# Patient Record
Sex: MALE | Race: BLACK OR AFRICAN AMERICAN | Employment: FULL TIME | URBAN - METROPOLITAN AREA
[De-identification: names, ages, dates, MRNs, and addresses within clinical notes are randomized per-mention and may not be internally consistent; named-entity substitution may affect disease eponyms.]

---

## 2018-08-01 ENCOUNTER — HOSPITAL ENCOUNTER (EMERGENCY)
Age: 48
Discharge: HOME | End: 2018-08-01
Attending: EMERGENCY MEDICINE
Payer: COMMERCIAL

## 2018-08-01 ENCOUNTER — APPOINTMENT (OUTPATIENT)
Dept: ULTRASOUND IMAGING | Age: 48
End: 2018-08-01
Attending: EMERGENCY MEDICINE
Payer: COMMERCIAL

## 2018-08-01 VITALS
RESPIRATION RATE: 13 BRPM | HEIGHT: 67 IN | BODY MASS INDEX: 25.95 KG/M2 | SYSTOLIC BLOOD PRESSURE: 137 MMHG | TEMPERATURE: 99.1 F | OXYGEN SATURATION: 97 % | HEART RATE: 94 BPM | DIASTOLIC BLOOD PRESSURE: 98 MMHG | WEIGHT: 165.34 LBS

## 2018-08-01 DIAGNOSIS — K85.90 ACUTE PANCREATITIS WITHOUT INFECTION OR NECROSIS, UNSPECIFIED PANCREATITIS TYPE: Primary | ICD-10-CM

## 2018-08-01 DIAGNOSIS — N30.01 ACUTE CYSTITIS WITH HEMATURIA: ICD-10-CM

## 2018-08-01 DIAGNOSIS — K76.0 HEPATIC STEATOSIS: ICD-10-CM

## 2018-08-01 DIAGNOSIS — R11.2 NAUSEA AND VOMITING, INTRACTABILITY OF VOMITING NOT SPECIFIED, UNSPECIFIED VOMITING TYPE: ICD-10-CM

## 2018-08-01 LAB
ALBUMIN SERPL-MCNC: 3.7 G/DL (ref 3.5–5)
ALBUMIN/GLOB SERPL: 0.8 {RATIO} (ref 1.1–2.2)
ALP SERPL-CCNC: 98 U/L (ref 45–117)
ALT SERPL-CCNC: 84 U/L (ref 12–78)
ANION GAP SERPL CALC-SCNC: 6 MMOL/L (ref 5–15)
APPEARANCE UR: CLEAR
AST SERPL-CCNC: 48 U/L (ref 15–37)
BACTERIA URNS QL MICRO: ABNORMAL /HPF
BASOPHILS # BLD: 0 K/UL (ref 0–0.1)
BASOPHILS NFR BLD: 0 % (ref 0–1)
BILIRUB SERPL-MCNC: 1.8 MG/DL (ref 0.2–1)
BILIRUB UR QL CFM: NEGATIVE
BUN SERPL-MCNC: 13 MG/DL (ref 6–20)
BUN/CREAT SERPL: 13 (ref 12–20)
CALCIUM SERPL-MCNC: 9.5 MG/DL (ref 8.5–10.1)
CHLORIDE SERPL-SCNC: 92 MMOL/L (ref 97–108)
CO2 SERPL-SCNC: 30 MMOL/L (ref 21–32)
COLOR UR: ABNORMAL
CREAT SERPL-MCNC: 1.03 MG/DL (ref 0.7–1.3)
DIFFERENTIAL METHOD BLD: ABNORMAL
EOSINOPHIL # BLD: 0 K/UL (ref 0–0.4)
EOSINOPHIL NFR BLD: 0 % (ref 0–7)
EPITH CASTS URNS QL MICRO: ABNORMAL /LPF
ERYTHROCYTE [DISTWIDTH] IN BLOOD BY AUTOMATED COUNT: 11.3 % (ref 11.5–14.5)
GLOBULIN SER CALC-MCNC: 4.6 G/DL (ref 2–4)
GLUCOSE SERPL-MCNC: 108 MG/DL (ref 65–100)
GLUCOSE UR STRIP.AUTO-MCNC: NEGATIVE MG/DL
HCT VFR BLD AUTO: 46.9 % (ref 36.6–50.3)
HGB BLD-MCNC: 16.7 G/DL (ref 12.1–17)
HGB UR QL STRIP: ABNORMAL
HYALINE CASTS URNS QL MICRO: ABNORMAL /LPF (ref 0–5)
IMM GRANULOCYTES # BLD: 0 K/UL (ref 0–0.04)
IMM GRANULOCYTES NFR BLD AUTO: 0 % (ref 0–0.5)
KETONES UR QL STRIP.AUTO: 40 MG/DL
LEUKOCYTE ESTERASE UR QL STRIP.AUTO: ABNORMAL
LIPASE SERPL-CCNC: >3000 U/L (ref 73–393)
LYMPHOCYTES # BLD: 0.5 K/UL (ref 0.8–3.5)
LYMPHOCYTES NFR BLD: 8 % (ref 12–49)
MCH RBC QN AUTO: 34.5 PG (ref 26–34)
MCHC RBC AUTO-ENTMCNC: 35.6 G/DL (ref 30–36.5)
MCV RBC AUTO: 96.9 FL (ref 80–99)
MONOCYTES # BLD: 0.6 K/UL (ref 0–1)
MONOCYTES NFR BLD: 10 % (ref 5–13)
MUCOUS THREADS URNS QL MICRO: ABNORMAL /LPF
NEUTS SEG # BLD: 4.9 K/UL (ref 1.8–8)
NEUTS SEG NFR BLD: 82 % (ref 32–75)
NITRITE UR QL STRIP.AUTO: POSITIVE
NRBC # BLD: 0 K/UL (ref 0–0.01)
NRBC BLD-RTO: 0 PER 100 WBC
PH UR STRIP: 6 [PH] (ref 5–8)
PLATELET # BLD AUTO: 189 K/UL (ref 150–400)
PMV BLD AUTO: 9.7 FL (ref 8.9–12.9)
POTASSIUM SERPL-SCNC: 3.8 MMOL/L (ref 3.5–5.1)
PROT SERPL-MCNC: 8.3 G/DL (ref 6.4–8.2)
PROT UR STRIP-MCNC: 100 MG/DL
RBC # BLD AUTO: 4.84 M/UL (ref 4.1–5.7)
RBC #/AREA URNS HPF: ABNORMAL /HPF (ref 0–5)
SODIUM SERPL-SCNC: 128 MMOL/L (ref 136–145)
SP GR UR REFRACTOMETRY: 1.03 (ref 1–1.03)
UA: UC IF INDICATED,UAUC: ABNORMAL
UROBILINOGEN UR QL STRIP.AUTO: 1 EU/DL (ref 0.2–1)
WBC # BLD AUTO: 6 K/UL (ref 4.1–11.1)
WBC URNS QL MICRO: ABNORMAL /HPF (ref 0–4)

## 2018-08-01 PROCEDURE — 85025 COMPLETE CBC W/AUTO DIFF WBC: CPT | Performed by: EMERGENCY MEDICINE

## 2018-08-01 PROCEDURE — 74011250637 HC RX REV CODE- 250/637: Performed by: EMERGENCY MEDICINE

## 2018-08-01 PROCEDURE — 81001 URINALYSIS AUTO W/SCOPE: CPT | Performed by: EMERGENCY MEDICINE

## 2018-08-01 PROCEDURE — 87086 URINE CULTURE/COLONY COUNT: CPT | Performed by: EMERGENCY MEDICINE

## 2018-08-01 PROCEDURE — 96374 THER/PROPH/DIAG INJ IV PUSH: CPT

## 2018-08-01 PROCEDURE — 80053 COMPREHEN METABOLIC PANEL: CPT | Performed by: EMERGENCY MEDICINE

## 2018-08-01 PROCEDURE — 96375 TX/PRO/DX INJ NEW DRUG ADDON: CPT

## 2018-08-01 PROCEDURE — 74011250636 HC RX REV CODE- 250/636: Performed by: EMERGENCY MEDICINE

## 2018-08-01 PROCEDURE — 99284 EMERGENCY DEPT VISIT MOD MDM: CPT

## 2018-08-01 PROCEDURE — 96361 HYDRATE IV INFUSION ADD-ON: CPT

## 2018-08-01 PROCEDURE — 83690 ASSAY OF LIPASE: CPT | Performed by: EMERGENCY MEDICINE

## 2018-08-01 PROCEDURE — 76705 ECHO EXAM OF ABDOMEN: CPT

## 2018-08-01 PROCEDURE — 36415 COLL VENOUS BLD VENIPUNCTURE: CPT | Performed by: EMERGENCY MEDICINE

## 2018-08-01 RX ORDER — ONDANSETRON 2 MG/ML
4 INJECTION INTRAMUSCULAR; INTRAVENOUS
Status: COMPLETED | OUTPATIENT
Start: 2018-08-01 | End: 2018-08-01

## 2018-08-01 RX ORDER — ONDANSETRON 4 MG/1
4 TABLET, ORALLY DISINTEGRATING ORAL
Status: COMPLETED | OUTPATIENT
Start: 2018-08-01 | End: 2018-08-01

## 2018-08-01 RX ORDER — CEPHALEXIN 500 MG/1
500 CAPSULE ORAL 3 TIMES DAILY
Qty: 15 CAP | Refills: 0 | Status: SHIPPED | OUTPATIENT
Start: 2018-08-01 | End: 2019-03-02

## 2018-08-01 RX ORDER — ONDANSETRON 4 MG/1
4 TABLET, ORALLY DISINTEGRATING ORAL
Qty: 10 TAB | Refills: 0 | Status: SHIPPED | OUTPATIENT
Start: 2018-08-01 | End: 2019-02-28

## 2018-08-01 RX ORDER — OXYCODONE HYDROCHLORIDE 5 MG/1
5 TABLET ORAL
Qty: 10 TAB | Refills: 0 | Status: SHIPPED | OUTPATIENT
Start: 2018-08-01 | End: 2019-02-28

## 2018-08-01 RX ORDER — HYDROCODONE BITARTRATE AND ACETAMINOPHEN 5; 325 MG/1; MG/1
1 TABLET ORAL
Status: COMPLETED | OUTPATIENT
Start: 2018-08-01 | End: 2018-08-01

## 2018-08-01 RX ORDER — FAMOTIDINE 20 MG/1
20 TABLET, FILM COATED ORAL 2 TIMES DAILY
Qty: 20 TAB | Refills: 0 | Status: SHIPPED | OUTPATIENT
Start: 2018-08-01 | End: 2019-02-28

## 2018-08-01 RX ORDER — FENTANYL CITRATE 50 UG/ML
50 INJECTION, SOLUTION INTRAMUSCULAR; INTRAVENOUS
Status: COMPLETED | OUTPATIENT
Start: 2018-08-01 | End: 2018-08-01

## 2018-08-01 RX ADMIN — FENTANYL CITRATE 50 MCG: 50 INJECTION, SOLUTION INTRAMUSCULAR; INTRAVENOUS at 14:56

## 2018-08-01 RX ADMIN — HYDROCODONE BITARTRATE AND ACETAMINOPHEN 1 TABLET: 5; 325 TABLET ORAL at 12:47

## 2018-08-01 RX ADMIN — ONDANSETRON 4 MG: 2 INJECTION, SOLUTION INTRAMUSCULAR; INTRAVENOUS at 14:56

## 2018-08-01 RX ADMIN — SODIUM CHLORIDE 1000 ML: 900 INJECTION, SOLUTION INTRAVENOUS at 14:56

## 2018-08-01 RX ADMIN — ONDANSETRON 4 MG: 4 TABLET, ORALLY DISINTEGRATING ORAL at 12:48

## 2018-08-01 NOTE — ED PROVIDER NOTES
EMERGENCY DEPARTMENT HISTORY AND PHYSICAL EXAM      Date: 8/1/2018  Patient Name: David Devi    History of Presenting Illness     Chief Complaint   Patient presents with    Dizziness     per pt n/v past few days     History Provided By: Patient    HPI: David Devi, 50 y.o. male with no significant PMHx, presents ambulatory to the ED with cc of constant 8 out of 10 abdominal pain, ongoing for 2 days. Pt reports subjective fever, nausea, vomiting, decreased urination, and lightheadedness alongside LLQ, LUQ, and epigastric pain. He notes that he cannot keep down any fluids or food and has therefore unable to urinate or pass a stool. He endorses to typically drinking 4-5 beers daily. Pt denies taking any OTC medications for relief PTA. He denies any HA, chills, dysuria, back pain, CP, or diarrhea. Chief Complaint: abdominal pain  Duration: 2 Days  Timing:  Intermittent and Constant  Location: LUQ, LLQ  Quality: burning  Severity: 8 out of 10  Modifying Factors: denies any modifying factors  Associated Symptoms: subjective fever, nausea, vomiting, decreased urination, lightheadedness    There are no other complaints, changes, or physical findings at this time. PCP: Jessica Whaley MD  Past History     Past Medical History:  History reviewed. No pertinent past medical history. Past Surgical History:  History reviewed. No pertinent surgical history. Family History:  History reviewed. No pertinent family history. Social History:  Social History   Substance Use Topics    Smoking status: Never Smoker    Smokeless tobacco: Never Used    Alcohol use 3.6 oz/week     6 Cans of beer per week       Allergies:  No Known Allergies  Review of Systems   Review of Systems   Constitutional: Positive for fever (subjective). Negative for chills. HENT: Negative for congestion. Eyes: Negative for visual disturbance. Respiratory: Negative for chest tightness. Cardiovascular: Negative for chest pain. Gastrointestinal: Positive for abdominal pain (LUQ, LLQ), nausea and vomiting. Endocrine: Negative for heat intolerance. Genitourinary: Positive for difficulty urinating. Negative for dysuria. Musculoskeletal: Negative for back pain. Skin: Negative for rash. Allergic/Immunologic: Negative for immunocompromised state. Neurological: Positive for light-headedness. Negative for headaches. Hematological: Does not bruise/bleed easily. Psychiatric/Behavioral: Negative. All other systems reviewed and are negative. Physical Exam   Physical Exam   Constitutional: He is oriented to person, place, and time. He appears well-developed and well-nourished. He appears distressed (mild). HENT:   Head: Normocephalic and atraumatic. Eyes: EOM are normal. Pupils are equal, round, and reactive to light. Neck: Normal range of motion. Neck supple. Cardiovascular: Normal rate, regular rhythm and normal heart sounds. Pulmonary/Chest: Effort normal and breath sounds normal. He has no wheezes. Abdominal: Soft. Bowel sounds are normal.   Epigastric tenderness greater than LUQ greater than LLQ   Musculoskeletal: Normal range of motion. He exhibits no edema or tenderness. Neurological: He is alert and oriented to person, place, and time. No cranial nerve deficit. Skin: Skin is warm and dry. Psychiatric: He has a normal mood and affect. His behavior is normal.   Nursing note and vitals reviewed.     Diagnostic Study Results     Labs -     Recent Results (from the past 12 hour(s))   CBC WITH AUTOMATED DIFF    Collection Time: 08/01/18 11:35 AM   Result Value Ref Range    WBC 6.0 4.1 - 11.1 K/uL    RBC 4.84 4.10 - 5.70 M/uL    HGB 16.7 12.1 - 17.0 g/dL    HCT 46.9 36.6 - 50.3 %    MCV 96.9 80.0 - 99.0 FL    MCH 34.5 (H) 26.0 - 34.0 PG    MCHC 35.6 30.0 - 36.5 g/dL    RDW 11.3 (L) 11.5 - 14.5 %    PLATELET 937 662 - 510 K/uL    MPV 9.7 8.9 - 12.9 FL    NRBC 0.0 0  WBC    ABSOLUTE NRBC 0.00 0.00 - 0.01 K/uL    NEUTROPHILS 82 (H) 32 - 75 %    LYMPHOCYTES 8 (L) 12 - 49 %    MONOCYTES 10 5 - 13 %    EOSINOPHILS 0 0 - 7 %    BASOPHILS 0 0 - 1 %    IMMATURE GRANULOCYTES 0 0.0 - 0.5 %    ABS. NEUTROPHILS 4.9 1.8 - 8.0 K/UL    ABS. LYMPHOCYTES 0.5 (L) 0.8 - 3.5 K/UL    ABS. MONOCYTES 0.6 0.0 - 1.0 K/UL    ABS. EOSINOPHILS 0.0 0.0 - 0.4 K/UL    ABS. BASOPHILS 0.0 0.0 - 0.1 K/UL    ABS. IMM. GRANS. 0.0 0.00 - 0.04 K/UL    DF AUTOMATED     METABOLIC PANEL, COMPREHENSIVE    Collection Time: 08/01/18 11:35 AM   Result Value Ref Range    Sodium 128 (L) 136 - 145 mmol/L    Potassium 3.8 3.5 - 5.1 mmol/L    Chloride 92 (L) 97 - 108 mmol/L    CO2 30 21 - 32 mmol/L    Anion gap 6 5 - 15 mmol/L    Glucose 108 (H) 65 - 100 mg/dL    BUN 13 6 - 20 MG/DL    Creatinine 1.03 0.70 - 1.30 MG/DL    BUN/Creatinine ratio 13 12 - 20      GFR est AA >60 >60 ml/min/1.73m2    GFR est non-AA >60 >60 ml/min/1.73m2    Calcium 9.5 8.5 - 10.1 MG/DL    Bilirubin, total 1.8 (H) 0.2 - 1.0 MG/DL    ALT (SGPT) 84 (H) 12 - 78 U/L    AST (SGOT) 48 (H) 15 - 37 U/L    Alk.  phosphatase 98 45 - 117 U/L    Protein, total 8.3 (H) 6.4 - 8.2 g/dL    Albumin 3.7 3.5 - 5.0 g/dL    Globulin 4.6 (H) 2.0 - 4.0 g/dL    A-G Ratio 0.8 (L) 1.1 - 2.2     LIPASE    Collection Time: 08/01/18 11:35 AM   Result Value Ref Range    Lipase >3000 (H) 73 - 393 U/L   URINALYSIS W/ REFLEX CULTURE    Collection Time: 08/01/18 12:06 PM   Result Value Ref Range    Color ORANGE      Appearance CLEAR CLEAR      Specific gravity 1.028 1.003 - 1.030      pH (UA) 6.0 5.0 - 8.0      Protein 100 (A) NEG mg/dL    Glucose NEGATIVE  NEG mg/dL    Ketone 40 (A) NEG mg/dL    Blood MODERATE (A) NEG      Urobilinogen 1.0 0.2 - 1.0 EU/dL    Nitrites POSITIVE (A) NEG      Leukocyte Esterase SMALL (A) NEG      WBC 5-10 0 - 4 /hpf    RBC 5-10 0 - 5 /hpf    Epithelial cells FEW FEW /lpf    Bacteria 1+ (A) NEG /hpf    UA:UC IF INDICATED URINE CULTURE ORDERED (A) CNI      Mucus TRACE (A) NEG /lpf    Hyaline cast 0-2 0 - 5 /lpf   BILIRUBIN, CONFIRM    Collection Time: 08/01/18 12:06 PM   Result Value Ref Range    Bilirubin UA, confirm NEGATIVE  NEG         Radiologic Studies -   US ABD LTD   Final Result   Initial Result:     Impression:     IMPRESSION:   1. Trace ascites. 2. Moderate hepatic steatosis. No overt cirrhosis.     Narrative:     ULTRASOUND OF THE RIGHT UPPER QUADRANT    INDICATION: Abdominal pain; Pancreatitis, acute, first episode, etiology  unknown, 2-3 days out    COMPARISON: None. TECHNIQUE:  Sonography of the right upper quadrant was performed. FINDINGS:    LIVER: Hepatic echotexture is increased, consistent with moderate hepatic  steatosis. The deep portions of the liver are not well visualized as a result. No intrahepatic biliary ductal dilation or focal mass shown in the superficial  aspects of the liver. There is trace ascites. The liver is not overtly  cirrhotic. GALLBLADDER: No gallstones, wall thickening, or pericholecystic fluid. COMMON DUCT: 0. 3 cm in diameter. The duct is normal caliber. PANCREAS: Obscured by bowel gas. RIGHT KIDNEY: 10.4 cm in length. No hydronephrosis, shadowing calculus, or  contour-deforming renal mass. Medical Decision Making   I am the first provider for this patient. I reviewed the vital signs, available nursing notes, past medical history, past surgical history, family history and social history. Vital Signs-Reviewed the patient's vital signs.   Patient Vitals for the past 12 hrs:   Temp Pulse Resp BP SpO2   08/01/18 1525 - 94 13 - 97 %   08/01/18 1515 - 94 13 (!) 137/98 98 %   08/01/18 1416 - (!) 109 17 - 98 %   08/01/18 1233 99.1 °F (37.3 °C) (!) 116 20 (!) 124/101 98 %   08/01/18 1120 98.6 °F (37 °C) (!) 122 18 109/86 100 %     Pulse Oximetry Analysis - 98% on RA    Records Reviewed: Nursing Notes, Old Medical Records and Previous Laboratory Studies    Provider Notes (Medical Decision Making):   DDx: pancreatitis, reflux, gastritis, PUD, dehydration, electrolyte abnormality    ED Course:   Initial assessment performed. The patients presenting problems have been discussed, and they are in agreement with the care plan formulated and outlined with them. I have encouraged them to ask questions as they arise throughout their visit. 3:19 PM  Pt has been re-evaluated and reports to be feeling better. Critical Care Time: 0    Disposition:  3:25 PM  I informed the pt that he needed admission, further observation, further workup for adequate evaluation for his pancreatitis, chest pain, DKA, and that by refusing the above, he is at risk for worsening of symptoms and complications, myocardial infarction, arrhythmia, sudden death, paralysis, coma. He is awake, alert, and he understands his condition and the risks involved in leaving. The patient has received Fentanyl (list any potential altering drugs patient had receivedanalgesics, benzos, sedatives) and it has been 1-2 hours since last receiving this medication. He is clinically aware of his surroundings and able to ask appropriate questions, the patients family member and the nurse present confirms he is not clinically intoxicated and able to make medical decisions. He verbalized knowing the risks and understood it was recommended that he stay and could also return at any time. his family member was present for the discussion and also verbalized that they understood both diagnosis, risks and could return at any time. They were both provided with warnings regarding worsening of his condition and were provided instructions to follow up with Shirley Hawkins MD tomorrow or return to the Emergency Room as soon as possible. This discussion was witnessed by nurse Gibson Arthur. PLAN:  1.    Discharge Medication List as of 8/1/2018  3:35 PM      START taking these medications    Details   ondansetron (ZOFRAN ODT) 4 mg disintegrating tablet Take 1 Tab by mouth every eight (8) hours as needed for Nausea., Normal, Disp-10 Tab, R-0      oxyCODONE IR (ROXICODONE) 5 mg immediate release tablet Take 1 Tab by mouth every four (4) hours as needed for Pain. Max Daily Amount: 30 mg., Print, Disp-10 Tab, R-0      famotidine (PEPCID) 20 mg tablet Take 1 Tab by mouth two (2) times a day., Normal, Disp-20 Tab, R-0      cephALEXin (KEFLEX) 500 mg capsule Take 1 Cap by mouth three (3) times daily. , Normal, Disp-15 Cap, R-0           2. Follow-up Information     Follow up With Details Comments Contact Info    Faina Kothari MD In 2 days As needed [id number]      Jewell Juarez MD  [id number] [phone]      [facility] EMERGENCY DEPT  If symptoms worsen [address] [place] [address] Drive  [address] [facility]  [phone]        Return to ED if worse   Diagnosis     Clinical Impression:   1. Acute pancreatitis without infection or necrosis, unspecified pancreatitis type    2. Hepatic steatosis    3. Nausea and vomiting, intractability of vomiting not specified, unspecified vomiting type    4. Acute cystitis with hematuria        Attestations: This note is prepared by Lanie Mcdonough, acting as a Scribe for Love Malagon MD.    Love Malagon MD: The scribe's documentation has been prepared under my direction and personally reviewed by me in its entirety. I confirm that the notes above accurately reflects all work, treatment, procedures, and medical decision making performed by me.

## 2018-08-01 NOTE — DISCHARGE INSTRUCTIONS
Nonalcoholic Steatohepatitis (SUNG): Care Instructions  Your Care Instructions    Nonalcoholic steatohepatitis (SUNG) is liver inflammation. It is caused by a buildup of fat in the liver. The fat buildup is not caused by drinking alcohol. Because of the inflammation, the liver does not work as well as it should. SUNG is part of a group of liver diseases called nonalcoholic fatty liver disease. In these diseases, fat builds up in the liver and sometimes causes liver damage. This damage can get worse over time. Follow-up care is a key part of your treatment and safety. Be sure to make and go to all appointments, and call your doctor if you are having problems. It's also a good idea to know your test results and keep a list of the medicines you take. How can you care for yourself at home? · Stay at a healthy weight. Or if you need to, slowly get to a healthy weight. · Control your cholesterol. Talk to your doctor about ways to lower your cholesterol, if needed. You might try getting active, taking medicines, and making healthy changes to your diet. · Eat healthy foods. This includes fruits, vegetables, lean meats and dairy, and whole grains. · If you have diabetes, keep your blood sugar at your target level. · Get at least 30 minutes of exercise on most days of the week. Walking is a good choice. You also may want to do other activities, such as running, swimming, cycling, or playing tennis or team sports. · Limit alcohol, or do not drink. Alcohol can damage the liver and cause health problems. When should you call for help? Call 911 anytime you think you may need emergency care.  For example, call if:    · You have trouble breathing.     · You vomit blood or what looks like coffee grounds.    Call your doctor now or seek immediate medical care if:    · You feel very sleepy or confused.     · You have new or worse belly pain.     · You have a fever.     · There is a new or increasing yellow tint to your skin or the whites of your eyes.     · You have any abnormal bleeding, such as:  ¨ Nosebleeds. ¨ Vaginal bleeding that is different (heavier, more frequent, at a different time of the month) than what you are used to. ¨ Bloody or black stools, or rectal bleeding. ¨ Bloody or pink urine.    Watch closely for changes in your health, and be sure to contact your doctor if:    · Your belly is getting bigger.     · You are gaining weight.     · You have any problems. Where can you learn more? Go to http://viviane-wallace.info/. Enter Y087 in the search box to learn more about \"Nonalcoholic Steatohepatitis (SUNG): Care Instructions. \"  Current as of: May 12, 2017  Content Version: 11.7  © 7873-0854 [employer]. Care instructions adapted under license by [employer] (which disclaims liability or warranty for this information). If you have questions about a medical condition or this instruction, always ask your healthcare professional. [place] [address] any warranty or liability for your use of this information. Urinary Tract Infections in Men: Care Instructions  Your Care Instructions    A urinary tract infection, or UTI, is a general term for an infection anywhere between the kidneys and the tip of the penis. UTIs can also be a result of a prostate problem. Most cause pain or burning when you urinate. Most UTIs are caused by bacteria and can be cured with antibiotics. It is important to complete your treatment so that the infection does not get worse. Follow-up care is a key part of your treatment and safety. Be sure to make and go to all appointments, and call your doctor if you are having problems. It's also a good idea to know your test results and keep a list of the medicines you take. How can you care for yourself at home? · Take your antibiotics as prescribed. Do not stop taking them just because you feel better.  You need to take the full course of antibiotics. · Take your medicines exactly as prescribed. Your doctor may have prescribed a medicine, such as phenazopyridine (Pyridium), to help relieve pain when you urinate. This turns your urine orange. You may stop taking it when your symptoms get better. But be sure to take all of your antibiotics, which treat the infection. · Drink extra water for the next day or two. This will help make the urine less concentrated and help wash out the bacteria causing the infection. (If you have kidney, heart, or liver disease and have to limit your fluids, talk with your doctor before you increase your fluid intake.)  · Avoid drinks that are carbonated or have caffeine. They can irritate the bladder. · Urinate often. Try to empty your bladder each time. · To relieve pain, take a hot bath or lay a heating pad (set on low) over your lower belly or genital area. Never go to sleep with a heating pad in place. To help prevent UTIs  · Drink plenty of fluids, enough so that your urine is light yellow or clear like water. If you have kidney, heart, or liver disease and have to limit fluids, talk with your doctor before you increase the amount of fluids you drink. · Urinate when you have the urge. Do not hold your urine for a long time. Urinate before you go to sleep. · Keep your penis clean. Catheter care  If you have a drainage tube (catheter) in place, the following steps will help you care for it. · Always wash your hands before and after touching your catheter. · Check the area around the urethra for inflammation or signs of infection. Signs of infection include irritated, swollen, red, or tender skin, or pus around the catheter. · Clean the area around the catheter with soap and water two times a day. Dry with a clean towel afterward. · Do not apply powder or lotion to the skin around the catheter. To empty the urine collection bag  · Wash your hands with soap and water.   · Without touching the drain spout, remove the spout from its sleeve at the bottom of the collection bag. Open the valve on the spout. · Let the urine flow out of the bag and into the toilet or a container. Do not let the tubing or drain spout touch anything. · After you empty the bag, clean the end of the drain spout with tissue and water. Close the valve and put the drain spout back into its sleeve at the bottom of the collection bag. · Wash your hands with soap and water. When should you call for help? Call your doctor now or seek immediate medical care if:    · Symptoms such as a fever, chills, nausea, or vomiting get worse or happen for the first time.     · You have new pain in your back just below your rib cage. This is called flank pain.     · There is new blood or pus in your urine.     · You are not able to take or keep down your antibiotics.    Watch closely for changes in your health, and be sure to contact your doctor if:    · You are not getting better after taking an antibiotic for 2 days.     · Your symptoms go away but then come back. Where can you learn more? Go to http://viviane-wallace.info/. Enter E486 in the search box to learn more about \"Urinary Tract Infections in Men: Care Instructions. \"  Current as of: May 12, 2017  Content Version: 11.7  © 7169-5870 [employer]. Care instructions adapted under license by [employer] (which disclaims liability or warranty for this information). If you have questions about a medical condition or this instruction, always ask your healthcare professional. [place] [address] any warranty or liability for your use of this information. Nausea and Vomiting: Care Instructions  Your Care Instructions    When you are nauseated, you may feel weak and sweaty and notice a lot of saliva in your mouth. Nausea often leads to vomiting.  Most of the time you do not need to worry about nausea and vomiting, but they can be signs of other illnesses. Two common causes of nausea and vomiting are stomach flu and food poisoning. Nausea and vomiting from viral stomach flu will usually start to improve within 24 hours. Nausea and vomiting from food poisoning may last from 12 to 48 hours. The doctor has checked you carefully, but problems can develop later. If you notice any problems or new symptoms, get medical treatment right away. Follow-up care is a key part of your treatment and safety. Be sure to make and go to all appointments, and call your doctor if you are having problems. It's also a good idea to know your test results and keep a list of the medicines you take. How can you care for yourself at home? · To prevent dehydration, drink plenty of fluids, enough so that your urine is light yellow or clear like water. Choose water and other caffeine-free clear liquids until you feel better. If you have kidney, heart, or liver disease and have to limit fluids, talk with your doctor before you increase the amount of fluids you drink. · Rest in bed until you feel better. · When you are able to eat, try clear soups, mild foods, and liquids until all symptoms are gone for 12 to 48 hours. Other good choices include dry toast, crackers, cooked cereal, and gelatin dessert, such as Jell-O. When should you call for help? Call 911 anytime you think you may need emergency care. For example, call if:    · You passed out (lost consciousness).    Call your doctor now or seek immediate medical care if:    · You have symptoms of dehydration, such as:  ¨ Dry eyes and a dry mouth. ¨ Passing only a little dark urine.   ¨ Feeling thirstier than usual.     · You have new or worsening belly pain.     · You have a new or higher fever.     · You vomit blood or what looks like coffee grounds.    Watch closely for changes in your health, and be sure to contact your doctor if:    · You have ongoing nausea and vomiting.     · Your vomiting is getting worse.     · Your vomiting lasts longer than 2 days.     · You are not getting better as expected. Where can you learn more? Go to http://viviane-wallace.info/. Enter 25 [id number] in the search box to learn more about \"Nausea and Vomiting: Care Instructions. \"  Current as of: November 20, 2017  Content Version: 11.7  © 2787-4046 [employer]. Care instructions adapted under license by [employer] (which disclaims liability or warranty for this information). If you have questions about a medical condition or this instruction, always ask your healthcare professional. [address] any warranty or liability for your use of this information. Pancreatitis: Care Instructions  Your Care Instructions    The pancreas is an organ behind the stomach. It makes hormones and enzymes to help your body digest food. But if these enzymes attack the pancreas, it can get inflamed. This is called pancreatitis. Most cases are caused by gallstones or by heavy alcohol use. If you take care of yourself at home, it will help you get better. It will also help you avoid more problems with your pancreas. Follow-up care is a key part of your treatment and safety. Be sure to make and go to all appointments, and call your doctor if you are having problems. It's also a good idea to know your test results and keep a list of the medicines you take. How can you care for yourself at home? · Drink clear liquids and eat bland foods until you feel better. [place] foods include rice, dry toast, and crackers. They also include bananas and applesauce. · Eat a low-fat diet until your doctor says your pancreas is healed. · Do not drink alcohol. Tell your doctor if you need help to quit. Counseling, support groups, and sometimes medicines can help you stay sober. · Be safe with medicines. Read and follow all instructions on the label.   ¨ If the doctor gave you a prescription medicine for pain, take it as prescribed. ¨ If you are not taking a prescription pain medicine, ask your doctor if you can take an over-the-counter medicine. · If your doctor prescribed antibiotics, take them as directed. Do not stop taking them just because you feel better. You need to take the full course of antibiotics. · Get extra rest until you feel better. To prevent future problems with your pancreas  · Do not drink alcohol. · Tell your doctors and pharmacist that you've had pancreatitis. They can help you avoid medicines that may cause this problem again. When should you call for help? Call 911 anytime you think you may need emergency care. For example, call if:    · You vomit blood or what looks like coffee grounds.     · Your stools are maroon or very bloody.    Call your doctor now or seek immediate medical care if:    · You have new or worse belly pain.     · Your stools are black and look like tar, or they have streaks of blood.     · You are vomiting.    Watch closely for changes in your health, and be sure to contact your doctor if:    · You do not get better as expected. Where can you learn more? Go to http://viviane-wallace.info/. Enter F244 in the search box to learn more about \"Pancreatitis: Care Instructions. \"  Current as of: May 12, 2017  Content Version: 11.7  © 4792-5386 [employer]. Care instructions adapted under license by [employer] (which disclaims liability or warranty for this information). If you have questions about a medical condition or this instruction, always ask your healthcare professional. [place] [address] any warranty or liability for your use of this information. Thank you! Thank you for allowing us to provide you with excellent care today. We hope we addressed all of your concerns and needs. We strive to provide excellent quality care in the Emergency Department.  You may receive a survey after your visit to evaluate the care you were provided. Should you receive a survey from us, we invite you to share your experience and tell us what made it excellent. It was a pleasure serving you, we invite you to share your experience with us, in our pursuit for excellence, should you be selected to receive a survey. If you feel that you have not received excellent quality care or timely care, please ask to speak to the nurse manager. Please choose us in the future for your continued health care needs. ------------------------------------------------------------------------------------------------------------  The exam and treatment you received in the Emergency Department were for an urgent problem and are not intended as complete care. It is important that you follow up with a doctor, nurse practitioner, or physician assistant for ongoing care. If your symptoms become worse or you do not improve as expected and you are unable to reach your usual health care provider, you should return to the Emergency Department. We are available 24 hours a day. Please take your discharge instructions with you when you go to your follow-up appointment. If you have any problem arranging a follow-up appointment, contact the Emergency Department immediately. If a prescription has been provided, please have it filled as soon as possible to prevent a delay in treatment. Read the entire medication instruction sheet provided to you by the pharmacy. If you have any questions or reservations about taking the medication due to side effects or interactions with other medications, please call your primary care physician or contact the ER to speak with the charge nurse. Make an appointment with your family doctor or the physician you were referred to for follow-up of this visit as instructed on your discharge paperwork, as this is mandatory follow-up. Return to the ER if you are unable to be seen or if you are unable to be seen in a timely manner.     If you have any problem arranging the follow-up visit, contact the Emergency Department immediately.

## 2018-08-03 LAB
BACTERIA SPEC CULT: NORMAL
CC UR VC: NORMAL
SERVICE CMNT-IMP: NORMAL

## 2019-02-27 ENCOUNTER — HOSPITAL ENCOUNTER (INPATIENT)
Age: 49
LOS: 2 days | Discharge: HOME | DRG: 439 | End: 2019-03-02
Attending: EMERGENCY MEDICINE | Admitting: GENERAL ACUTE CARE HOSPITAL
Payer: COMMERCIAL

## 2019-02-27 DIAGNOSIS — K85.21 ALCOHOL-INDUCED ACUTE PANCREATITIS WITH UNINFECTED NECROSIS: Primary | ICD-10-CM

## 2019-02-27 PROCEDURE — 36415 COLL VENOUS BLD VENIPUNCTURE: CPT

## 2019-02-27 PROCEDURE — 83605 ASSAY OF LACTIC ACID: CPT

## 2019-02-27 PROCEDURE — 85025 COMPLETE CBC W/AUTO DIFF WBC: CPT

## 2019-02-27 PROCEDURE — 99284 EMERGENCY DEPT VISIT MOD MDM: CPT

## 2019-02-27 PROCEDURE — 80307 DRUG TEST PRSMV CHEM ANLYZR: CPT

## 2019-02-27 PROCEDURE — 83690 ASSAY OF LIPASE: CPT

## 2019-02-27 PROCEDURE — 94761 N-INVAS EAR/PLS OXIMETRY MLT: CPT

## 2019-02-27 RX ORDER — ONDANSETRON 4 MG/1
4 TABLET, ORALLY DISINTEGRATING ORAL
Status: COMPLETED | OUTPATIENT
Start: 2019-02-27 | End: 2019-02-28

## 2019-02-27 RX ORDER — FENTANYL CITRATE 50 UG/ML
75 INJECTION, SOLUTION INTRAMUSCULAR; INTRAVENOUS
Status: COMPLETED | OUTPATIENT
Start: 2019-02-27 | End: 2019-02-28

## 2019-02-27 NOTE — LETTER
NOTIFICATION RETURN TO WORK / SCHOOL 
 
3/2/2019 8:08 AM 
 
Mr. Tamara Andrews 
[address] [place] [phone] To Whom It May Concern: 
 
Tamara Andrews has recently been hospitalized at [facility]. He was hospitalized from the evening of February 27 until the morning of March 2, 2019. He is medically cleared to return to work. If there are questions or concerns please have the patient contact our office. Sincerely, Delaney Orozco MD 
Hospitalist 
[facility]

## 2019-02-28 ENCOUNTER — APPOINTMENT (OUTPATIENT)
Dept: CT IMAGING | Age: 49
DRG: 439 | End: 2019-02-28
Attending: EMERGENCY MEDICINE
Payer: COMMERCIAL

## 2019-02-28 ENCOUNTER — APPOINTMENT (OUTPATIENT)
Dept: ULTRASOUND IMAGING | Age: 49
DRG: 439 | End: 2019-02-28
Attending: GENERAL ACUTE CARE HOSPITAL
Payer: COMMERCIAL

## 2019-02-28 PROBLEM — K85.90 PANCREATITIS: Status: ACTIVE | Noted: 2019-02-28

## 2019-02-28 LAB
ALBUMIN SERPL-MCNC: 4.1 G/DL (ref 3.5–5)
ALBUMIN/GLOB SERPL: 0.9 {RATIO} (ref 1.1–2.2)
ALP SERPL-CCNC: 92 U/L (ref 45–117)
ALT SERPL-CCNC: 83 U/L (ref 12–78)
ANION GAP SERPL CALC-SCNC: 11 MMOL/L (ref 5–15)
ANION GAP SERPL CALC-SCNC: 9 MMOL/L (ref 5–15)
APPEARANCE UR: CLEAR
AST SERPL-CCNC: 84 U/L (ref 15–37)
BACTERIA URNS QL MICRO: NEGATIVE /HPF
BASOPHILS # BLD: 0 K/UL (ref 0–0.1)
BASOPHILS NFR BLD: 0 % (ref 0–1)
BILIRUB SERPL-MCNC: 1.9 MG/DL (ref 0.2–1)
BILIRUB UR QL CFM: POSITIVE
BUN SERPL-MCNC: 13 MG/DL (ref 6–20)
BUN SERPL-MCNC: 15 MG/DL (ref 6–20)
BUN/CREAT SERPL: 16 (ref 12–20)
BUN/CREAT SERPL: 16 (ref 12–20)
CALCIUM SERPL-MCNC: 9.1 MG/DL (ref 8.5–10.1)
CALCIUM SERPL-MCNC: 9.8 MG/DL (ref 8.5–10.1)
CHLORIDE SERPL-SCNC: 89 MMOL/L (ref 97–108)
CHLORIDE SERPL-SCNC: 92 MMOL/L (ref 97–108)
CO2 SERPL-SCNC: 27 MMOL/L (ref 21–32)
CO2 SERPL-SCNC: 28 MMOL/L (ref 21–32)
COLOR UR: ABNORMAL
COMMENT, HOLDF: NORMAL
CREAT SERPL-MCNC: 0.82 MG/DL (ref 0.7–1.3)
CREAT SERPL-MCNC: 0.92 MG/DL (ref 0.7–1.3)
DIFFERENTIAL METHOD BLD: ABNORMAL
EOSINOPHIL # BLD: 0 K/UL (ref 0–0.4)
EOSINOPHIL NFR BLD: 0 % (ref 0–7)
EPITH CASTS URNS QL MICRO: ABNORMAL /LPF
ERYTHROCYTE [DISTWIDTH] IN BLOOD BY AUTOMATED COUNT: 10.8 % (ref 11.5–14.5)
ETHANOL SERPL-MCNC: <10 MG/DL
GLOBULIN SER CALC-MCNC: 4.4 G/DL (ref 2–4)
GLUCOSE SERPL-MCNC: 104 MG/DL (ref 65–100)
GLUCOSE SERPL-MCNC: 106 MG/DL (ref 65–100)
GLUCOSE UR STRIP.AUTO-MCNC: NEGATIVE MG/DL
HCT VFR BLD AUTO: 42.6 % (ref 36.6–50.3)
HGB BLD-MCNC: 15.8 G/DL (ref 12.1–17)
HGB UR QL STRIP: ABNORMAL
HYALINE CASTS URNS QL MICRO: ABNORMAL /LPF (ref 0–5)
IMM GRANULOCYTES # BLD AUTO: 0 K/UL (ref 0–0.04)
IMM GRANULOCYTES NFR BLD AUTO: 0 % (ref 0–0.5)
KETONES UR QL STRIP.AUTO: 80 MG/DL
LACTATE SERPL-SCNC: 1.3 MMOL/L (ref 0.4–2)
LACTATE SERPL-SCNC: 2.2 MMOL/L (ref 0.4–2)
LEUKOCYTE ESTERASE UR QL STRIP.AUTO: ABNORMAL
LIPASE SERPL-CCNC: >3000 U/L (ref 73–393)
LIPASE SERPL-CCNC: >3000 U/L (ref 73–393)
LYMPHOCYTES # BLD: 0.3 K/UL (ref 0.8–3.5)
LYMPHOCYTES NFR BLD: 4 % (ref 12–49)
MCH RBC QN AUTO: 34.7 PG (ref 26–34)
MCHC RBC AUTO-ENTMCNC: 37.1 G/DL (ref 30–36.5)
MCV RBC AUTO: 93.6 FL (ref 80–99)
MONOCYTES # BLD: 0.6 K/UL (ref 0–1)
MONOCYTES NFR BLD: 9 % (ref 5–13)
NEUTS SEG # BLD: 5.7 K/UL (ref 1.8–8)
NEUTS SEG NFR BLD: 87 % (ref 32–75)
NITRITE UR QL STRIP.AUTO: POSITIVE
NRBC # BLD: 0 K/UL (ref 0–0.01)
NRBC BLD-RTO: 0 PER 100 WBC
PH UR STRIP: 6 [PH] (ref 5–8)
PLATELET # BLD AUTO: 177 K/UL (ref 150–400)
PMV BLD AUTO: 9.7 FL (ref 8.9–12.9)
POTASSIUM SERPL-SCNC: 3.9 MMOL/L (ref 3.5–5.1)
POTASSIUM SERPL-SCNC: 4.2 MMOL/L (ref 3.5–5.1)
PROT SERPL-MCNC: 8.5 G/DL (ref 6.4–8.2)
PROT UR STRIP-MCNC: 100 MG/DL
RBC # BLD AUTO: 4.55 M/UL (ref 4.1–5.7)
RBC #/AREA URNS HPF: ABNORMAL /HPF (ref 0–5)
RBC MORPH BLD: ABNORMAL
SAMPLES BEING HELD,HOLD: NORMAL
SODIUM SERPL-SCNC: 127 MMOL/L (ref 136–145)
SODIUM SERPL-SCNC: 129 MMOL/L (ref 136–145)
SP GR UR REFRACTOMETRY: ABNORMAL (ref 1–1.03)
UA: UC IF INDICATED,UAUC: ABNORMAL
UROBILINOGEN UR QL STRIP.AUTO: 1 EU/DL (ref 0.2–1)
WBC # BLD AUTO: 6.6 K/UL (ref 4.1–11.1)
WBC URNS QL MICRO: ABNORMAL /HPF (ref 0–4)

## 2019-02-28 PROCEDURE — 83605 ASSAY OF LACTIC ACID: CPT

## 2019-02-28 PROCEDURE — 74177 CT ABD & PELVIS W/CONTRAST: CPT

## 2019-02-28 PROCEDURE — 74011250637 HC RX REV CODE- 250/637: Performed by: GENERAL ACUTE CARE HOSPITAL

## 2019-02-28 PROCEDURE — 96361 HYDRATE IV INFUSION ADD-ON: CPT

## 2019-02-28 PROCEDURE — 76700 US EXAM ABDOM COMPLETE: CPT

## 2019-02-28 PROCEDURE — 81001 URINALYSIS AUTO W/SCOPE: CPT

## 2019-02-28 PROCEDURE — 83690 ASSAY OF LIPASE: CPT

## 2019-02-28 PROCEDURE — 74011250636 HC RX REV CODE- 250/636: Performed by: GENERAL ACUTE CARE HOSPITAL

## 2019-02-28 PROCEDURE — 74011250636 HC RX REV CODE- 250/636: Performed by: INTERNAL MEDICINE

## 2019-02-28 PROCEDURE — 94761 N-INVAS EAR/PLS OXIMETRY MLT: CPT

## 2019-02-28 PROCEDURE — 65270000015 HC RM PRIVATE ONCOLOGY

## 2019-02-28 PROCEDURE — 74011250636 HC RX REV CODE- 250/636: Performed by: EMERGENCY MEDICINE

## 2019-02-28 PROCEDURE — 80053 COMPREHEN METABOLIC PANEL: CPT

## 2019-02-28 PROCEDURE — 74011636320 HC RX REV CODE- 636/320: Performed by: EMERGENCY MEDICINE

## 2019-02-28 PROCEDURE — 96374 THER/PROPH/DIAG INJ IV PUSH: CPT

## 2019-02-28 PROCEDURE — 74011250637 HC RX REV CODE- 250/637: Performed by: PHYSICIAN ASSISTANT

## 2019-02-28 RX ORDER — LORAZEPAM 2 MG/ML
2 INJECTION INTRAMUSCULAR
Status: DISCONTINUED | OUTPATIENT
Start: 2019-02-28 | End: 2019-03-02 | Stop reason: HOSPADM

## 2019-02-28 RX ORDER — HYDROMORPHONE HYDROCHLORIDE 1 MG/ML
1 INJECTION, SOLUTION INTRAMUSCULAR; INTRAVENOUS; SUBCUTANEOUS ONCE
Status: COMPLETED | OUTPATIENT
Start: 2019-02-28 | End: 2019-02-28

## 2019-02-28 RX ORDER — SODIUM CHLORIDE 9 MG/ML
150 INJECTION, SOLUTION INTRAVENOUS CONTINUOUS
Status: DISCONTINUED | OUTPATIENT
Start: 2019-02-28 | End: 2019-03-02 | Stop reason: HOSPADM

## 2019-02-28 RX ORDER — ENOXAPARIN SODIUM 100 MG/ML
40 INJECTION SUBCUTANEOUS DAILY
Status: DISCONTINUED | OUTPATIENT
Start: 2019-02-28 | End: 2019-03-02 | Stop reason: HOSPADM

## 2019-02-28 RX ORDER — LANOLIN ALCOHOL/MO/W.PET/CERES
1000 CREAM (GRAM) TOPICAL DAILY
Status: DISCONTINUED | OUTPATIENT
Start: 2019-02-28 | End: 2019-03-02 | Stop reason: HOSPADM

## 2019-02-28 RX ORDER — LORAZEPAM 2 MG/ML
4 INJECTION INTRAMUSCULAR
Status: DISCONTINUED | OUTPATIENT
Start: 2019-02-28 | End: 2019-03-02 | Stop reason: HOSPADM

## 2019-02-28 RX ORDER — THERA TABS 400 MCG
1 TAB ORAL DAILY
Status: DISCONTINUED | OUTPATIENT
Start: 2019-02-28 | End: 2019-03-02 | Stop reason: HOSPADM

## 2019-02-28 RX ORDER — SODIUM CHLORIDE 0.9 % (FLUSH) 0.9 %
10 SYRINGE (ML) INJECTION
Status: COMPLETED | OUTPATIENT
Start: 2019-02-28 | End: 2019-02-28

## 2019-02-28 RX ORDER — MORPHINE SULFATE 2 MG/ML
1 INJECTION, SOLUTION INTRAMUSCULAR; INTRAVENOUS
Status: DISCONTINUED | OUTPATIENT
Start: 2019-02-28 | End: 2019-02-28

## 2019-02-28 RX ORDER — SODIUM CHLORIDE 0.9 % (FLUSH) 0.9 %
5-40 SYRINGE (ML) INJECTION AS NEEDED
Status: DISCONTINUED | OUTPATIENT
Start: 2019-02-28 | End: 2019-03-02 | Stop reason: HOSPADM

## 2019-02-28 RX ORDER — SODIUM CHLORIDE 0.9 % (FLUSH) 0.9 %
5-40 SYRINGE (ML) INJECTION EVERY 8 HOURS
Status: DISCONTINUED | OUTPATIENT
Start: 2019-02-28 | End: 2019-03-02 | Stop reason: HOSPADM

## 2019-02-28 RX ORDER — ONDANSETRON 2 MG/ML
4 INJECTION INTRAMUSCULAR; INTRAVENOUS
Status: DISCONTINUED | OUTPATIENT
Start: 2019-02-28 | End: 2019-03-02 | Stop reason: HOSPADM

## 2019-02-28 RX ORDER — ASPIRIN 325 MG/1
100 TABLET, FILM COATED ORAL DAILY
Status: DISCONTINUED | OUTPATIENT
Start: 2019-02-28 | End: 2019-03-02 | Stop reason: HOSPADM

## 2019-02-28 RX ORDER — FOLIC ACID 1 MG/1
1 TABLET ORAL DAILY
Status: DISCONTINUED | OUTPATIENT
Start: 2019-02-28 | End: 2019-03-02 | Stop reason: HOSPADM

## 2019-02-28 RX ORDER — HYDROMORPHONE HYDROCHLORIDE 1 MG/ML
0.5 INJECTION, SOLUTION INTRAMUSCULAR; INTRAVENOUS; SUBCUTANEOUS
Status: DISCONTINUED | OUTPATIENT
Start: 2019-02-28 | End: 2019-02-28

## 2019-02-28 RX ORDER — HYDROMORPHONE HYDROCHLORIDE 1 MG/ML
1 INJECTION, SOLUTION INTRAMUSCULAR; INTRAVENOUS; SUBCUTANEOUS
Status: DISCONTINUED | OUTPATIENT
Start: 2019-02-28 | End: 2019-03-01

## 2019-02-28 RX ORDER — LANOLIN ALCOHOL/MO/W.PET/CERES
100 CREAM (GRAM) TOPICAL DAILY
Status: DISCONTINUED | OUTPATIENT
Start: 2019-02-28 | End: 2019-02-28

## 2019-02-28 RX ADMIN — Medication 100 MG: at 09:37

## 2019-02-28 RX ADMIN — FENTANYL CITRATE 75 MCG: 50 INJECTION, SOLUTION INTRAMUSCULAR; INTRAVENOUS at 00:08

## 2019-02-28 RX ADMIN — HYDROMORPHONE HYDROCHLORIDE 0.5 MG: 1 INJECTION, SOLUTION INTRAMUSCULAR; INTRAVENOUS; SUBCUTANEOUS at 16:09

## 2019-02-28 RX ADMIN — CYANOCOBALAMIN TAB 500 MCG 1000 MCG: 500 TAB at 09:38

## 2019-02-28 RX ADMIN — Medication 10 ML: at 00:08

## 2019-02-28 RX ADMIN — MORPHINE SULFATE 1 MG: 2 INJECTION, SOLUTION INTRAMUSCULAR; INTRAVENOUS at 10:35

## 2019-02-28 RX ADMIN — ONDANSETRON 4 MG: 2 INJECTION INTRAMUSCULAR; INTRAVENOUS at 06:05

## 2019-02-28 RX ADMIN — SODIUM CHLORIDE 1000 ML: 900 INJECTION, SOLUTION INTRAVENOUS at 00:08

## 2019-02-28 RX ADMIN — IOPAMIDOL 100 ML: 755 INJECTION, SOLUTION INTRAVENOUS at 00:26

## 2019-02-28 RX ADMIN — ENOXAPARIN SODIUM 40 MG: 40 INJECTION SUBCUTANEOUS at 09:38

## 2019-02-28 RX ADMIN — HYDROMORPHONE HYDROCHLORIDE 0.5 MG: 1 INJECTION, SOLUTION INTRAMUSCULAR; INTRAVENOUS; SUBCUTANEOUS at 12:14

## 2019-02-28 RX ADMIN — HYDROMORPHONE HYDROCHLORIDE 1 MG: 1 INJECTION, SOLUTION INTRAMUSCULAR; INTRAVENOUS; SUBCUTANEOUS at 20:43

## 2019-02-28 RX ADMIN — SODIUM CHLORIDE 150 ML/HR: 900 INJECTION, SOLUTION INTRAVENOUS at 23:18

## 2019-02-28 RX ADMIN — Medication 10 ML: at 06:05

## 2019-02-28 RX ADMIN — ONDANSETRON 4 MG: 4 TABLET, ORALLY DISINTEGRATING ORAL at 00:08

## 2019-02-28 RX ADMIN — THERA TABS 1 TABLET: TAB at 09:38

## 2019-02-28 RX ADMIN — HYDROMORPHONE HYDROCHLORIDE 1 MG: 1 INJECTION, SOLUTION INTRAMUSCULAR; INTRAVENOUS; SUBCUTANEOUS at 02:00

## 2019-02-28 RX ADMIN — FOLIC ACID 1 MG: 1 TABLET ORAL at 09:37

## 2019-02-28 RX ADMIN — SODIUM CHLORIDE 100 ML/HR: 900 INJECTION, SOLUTION INTRAVENOUS at 04:15

## 2019-02-28 RX ADMIN — MORPHINE SULFATE 1 MG: 2 INJECTION, SOLUTION INTRAMUSCULAR; INTRAVENOUS at 06:05

## 2019-02-28 RX ADMIN — SODIUM CHLORIDE 100 ML/HR: 900 INJECTION, SOLUTION INTRAVENOUS at 15:14

## 2019-02-28 NOTE — CDMP QUERY
Dr. Tavo Calix, Patient admitted with Acute pancreatitis, noted to have Na: 127. If possible, please document in progress notes and d/c summary if you are evaluating and/or treating any of the following: 
 
=>Hyponatremia POA 
=>Other, please specify 
=>Unable to Determine The medical record reflects the following: 
 
  Risk Factors: 48 BM w/hx: ETOH abuse Clinical Indicators: Admit Na: 127, 2/28: 129 Treatment: 1000 ml NS bolus IV, NS @ 100 ml/hr Thank You, Arsh [email or web address] 
[phone]

## 2019-02-28 NOTE — ED NOTES
Report received from [place], [facility]. Devaughn GARCIA, ED Summary, MAR and Recent Results was discussed.     Ramonita Yeager RN

## 2019-02-28 NOTE — PROGRESS NOTES
Patient seen in follow up to Dr. Judy Schaeffer early AM admission. Complains of abdominal pain, states morphine was unhelpful. Exacerbated with drinking water. Adamant about wanting to go home this afternoon. Exam:  VS reviewed  Gen: appears in pain on bed  CV: normal S1 and S2, reg  Pulm: CTA  Abd: soft, ND, tender in epigastrium, no guarding  Ext: warm, no edema    Plan  Will adjust analgesics  Maintain NPO, IVF  Counseled extensively that from a medical standpoint, I cannot discharge him this afternoon. Discussed the risks of decline including death. Suspect strongly that patient will elect to leave AMA regardless.

## 2019-02-28 NOTE — ED NOTES
Pt. Requesting increase in pain medication dose at this time. Paged Dr. Chari Nelson. Awaiting call back.

## 2019-02-28 NOTE — ED NOTES
Bedside and Verbal shift change report given to Ayesha BRODERICK (oncoming nurse) by Cushing RN (offgoing nurse). Report included the following information SBAR, Ed summary, MAR, recent results.

## 2019-02-28 NOTE — ED NOTES
Bedside and Verbal shift change report given to DANGELO Molina (oncoming nurse) by Lis Uriarte (offgoing nurse). Report included the following information SBAR, ED Summary, Intake/Output, MAR and Recent Results.

## 2019-02-28 NOTE — H&P
Hospitalist Admission Note    NAME: Simonne Oppenheim   :  1970   MRN:  [id number]     Date/Time:  2019 1:45 AM    Patient PCP: Elisabet Walker MD  ______________________________________________________________________  Given the patient's current clinical presentation, I have a high level of concern for decompensation if discharged from the emergency department. Complex decision making was performed, which includes reviewing the patient's available past medical records, laboratory results, and x-ray films. My assessment of this patient's clinical condition and my plan of care is as follows. Assessment / Plan:  Pancreatitis, recurrent, likely due to EtOH  -Admit to Medical bed  -Keep NPO  -IVF, IV pain medication  -Anti-emetics PRN  -CT Abdo: IMPRESSION: Acute pancreatitis with an area of hypoenhancement of the tail  suspicious for pancreatic parenchymal necrosis. Ascites. No other complication identified.  -Lipase >3000 - trend  -LFTs slightly deranged as well, not typical AST>ALT seen with EtOH   -LA 2.2 - continue IVF  -EtOH level  <10 - reports daily alcohol abuse - [facility] protocol  -MVT/FA/Thiamine  -Will order Abdo US    Code Status: Full  Surrogate Decision Maker: Wife  DVT Prophylaxis: Lovenox  GI Prophylaxis: not indicated  Baseline: independent       Subjective:   CHIEF COMPLAINT: N/V, abdominal pain    HISTORY OF PRESENT ILLNESS:     Simonne Oppenheim is a 50 y.o.  male who presents with CC listed above. Pt still complaining of significant abdominal pain. He is nearly writhing on the bed. He states that he drinks EtOH on a daily basis but has cut back. Currently he only admits to drinking 2 beers a day. He denies any history of withdrawal symptoms. He states that this abdominal pain has been on going for the past few days but has been worsening. He states that he cannot tolerate any food right now. He denies any history of gallstones.      Of note, pt states that he has to go to work tomorrow at [address] [place] [address] him that he may not be ready for discharge by then and that we can provide him a letter for work stating that he is hospitalized. He is still forceful about potentially leaving tomorrow afternoon. We were asked to admit for work up and evaluation of the above problems. History reviewed. No pertinent past medical history. History reviewed. No pertinent surgical history. Social History     Tobacco Use    Smoking status: Never Smoker    Smokeless tobacco: Never Used   Substance Use Topics    Alcohol use: Yes     Alcohol/week: 3.6 oz     Types: 6 Cans of beer per week        History reviewed. No pertinent family history. Maternal history of HTN. No Known Allergies     Prior to Admission medications    Medication Sig Start Date End Date Taking? Authorizing Provider   cephALEXin (KEFLEX) 500 mg capsule Take 1 Cap by mouth three (3) times daily. 8/1/18  Yes Enriqueta Littlejohn MD       REVIEW OF SYSTEMS:     I am not able to complete the review of systems because:    The patient is intubated and sedated    The patient has altered mental status due to his acute medical problems    The patient has baseline aphasia from prior stroke(s)    The patient has baseline dementia and is not reliable historian    The patient is in acute medical distress and unable to provide information           Total of 12 systems reviewed as follows:       POSITIVE= underlined text  Negative = text not underlined  General:  fever, chills, sweats, generalized weakness, weight loss/gain,      loss of appetite   Eyes:    blurred vision, eye pain, loss of vision, double vision  ENT:    rhinorrhea, pharyngitis   Respiratory:   cough, sputum production, SOB, MEEKS, wheezing, pleuritic pain   Cardiology:   chest pain, palpitations, orthopnea, PND, edema, syncope   Gastrointestinal:  abdominal pain , N/V, diarrhea, dysphagia, constipation, bleeding   Genitourinary:  frequency, urgency, dysuria, hematuria, incontinence   Muskuloskeletal :  arthralgia, myalgia, back pain  Hematology:  easy bruising, nose or gum bleeding, lymphadenopathy   Dermatological: rash, ulceration, pruritis, color change / jaundice  Endocrine:   hot flashes or polydipsia   Neurological:  headache, dizziness, confusion, focal weakness, paresthesia,     Speech difficulties, memory loss, gait difficulty  Psychological: Feelings of anxiety, depression, agitation    Objective:   VITALS:    Visit Vitals  BP (!) 144/96   Pulse (!) 101   Temp 97.7 °F (36.5 °C)   Resp 15   Ht 5' 6\" (1.676 m)   Wt 169 lb 1.5 oz (76.7 kg)   SpO2 99%   BMI 27.29 kg/m²       PHYSICAL EXAM:    General:    Alert, cooperative, in distress  HEENT: Atraumatic, anicteric sclerae, pink conjunctivae     No oral ulcers, mucosa moist, throat clear, dentition fair  Neck:  Supple, symmetrical,  thyroid: non tender  Lungs:   Clear to auscultation bilaterally. No Wheezing or Rhonchi. No rales. Chest wall:  No tenderness  No Accessory muscle use. Heart:   Regular  rhythm,  No  murmur   No edema  Abdomen:   Soft, TTP throughout. Not distended. Bowel sounds normal  Extremities: No cyanosis. No clubbing,      Skin turgor normal, Capillary refill normal, Radial dial pulse 2+  Skin:     Not pale. Not Jaundiced  No rashes   Psych:  Good insight. Not depressed. Not anxious or agitated. Neurologic: EOMs intact. No facial asymmetry. No aphasia or slurred speech. Symmetrical strength, Sensation grossly intact.  Alert and oriented X 4.     _______________________________________________________________________  Care Plan discussed with:    Comments   Patient x    Family  x    RN x    Care Manager                    Consultant:      _______________________________________________________________________  Expected  Disposition:   Home with Family x   HH/PT/OT/RN    SNF/LTC    ADRIAN    ________________________________________________________________________  TOTAL TIME:  54 Minutes    Critical Care Provided     Minutes non procedure based      Comments     Reviewed previous records   >50% of visit spent in counseling and coordination of care  Discussion with patient and/or family and questions answered       ________________________________________________________________________  Signed: Angela Song MD    Procedures: see electronic medical records for all procedures/Xrays and details which were not copied into this note but were reviewed prior to creation of Plan. LAB DATA REVIEWED:    Recent Results (from the past 24 hour(s))   CBC WITH AUTOMATED DIFF    Collection Time: 02/27/19 11:54 PM   Result Value Ref Range    WBC 6.6 4.1 - 11.1 K/uL    RBC 4.55 4.10 - 5.70 M/uL    HGB 15.8 12.1 - 17.0 g/dL    HCT 42.6 36.6 - 50.3 %    MCV 93.6 80.0 - 99.0 FL    MCH 34.7 (H) 26.0 - 34.0 PG    MCHC 37.1 (H) 30.0 - 36.5 g/dL    RDW 10.8 (L) 11.5 - 14.5 %    PLATELET 722 080 - 059 K/uL    MPV 9.7 8.9 - 12.9 FL    NRBC 0.0 0  WBC    ABSOLUTE NRBC 0.00 0.00 - 0.01 K/uL    NEUTROPHILS 87 (H) 32 - 75 %    LYMPHOCYTES 4 (L) 12 - 49 %    MONOCYTES 9 5 - 13 %    EOSINOPHILS 0 0 - 7 %    BASOPHILS 0 0 - 1 %    IMMATURE GRANULOCYTES 0 0.0 - 0.5 %    ABS. NEUTROPHILS 5.7 1.8 - 8.0 K/UL    ABS. LYMPHOCYTES 0.3 (L) 0.8 - 3.5 K/UL    ABS. MONOCYTES 0.6 0.0 - 1.0 K/UL    ABS. EOSINOPHILS 0.0 0.0 - 0.4 K/UL    ABS. BASOPHILS 0.0 0.0 - 0.1 K/UL    ABS. IMM.  GRANS. 0.0 0.00 - 0.04 K/UL    DF AUTOMATED      RBC COMMENTS NORMOCYTIC, NORMOCHROMIC     METABOLIC PANEL, COMPREHENSIVE    Collection Time: 02/27/19 11:54 PM   Result Value Ref Range    Sodium 127 (L) 136 - 145 mmol/L    Potassium 4.2 3.5 - 5.1 mmol/L    Chloride 89 (L) 97 - 108 mmol/L    CO2 27 21 - 32 mmol/L    Anion gap 11 5 - 15 mmol/L    Glucose 106 (H) 65 - 100 mg/dL    BUN 15 6 - 20 MG/DL    Creatinine 0.92 0.70 - 1.30 MG/DL    BUN/Creatinine ratio 16 12 - 20      GFR est AA >60 >60 ml/min/1.73m2    GFR est non-AA >60 >60 ml/min/1.73m2 Calcium 9.8 8.5 - 10.1 MG/DL    Bilirubin, total 1.9 (H) 0.2 - 1.0 MG/DL    ALT (SGPT) 83 (H) 12 - 78 U/L    AST (SGOT) 84 (H) 15 - 37 U/L    Alk. phosphatase 92 45 - 117 U/L    Protein, total 8.5 (H) 6.4 - 8.2 g/dL    Albumin 4.1 3.5 - 5.0 g/dL    Globulin 4.4 (H) 2.0 - 4.0 g/dL    A-G Ratio 0.9 (L) 1.1 - 2.2     LACTIC ACID    Collection Time: 02/27/19 11:54 PM   Result Value Ref Range    Lactic acid 2.2 (HH) 0.4 - 2.0 MMOL/L   LIPASE    Collection Time: 02/27/19 11:54 PM   Result Value Ref Range    Lipase >3,000 (H) 73 - 393 U/L   SAMPLES BEING HELD    Collection Time: 02/27/19 11:54 PM   Result Value Ref Range    SAMPLES BEING HELD RD BL     COMMENT        Add-on orders for these samples will be processed based on acceptable specimen integrity and analyte stability, which may vary by analyte.    ETHYL ALCOHOL    Collection Time: 02/27/19 11:54 PM   Result Value Ref Range    ALCOHOL(ETHYL),SERUM <10 <10 MG/DL

## 2019-02-28 NOTE — ED NOTES
Medications explained to pt and answered all questions. Medications given as ordered. Discussed signs/symptoms/concerns to call for; call bell within reach.

## 2019-02-28 NOTE — ED NOTES
Report given to Ct Roberts RN. Updated regarding pt status, diagnosis, and plan. Answered all questions to best of knowledge.

## 2019-02-28 NOTE — ED NOTES
TRANSFER - OUT REPORT:    Verbal report given to Tameka Tan (name) on Amanda Rizvi  being transferred to Med Onc [phone] [id number] (unit) for routine progression of care       Report consisted of patients Situation, Background, Assessment and   Recommendations(SBAR). Information from the following report(s) SBAR, Kardex, ED Summary, Intake/Output, MAR, Recent Results and Cardiac Rhythm NSR to Sinus Tach was reviewed with the receiving nurse. Lines:   Peripheral IV 02/28/19 Right Antecubital (Active)   Site Assessment Clean, dry, & intact 2/28/2019 12:02 AM   Phlebitis Assessment 0 2/28/2019 12:02 AM   Infiltration Assessment 0 2/28/2019 12:02 AM   Dressing Status Clean, dry, & intact 2/28/2019 12:02 AM   Dressing Type Tape;Transparent 2/28/2019 12:02 AM   Hub Color/Line Status Pink;Flushed;Patent 2/28/2019 12:02 AM   Action Taken Blood drawn 2/28/2019 12:02 AM        Opportunity for questions and clarification was provided. Patient transported with:   [employer] [facility] w/o tele or nurse. Clothing bagged for transport. Transport requested.

## 2019-03-01 LAB
ANION GAP SERPL CALC-SCNC: 8 MMOL/L (ref 5–15)
BASOPHILS # BLD: 0 K/UL (ref 0–0.1)
BASOPHILS NFR BLD: 0 % (ref 0–1)
BUN SERPL-MCNC: 14 MG/DL (ref 6–20)
BUN/CREAT SERPL: 20 (ref 12–20)
CALCIUM SERPL-MCNC: 8 MG/DL (ref 8.5–10.1)
CHLORIDE SERPL-SCNC: 98 MMOL/L (ref 97–108)
CO2 SERPL-SCNC: 23 MMOL/L (ref 21–32)
CREAT SERPL-MCNC: 0.69 MG/DL (ref 0.7–1.3)
DIFFERENTIAL METHOD BLD: ABNORMAL
EOSINOPHIL # BLD: 0 K/UL (ref 0–0.4)
EOSINOPHIL NFR BLD: 0 % (ref 0–7)
ERYTHROCYTE [DISTWIDTH] IN BLOOD BY AUTOMATED COUNT: 11.4 % (ref 11.5–14.5)
GLUCOSE SERPL-MCNC: 87 MG/DL (ref 65–100)
HCT VFR BLD AUTO: 39.4 % (ref 36.6–50.3)
HGB BLD-MCNC: 13.9 G/DL (ref 12.1–17)
IMM GRANULOCYTES # BLD AUTO: 0.1 K/UL (ref 0–0.04)
IMM GRANULOCYTES NFR BLD AUTO: 1 % (ref 0–0.5)
LIPASE SERPL-CCNC: 1412 U/L (ref 73–393)
LYMPHOCYTES # BLD: 0.3 K/UL (ref 0.8–3.5)
LYMPHOCYTES NFR BLD: 3 % (ref 12–49)
MAGNESIUM SERPL-MCNC: 2.1 MG/DL (ref 1.6–2.4)
MCH RBC QN AUTO: 34.7 PG (ref 26–34)
MCHC RBC AUTO-ENTMCNC: 35.3 G/DL (ref 30–36.5)
MCV RBC AUTO: 98.3 FL (ref 80–99)
MONOCYTES # BLD: 0.9 K/UL (ref 0–1)
MONOCYTES NFR BLD: 9 % (ref 5–13)
NEUTS SEG # BLD: 8.2 K/UL (ref 1.8–8)
NEUTS SEG NFR BLD: 87 % (ref 32–75)
NRBC # BLD: 0 K/UL (ref 0–0.01)
NRBC BLD-RTO: 0 PER 100 WBC
PHOSPHATE SERPL-MCNC: 1.9 MG/DL (ref 2.6–4.7)
PLATELET # BLD AUTO: 151 K/UL (ref 150–400)
PMV BLD AUTO: 9.8 FL (ref 8.9–12.9)
POTASSIUM SERPL-SCNC: 3.7 MMOL/L (ref 3.5–5.1)
RBC # BLD AUTO: 4.01 M/UL (ref 4.1–5.7)
SODIUM SERPL-SCNC: 129 MMOL/L (ref 136–145)
WBC # BLD AUTO: 9.4 K/UL (ref 4.1–11.1)

## 2019-03-01 PROCEDURE — 85025 COMPLETE CBC W/AUTO DIFF WBC: CPT

## 2019-03-01 PROCEDURE — 74011250636 HC RX REV CODE- 250/636: Performed by: GENERAL ACUTE CARE HOSPITAL

## 2019-03-01 PROCEDURE — 74011250637 HC RX REV CODE- 250/637: Performed by: INTERNAL MEDICINE

## 2019-03-01 PROCEDURE — 83690 ASSAY OF LIPASE: CPT

## 2019-03-01 PROCEDURE — 65270000015 HC RM PRIVATE ONCOLOGY

## 2019-03-01 PROCEDURE — 84100 ASSAY OF PHOSPHORUS: CPT

## 2019-03-01 PROCEDURE — 36415 COLL VENOUS BLD VENIPUNCTURE: CPT

## 2019-03-01 PROCEDURE — 83735 ASSAY OF MAGNESIUM: CPT

## 2019-03-01 PROCEDURE — 74011250637 HC RX REV CODE- 250/637: Performed by: GENERAL ACUTE CARE HOSPITAL

## 2019-03-01 PROCEDURE — 80048 BASIC METABOLIC PNL TOTAL CA: CPT

## 2019-03-01 PROCEDURE — 74011250636 HC RX REV CODE- 250/636: Performed by: INTERNAL MEDICINE

## 2019-03-01 RX ORDER — METOPROLOL TARTRATE 25 MG/1
12.5 TABLET, FILM COATED ORAL EVERY 12 HOURS
Status: DISCONTINUED | OUTPATIENT
Start: 2019-03-01 | End: 2019-03-02 | Stop reason: HOSPADM

## 2019-03-01 RX ORDER — MAGNESIUM CITRATE
296 SOLUTION, ORAL ORAL
Status: COMPLETED | OUTPATIENT
Start: 2019-03-01 | End: 2019-03-01

## 2019-03-01 RX ORDER — OXYCODONE HYDROCHLORIDE 5 MG/1
5 TABLET ORAL
Status: DISCONTINUED | OUTPATIENT
Start: 2019-03-01 | End: 2019-03-02 | Stop reason: HOSPADM

## 2019-03-01 RX ADMIN — SODIUM CHLORIDE 150 ML/HR: 900 INJECTION, SOLUTION INTRAVENOUS at 14:31

## 2019-03-01 RX ADMIN — Medication 10 ML: at 14:00

## 2019-03-01 RX ADMIN — HYDROMORPHONE HYDROCHLORIDE 1 MG: 1 INJECTION, SOLUTION INTRAMUSCULAR; INTRAVENOUS; SUBCUTANEOUS at 08:06

## 2019-03-01 RX ADMIN — ENOXAPARIN SODIUM 40 MG: 40 INJECTION SUBCUTANEOUS at 08:07

## 2019-03-01 RX ADMIN — SODIUM CHLORIDE 150 ML/HR: 900 INJECTION, SOLUTION INTRAVENOUS at 22:10

## 2019-03-01 RX ADMIN — FOLIC ACID 1 MG: 1 TABLET ORAL at 08:07

## 2019-03-01 RX ADMIN — CYANOCOBALAMIN TAB 500 MCG 1000 MCG: 500 TAB at 08:07

## 2019-03-01 RX ADMIN — SODIUM CHLORIDE 150 ML/HR: 900 INJECTION, SOLUTION INTRAVENOUS at 06:03

## 2019-03-01 RX ADMIN — OXYCODONE HYDROCHLORIDE 5 MG: 5 TABLET ORAL at 22:07

## 2019-03-01 RX ADMIN — Medication 10 ML: at 22:09

## 2019-03-01 RX ADMIN — THERA TABS 1 TABLET: TAB at 08:06

## 2019-03-01 RX ADMIN — OXYCODONE HYDROCHLORIDE 5 MG: 5 TABLET ORAL at 13:06

## 2019-03-01 RX ADMIN — DIBASIC SODIUM PHOSPHATE, MONOBASIC POTASSIUM PHOSPHATE AND MONOBASIC SODIUM PHOSPHATE 1 TABLET: 852; 155; 130 TABLET ORAL at 17:53

## 2019-03-01 RX ADMIN — METOPROLOL TARTRATE 12.5 MG: 25 TABLET ORAL at 22:07

## 2019-03-01 RX ADMIN — OXYCODONE HYDROCHLORIDE 5 MG: 5 TABLET ORAL at 17:53

## 2019-03-01 RX ADMIN — HYDROMORPHONE HYDROCHLORIDE 1 MG: 1 INJECTION, SOLUTION INTRAMUSCULAR; INTRAVENOUS; SUBCUTANEOUS at 03:14

## 2019-03-01 RX ADMIN — MAGESIUM CITRATE 296 ML: 1.75 LIQUID ORAL at 14:31

## 2019-03-01 RX ADMIN — Medication 100 MG: at 08:07

## 2019-03-01 NOTE — PROGRESS NOTES
Hospitalist Progress Note    NAME: Tamara Andrews   :  1970   MRN:  [id number]       Assessment / Plan:  Acute pancreatitis:  --Clinically improving with conservative management; no evidence of complications  --RUQ US negative for stones  --Patient admits to ongoing alcohol use; counseled on quitting and he intends to do so  --Will check triglycerides in the morning to exclude this as an etiology of disease  --Advance diet tonight and transition to oral analgesics    Alcohol abuse:  --On CIWA protocol but not exhibiting signs of alcohol withdrawal  --Receiving thiamine, multivitamin  --Replete phosphorus today  --Counseled on cessation of alcohol use    Hyponatremia POA:  --Appears chronic, possibly due to chronic alcohol abuse    25.0 - 29.9 Overweight / Body mass index is 27.29 kg/m². Code status: Full  Prophylaxis: Lovenox  Recommended Disposition: Home w/Family -- likely tomorrow if tolerates diet tonight     Subjective:     Chief Complaint / Reason for Physician Visit: pancreatitis follow up  Less pain today, but still requiring analgesics for management. Diet advanced to clear liquids; wants to try solid food tonight. Discussed with RN events overnight. Review of Systems:  Symptom Y/N Comments  Symptom Y/N Comments   Fever/Chills n   Chest Pain     Poor Appetite n   Edema n    Cough    Abdominal Pain     Sputum    Joint Pain     SOB/MEEKS n   Pruritis/Rash     Nausea/vomit n   Tolerating PT/OT     Diarrhea    Tolerating Diet n    Constipation y   Other       Could NOT obtain due to:      Objective:     VITALS:   Last 24hrs VS reviewed since prior progress note.  Most recent are:  Patient Vitals for the past 24 hrs:   Temp Pulse Resp BP SpO2   19 0800 98.2 °F (36.8 °C) (!) 110 18 144/86 99 %   19 2246 99 °F (37.2 °C) 98 18 134/82 99 %   19 1932 99.8 °F (37.7 °C) (!) 103 20 124/80 98 %   19 1816 97.7 °F (36.5 °C) (!) 111 20 (!) 147/91 98 %   19 1715 -- 100 22 (!) 140/96 96 %   02/28/19 1700 98.4 °F (36.9 °C) (!) 111 16 132/89 99 %   02/28/19 1615 -- (!) 116 24 (!) 141/97 96 %   02/28/19 1500 98.2 °F (36.8 °C) 96 18 143/90 96 %       Intake/Output Summary (Last 24 hours) at 3/1/2019 1443  Last data filed at 2/28/2019 2246  Gross per 24 hour   Intake --   Output 850 ml   Net -850 ml        PHYSICAL EXAM:  General: WD, WN. Alert, cooperative, no acute distress, looking more comfortable compared to yesterday    EENT:  EOMI. Anicteric sclerae. MMM  Resp:  CTA bilaterally, no wheezing or rales. No accessory muscle use  CV:  Regular  rhythm,  No edema  GI:  Soft, Non distended, tender to deep palpation in epigastrium. +Bowel sounds  Neurologic:  Alert and oriented X 3, normal speech,   Psych:   Good insight. Not anxious nor agitated  Skin:  No rashes. No jaundice    Reviewed most current lab test results and cultures  YES  Reviewed most current radiology test results   YES  Review and summation of old records today    NO  Reviewed patient's current orders and MAR    YES  PMH/SH reviewed - no change compared to H&P  ________________________________________________________________________  Care Plan discussed with:    Comments   Patient x    Family  x Wife   RN     Care Manager     Consultant                        Multidiciplinary team rounds were held today with , nursing, pharmacist and clinical coordinator. Patient's plan of care was discussed; medications were reviewed and discharge planning was addressed.      ________________________________________________________________________  Total NON critical care TIME:  15   Minutes    Total CRITICAL CARE TIME Spent:   Minutes non procedure based      Comments   >50% of visit spent in counseling and coordination of care     ________________________________________________________________________  Gato Stafford MD     Procedures: see electronic medical records for all procedures/Xrays and details which were not copied into this note but were reviewed prior to creation of Plan. LABS:  I reviewed today's most current labs and imaging studies.   Pertinent labs include:  Recent Labs     03/01/19  0540 02/27/19  2354   WBC 9.4 6.6   HGB 13.9 15.8   HCT 39.4 42.6    177     Recent Labs     03/01/19  0540 02/28/19  0348 02/27/19  2354   * 129* 127*   K 3.7 3.9 4.2   CL 98 92* 89*   CO2 23 28 27   GLU 87 104* 106*   BUN 14 13 15   CREA 0.69* 0.82 0.92   CA 8.0* 9.1 9.8   MG 2.1  --   --    PHOS 1.9*  --   --    ALB  --   --  4.1   TBILI  --   --  1.9*   SGOT  --   --  84*   ALT  --   --  83*       Signed: Gato Stafford MD

## 2019-03-01 NOTE — PROGRESS NOTES
Spiritual Care Partner Volunteer visited patient in Oncology on March 1, 2019. Documented by:  RAQUEL Arce 61 Paging Service [phone])

## 2019-03-01 NOTE — PROGRESS NOTES
Oncology End of Shift Note      Bedside shift change report given to Dax Willard RN (incoming nurse) by Paulina Hickman (outgoing nurse) on [facility]. Report included the following information SBAR, Kardex and MAR.

## 2019-03-01 NOTE — PROGRESS NOTES
Oncology End of Shift Note      Bedside shift change report given to Ulysses Wells RN (incoming nurse) by Jose Juan Nwoakr (outgoing nurse) on [facility]. Report included the following information SBAR, Kardex, ED Summary, Intake/Output, MAR, Accordion and Recent Results. Shift Summary:       Issues for Physician to Address:       Patient on Cardiac Monitoring?     [] Yes  [x] No    Rhythm:          Shift Events        Jose Juan Officer

## 2019-03-02 VITALS
DIASTOLIC BLOOD PRESSURE: 101 MMHG | BODY MASS INDEX: 27.18 KG/M2 | SYSTOLIC BLOOD PRESSURE: 152 MMHG | HEART RATE: 97 BPM | OXYGEN SATURATION: 98 % | HEIGHT: 66 IN | WEIGHT: 169.09 LBS | RESPIRATION RATE: 18 BRPM | TEMPERATURE: 98.9 F

## 2019-03-02 LAB
ALBUMIN SERPL-MCNC: 2.6 G/DL (ref 3.5–5)
ALBUMIN/GLOB SERPL: 0.7 {RATIO} (ref 1.1–2.2)
ALP SERPL-CCNC: 63 U/L (ref 45–117)
ALT SERPL-CCNC: 39 U/L (ref 12–78)
ANION GAP SERPL CALC-SCNC: 7 MMOL/L (ref 5–15)
AST SERPL-CCNC: 38 U/L (ref 15–37)
BILIRUB DIRECT SERPL-MCNC: 0.8 MG/DL (ref 0–0.2)
BILIRUB SERPL-MCNC: 1.7 MG/DL (ref 0.2–1)
BUN SERPL-MCNC: 9 MG/DL (ref 6–20)
BUN/CREAT SERPL: 14 (ref 12–20)
CALCIUM SERPL-MCNC: 8 MG/DL (ref 8.5–10.1)
CHLORIDE SERPL-SCNC: 97 MMOL/L (ref 97–108)
CO2 SERPL-SCNC: 27 MMOL/L (ref 21–32)
CREAT SERPL-MCNC: 0.65 MG/DL (ref 0.7–1.3)
GLOBULIN SER CALC-MCNC: 3.6 G/DL (ref 2–4)
GLUCOSE SERPL-MCNC: 97 MG/DL (ref 65–100)
POTASSIUM SERPL-SCNC: 3.5 MMOL/L (ref 3.5–5.1)
PROT SERPL-MCNC: 6.2 G/DL (ref 6.4–8.2)
SODIUM SERPL-SCNC: 131 MMOL/L (ref 136–145)
TRIGL SERPL-MCNC: 85 MG/DL (ref ?–150)

## 2019-03-02 PROCEDURE — 80076 HEPATIC FUNCTION PANEL: CPT

## 2019-03-02 PROCEDURE — 84478 ASSAY OF TRIGLYCERIDES: CPT

## 2019-03-02 PROCEDURE — 74011250637 HC RX REV CODE- 250/637: Performed by: GENERAL ACUTE CARE HOSPITAL

## 2019-03-02 PROCEDURE — 74011250636 HC RX REV CODE- 250/636: Performed by: INTERNAL MEDICINE

## 2019-03-02 PROCEDURE — 36415 COLL VENOUS BLD VENIPUNCTURE: CPT

## 2019-03-02 PROCEDURE — 74011250637 HC RX REV CODE- 250/637: Performed by: INTERNAL MEDICINE

## 2019-03-02 PROCEDURE — 80048 BASIC METABOLIC PNL TOTAL CA: CPT

## 2019-03-02 RX ORDER — ONDANSETRON 4 MG/1
4 TABLET, FILM COATED ORAL
Qty: 30 TAB | Refills: 0 | Status: SHIPPED | OUTPATIENT
Start: 2019-03-02 | End: 2020-11-10

## 2019-03-02 RX ORDER — OXYCODONE HYDROCHLORIDE 5 MG/1
5 TABLET ORAL
Qty: 30 TAB | Refills: 0 | Status: SHIPPED | OUTPATIENT
Start: 2019-03-02 | End: 2019-03-09

## 2019-03-02 RX ADMIN — OXYCODONE HYDROCHLORIDE 5 MG: 5 TABLET ORAL at 03:43

## 2019-03-02 RX ADMIN — DIBASIC SODIUM PHOSPHATE, MONOBASIC POTASSIUM PHOSPHATE AND MONOBASIC SODIUM PHOSPHATE 1 TABLET: 852; 155; 130 TABLET ORAL at 08:54

## 2019-03-02 RX ADMIN — THERA TABS 1 TABLET: TAB at 08:55

## 2019-03-02 RX ADMIN — CYANOCOBALAMIN TAB 500 MCG 1000 MCG: 500 TAB at 08:54

## 2019-03-02 RX ADMIN — METOPROLOL TARTRATE 12.5 MG: 25 TABLET ORAL at 08:55

## 2019-03-02 RX ADMIN — Medication 10 ML: at 05:01

## 2019-03-02 RX ADMIN — SODIUM CHLORIDE 150 ML/HR: 900 INJECTION, SOLUTION INTRAVENOUS at 05:01

## 2019-03-02 RX ADMIN — Medication 100 MG: at 09:00

## 2019-03-02 RX ADMIN — OXYCODONE HYDROCHLORIDE 5 MG: 5 TABLET ORAL at 08:54

## 2019-03-02 RX ADMIN — FOLIC ACID 1 MG: 1 TABLET ORAL at 08:55

## 2019-03-02 NOTE — DISCHARGE SUMMARY
Hospitalist Discharge Summary     Patient ID:  Ki Valentin  [id number]  50 y.o.  1970    PCP on record: Yesenia Pate MD    Admit date: 2/27/2019  Discharge date and time: 3/2/2019      DISCHARGE DIAGNOSIS:  Acute alcoholic pancreatitis  Alcohol abuse  Hyponatremia  Hepatic steatosis  Elevated blood pressure without diagnosis of hypertension    CONSULTATIONS:  None    Excerpted HPI from H&P of Lico Chacon MD:  Ki Valentin is a 50 y.o.  male who presents with nausea and abdominal pain. Pt still complaining of significant abdominal pain. He is nearly writhing on the bed. He states that he drinks EtOH on a daily basis but has cut back. Currently he only admits to drinking 2 beers a day. He denies any history of withdrawal symptoms. He states that this abdominal pain has been on going for the past few days but has been worsening. He states that he cannot tolerate any food right now. He denies any history of gallstones. Of note, pt states that he has to go to work tomorrow at [address] [place] [address] him that he may not be ready for discharge by then and that we can provide him a letter for work stating that he is hospitalized. He is still forceful about potentially leaving tomorrow afternoon.     We were asked to admit for work up and evaluation of the above problems. ______________________________________________________________________  DISCHARGE SUMMARY/HOSPITAL COURSE:  for full details see H&P, daily progress notes, labs, consult notes. Acute alcoholic pancreatitis  Treated conservatively with initial bowel rest, aggressive IV fluid resuscitation, and analgesics. Diet was gradually reintroduced in stepwise fashion, and intravenous analgesia transitioned to oral analgesics, which he tolerated well. Patient did not demonstrate any significant complications secondary to pancreatitis and was discharged with oral analgesics and antiemetics.  He was advised to abstain from any further alcohol use and to drink plenty of fluids, as well as to eat bland foods only until he felt back to normal.    Alcohol abuse  Counseled on cessation, which he intends to pursue. Patient did not display signs of alcohol withdrawal during his hospitalization. Hyponatremia  Chronic and asymptomatic; can be investigated further on an outpatient basis. Hepatic steatosis  Counseled on avoiding excess glucose and stopping alcohol use    Elevated blood pressure without diagnosis of hypertension  Advised to check outpatient BP and follow up with his primary care physician for management of possible chronic hypertension. _______________________________________________________________________  Patient seen and examined by me on discharge day. Pertinent Findings:  Gen:    Not in distress, nontoxic  Chest: Clear lungs, normal effort  CVS:   Regular rhythm. No edema  Abd:  Soft, not distended, not tender  Neuro:  Alert, oriented x3, no tremors  _______________________________________________________________________  DISCHARGE MEDICATIONS:   Discharge Medication List as of 3/2/2019  8:13 AM      START taking these medications    Details   ondansetron hcl (ZOFRAN) 4 mg tablet Take 1 Tab by mouth every eight (8) hours as needed for Nausea. , Print, Disp-30 Tab, R-0      oxyCODONE IR (ROXICODONE) 5 mg immediate release tablet Take 1 Tab by mouth every four (4) hours as needed for Pain for up to 7 days. Max Daily Amount: 30 mg., Print, Disp-30 Tab, R-0         STOP taking these medications       cephALEXin (KEFLEX) 500 mg capsule Comments:   Reason for Stopping:               My Recommended Diet, Activity, Wound Care, and follow-up labs are listed in the patient's Discharge Insturctions which I have personally completed and reviewed.     ______________________________________________________________________    Risk of deterioration: Low    Condition at Discharge: Stable  ______________________________________________________________________    Disposition  Home with family, no needs  ______________________________________________________________________    Care Plan discussed with:   Patient, Family, RN    ______________________________________________________________________    Code Status: Full Code  ______________________________________________________________________      Follow up with:   PCP : Dre Weston MD  Follow-up Information     Follow up With Specialties Details Why Contact Info    Dre Weston MD Family Practice In 1 month Hospital follow up/blood pressure check [address] [address]  [phone]                Total time in minutes spent coordinating this discharge (includes going over instructions, follow-up, prescriptions, and preparing report for sign off to her PCP) : 45  minutes    Signed:  Keiry Saldaña MD

## 2019-03-02 NOTE — DISCHARGE INSTRUCTIONS
Patient Education        Pancreatitis: Care Instructions  Your Care Instructions    The pancreas is an organ behind the stomach. It makes hormones and enzymes to help your body digest food. But if these enzymes attack the pancreas, it can get inflamed. This is called pancreatitis. Most cases are caused by gallstones or by heavy alcohol use. If you take care of yourself at home, it will help you get better. It will also help you avoid more problems with your pancreas. Follow-up care is a key part of your treatment and safety. Be sure to make and go to all appointments, and call your doctor if you are having problems. It's also a good idea to know your test results and keep a list of the medicines you take. How can you care for yourself at home? · Drink clear liquids and eat bland foods until you feel better. [place] foods include rice, dry toast, and crackers. They also include bananas and applesauce. · Eat a low-fat diet until your doctor says your pancreas is healed. · Do not drink alcohol. Tell your doctor if you need help to quit. Counseling, support groups, and sometimes medicines can help you stay sober. · Be safe with medicines. Read and follow all instructions on the label. ? If the doctor gave you a prescription medicine for pain, take it as prescribed. ? If you are not taking a prescription pain medicine, ask your doctor if you can take an over-the-counter medicine. · If your doctor prescribed antibiotics, take them as directed. Do not stop taking them just because you feel better. You need to take the full course of antibiotics. · Get extra rest until you feel better. To prevent future problems with your pancreas  · Do not drink alcohol. · Tell your doctors and pharmacist that you've had pancreatitis. They can help you avoid medicines that may cause this problem again. When should you call for help? Call 911 anytime you think you may need emergency care.  For example, call if:    · You vomit blood or what looks like coffee grounds.     · Your stools are maroon or very bloody.    Call your doctor now or seek immediate medical care if:    · You have new or worse belly pain.     · Your stools are black and look like tar, or they have streaks of blood.     · You are vomiting.    Watch closely for changes in your health, and be sure to contact your doctor if:    · You do not get better as expected. Where can you learn more? Go to http://viviane-wallace.info/. Enter S901 in the search box to learn more about \"Pancreatitis: Care Instructions. \"  Current as of: 2018  Content Version: 11.9  © 2486-8132 [employer]. Care instructions adapted under license by [employer] (which disclaims liability or warranty for this information). If you have questions about a medical condition or this instruction, always ask your healthcare professional. [place] [address] any warranty or liability for your use of this information. HOSPITALIST DISCHARGE INSTRUCTIONS    NAME: Miller Anne   :  1970   MRN:  [id number]     Date/Time:  3/2/2019 8:07 AM    ADMIT DATE: 2019   DISCHARGE DATE: 3/2/2019         · It is important that you take the medication exactly as they are prescribed. · Keep your medication in the bottles provided by the pharmacist and keep a list of the medication names, dosages, and times to be taken in your wallet. · Do not take other medications without consulting your doctor. What to do at [address]:  Eat low fat, bland foods until your pain is totally resolved. Drink plenty of water. Do not use alcohol. Recommended activity: Activity as tolerated      If you have questions regarding the hospital related prescriptions or hospital related issues please call [facility] at [phone].  You can always direct your questions to your primary care doctor if you are unable to reach your hospital physician; your PCP works as an extension of your hospital doctor just like your hospital doctor is an extension of your PCP for your time at the hospital [facility])    If you experience any of the following symptoms then please call your primary care physician or return to the emergency room if you cannot get hold of your doctor:    Fever, chills, nausea, vomiting, or persistent diarrhea  Worsening weakness or new problems with your speech or balance  Dark stools or visible blood in your stools  New Leg swelling or shortness of breath as these could be signs of a clot    Additional Instructions:    I recommend checking your blood pressure when you are feeling better so we can determine whether you have chronic high blood pressure. Bring these papers with you to your follow up appointments. The papers will help your doctors be sure to continue the care plan from the hospital.              Information obtained by :  I understand that if any problems occur once I am at home I am to contact my physician. I understand and acknowledge receipt of the instructions indicated above.                                                                                                                                            Physician's or R.N.'s Signature                                                                  Date/Time                                                                                                                                              Patient or Representative Signature

## 2019-03-02 NOTE — PROGRESS NOTES
Oncology End of Shift Note      Bedside shift change report given to DANGELO Chaney (incoming nurse) by Sanna Caputo (outgoing nurse) on [facility]. Report included the following information SBAR, Kardex and MAR. Shift Summary: pt. Stable during shift. Received prn pain meds.  No BM

## 2019-03-02 NOTE — PROGRESS NOTES
Patient being discharged this morning. Prescriptions and follow up appointments reviewed at bedside with patient. Opportunity for questions and clarification provided.

## 2019-03-02 NOTE — PROGRESS NOTES
Oncology End of Shift Note      Bedside shift change report given to Elis Jhaveri RN (incoming nurse) by Wendy Valdes (outgoing nurse) on [facility]. Report included the following information SBAR, Kardex, Intake/Output, MAR and Recent Results.       Shift Summary:       Issues for Physician to Address:                Shift Events        Alexei Steven

## 2020-11-10 ENCOUNTER — APPOINTMENT (OUTPATIENT)
Dept: CT IMAGING | Age: 50
End: 2020-11-10
Attending: EMERGENCY MEDICINE
Payer: COMMERCIAL

## 2020-11-10 ENCOUNTER — HOSPITAL ENCOUNTER (EMERGENCY)
Age: 50
Discharge: HOME | End: 2020-11-11
Attending: EMERGENCY MEDICINE
Payer: COMMERCIAL

## 2020-11-10 DIAGNOSIS — K85.20 ALCOHOL-INDUCED ACUTE PANCREATITIS, UNSPECIFIED COMPLICATION STATUS: Primary | ICD-10-CM

## 2020-11-10 DIAGNOSIS — R10.13 ABDOMINAL PAIN, EPIGASTRIC: ICD-10-CM

## 2020-11-10 LAB
ALBUMIN SERPL-MCNC: 3.9 G/DL (ref 3.5–5)
ALBUMIN/GLOB SERPL: 0.9 {RATIO} (ref 1.1–2.2)
ALP SERPL-CCNC: 113 U/L (ref 45–117)
ALT SERPL-CCNC: 73 U/L (ref 12–78)
ANION GAP SERPL CALC-SCNC: 13 MMOL/L (ref 5–15)
AST SERPL-CCNC: 55 U/L (ref 15–37)
BASOPHILS # BLD: 0 K/UL (ref 0–0.1)
BASOPHILS NFR BLD: 0 % (ref 0–1)
BILIRUB SERPL-MCNC: 2.3 MG/DL (ref 0.2–1)
BUN SERPL-MCNC: 12 MG/DL (ref 6–20)
BUN/CREAT SERPL: 13 (ref 12–20)
CALCIUM SERPL-MCNC: 9.8 MG/DL (ref 8.5–10.1)
CHLORIDE SERPL-SCNC: 93 MMOL/L (ref 97–108)
CO2 SERPL-SCNC: 27 MMOL/L (ref 21–32)
CREAT SERPL-MCNC: 0.94 MG/DL (ref 0.7–1.3)
DIFFERENTIAL METHOD BLD: ABNORMAL
EOSINOPHIL # BLD: 0 K/UL (ref 0–0.4)
EOSINOPHIL NFR BLD: 0 % (ref 0–7)
ERYTHROCYTE [DISTWIDTH] IN BLOOD BY AUTOMATED COUNT: 11.1 % (ref 11.5–14.5)
ETHANOL SERPL-MCNC: <10 MG/DL
GLOBULIN SER CALC-MCNC: 4.3 G/DL (ref 2–4)
GLUCOSE SERPL-MCNC: 114 MG/DL (ref 65–100)
HCT VFR BLD AUTO: 42.3 % (ref 36.6–50.3)
HGB BLD-MCNC: 15.4 G/DL (ref 12.1–17)
IMM GRANULOCYTES # BLD AUTO: 0 K/UL (ref 0–0.04)
IMM GRANULOCYTES NFR BLD AUTO: 0 % (ref 0–0.5)
LIPASE SERPL-CCNC: >3000 U/L (ref 73–393)
LYMPHOCYTES # BLD: 0.3 K/UL (ref 0.8–3.5)
LYMPHOCYTES NFR BLD: 5 % (ref 12–49)
MCH RBC QN AUTO: 34.9 PG (ref 26–34)
MCHC RBC AUTO-ENTMCNC: 36.4 G/DL (ref 30–36.5)
MCV RBC AUTO: 95.9 FL (ref 80–99)
MONOCYTES # BLD: 0.7 K/UL (ref 0–1)
MONOCYTES NFR BLD: 11 % (ref 5–13)
NEUTS SEG # BLD: 5.2 K/UL (ref 1.8–8)
NEUTS SEG NFR BLD: 84 % (ref 32–75)
NRBC # BLD: 0 K/UL (ref 0–0.01)
NRBC BLD-RTO: 0 PER 100 WBC
PLATELET # BLD AUTO: 198 K/UL (ref 150–400)
PMV BLD AUTO: 9.1 FL (ref 8.9–12.9)
POTASSIUM SERPL-SCNC: 3.6 MMOL/L (ref 3.5–5.1)
PROT SERPL-MCNC: 8.2 G/DL (ref 6.4–8.2)
RBC # BLD AUTO: 4.41 M/UL (ref 4.1–5.7)
RBC MORPH BLD: ABNORMAL
SODIUM SERPL-SCNC: 133 MMOL/L (ref 136–145)
WBC # BLD AUTO: 6.2 K/UL (ref 4.1–11.1)

## 2020-11-10 PROCEDURE — 80307 DRUG TEST PRSMV CHEM ANLYZR: CPT

## 2020-11-10 PROCEDURE — 83690 ASSAY OF LIPASE: CPT

## 2020-11-10 PROCEDURE — 36415 COLL VENOUS BLD VENIPUNCTURE: CPT

## 2020-11-10 PROCEDURE — 74011000636 HC RX REV CODE- 636: Performed by: EMERGENCY MEDICINE

## 2020-11-10 PROCEDURE — 99283 EMERGENCY DEPT VISIT LOW MDM: CPT

## 2020-11-10 PROCEDURE — 74011250636 HC RX REV CODE- 250/636: Performed by: EMERGENCY MEDICINE

## 2020-11-10 PROCEDURE — 74177 CT ABD & PELVIS W/CONTRAST: CPT

## 2020-11-10 PROCEDURE — 80053 COMPREHEN METABOLIC PANEL: CPT

## 2020-11-10 PROCEDURE — 96375 TX/PRO/DX INJ NEW DRUG ADDON: CPT

## 2020-11-10 PROCEDURE — 85025 COMPLETE CBC W/AUTO DIFF WBC: CPT

## 2020-11-10 PROCEDURE — 96374 THER/PROPH/DIAG INJ IV PUSH: CPT

## 2020-11-10 RX ORDER — OXYCODONE AND ACETAMINOPHEN 5; 325 MG/1; MG/1
1 TABLET ORAL
Status: COMPLETED | OUTPATIENT
Start: 2020-11-11 | End: 2020-11-11

## 2020-11-10 RX ORDER — HYDROMORPHONE HYDROCHLORIDE 1 MG/ML
1 INJECTION, SOLUTION INTRAMUSCULAR; INTRAVENOUS; SUBCUTANEOUS ONCE
Status: COMPLETED | OUTPATIENT
Start: 2020-11-10 | End: 2020-11-10

## 2020-11-10 RX ORDER — OXYCODONE AND ACETAMINOPHEN 5; 325 MG/1; MG/1
1 TABLET ORAL
Qty: 12 TAB | Refills: 0 | Status: SHIPPED | OUTPATIENT
Start: 2020-11-10 | End: 2020-11-13

## 2020-11-10 RX ORDER — SODIUM CHLORIDE 0.9 % (FLUSH) 0.9 %
10 SYRINGE (ML) INJECTION ONCE
Status: COMPLETED | OUTPATIENT
Start: 2020-11-10 | End: 2020-11-10

## 2020-11-10 RX ORDER — ONDANSETRON 2 MG/ML
4 INJECTION INTRAMUSCULAR; INTRAVENOUS
Status: COMPLETED | OUTPATIENT
Start: 2020-11-10 | End: 2020-11-10

## 2020-11-10 RX ORDER — ONDANSETRON 4 MG/1
4 TABLET, ORALLY DISINTEGRATING ORAL
Qty: 20 TAB | Refills: 0 | Status: SHIPPED | OUTPATIENT
Start: 2020-11-10

## 2020-11-10 RX ORDER — ONDANSETRON 4 MG/1
4 TABLET, ORALLY DISINTEGRATING ORAL
Qty: 20 TAB | Refills: 0 | Status: SHIPPED | OUTPATIENT
Start: 2020-11-10 | End: 2020-11-10

## 2020-11-10 RX ORDER — SODIUM CHLORIDE 9 MG/ML
50 INJECTION, SOLUTION INTRAVENOUS ONCE
Status: COMPLETED | OUTPATIENT
Start: 2020-11-10 | End: 2020-11-11

## 2020-11-10 RX ORDER — MORPHINE SULFATE 2 MG/ML
4 INJECTION, SOLUTION INTRAMUSCULAR; INTRAVENOUS
Status: COMPLETED | OUTPATIENT
Start: 2020-11-10 | End: 2020-11-10

## 2020-11-10 RX ADMIN — SODIUM CHLORIDE 50 ML/HR: 900 INJECTION, SOLUTION INTRAVENOUS at 21:20

## 2020-11-10 RX ADMIN — IOPAMIDOL 100 ML: 755 INJECTION, SOLUTION INTRAVENOUS at 21:20

## 2020-11-10 RX ADMIN — MORPHINE SULFATE 4 MG: 2 INJECTION, SOLUTION INTRAMUSCULAR; INTRAVENOUS at 20:27

## 2020-11-10 RX ADMIN — HYDROMORPHONE HYDROCHLORIDE 1 MG: 1 INJECTION, SOLUTION INTRAMUSCULAR; INTRAVENOUS; SUBCUTANEOUS at 22:58

## 2020-11-10 RX ADMIN — Medication 10 ML: at 21:20

## 2020-11-10 RX ADMIN — SODIUM CHLORIDE 1000 ML: 900 INJECTION, SOLUTION INTRAVENOUS at 20:27

## 2020-11-10 RX ADMIN — ONDANSETRON 4 MG: 2 INJECTION INTRAMUSCULAR; INTRAVENOUS at 20:27

## 2020-11-10 NOTE — LETTER
[facility] EMERGENCY [address] [address] 
[phone] Work/School Note Date: 11/10/2020 To Whom It May concern: 
 
Sirisha Silva was seen and treated today in the emergency room by the following provider(s): 
Attending Provider: Victor M Hopper may return to work on 11/12/2020. Sincerely, Kaleb Castro, DO

## 2020-11-11 VITALS
BODY MASS INDEX: 27.29 KG/M2 | HEART RATE: 76 BPM | DIASTOLIC BLOOD PRESSURE: 86 MMHG | HEIGHT: 66 IN | TEMPERATURE: 98 F | SYSTOLIC BLOOD PRESSURE: 132 MMHG | RESPIRATION RATE: 16 BRPM | OXYGEN SATURATION: 97 %

## 2020-11-11 LAB
APPEARANCE UR: NORMAL
BACTERIA URNS QL MICRO: NORMAL /HPF
BILIRUB UR QL: NORMAL
COLOR UR: NORMAL
EPITH CASTS URNS QL MICRO: NORMAL /LPF (ref 0–5)
GLUCOSE UR STRIP.AUTO-MCNC: NORMAL MG/DL
HGB UR QL STRIP: NORMAL
KETONES UR QL STRIP.AUTO: NORMAL MG/DL
LEUKOCYTE ESTERASE UR QL STRIP.AUTO: NORMAL
NITRITE UR QL STRIP.AUTO: NORMAL
PH UR STRIP: NORMAL [PH] (ref 5–8)
PROT UR STRIP-MCNC: NORMAL MG/DL
RBC #/AREA URNS HPF: NORMAL /HPF (ref 0–5)
SP GR UR REFRACTOMETRY: NORMAL (ref 1–1.03)
SP GR UR REFRACTOMETRY: NORMAL (ref 1–1.03)
UR CULT HOLD, URHOLD: NORMAL
UROBILINOGEN UR QL STRIP.AUTO: NORMAL EU/DL (ref 0.2–1)
WBC URNS QL MICRO: NORMAL /HPF (ref 0–4)

## 2020-11-11 PROCEDURE — 74011250637 HC RX REV CODE- 250/637: Performed by: EMERGENCY MEDICINE

## 2020-11-11 RX ADMIN — OXYCODONE HYDROCHLORIDE AND ACETAMINOPHEN 1 TABLET: 5; 325 TABLET ORAL at 00:02

## 2020-11-11 NOTE — DISCHARGE INSTRUCTIONS
We hope that we have addressed all of your medical concerns. The examination and treatment you received in the Emergency Department were for an emergent problem and were not intended as complete care. It is important that you follow up with your healthcare provider(s) for ongoing care. If your symptoms worsen or do not improve as expected, and you are unable to reach your usual health care provider(s), you should return to the Emergency Department. Today's healthcare is undergoing tremendous change, and patient satisfaction surveys are one of the many tools to assess the quality of medical care. You may receive a survey from the "[employer] regarding your experience in the Emergency Department. I hope that your experience has been completely positive, particularly the medical care that I provided. As such, please participate in the survey; anything less than excellent does not meet my expectations or intentions. [facility] and 8 [facility] participate in nationally recognized quality of care measures. If your blood pressure is greater than 120/80, as reported below, we urge that you seek medical care to address the potential of high blood pressure, commonly known as hypertension. Hypertension can be hereditary or can be caused by certain medical conditions, pain, stress, or \"white coat syndrome. \"       Please make an appointment with your health care provider(s) for follow up of your Emergency Department visit. VITALS:   Patient Vitals for the past 8 hrs:   Temp Pulse Resp BP SpO2   11/10/20 1910 97.7 °F (36.5 °C) (!) 105 18 (!) 150/101 98 %          Thank you for allowing us to provide you with medical care today. We realize that you have many choices for your emergency care needs. Please choose us in the future for any continued health care needs. [address] [facility]   Office: [phone]            Recent Results (from the past 24 hour(s))   LIPASE    Collection Time: 11/10/20  7:15 PM   Result Value Ref Range    Lipase >3,000 (H) 73 - 393 U/L   CBC WITH AUTOMATED DIFF    Collection Time: 11/10/20  7:15 PM   Result Value Ref Range    WBC 6.2 4.1 - 11.1 K/uL    RBC 4.41 4.10 - 5.70 M/uL    HGB 15.4 12.1 - 17.0 g/dL    HCT 42.3 36.6 - 50.3 %    MCV 95.9 80.0 - 99.0 FL    MCH 34.9 (H) 26.0 - 34.0 PG    MCHC 36.4 30.0 - 36.5 g/dL    RDW 11.1 (L) 11.5 - 14.5 %    PLATELET 254 797 - 897 K/uL    MPV 9.1 8.9 - 12.9 FL    NRBC 0.0 0  WBC    ABSOLUTE NRBC 0.00 0.00 - 0.01 K/uL    NEUTROPHILS 84 (H) 32 - 75 %    LYMPHOCYTES 5 (L) 12 - 49 %    MONOCYTES 11 5 - 13 %    EOSINOPHILS 0 0 - 7 %    BASOPHILS 0 0 - 1 %    IMMATURE GRANULOCYTES 0 0.0 - 0.5 %    ABS. NEUTROPHILS 5.2 1.8 - 8.0 K/UL    ABS. LYMPHOCYTES 0.3 (L) 0.8 - 3.5 K/UL    ABS. MONOCYTES 0.7 0.0 - 1.0 K/UL    ABS. EOSINOPHILS 0.0 0.0 - 0.4 K/UL    ABS. BASOPHILS 0.0 0.0 - 0.1 K/UL    ABS. IMM. GRANS. 0.0 0.00 - 0.04 K/UL    DF SMEAR SCANNED      RBC COMMENTS NORMOCYTIC, NORMOCHROMIC     METABOLIC PANEL, COMPREHENSIVE    Collection Time: 11/10/20  7:15 PM   Result Value Ref Range    Sodium 133 (L) 136 - 145 mmol/L    Potassium 3.6 3.5 - 5.1 mmol/L    Chloride 93 (L) 97 - 108 mmol/L    CO2 27 21 - 32 mmol/L    Anion gap 13 5 - 15 mmol/L    Glucose 114 (H) 65 - 100 mg/dL    BUN 12 6 - 20 MG/DL    Creatinine 0.94 0.70 - 1.30 MG/DL    BUN/Creatinine ratio 13 12 - 20      GFR est AA >60 >60 ml/min/1.73m2    GFR est non-AA >60 >60 ml/min/1.73m2    Calcium 9.8 8.5 - 10.1 MG/DL    Bilirubin, total 2.3 (H) 0.2 - 1.0 MG/DL    ALT (SGPT) 73 12 - 78 U/L    AST (SGOT) 55 (H) 15 - 37 U/L    Alk.  phosphatase 113 45 - 117 U/L    Protein, total 8.2 6.4 - 8.2 g/dL    Albumin 3.9 3.5 - 5.0 g/dL    Globulin 4.3 (H) 2.0 - 4.0 g/dL    A-G Ratio 0.9 (L) 1.1 - 2.2     ETHYL ALCOHOL    Collection Time: 11/10/20  7:15 PM   Result Value Ref Range    ALCOHOL(ETHYL),SERUM <10 <10 MG/DL       Ct Abd Pelv W Cont    Result Date: 11/10/2020  EXAM: CT ABD PELV W CONT INDICATION: pancreatitis COMPARISON: CONTRAST: 100 mL of Isovue-370. TECHNIQUE: Following the uneventful intravenous administration of contrast, thin axial images were obtained through the abdomen and pelvis. Coronal and sagittal reconstructions were generated. Oral contrast was not administered. CT dose reduction was achieved through use of a standardized protocol tailored for this examination and automatic exposure control for dose modulation. FINDINGS: LOWER THORAX: No significant abnormality in the incidentally imaged lower chest. LIVER: No mass. There is hepatic steatosis BILIARY TREE: Gallbladder is within normal limits. CBD is not dilated. SPLEEN: within normal limits. PANCREAS: There is peripancreatic edema. There is slight prominence of the head were there are areas of low density. There is fluid extending into the lesser sac. There is small amount of ascites in the lower abdomen and pelvis. There is a small bowel loop slightly distended most likely localized ileus. ADRENALS: Unremarkable. KIDNEYS: No mass, calculus, or hydronephrosis. STOMACH: Unremarkable. SMALL BOWEL: No dilatation or wall thickening. COLON: No dilatation or wall thickening. APPENDIX: Not distended PERITONEUM: No ascites or pneumoperitoneum. RETROPERITONEUM: No lymphadenopathy or aortic aneurysm. REPRODUCTIVE ORGANS: Prostate is normal URINARY BLADDER: No mass or calculus. BONES: No destructive bone lesion. ABDOMINAL WALL: No mass or hernia. ADDITIONAL COMMENTS: N/A     IMPRESSION: 1. Findings are consistent with acute interstitial edematous pancreatitis. There is slight hypoenhancement of the head most likely more focal pancreatitis. There is mild peripancreatic edema. There is fluid extending into the lesser sac and right anterior pararenal space.  There is a mild amount of ascites in the lower abdomen and pelvis. .        Patient Education        Abdominal Pain: Care Instructions  Your Care Instructions     Abdominal pain has many possible causes. Some aren't serious and get better on their own in a few days. Others need more testing and treatment. If your pain continues or gets worse, you need to be rechecked and may need more tests to find out what is wrong. You may need surgery to correct the problem. Don't ignore new symptoms, such as fever, nausea and vomiting, urination problems, pain that gets worse, and dizziness. These may be signs of a more serious problem. Your doctor may have recommended a follow-up visit in the next 8 to 12 hours. If you are not getting better, you may need more tests or treatment. The doctor has checked you carefully, but problems can develop later. If you notice any problems or new symptoms, get medical treatment right away. Follow-up care is a key part of your treatment and safety. Be sure to make and go to all appointments, and call your doctor if you are having problems. It's also a good idea to know your test results and keep a list of the medicines you take. How can you care for yourself at home? · Rest until you feel better. · To prevent dehydration, drink plenty of fluids, enough so that your urine is light yellow or clear like water. Choose water and other caffeine-free clear liquids until you feel better. If you have kidney, heart, or liver disease and have to limit fluids, talk with your doctor before you increase the amount of fluids you drink. · If your stomach is upset, eat mild foods, such as rice, dry toast or crackers, bananas, and applesauce. Try eating several small meals instead of two or three large ones. · Wait until 48 hours after all symptoms have gone away before you have spicy foods, alcohol, and drinks that contain caffeine. · Do not eat foods that are high in fat.   · Avoid anti-inflammatory medicines such as aspirin, ibuprofen (Advil, Motrin), and naproxen (Aleve). These can cause stomach upset. Talk to your doctor if you take daily aspirin for another health problem. When should you call for help? Call 911 anytime you think you may need emergency care. For example, call if:    · You passed out (lost consciousness).     · You pass maroon or very bloody stools.     · You vomit blood or what looks like coffee grounds.     · You have new, severe belly pain. Call your doctor now or seek immediate medical care if:    · Your pain gets worse, especially if it becomes focused in one area of your belly.     · You have a new or higher fever.     · Your stools are black and look like tar, or they have streaks of blood.     · You have unexpected vaginal bleeding.     · You have symptoms of a urinary tract infection. These may include:  ? Pain when you urinate. ? Urinating more often than usual.  ? Blood in your urine.     · You are dizzy or lightheaded, or you feel like you may faint. Watch closely for changes in your health, and be sure to contact your doctor if:    · You are not getting better after 1 day (24 hours). Where can you learn more? Go to http://www.gray.com/  Enter I319 in the search box to learn more about \"Abdominal Pain: Care Instructions. \"  Current as of: June 26, 2019               Content Version: 12.6  © 6795-6554 [employer]. Care instructions adapted under license by [employer] (which disclaims liability or warranty for this information). If you have questions about a medical condition or this instruction, always ask your healthcare professional. [place] [address] any warranty or liability for your use of this information. Patient Education        Pancreatitis: Care Instructions  Your Care Instructions     The pancreas is an organ behind the stomach. It makes hormones and enzymes to help your body digest food.   But if these enzymes attack the pancreas, it can get inflamed. This is called pancreatitis. Most cases are caused by gallstones or by heavy alcohol use. If you take care of yourself at home, it will help you get better. It will also help you avoid more problems with your pancreas. Follow-up care is a key part of your treatment and safety. Be sure to make and go to all appointments, and call your doctor if you are having problems. It's also a good idea to know your test results and keep a list of the medicines you take. How can you care for yourself at home? · Drink clear liquids and eat bland foods until you feel better. [place] foods include rice, dry toast, and crackers. They also include bananas and applesauce. · Eat a low-fat diet until your doctor says your pancreas is healed. · Do not drink alcohol. Tell your doctor if you need help to quit. Counseling, support groups, and sometimes medicines can help you stay sober. · Be safe with medicines. Read and follow all instructions on the label. ? If the doctor gave you a prescription medicine for pain, take it as prescribed. ? If you are not taking a prescription pain medicine, ask your doctor if you can take an over-the-counter medicine. · If your doctor prescribed antibiotics, take them as directed. Do not stop taking them just because you feel better. You need to take the full course of antibiotics. · Get extra rest until you feel better. To prevent future problems with your pancreas  · Do not drink alcohol. · Tell your doctors and pharmacist that you've had pancreatitis. They can help you avoid medicines that may cause this problem again. When should you call for help? Call 911 anytime you think you may need emergency care. For example, call if:    · You vomit blood or what looks like coffee grounds.     · Your stools are maroon or very bloody.    Call your doctor now or seek immediate medical care if:    · You have new or worse belly pain.     · Your stools are black and look like tar, or they have streaks of blood.     · You are vomiting. Watch closely for changes in your health, and be sure to contact your doctor if:    · You do not get better as expected. Where can you learn more? Go to http://www.gray.com/  Enter J381 in the search box to learn more about \"Pancreatitis: Care Instructions. \"  Current as of: April 15, 2020               Content Version: 12.6  © 2006-2020 [employer]. Care instructions adapted under license by [employer] (which disclaims liability or warranty for this information). If you have questions about a medical condition or this instruction, always ask your healthcare professional. [address] any warranty or liability for your use of this information.

## 2020-11-11 NOTE — ED TRIAGE NOTES
Triage: pt c/o upper abd pain and N/V x3 days ago. Hx of pancreatitis; last episode 2018. Denies fever, rectal bleeding, dizziness, SOB, chest pain, urinary symptoms.

## 2020-11-11 NOTE — ED PROVIDER NOTES
This is a 59-year-old gentleman comes emergency room chief complaint of epigastric pain. Patient states that his abdominal pain started approximately 3 days ago. Patient has had nausea vomiting as well. Patient is also had a little bit of constipation. Patient denies any fever or chills. Patient denies any shortness of breath or chest pain. Patient denies any urinary symptoms. Patient states he does have a history of pancreatitis in the past.  Patient states that he does continue to drink alcohol on a frequent basis. Patient states his last drink of alcohol was on Saturday. The history is provided by the patient. No  was used. Abdominal Pain    This is a new problem. The current episode started more than 2 days ago. The problem occurs constantly. The problem has been gradually worsening. The pain is associated with vomiting. The pain is located in the epigastric region. The quality of the pain is sharp and aching. The pain is at a severity of 10/10. The pain is severe. Associated symptoms include nausea, vomiting and constipation. Pertinent negatives include no fever, no diarrhea, no dysuria, no hematuria, no myalgias, no chest pain and no back pain. The pain is worsened by palpation, drinking alcohol and vomiting. The pain is relieved by nothing. Past workup includes CT scan. Past workup includes no surgery. His past medical history is significant for pancreatitis. His past medical history does not include PUD, GERD, UTI or DM. The patient's surgical history non-contributory. Past Medical History:   Diagnosis Date    Pancreatitis        History reviewed. No pertinent surgical history. History reviewed. No pertinent family history.     Social History     Socioeconomic History    Marital status:      Spouse name: Not on file    Number of children: Not on file    Years of education: Not on file    Highest education level: Not on file   Occupational History    Not on file   Social Needs    Financial resource strain: Not on file    Food insecurity     Worry: Not on file     Inability: Not on file    Transportation needs     Medical: Not on file     Non-medical: Not on file   Tobacco Use    Smoking status: Never Smoker    Smokeless tobacco: Never Used   Substance and Sexual Activity    Alcohol use: Yes     Alcohol/week: 6.0 standard drinks     Types: 6 Cans of beer per week    Drug use: No    Sexual activity: Yes   Lifestyle    Physical activity     Days per week: Not on file     Minutes per session: Not on file    Stress: Not on file   Relationships    Social connections     Talks on phone: Not on file     Gets together: Not on file     Attends Judaism service: Not on file     Active member of club or organization: Not on file     Attends meetings of clubs or organizations: Not on file     Relationship status: Not on file    Intimate partner violence     Fear of current or ex partner: Not on file     Emotionally abused: Not on file     Physically abused: Not on file     Forced sexual activity: Not on file   Other Topics Concern    Not on file   Social History Narrative    Not on file     ALLERGIES: Patient has no known allergies. Review of Systems   Constitutional: Negative for appetite change, chills, fever and unexpected weight change. HENT: Negative for ear pain, hearing loss, rhinorrhea and trouble swallowing. Eyes: Negative for pain and visual disturbance. Respiratory: Negative for cough, chest tightness and shortness of breath. Cardiovascular: Negative for chest pain and palpitations. Gastrointestinal: Positive for abdominal pain, constipation, nausea and vomiting. Negative for abdominal distention, blood in stool and diarrhea. Genitourinary: Negative for dysuria, hematuria and urgency. Musculoskeletal: Negative for back pain and myalgias. Skin: Negative for rash.    Neurological: Negative for dizziness, syncope, weakness and numbness. Psychiatric/Behavioral: Negative for confusion and suicidal ideas. All other systems reviewed and are negative. Vitals:    11/10/20 1910 11/11/20 0000   BP: (!) 150/101 132/86   Pulse: (!) 105 76   Resp: 18 16   Temp: 97.7 °F (36.5 °C) 98 °F (36.7 °C)   SpO2: 98% 97%   Height: 5' 6\" (1.676 m)             Physical Exam  Vitals signs and nursing note reviewed. Constitutional:       General: He is not in acute distress. Appearance: Normal appearance. He is well-developed. He is not diaphoretic. Comments: Uncomfortable appearing   HENT:      Head: Normocephalic and atraumatic. Right Ear: External ear normal.      Left Ear: External ear normal.   Eyes:      General: No scleral icterus. Right eye: No discharge. Left eye: No discharge. Extraocular Movements: Extraocular movements intact. Conjunctiva/sclera: Conjunctivae normal.      Pupils: Pupils are equal, round, and reactive to light. Neck:      Musculoskeletal: Normal range of motion and neck supple. Vascular: No JVD. Trachea: No tracheal deviation. Cardiovascular:      Rate and Rhythm: Normal rate and regular rhythm. Heart sounds: Normal heart sounds. No murmur. No friction rub. No gallop. Pulmonary:      Effort: Pulmonary effort is normal. No respiratory distress. Breath sounds: Normal breath sounds. No stridor. No decreased breath sounds, wheezing, rhonchi or rales. Chest:      Chest wall: No tenderness. Abdominal:      General: Bowel sounds are normal. There is no distension. Palpations: Abdomen is soft. Tenderness: There is abdominal tenderness in the epigastric area. There is no guarding or rebound. Musculoskeletal: Normal range of motion. General: No tenderness. Skin:     General: Skin is warm and dry. Capillary Refill: Capillary refill takes less than 2 seconds. Coloration: Skin is not pale. Findings: No erythema or rash. Neurological:      General: No focal deficit present. Mental Status: He is alert and oriented to person, place, and time. GCS: GCS eye subscore is 4. GCS verbal subscore is 5. GCS motor subscore is 6. Cranial Nerves: No cranial nerve deficit. Sensory: No sensory deficit. Motor: No weakness or abnormal muscle tone. Coordination: Coordination normal.      Deep Tendon Reflexes: Reflexes are normal and symmetric. Reflexes normal.   Psychiatric:         Mood and Affect: Mood normal. Mood is not anxious or depressed. Behavior: Behavior normal.         Thought Content: Thought content normal.         Judgment: Judgment normal.          MDM  Number of Diagnoses or Management Options  Abdominal pain, epigastric:   Alcohol-induced acute pancreatitis, unspecified complication status:      Amount and/or Complexity of Data Reviewed  Clinical lab tests: ordered and reviewed  Tests in the radiology section of CPT®: ordered and reviewed    Risk of Complications, Morbidity, and/or Mortality  Presenting problems: moderate  Diagnostic procedures: moderate  Management options: moderate    Patient Progress  Patient progress: improved       Procedures    Chief Complaint   Patient presents with    Abdominal Pain       The patient's presenting problems have been discussed, and they are in agreement with the care plan formulated and outlined with them. I have encouraged them to ask questions as they arise throughout their visit.     MEDICATIONS GIVEN:  Medications   sodium chloride 0.9 % bolus infusion 1,000 mL (0 mL IntraVENous IV Completed 11/11/20 0000)   ondansetron (ZOFRAN) injection 4 mg (4 mg IntraVENous Given 11/10/20 2027)   morphine injection 4 mg (4 mg IntraVENous Given 11/10/20 2027)   0.9% sodium chloride infusion (0 mL/hr IntraVENous IV Completed 11/11/20 0000)   iopamidoL (ISOVUE-370) 76 % injection 100 mL (100 mL IntraVENous Given 11/10/20 2120)   sodium chloride (NS) flush 10 mL (10 mL IntraVENous Given 11/10/20 2120)   HYDROmorphone (PF) (DILAUDID) injection 1 mg (1 mg IntraVENous Given 11/10/20 2258)   oxyCODONE-acetaminophen (PERCOCET) 5-325 mg per tablet 1 Tab (1 Tab Oral Given 11/11/20 0002)       LABS REVIEWED:  Recent Results (from the past 24 hour(s))   LIPASE    Collection Time: 11/10/20  7:15 PM   Result Value Ref Range    Lipase >3,000 (H) 73 - 393 U/L   CBC WITH AUTOMATED DIFF    Collection Time: 11/10/20  7:15 PM   Result Value Ref Range    WBC 6.2 4.1 - 11.1 K/uL    RBC 4.41 4.10 - 5.70 M/uL    HGB 15.4 12.1 - 17.0 g/dL    HCT 42.3 36.6 - 50.3 %    MCV 95.9 80.0 - 99.0 FL    MCH 34.9 (H) 26.0 - 34.0 PG    MCHC 36.4 30.0 - 36.5 g/dL    RDW 11.1 (L) 11.5 - 14.5 %    PLATELET 939 749 - 157 K/uL    MPV 9.1 8.9 - 12.9 FL    NRBC 0.0 0  WBC    ABSOLUTE NRBC 0.00 0.00 - 0.01 K/uL    NEUTROPHILS 84 (H) 32 - 75 %    LYMPHOCYTES 5 (L) 12 - 49 %    MONOCYTES 11 5 - 13 %    EOSINOPHILS 0 0 - 7 %    BASOPHILS 0 0 - 1 %    IMMATURE GRANULOCYTES 0 0.0 - 0.5 %    ABS. NEUTROPHILS 5.2 1.8 - 8.0 K/UL    ABS. LYMPHOCYTES 0.3 (L) 0.8 - 3.5 K/UL    ABS. MONOCYTES 0.7 0.0 - 1.0 K/UL    ABS. EOSINOPHILS 0.0 0.0 - 0.4 K/UL    ABS. BASOPHILS 0.0 0.0 - 0.1 K/UL    ABS. IMM. GRANS. 0.0 0.00 - 0.04 K/UL    DF SMEAR SCANNED      RBC COMMENTS NORMOCYTIC, NORMOCHROMIC     METABOLIC PANEL, COMPREHENSIVE    Collection Time: 11/10/20  7:15 PM   Result Value Ref Range    Sodium 133 (L) 136 - 145 mmol/L    Potassium 3.6 3.5 - 5.1 mmol/L    Chloride 93 (L) 97 - 108 mmol/L    CO2 27 21 - 32 mmol/L    Anion gap 13 5 - 15 mmol/L    Glucose 114 (H) 65 - 100 mg/dL    BUN 12 6 - 20 MG/DL    Creatinine 0.94 0.70 - 1.30 MG/DL    BUN/Creatinine ratio 13 12 - 20      GFR est AA >60 >60 ml/min/1.73m2    GFR est non-AA >60 >60 ml/min/1.73m2    Calcium 9.8 8.5 - 10.1 MG/DL    Bilirubin, total 2.3 (H) 0.2 - 1.0 MG/DL    ALT (SGPT) 73 12 - 78 U/L    AST (SGOT) 55 (H) 15 - 37 U/L    Alk.  phosphatase 113 45 - 117 U/L Protein, total 8.2 6.4 - 8.2 g/dL    Albumin 3.9 3.5 - 5.0 g/dL    Globulin 4.3 (H) 2.0 - 4.0 g/dL    A-G Ratio 0.9 (L) 1.1 - 2.2     ETHYL ALCOHOL    Collection Time: 11/10/20  7:15 PM   Result Value Ref Range    ALCOHOL(ETHYL),SERUM <10 <10 MG/DL       VITAL SIGNS:  Patient Vitals for the past 24 hrs:   Temp Pulse Resp BP SpO2   11/11/20 0000 98 °F (36.7 °C) 76 16 132/86 97 %   11/10/20 1910 97.7 °F (36.5 °C) (!) 105 18 (!) 150/101 98 %       RADIOLOGY RESULTS:  The following have been ordered and reviewed:  Ct Abd Pelv W Cont    Result Date: 11/10/2020  EXAM: CT ABD PELV W CONT INDICATION: pancreatitis COMPARISON: CONTRAST: 100 mL of Isovue-370. TECHNIQUE: Following the uneventful intravenous administration of contrast, thin axial images were obtained through the abdomen and pelvis. Coronal and sagittal reconstructions were generated. Oral contrast was not administered. CT dose reduction was achieved through use of a standardized protocol tailored for this examination and automatic exposure control for dose modulation. FINDINGS: LOWER THORAX: No significant abnormality in the incidentally imaged lower chest. LIVER: No mass. There is hepatic steatosis BILIARY TREE: Gallbladder is within normal limits. CBD is not dilated. SPLEEN: within normal limits. PANCREAS: There is peripancreatic edema. There is slight prominence of the head were there are areas of low density. There is fluid extending into the lesser sac. There is small amount of ascites in the lower abdomen and pelvis. There is a small bowel loop slightly distended most likely localized ileus. ADRENALS: Unremarkable. KIDNEYS: No mass, calculus, or hydronephrosis. STOMACH: Unremarkable. SMALL BOWEL: No dilatation or wall thickening. COLON: No dilatation or wall thickening. APPENDIX: Not distended PERITONEUM: No ascites or pneumoperitoneum. RETROPERITONEUM: No lymphadenopathy or aortic aneurysm.  REPRODUCTIVE ORGANS: Prostate is normal URINARY BLADDER: No mass or calculus. BONES: No destructive bone lesion. ABDOMINAL WALL: No mass or hernia. ADDITIONAL COMMENTS: N/A     IMPRESSION: 1. Findings are consistent with acute interstitial edematous pancreatitis. There is slight hypoenhancement of the head most likely more focal pancreatitis. There is mild peripancreatic edema. There is fluid extending into the lesser sac and right anterior pararenal space. There is a mild amount of ascites in the lower abdomen and pelvis. Cristhian Walton PROGRESS NOTES:  Recommended that the patient be admitted for further evaluation and treatment and to see GI. Patient wishes to be discharged. Patient given instructions on return precautions. Discussed results and plan with patient. Patient will be discharged home with PCP and GI follow up. Patient instructed to return to the emergency room for any worsening symptoms or any other concerns. DIAGNOSIS:    1. Alcohol-induced acute pancreatitis, unspecified complication status    2. Abdominal pain, epigastric        PLAN:  Follow-up Information     Follow up With Specialties Details Why Contact Info    Destinee Hunter MD Family Medicine Schedule an appointment as soon as possible for a visit  [id number] [address]/ [place] [address] [facility]  Schedule an appointment as soon as possible for a visit  [address] [facility] [address] [facility] [id number]    SPT [facility] [facility]  If symptoms worsen [address] [facility] [phone]        Discharge Medication List as of 11/10/2020 11:34 PM      START taking these medications    Details   oxyCODONE-acetaminophen (Percocet) 5-325 mg per tablet Take 1 Tab by mouth every six (6) hours as needed for Pain for up to 3 days.  Max Daily Amount: 4 Tabs., Normal, Disp-12 Tab,R-0      ondansetron (ZOFRAN ODT) 4 mg disintegrating tablet Take 1 Tab by mouth every eight (8) hours as needed for Nausea., Normal, Disp-20 Tab,R-0             ED COURSE: The patient's hospital course has been uncomplicated. Please note that this dictation was completed with [employer], the computer voice recognition software. Quite often unanticipated grammatical, syntax, homophones, and other interpretive errors are inadvertently transcribed by the computer software. Please disregard these errors. Please excuse any errors that have escaped final proofreading.

## 2020-11-27 ENCOUNTER — TRANSCRIBE ORDER (OUTPATIENT)
Dept: SCHEDULING | Age: 50
End: 2020-11-27

## 2020-11-27 DIAGNOSIS — K21.9 ESOPHAGEAL REFLUX: ICD-10-CM

## 2020-11-27 DIAGNOSIS — F10.10 ALCOHOL ABUSE: ICD-10-CM

## 2020-11-27 DIAGNOSIS — K85.90 ACUTE PANCREATITIS: Primary | ICD-10-CM

## 2021-01-09 ENCOUNTER — HOSPITAL ENCOUNTER (OUTPATIENT)
Dept: MRI IMAGING | Age: 51
Discharge: HOME | End: 2021-01-09
Attending: NURSE PRACTITIONER
Payer: COMMERCIAL

## 2021-01-09 DIAGNOSIS — K85.90 ACUTE PANCREATITIS: ICD-10-CM

## 2021-01-09 DIAGNOSIS — K21.9 ESOPHAGEAL REFLUX: ICD-10-CM

## 2021-01-09 DIAGNOSIS — F10.10 ALCOHOL ABUSE: ICD-10-CM

## 2021-01-09 PROCEDURE — A9585 GADOBUTROL INJECTION: HCPCS | Performed by: NURSE PRACTITIONER

## 2021-01-09 PROCEDURE — 74183 MRI ABD W/O CNTR FLWD CNTR: CPT

## 2021-01-09 PROCEDURE — 74011250636 HC RX REV CODE- 250/636: Performed by: NURSE PRACTITIONER

## 2021-01-09 RX ADMIN — GADOBUTROL 7 ML: 604.72 INJECTION INTRAVENOUS at 09:13

## 2022-03-19 PROBLEM — K85.90 PANCREATITIS: Status: ACTIVE | Noted: 2019-02-28

## 2022-09-15 ENCOUNTER — TRANSCRIBE ORDER (OUTPATIENT)
Dept: SCHEDULING | Age: 52
End: 2022-09-15

## 2022-09-15 DIAGNOSIS — K85.90 ACUTE PANCREATITIS: ICD-10-CM

## 2022-09-15 DIAGNOSIS — K21.9 ACID REFLUX: Primary | ICD-10-CM

## 2022-09-15 DIAGNOSIS — R10.13 EPIGASTRIC PAIN: ICD-10-CM

## 2022-09-23 ENCOUNTER — HOSPITAL ENCOUNTER (OUTPATIENT)
Dept: CT IMAGING | Age: 52
Discharge: HOME | End: 2022-09-23
Attending: INTERNAL MEDICINE
Payer: COMMERCIAL

## 2022-09-23 DIAGNOSIS — K85.90 ACUTE PANCREATITIS: ICD-10-CM

## 2022-09-23 DIAGNOSIS — R10.13 EPIGASTRIC PAIN: ICD-10-CM

## 2022-09-23 DIAGNOSIS — K21.9 ACID REFLUX: ICD-10-CM

## 2022-09-23 PROCEDURE — 74160 CT ABDOMEN W/CONTRAST: CPT

## 2022-09-23 PROCEDURE — 74011000636 HC RX REV CODE- 636: Performed by: RADIOLOGY

## 2022-09-23 RX ADMIN — IOPAMIDOL 100 ML: 755 INJECTION, SOLUTION INTRAVENOUS at 17:59

## 2022-09-28 ENCOUNTER — APPOINTMENT (OUTPATIENT)
Dept: ULTRASOUND IMAGING | Age: 52
DRG: 433 | End: 2022-09-28
Attending: PHYSICIAN ASSISTANT
Payer: COMMERCIAL

## 2022-09-28 ENCOUNTER — HOSPITAL ENCOUNTER (INPATIENT)
Age: 52
LOS: 8 days | Discharge: HOME | DRG: 433 | End: 2022-10-07
Attending: EMERGENCY MEDICINE | Admitting: FAMILY MEDICINE
Payer: COMMERCIAL

## 2022-09-28 DIAGNOSIS — R16.0 HEPATOMEGALY: ICD-10-CM

## 2022-09-28 DIAGNOSIS — F10.10 ALCOHOL USE DISORDER, MILD, ABUSE: ICD-10-CM

## 2022-09-28 DIAGNOSIS — K70.9 ALCOHOLIC LIVER DISEASE (HCC): ICD-10-CM

## 2022-09-28 DIAGNOSIS — E87.1 HYPONATREMIA: ICD-10-CM

## 2022-09-28 DIAGNOSIS — K70.11 ALCOHOLIC HEPATITIS WITH ASCITES: ICD-10-CM

## 2022-09-28 DIAGNOSIS — K70.10 ALCOHOLIC HEPATITIS WITHOUT ASCITES: ICD-10-CM

## 2022-09-28 DIAGNOSIS — D68.9 COAGULOPATHY (HCC): ICD-10-CM

## 2022-09-28 DIAGNOSIS — R74.01 TRANSAMINITIS: Primary | ICD-10-CM

## 2022-09-28 LAB
ALBUMIN SERPL-MCNC: 2.4 G/DL (ref 3.5–5)
ALBUMIN/GLOB SERPL: 0.4 {RATIO} (ref 1.1–2.2)
ALP SERPL-CCNC: 240 U/L (ref 45–117)
ALT SERPL-CCNC: 82 U/L (ref 12–78)
ANION GAP SERPL CALC-SCNC: 5 MMOL/L (ref 5–15)
APPEARANCE UR: CLEAR
AST SERPL-CCNC: 305 U/L (ref 15–37)
BACTERIA URNS QL MICRO: NEGATIVE /HPF
BASOPHILS # BLD: 0.1 K/UL (ref 0–0.1)
BASOPHILS NFR BLD: 1 % (ref 0–1)
BILIRUB SERPL-MCNC: 11.1 MG/DL (ref 0.2–1)
BILIRUB UR QL CFM: POSITIVE
BUN SERPL-MCNC: 2 MG/DL (ref 6–20)
BUN/CREAT SERPL: 4 (ref 12–20)
CALCIUM SERPL-MCNC: 9.3 MG/DL (ref 8.5–10.1)
CHLORIDE SERPL-SCNC: 91 MMOL/L (ref 97–108)
CO2 SERPL-SCNC: 33 MMOL/L (ref 21–32)
COLOR UR: NORMAL
COMMENT, HOLDF: NORMAL
COMMENT, HOLDF: NORMAL
CREAT SERPL-MCNC: 0.47 MG/DL (ref 0.7–1.3)
DIFFERENTIAL METHOD BLD: ABNORMAL
EOSINOPHIL # BLD: 0.1 K/UL (ref 0–0.4)
EOSINOPHIL NFR BLD: 1 % (ref 0–7)
EPITH CASTS URNS QL MICRO: NORMAL /LPF
ERYTHROCYTE [DISTWIDTH] IN BLOOD BY AUTOMATED COUNT: 13.7 % (ref 11.5–14.5)
GLOBULIN SER CALC-MCNC: 5.4 G/DL (ref 2–4)
GLUCOSE SERPL-MCNC: 123 MG/DL (ref 65–100)
GLUCOSE UR STRIP.AUTO-MCNC: NEGATIVE MG/DL
HAV IGM SER QL: NONREACTIVE
HBV CORE IGM SER QL: NONREACTIVE
HBV SURFACE AG SER QL: <0.1 INDEX
HBV SURFACE AG SER QL: NEGATIVE
HCT VFR BLD AUTO: 26.6 % (ref 36.6–50.3)
HCV AB SERPL QL IA: NONREACTIVE
HGB BLD-MCNC: 9.7 G/DL (ref 12.1–17)
HGB UR QL STRIP: NEGATIVE
IMM GRANULOCYTES # BLD AUTO: 0 K/UL (ref 0–0.04)
IMM GRANULOCYTES NFR BLD AUTO: 0 % (ref 0–0.5)
KETONES UR QL STRIP.AUTO: NEGATIVE MG/DL
LEUKOCYTE ESTERASE UR QL STRIP.AUTO: NEGATIVE
LIPASE SERPL-CCNC: 50 U/L (ref 73–393)
LYMPHOCYTES # BLD: 0.6 K/UL (ref 0.8–3.5)
LYMPHOCYTES NFR BLD: 8 % (ref 12–49)
MCH RBC QN AUTO: ABNORMAL PG (ref 26–34)
MCHC RBC AUTO-ENTMCNC: ABNORMAL G/DL (ref 30–36.5)
MCV RBC AUTO: 101.5 FL (ref 80–99)
MONOCYTES # BLD: 0.5 K/UL (ref 0–1)
MONOCYTES NFR BLD: 7 % (ref 5–13)
NEUTS SEG # BLD: 6 K/UL (ref 1.8–8)
NEUTS SEG NFR BLD: 83 % (ref 32–75)
NITRITE UR QL STRIP.AUTO: NEGATIVE
NRBC # BLD: 0 K/UL (ref 0–0.01)
NRBC BLD-RTO: 0 PER 100 WBC
PH UR STRIP: 7.5 [PH] (ref 5–8)
PLATELET # BLD AUTO: 272 K/UL (ref 150–400)
PMV BLD AUTO: 10.5 FL (ref 8.9–12.9)
POTASSIUM SERPL-SCNC: 3.2 MMOL/L (ref 3.5–5.1)
PROT SERPL-MCNC: 7.8 G/DL (ref 6.4–8.2)
PROT UR STRIP-MCNC: NEGATIVE MG/DL
RBC # BLD AUTO: 2.62 M/UL (ref 4.1–5.7)
RBC #/AREA URNS HPF: NORMAL /HPF (ref 0–5)
RBC MORPH BLD: ABNORMAL
SAMPLES BEING HELD,HOLD: NORMAL
SAMPLES BEING HELD,HOLD: NORMAL
SODIUM SERPL-SCNC: 129 MMOL/L (ref 136–145)
SP GR UR REFRACTOMETRY: 1 (ref 1–1.03)
SP1: NORMAL
SP2: NORMAL
SP3: NORMAL
UROBILINOGEN UR QL STRIP.AUTO: 1 EU/DL (ref 0.2–1)
WBC # BLD AUTO: 7.3 K/UL (ref 4.1–11.1)
WBC URNS QL MICRO: NORMAL /HPF (ref 0–4)

## 2022-09-28 PROCEDURE — 36415 COLL VENOUS BLD VENIPUNCTURE: CPT

## 2022-09-28 PROCEDURE — 80053 COMPREHEN METABOLIC PANEL: CPT

## 2022-09-28 PROCEDURE — 76705 ECHO EXAM OF ABDOMEN: CPT

## 2022-09-28 PROCEDURE — 85025 COMPLETE CBC W/AUTO DIFF WBC: CPT

## 2022-09-28 PROCEDURE — 80074 ACUTE HEPATITIS PANEL: CPT

## 2022-09-28 PROCEDURE — 81001 URINALYSIS AUTO W/SCOPE: CPT

## 2022-09-28 PROCEDURE — 83690 ASSAY OF LIPASE: CPT

## 2022-09-28 PROCEDURE — 99285 EMERGENCY DEPT VISIT HI MDM: CPT

## 2022-09-28 RX ORDER — OXYCODONE HYDROCHLORIDE 5 MG/1
5 TABLET ORAL
Status: COMPLETED | OUTPATIENT
Start: 2022-09-28 | End: 2022-09-29

## 2022-09-28 RX ORDER — ONDANSETRON 2 MG/ML
4 INJECTION INTRAMUSCULAR; INTRAVENOUS
Status: COMPLETED | OUTPATIENT
Start: 2022-09-28 | End: 2022-09-29

## 2022-09-29 ENCOUNTER — APPOINTMENT (OUTPATIENT)
Dept: NUCLEAR MEDICINE | Age: 52
DRG: 433 | End: 2022-09-29
Attending: FAMILY MEDICINE
Payer: COMMERCIAL

## 2022-09-29 PROBLEM — R16.0 HEPATOMEGALY: Status: ACTIVE | Noted: 2022-09-29

## 2022-09-29 PROBLEM — R74.01 TRANSAMINITIS: Status: ACTIVE | Noted: 2022-09-29

## 2022-09-29 PROBLEM — E80.6 HYPERBILIRUBINEMIA: Status: ACTIVE | Noted: 2022-09-29

## 2022-09-29 PROBLEM — E44.0 MODERATE PROTEIN-CALORIE MALNUTRITION (HCC): Status: ACTIVE | Noted: 2022-09-29

## 2022-09-29 LAB
ALBUMIN SERPL-MCNC: 2.3 G/DL (ref 3.5–5)
ALBUMIN/GLOB SERPL: 0.5 {RATIO} (ref 1.1–2.2)
ALP SERPL-CCNC: 226 U/L (ref 45–117)
ALT SERPL-CCNC: 75 U/L (ref 12–78)
ANION GAP SERPL CALC-SCNC: 9 MMOL/L (ref 5–15)
APTT PPP: 38 SEC (ref 22.1–31)
AST SERPL-CCNC: 288 U/L (ref 15–37)
BASOPHILS # BLD: 0 K/UL (ref 0–0.1)
BASOPHILS NFR BLD: 0 % (ref 0–1)
BILIRUB DIRECT SERPL-MCNC: 9 MG/DL (ref 0–0.2)
BILIRUB SERPL-MCNC: 11.6 MG/DL (ref 0.2–1)
BUN SERPL-MCNC: 1 MG/DL (ref 6–20)
BUN/CREAT SERPL: 2 (ref 12–20)
CALCIUM SERPL-MCNC: 8.7 MG/DL (ref 8.5–10.1)
CHLORIDE SERPL-SCNC: 93 MMOL/L (ref 97–108)
CO2 SERPL-SCNC: 29 MMOL/L (ref 21–32)
CREAT SERPL-MCNC: 0.5 MG/DL (ref 0.7–1.3)
DIFFERENTIAL METHOD BLD: ABNORMAL
EOSINOPHIL # BLD: 0 K/UL (ref 0–0.4)
EOSINOPHIL NFR BLD: 0 % (ref 0–7)
ERYTHROCYTE [DISTWIDTH] IN BLOOD BY AUTOMATED COUNT: 14.4 % (ref 11.5–14.5)
GLOBULIN SER CALC-MCNC: 4.8 G/DL (ref 2–4)
GLUCOSE SERPL-MCNC: 124 MG/DL (ref 65–100)
HCT VFR BLD AUTO: 26.5 % (ref 36.6–50.3)
HGB BLD-MCNC: ABNORMAL G/DL (ref 12.1–17)
IMM GRANULOCYTES # BLD AUTO: 0.1 K/UL (ref 0–0.04)
IMM GRANULOCYTES NFR BLD AUTO: 1 % (ref 0–0.5)
INR PPP: 1.6 (ref 0.9–1.1)
LYMPHOCYTES # BLD: 0.3 K/UL (ref 0.8–3.5)
LYMPHOCYTES NFR BLD: 4 % (ref 12–49)
MAGNESIUM SERPL-MCNC: 1.5 MG/DL (ref 1.6–2.4)
MCH RBC QN AUTO: ABNORMAL PG (ref 26–34)
MCHC RBC AUTO-ENTMCNC: ABNORMAL G/DL (ref 30–36.5)
MCV RBC AUTO: 101.5 FL (ref 80–99)
MONOCYTES # BLD: 0.3 K/UL (ref 0–1)
MONOCYTES NFR BLD: 4 % (ref 5–13)
NEUTS SEG # BLD: 7 K/UL (ref 1.8–8)
NEUTS SEG NFR BLD: 91 % (ref 32–75)
NRBC # BLD: 0 K/UL (ref 0–0.01)
NRBC BLD-RTO: 0 PER 100 WBC
PLATELET # BLD AUTO: 234 K/UL (ref 150–400)
PLATELET COMMENTS,PCOM: ABNORMAL
PMV BLD AUTO: 12 FL (ref 8.9–12.9)
POTASSIUM SERPL-SCNC: 3 MMOL/L (ref 3.5–5.1)
PROT SERPL-MCNC: 7.1 G/DL (ref 6.4–8.2)
PROTHROMBIN TIME: 16.5 SEC (ref 9–11.1)
RBC # BLD AUTO: 2.61 M/UL (ref 4.1–5.7)
RBC MORPH BLD: ABNORMAL
SODIUM SERPL-SCNC: 131 MMOL/L (ref 136–145)
THERAPEUTIC RANGE,PTTT: ABNORMAL SECS (ref 58–77)
WBC # BLD AUTO: 7.7 K/UL (ref 4.1–11.1)

## 2022-09-29 PROCEDURE — A9537 TC99M MEBROFENIN: HCPCS

## 2022-09-29 PROCEDURE — 74011250636 HC RX REV CODE- 250/636: Performed by: INTERNAL MEDICINE

## 2022-09-29 PROCEDURE — 80053 COMPREHEN METABOLIC PANEL: CPT

## 2022-09-29 PROCEDURE — 36415 COLL VENOUS BLD VENIPUNCTURE: CPT

## 2022-09-29 PROCEDURE — 99221 1ST HOSP IP/OBS SF/LOW 40: CPT

## 2022-09-29 PROCEDURE — 82248 BILIRUBIN DIRECT: CPT

## 2022-09-29 PROCEDURE — 74011250636 HC RX REV CODE- 250/636: Performed by: FAMILY MEDICINE

## 2022-09-29 PROCEDURE — 85610 PROTHROMBIN TIME: CPT

## 2022-09-29 PROCEDURE — 85730 THROMBOPLASTIN TIME PARTIAL: CPT

## 2022-09-29 PROCEDURE — 74011000258 HC RX REV CODE- 258: Performed by: INTERNAL MEDICINE

## 2022-09-29 PROCEDURE — 74011250637 HC RX REV CODE- 250/637: Performed by: FAMILY MEDICINE

## 2022-09-29 PROCEDURE — 96374 THER/PROPH/DIAG INJ IV PUSH: CPT

## 2022-09-29 PROCEDURE — 83735 ASSAY OF MAGNESIUM: CPT

## 2022-09-29 PROCEDURE — 74011000250 HC RX REV CODE- 250: Performed by: FAMILY MEDICINE

## 2022-09-29 PROCEDURE — 74011250636 HC RX REV CODE- 250/636: Performed by: PHYSICIAN ASSISTANT

## 2022-09-29 PROCEDURE — 85025 COMPLETE CBC W/AUTO DIFF WBC: CPT

## 2022-09-29 PROCEDURE — 74011250637 HC RX REV CODE- 250/637: Performed by: PHYSICIAN ASSISTANT

## 2022-09-29 PROCEDURE — 65270000029 HC RM PRIVATE

## 2022-09-29 RX ORDER — ACETAMINOPHEN 650 MG/1
650 SUPPOSITORY RECTAL
Status: DISCONTINUED | OUTPATIENT
Start: 2022-09-29 | End: 2022-09-30

## 2022-09-29 RX ORDER — HYDROMORPHONE HYDROCHLORIDE 1 MG/ML
0.5 INJECTION, SOLUTION INTRAMUSCULAR; INTRAVENOUS; SUBCUTANEOUS
Status: DISCONTINUED | OUTPATIENT
Start: 2022-09-29 | End: 2022-10-07 | Stop reason: HOSPADM

## 2022-09-29 RX ORDER — ONDANSETRON 2 MG/ML
4 INJECTION INTRAMUSCULAR; INTRAVENOUS
Status: DISCONTINUED | OUTPATIENT
Start: 2022-09-29 | End: 2022-10-07 | Stop reason: HOSPADM

## 2022-09-29 RX ORDER — SODIUM CHLORIDE 9 MG/ML
25 INJECTION, SOLUTION INTRAVENOUS
Status: COMPLETED | OUTPATIENT
Start: 2022-09-29 | End: 2022-09-29

## 2022-09-29 RX ORDER — SODIUM CHLORIDE 9 MG/ML
75 INJECTION, SOLUTION INTRAVENOUS CONTINUOUS
Status: DISCONTINUED | OUTPATIENT
Start: 2022-09-29 | End: 2022-10-01

## 2022-09-29 RX ORDER — MAGNESIUM SULFATE 1 G/100ML
1 INJECTION INTRAVENOUS ONCE
Status: COMPLETED | OUTPATIENT
Start: 2022-09-29 | End: 2022-09-29

## 2022-09-29 RX ORDER — HYDROMORPHONE HYDROCHLORIDE 1 MG/ML
1 INJECTION, SOLUTION INTRAMUSCULAR; INTRAVENOUS; SUBCUTANEOUS
Status: COMPLETED | OUTPATIENT
Start: 2022-09-29 | End: 2022-09-29

## 2022-09-29 RX ORDER — POTASSIUM CHLORIDE 7.45 MG/ML
10 INJECTION INTRAVENOUS ONCE
Status: COMPLETED | OUTPATIENT
Start: 2022-09-29 | End: 2022-09-29

## 2022-09-29 RX ORDER — POLYETHYLENE GLYCOL 3350 17 G/17G
17 POWDER, FOR SOLUTION ORAL DAILY PRN
Status: DISCONTINUED | OUTPATIENT
Start: 2022-09-29 | End: 2022-09-30

## 2022-09-29 RX ORDER — ONDANSETRON 4 MG/1
4 TABLET, ORALLY DISINTEGRATING ORAL
Status: DISCONTINUED | OUTPATIENT
Start: 2022-09-29 | End: 2022-10-07 | Stop reason: HOSPADM

## 2022-09-29 RX ORDER — KIT FOR THE PREPARATION OF TECHNETIUM TC 99M MEBROFENIN 45 MG/10ML
5.4 INJECTION, POWDER, LYOPHILIZED, FOR SOLUTION INTRAVENOUS
Status: COMPLETED | OUTPATIENT
Start: 2022-09-29 | End: 2022-09-29

## 2022-09-29 RX ORDER — SODIUM CHLORIDE 0.9 % (FLUSH) 0.9 %
5-40 SYRINGE (ML) INJECTION AS NEEDED
Status: DISCONTINUED | OUTPATIENT
Start: 2022-09-29 | End: 2022-10-07 | Stop reason: HOSPADM

## 2022-09-29 RX ORDER — MORPHINE SULFATE 2 MG/ML
2 INJECTION, SOLUTION INTRAMUSCULAR; INTRAVENOUS
Status: DISCONTINUED | OUTPATIENT
Start: 2022-09-29 | End: 2022-09-29

## 2022-09-29 RX ORDER — HYDROMORPHONE HYDROCHLORIDE 2 MG/1
2 TABLET ORAL ONCE
Status: COMPLETED | OUTPATIENT
Start: 2022-09-29 | End: 2022-09-29

## 2022-09-29 RX ORDER — ACETAMINOPHEN 325 MG/1
650 TABLET ORAL
Status: DISCONTINUED | OUTPATIENT
Start: 2022-09-29 | End: 2022-09-30

## 2022-09-29 RX ORDER — SODIUM CHLORIDE 0.9 % (FLUSH) 0.9 %
5-40 SYRINGE (ML) INJECTION EVERY 8 HOURS
Status: DISCONTINUED | OUTPATIENT
Start: 2022-09-29 | End: 2022-10-07 | Stop reason: HOSPADM

## 2022-09-29 RX ADMIN — POTASSIUM CHLORIDE 10 MEQ: 7.46 INJECTION, SOLUTION INTRAVENOUS at 12:01

## 2022-09-29 RX ADMIN — SODIUM CHLORIDE, PRESERVATIVE FREE 10 ML: 5 INJECTION INTRAVENOUS at 18:32

## 2022-09-29 RX ADMIN — SODIUM CHLORIDE, PRESERVATIVE FREE 10 ML: 5 INJECTION INTRAVENOUS at 12:01

## 2022-09-29 RX ADMIN — KIT FOR THE PREPARATION OF TECHNETIUM TC 99M MEBROFENIN 5.4 MILLICURIE: 45 INJECTION, POWDER, LYOPHILIZED, FOR SOLUTION INTRAVENOUS at 09:30

## 2022-09-29 RX ADMIN — MORPHINE SULFATE 2 MG: 2 INJECTION, SOLUTION INTRAMUSCULAR; INTRAVENOUS at 12:00

## 2022-09-29 RX ADMIN — HYDROMORPHONE HYDROCHLORIDE 2 MG: 2 TABLET ORAL at 02:03

## 2022-09-29 RX ADMIN — OXYCODONE 5 MG: 5 TABLET ORAL at 00:03

## 2022-09-29 RX ADMIN — SODIUM CHLORIDE, PRESERVATIVE FREE 10 ML: 5 INJECTION INTRAVENOUS at 06:12

## 2022-09-29 RX ADMIN — HYDROMORPHONE HYDROCHLORIDE 0.5 MG: 1 INJECTION, SOLUTION INTRAMUSCULAR; INTRAVENOUS; SUBCUTANEOUS at 15:12

## 2022-09-29 RX ADMIN — MAGNESIUM SULFATE 1 G: 1 INJECTION INTRAVENOUS at 08:52

## 2022-09-29 RX ADMIN — HYDROMORPHONE HYDROCHLORIDE 0.5 MG: 1 INJECTION, SOLUTION INTRAMUSCULAR; INTRAVENOUS; SUBCUTANEOUS at 21:29

## 2022-09-29 RX ADMIN — ONDANSETRON 4 MG: 2 INJECTION INTRAMUSCULAR; INTRAVENOUS at 06:26

## 2022-09-29 RX ADMIN — SODIUM CHLORIDE 75 ML/HR: 9 INJECTION, SOLUTION INTRAVENOUS at 20:43

## 2022-09-29 RX ADMIN — SODIUM CHLORIDE, PRESERVATIVE FREE 10 ML: 5 INJECTION INTRAVENOUS at 15:12

## 2022-09-29 RX ADMIN — ACETAMINOPHEN 650 MG: 325 TABLET, FILM COATED ORAL at 06:36

## 2022-09-29 RX ADMIN — SINCALIDE 1.36 MCG: 5 INJECTION, POWDER, LYOPHILIZED, FOR SOLUTION INTRAVENOUS at 10:45

## 2022-09-29 RX ADMIN — ONDANSETRON 4 MG: 2 INJECTION INTRAMUSCULAR; INTRAVENOUS at 00:06

## 2022-09-29 RX ADMIN — SODIUM CHLORIDE 75 ML/HR: 9 INJECTION, SOLUTION INTRAVENOUS at 06:15

## 2022-09-29 RX ADMIN — HYDROMORPHONE HYDROCHLORIDE 1 MG: 1 INJECTION, SOLUTION INTRAMUSCULAR; INTRAVENOUS; SUBCUTANEOUS at 03:06

## 2022-09-29 RX ADMIN — HYDROMORPHONE HYDROCHLORIDE 0.5 MG: 1 INJECTION, SOLUTION INTRAMUSCULAR; INTRAVENOUS; SUBCUTANEOUS at 18:32

## 2022-09-29 RX ADMIN — SODIUM CHLORIDE 25 ML/HR: 900 INJECTION, SOLUTION INTRAVENOUS at 10:45

## 2022-09-29 RX ADMIN — SODIUM CHLORIDE 1000 ML: 9 INJECTION, SOLUTION INTRAVENOUS at 00:02

## 2022-09-29 NOTE — H&P
[address] [facility] Adult  Hospitalist Group  History and Physical    Date of Service:  9/29/2022  Primary Care Provider: Yahir Lawrence MD  Source of information: The patient, patient's wife (at the bedside), and chart review    Chief Complaint: Abdominal Pain and Vomiting      History of Presenting Illness:   Luis Zaidi is a 46 y.o. male with past medical history of alcohol abuse and pancreatitis presented to the emergency department with abnormal CT findings. Patient reportedly underwent CT abdomen pelvis with IV contrast on 9/23/2022; findings included hepatomegaly with parenchymal heterogeneity suspicious for acute pancreatitis, biliary sludge in the gallbladder with gallbladder wall thickening. Patient reportedly was called by the gastroenterologist office regarding the same and was referred to the emergency department for further evaluation. There is concern for hepatitis along with enlarged liver. Patient had significant history of alcohol abuse (formerly ~ 8 beers / day); reportedly quit drinking alcohol approximately a week ago. On arrival emergency department initial recorded vital signs temperature 97.8 °F, /92, heart rate 97, respiratory rate 18, O2 saturation 99% room air. Limited abdominal ultrasound showed evidence of hepatomegaly, diffuse heterogeneous liver, possible hepatic steatosis or nonspecific parenchymal liver disease, portal hypertension, perihepatic ascites, diminutive middle and left hepatic veins with discontinuous flow to IVC with findings suggesting possible acute liver inflammation and gallbladder sludge without shadowing gallstones; no biliary duct dilatation. ED ordered Dilaudid 2 mg IV, Zofran 4 mg IV, oxycodone 5 mg p.o., 0.9% normal saline 1000 mL IV fluid bolus x1 dose. Patient is now seen for admission to the hospital service for continued evaluation and treatment.  Patient complains of ongoing, recent nausea, vomiting, poor oral intake, and significant weight loss ~ 35 - 40 lbs x last 12 months. REVIEW OF SYSTEMS:  A comprehensive review of systems was negative except for that written in the History of Present Illness. Past Medical History:   Diagnosis Date    Pancreatitis           Medications:  Prior to Admission medications    Medication Sig Start Date End Date Taking? Authorizing Provider   ondansetron (ZOFRAN ODT) 4 mg disintegrating tablet Take 1 Tab by mouth every eight (8) hours as needed for Nausea. 11/10/20  Yes Clark[place] , DO     Allergies:  No Known Allergies     Social History:    reports that he has never smoked. He has never used smokeless tobacco. He reports current alcohol use of about 6.0 standard drinks per week. He reports that he does not use drugs. Family history:      Objective:   Visit Vitals  BP (!) 148/92 (BP 1 Location: Right upper arm, BP Patient Position: At rest;Sitting)   Pulse 97   Temp 97.8 °F (36.6 °C)   Resp 18   Ht 5' 7\" (1.702 m)   Wt 67.2 kg (148 lb 2.4 oz)   SpO2 99%   BMI 23.20 kg/m²      O2 Device: None (Room air)    PHYSICAL EXAM:   General:  Patient in no acute respiratory distress. Head:  Normocephalic, without obvious abnormality, atraumatic   Eyes:  Conjunctivae/corneas jaundiced; sclera icteric. Diffuse 2 mm reactive bilateral.   E/N/M/T: Nares normal. Septum midline.  No nasal drainage or sinus tenderness  Tongue midline/ non-edematous  Clear oropharynx   Neck: Normal appearance and movements, symmetrical, trachea midline  No palpable adenopathy  No thyroid enlargement, tenderness or nodules  No carotid bruit   No JVD  Trachea midline   Lungs:   Symmetrical chest expansion and respiratory effort  Clear to auscultation bilaterally   Chest wall:  No tenderness or deformity   Heart:  Regular rate and rhythm   Normal S1 and S2; no murmur, click, rub or gallop   Abdomen:   Soft, mild generalized tenderness  No rebound, guarding, or rigidity  Mildly distended   Bowel sounds normal  + hepatomegaly  No hernias present   Back: No costovertebral angle tenderness  No step-off deformity   Extremities: Extremities normal, atraumatic  No cyanosis or edema     Vascular/  Pulses: 2+ radial/ 1+ DP bilateral pulses   Integument/  Skin: No rashes or ulcers  Warm and dry   Musculo-      skeletal: Gait not tested  Normal symmetry, ROM, strength and tone  No calf tenderness   Neuro: GCS 15. Moves all extremities x 4. No slurred speech. No facial droop. Sensation grossly intact. Psych: Alert, oriented x 3             Data Review: All diagnostic labs and studies have been reviewed. Abnormal Labs Reviewed   METABOLIC PANEL, COMPREHENSIVE - Abnormal; Notable for the following components:       Result Value    Sodium 129 (*)     Potassium 3.2 (*)     Chloride 91 (*)     CO2 33 (*)     Glucose 123 (*)     BUN 2 (*)     Creatinine 0.47 (*)     BUN/Creatinine ratio 4 (*)     Bilirubin, total 11.1 (*)     ALT (SGPT) 82 (*)     AST (SGOT) 305 (*)     Alk. phosphatase 240 (*)     Albumin 2.4 (*)     Globulin 5.4 (*)     A-G Ratio 0.4 (*)     All other components within normal limits   CBC WITH AUTOMATED DIFF - Abnormal; Notable for the following components:    RBC 2.62 (*)     HGB 9.7 (*)     HCT 26.6 (*)     .5 (*)     NEUTROPHILS 83 (*)     LYMPHOCYTES 8 (*)     ABS. LYMPHOCYTES 0.6 (*)     All other components within normal limits   LIPASE - Abnormal; Notable for the following components:    Lipase 50 (*)     All other components within normal limits   BILIRUBIN, CONFIRM - Abnormal; Notable for the following components:    Bilirubin UA, confirm Positive (*)     All other components within normal limits       All Micro Results       None            IMAGING:   US ABD LTD   Final Result   1. Hepatomegaly. Diffusely echogenic and heterogeneous liver can be seen with   hepatic steatosis or nonspecific parenchymal liver disease.        2. Borderline Main portal vein velocity and diameter suggesting  portal   hypertension. Small amount of perihepatic ascites. 3. Diminutive middle and left hepatic veins with discontinuous flow to the IVC. Nonvisualized right hepatic vein. These findings may be due to acute liver   inflammation, but Budd-Chiari syndrome is a differential consideration. 4. Gallbladder sludge without shadowing gallstones. No biliary duct dilatation. ECG/ECHO:  No results found for this or any previous visit. Assessment / Plan:   Given the patient's current clinical presentation, there is a high level of concern for decompensation if discharged from the emergency department. Complex decision making was performed, which includes reviewing the patient's available past medical records, laboratory results, and imaging studies. Active Problems:    Transaminitis   -admit to med / surg floor  -repeat LFTs in a.m.  -order ammonia level  -consult GI and/or hepatologist     2. Hyperbilirubinemia  -Total bilirubin 11.1  -plan as above  -order total and direct bilirubin level    3. Hepatomegaly  -Hepatic steatosis or nonspecific parenchymal liver disease.  -Perihepatic ascites  -Findings suggesting portal hypertension  -Gallbladder sludge  -order HIDA scan    4. Nausea vomiting  -mild generalized abdominal pain/ distention (secondary to ascites)  -Order Zofran 4 mg IV every 6 hours as needed    5. Hyponatremia, acute  -Serum sodium 129. Repeat sodium levels. 6.  Acute hypokalemia  -K 3.2. Oral KCl 30 mEq p.o. x1 dose. Repeat K level post replacement    7. Anemia, macrocytic  -Hemoglobin 9.7, .5  -Check vitamin B12, folate levels. Check iron profile. Repeat H&H. Transfuse if hemoglobin less than 7.0  -Order stool occult blood test    8. History of alcohol abuse  -Reportedly quit alcohol  -Plan as above    9.  Jaundice  -plan as above                 DIET: N.p.o. for HIDA scan  ISOLATION PRECAUTIONS:  none  ADVANCED DIRECTIVE/ CODE STATUS:  FULL CODE DVT PROPHYLAXIS: SCDs  FUNCTIONAL STATUS PRIOR TO HOSPITALIZATION: Fully active and ambulatory; able to carry on all self-care without restriction. Ambulatory status/function: By self   EARLY MOBILITY ASSESSMENT: ambulates unassisted  ANTICIPATED DISCHARGE: Greater than 48 hours. ANTICIPATED DISPOSITION: Home  EMERGENCY CONTACT/SURROGATE DECISION MAKEREvely December [phone]    CRITICAL CARE WAS PERFORMED FOR THIS ENCOUNTER: NO.      Signed By: Kings Coleman MD     September 29, 2022         Please note that this dictation may have been completed with Dragon, the computer voice recognition software. Quite often unanticipated grammatical, syntax, homophones, and other interpretive errors are inadvertently transcribed by the computer software. Please disregard these errors. Please excuse any errors that have escaped final proofreading.

## 2022-09-29 NOTE — PROGRESS NOTES
[facility]  Hospitalist Group                                                                                          Hospitalist Progress Note  Asia Nation MD  Answering service: [phone] from in house phone        Date of Service:  2022  NAME:  Deepika Markham  :  1970  MRN:  [id number]      Admission Summary:   Deepika Markham is a 46 y.o. male with past medical history of alcohol abuse and pancreatitis presented to the emergency department with abnormal CT findings. Patient reportedly underwent CT abdomen pelvis with IV contrast on 2022; findings included hepatomegaly with parenchymal heterogeneity suspicious for acute pancreatitis, biliary sludge in the gallbladder with gallbladder wall thickening. Patient reportedly was called by the gastroenterologist office regarding the same and was referred to the emergency department for further evaluation. There is concern for hepatitis along with enlarged liver. Patient had significant history of alcohol abuse (formerly ~ 8 beers / day); reportedly quit drinking alcohol approximately a week ago. On arrival emergency department initial recorded vital signs temperature 97.8 °F, /92, heart rate 97, respiratory rate 18, O2 saturation 99% room air. Limited abdominal ultrasound showed evidence of hepatomegaly, diffuse heterogeneous liver, possible hepatic steatosis or nonspecific parenchymal liver disease, portal hypertension, perihepatic ascites, diminutive middle and left hepatic veins with discontinuous flow to IVC with findings suggesting possible acute liver inflammation and gallbladder sludge without shadowing gallstones; no biliary duct dilatation. ED ordered Dilaudid 2 mg IV, Zofran 4 mg IV, oxycodone 5 mg p.o., 0.9% normal saline 1000 mL IV fluid bolus x1 dose. Patient is now seen for admission to the hospital service for continued evaluation and treatment.  Patient complains of ongoing, recent nausea, vomiting, poor oral intake, and significant weight loss ~ 35 - 40 lbs x last 12 months       Interval history / Subjective:   Admitted over night for evaluation of abd pain, jaundice  HIDA noted  GI, hepatology and general surgeon consulted, evaluation is pendign  + abd pain RUQ  Wife at bed side  Advised alcohol cessation     Assessment & Plan:     Transaminitis   -f/u LFTs   -ammonia level  -GI and/or hepatologist consulted, evaluation is pending  HIDA reviewed  Gen Sx consulted     Hyperbilirubinemia  -Total bilirubin 11.1  Continue supportive care, fluids IV  Follow up GI and Hepatologist     Hepatomegaly  -Hepatic steatosis or nonspecific parenchymal liver disease.  -Perihepatic ascites  -Findings suggesting portal hypertension  -Gallbladder sludge  -HIDA scan reviewed, no acute cholecystitis, + cystic duct obstruction  General surgeon consulted     Nausea vomiting  -mild generalized abdominal pain/ distention (secondary to ascites)  -Order Zofran 4 mg IV every 6 hours as needed    Hyponatremia, acute  -Serum sodium 129. Repeat sodium levels 131     Acute hypokalemia  -K 3.2. Oral KCl 30 mEq p.o. x1 dose. Repeat K level post and will replace    Anemia, macrocytic  -Hemoglobin 9.7, .5  -Check vitamin B12, folate levels. Check iron profile. Repeat H&H.   Transfuse if hemoglobin less than 7.0  -Order stool occult blood test    History of alcohol abuse  -Reportedly quit alcohol  -Plan as above     Jaundice  -plan as above        Code status: Full code  Prophylaxis: SCD  Care Plan discussed with: patient and RN  Anticipated Disposition: TBD, pending clinical improvement       Hospital Problems  Never Reviewed            Codes Class Noted POA    Hepatomegaly ICD-10-CM: R16.0  ICD-9-CM: 789.1  9/29/2022 Unknown        Hyperbilirubinemia ICD-10-CM: E80.6  ICD-9-CM: 782.4  9/29/2022 Unknown        Transaminitis ICD-10-CM: R74.01  ICD-9-CM: 790.4  9/29/2022 Unknown        Moderate protein-calorie malnutrition ([facility] ICD-10-CM: E44.0  ICD-9-CM: 263.0  9/29/2022 Yes             Review of Systems:   A comprehensive review of systems was negative except for that written in the HPI. Vital Signs:    Last 24hrs VS reviewed since prior progress note. Most recent are:  Visit Vitals  /79 (BP 1 Location: Left upper arm, BP Patient Position: At rest)   Pulse 100   Temp 98.5 °F (36.9 °C)   Resp 16   Ht 5' 7\" (1.702 m)   Wt 67.2 kg (148 lb 2.4 oz)   SpO2 98%   BMI 23.20 kg/m²         Intake/Output Summary (Last 24 hours) at 9/29/2022 1614  Last data filed at 9/29/2022 0210  Gross per 24 hour   Intake 1000 ml   Output --   Net 1000 ml        Physical Examination:     I had a face to face encounter with this patient and independently examined them on 9/29/2022 as outlined below:          Constitutional:  + abdominal pain, cooperative, pleasant    ENT:  Oral mucosa moist, oropharynx benign. Icteric sclera   Resp:  CTA bilaterally. No wheezing/rhonchi/rales. No accessory muscle use. CV:  Regular rhythm, normal rate, no murmurs, gallops, rubs    GI:  Soft, + distended, + tender, normoactive bowel sounds, +hepatosplenomegaly     Musculoskeletal:  No edema, warm, 2+ pulses throughout    Neurologic:  Moves all extremities. AAOx3, CN II-XII reviewed            Data Review:    Review and/or order of clinical lab test  HIDA:  IMPRESSION  No evidence of acute cholecystitis or common bile duct obstruction. Absent gallbladder emptying following CCK administration suggests chronic  cholecystitis/chronic cystic duct dysfunction.     Labs:     Recent Labs     09/29/22  0619 09/28/22 2101   WBC 7.7 7.3   HGB UNABLE TO OBTAIN ACCURATE RESULTS DUE TO EXTREME ICTERIA 9.7*   HCT 26.5* 26.6*    272     Recent Labs     09/29/22  0619 09/28/22  2101   * 129*   K 3.0* 3.2*   CL 93* 91*   CO2 29 33*   BUN 1* 2*   CREA 0.50* 0.47*   * 123*   CA 8.7 9.3   MG 1.5*  --      Recent Labs 09/29/22 0619 09/28/22 2101   ALT 75 82*   * 240*   TBILI 11.6* 11.1*   TP 7.1 7.8   ALB 2.3* 2.4*   GLOB 4.8* 5.4*   LPSE  --  50*     Recent Labs     09/29/22 0619   INR 1.6*   PTP 16.5*   APTT 38.0*      No results for input(s): FE, TIBC, PSAT, FERR in the last 72 hours. No results found for: FOL, RBCF   No results for input(s): PH, PCO2, PO2 in the last 72 hours. No results for input(s): CPK, CKNDX, TROIQ in the last 72 hours.     No lab exists for component: CPKMB  Lab Results   Component Value Date/Time    Triglyceride 85 03/02/2019 03:46 AM     No results found for: [facility]  Lab Results   Component Value Date/Time    Color DARK YELLOW 09/28/2022 10:31 PM    Appearance CLEAR 09/28/2022 10:31 PM    Specific gravity 1.005 09/28/2022 10:31 PM    Specific gravity PATIENT DISCHARGED BEFORE SAMPLE COLLECTED 11/10/2020 08:00 PM    pH (UA) 7.5 09/28/2022 10:31 PM    Protein Negative 09/28/2022 10:31 PM    Glucose Negative 09/28/2022 10:31 PM    Ketone Negative 09/28/2022 10:31 PM    Bilirubin PATIENT DISCHARGED BEFORE SAMPLE COLLECTED 11/10/2020 08:00 PM    Urobilinogen 1.0 09/28/2022 10:31 PM    Nitrites Negative 09/28/2022 10:31 PM    Leukocyte Esterase Negative 09/28/2022 10:31 PM    Epithelial cells FEW 09/28/2022 10:31 PM    Bacteria Negative 09/28/2022 10:31 PM    WBC 0-4 09/28/2022 10:31 PM    RBC 0-5 09/28/2022 10:31 PM         Medications Reviewed:     Current Facility-Administered Medications   Medication Dose Route Frequency    sodium chloride (NS) flush 5-40 mL  5-40 mL IntraVENous Q8H    sodium chloride (NS) flush 5-40 mL  5-40 mL IntraVENous PRN    acetaminophen (TYLENOL) tablet 650 mg  650 mg Oral Q6H PRN    Or    acetaminophen (TYLENOL) suppository 650 mg  650 mg Rectal Q6H PRN    polyethylene glycol (MIRALAX) packet 17 g  17 g Oral DAILY PRN    ondansetron (ZOFRAN ODT) tablet 4 mg  4 mg Oral Q8H PRN    Or    ondansetron (ZOFRAN) injection 4 mg  4 mg IntraVENous Q6H PRN    0.9% sodium chloride infusion  75 mL/hr IntraVENous CONTINUOUS    sodium chloride 0.9 % bolus infusion 25 mL  25 mL IntraVENous RAD ONCE    HYDROmorphone (DILAUDID) injection 0.5 mg  0.5 mg IntraVENous Q3H PRN     ______________________________________________________________________  EXPECTED LENGTH OF STAY: - - -  ACTUAL LENGTH OF STAY:          0                 Zahcery Camara MD

## 2022-09-29 NOTE — ED TRIAGE NOTES
Pt states Dr Myers Darrick office called his home on Monday after he had a CT Friday- per pt- his ct showed \"hepatitis and an enlarged liver. \"  Pt says he is here for admission. Pt says he quit drinking about a week ago and has been drinking for 15 years. Pt associates n/v with last vomitus this AM..

## 2022-09-29 NOTE — PROGRESS NOTES
Comprehensive Nutrition Assessment    Type and Reason for Visit: Initial, Positive nutrition screen    Nutrition Recommendations/Plan:   Diet advancement per MD  Will add strawb ensure enlive TID, snacks upon advancement  Pt c/o constipation- consider addition of bowel regimen  Continue with IV Mg, IV K repletion       Malnutrition Assessment:  Malnutrition Status: Moderate malnutrition (09/29/22 1321)    Context:  Acute illness     Findings of the 6 clinical characteristics of malnutrition:   Energy Intake:  75% or less of est energy req for 7 or more days  Weight Loss:  Greater than 7.5% over 3 months     Body Fat Loss: Moderate body fat loss, Triceps, Orbital   Muscle Mass Loss:  Mild muscle mass loss, Hand (interosseous), Calf  Fluid Accumulation:  No significant fluid accumulation,     Strength:  Not performed        Nutrition Assessment:    Past Medical History:   Diagnosis Date    Pancreatitis      46 y.o. male admitted with jaundice, pancreatitis r/t h/o alcohol abuse. Per MD note abd ultrasound showed \"hepatomegaly, diffuse heterogeneous liver, possible hepatic steatosis or nonspecific parenchymal liver disease, portal hypertension, perihepatic ascites, diminutive middle and left hepatic veins with discontinuous flow to IVC with findings suggesting possible acute liver inflammation and gallbladder sludge without shadowing gallstones; no biliary duct dilatation. \" Pt NPO today, had HIDA scan this morning, awaiting surgery consult. Visited pt at bedside where he stated he's been eating poorly for 6 months d/t n/v/lack of appetite. Stated #, thinks he's lost 35-40# in last 3-6 months. NFPE performed revealed moderate fat and muscle wasting. Pt meets ASPEN criteria for moderate malnutrition. Pt stated his appetite is better today, likely d/t being NPO since admission. He'd like strawberry ensure TID, boiled eggs, yogurt, cheese as snacks.  We talked about use of easy to prepare, small frequent meals dense in kcals/pro for wt gain/maintenance in context of poor appetite and liver dysfunction. Na 131, K 3.0, Mg 1.5- pt receiving IV repletion of all. Recent Labs     09/29/22  0619 09/28/22  2101   * 123*   BUN 1* 2*   CREA 0.50* 0.47*   * 129*   K 3.0* 3.2*   CL 93* 91*   CO2 29 33*   CA 8.7 9.3   MG 1.5*  --          Nutritionally Significant Medications:  IV NaCl, IV Mg, IV K  Prn morphine, zofran      Estimated Daily Nutrient Needs:  Energy Requirements Based On: Kcal/kg     Energy (kcal/day): 2010  Weight Used for Protein Requirements: Current  Protein (g/day): 101  Method Used for Fluid Requirements: 1 ml/kcal  Fluid (ml/day): 2000    Nutrition Related Findings:   Edema: No data recorded    Last BM: 09/29/22,      Wounds: None      Current Nutrition Therapies:  Diet: NPO  Supplements: NPO  Meal intake: No data found. Supplement intake: No data found. Nutrition Support: none      Anthropometric Measures:  Height: 5' 7\" (170.2 cm)  Ideal Body Weight (IBW): 148 lbs (67 kg)     Current Body Wt:  67.2 kg (148 lb 2.4 oz), 100.1 % IBW. Standing scale  Current BMI (kg/m2): 23.2  Usual Body Weight: 77.1 kg (170 lb)  % Weight Change (Calculated): -12.9  Weight Adjustment: No adjustment     BMI Category: Normal weight (BMI 18.5-24. 9)    Wt Readings from Last 10 Encounters:   09/28/22 67.2 kg (148 lb 2.4 oz)   02/27/19 76.7 kg (169 lb 1.5 oz)   08/01/18 75 kg (165 lb 5.5 oz)           Nutrition Diagnosis:   Moderate malnutrition, In context of acute illness or injury related to inadequate protein-energy intake as evidenced by Criteria as identified in malnutrition assessment  Unintended weight loss related to other (specify) (hepatic dysfunction) as evidenced by poor intake prior to admission, nausea, vomiting, weight loss    Nutrition Interventions:   Food and/or Nutrient Delivery: Start oral diet, Start oral nutrition supplement, Snacks (specify) (upon appropriate advancement)  Nutrition Education/Counseling: Counseling completed  Coordination of Nutrition Care: Continue to monitor while inpatient       Goals:     Goals: PO intake 50% or greater, by next RD assessment       Nutrition Monitoring and Evaluation:   Behavioral-Environmental Outcomes: None identified  Food/Nutrient Intake Outcomes: Diet advancement/tolerance, Supplement intake, Food and nutrient intake  Physical Signs/Symptoms Outcomes: Biochemical data, GI status, Meal time behavior, Nutrition focused physical findings, Weight    Discharge Planning:    Continue oral nutrition supplement    Juan Petersen RD  Available via [facility]

## 2022-09-29 NOTE — ED NOTES
Took call from Keith/Laboratory, stating there will be no report on the mch and mchc from the CBC collected due to it being icteric. Penny Landaverde, [address] notified.

## 2022-09-29 NOTE — PROGRESS NOTES
ALBERT: anticipate d/c home with spouse; Follow up with PCP & Specialist    CM provided pt with IP & OP Etoh abuse written resources as per Attending's request    Spouse transport    RUR: 8%  -GI consulted  -Hepatology consulted  Reason for Admission:    Transaminitis, hepatomegaly, hyperbilirubinemia,          History of: Etoh abuse, pancreatitis          RUR Score:                   8%  Plan for utilizing home health:          PCP: First and Last name:  Kristen Graves MD     Name of Practice:    Are you a current patient: Yes/No: YES   Approximate date of last visit: within last year   Can you participate in a virtual visit with your PCP: YES                    Current Advanced Directive/Advance Care Plan: Full Code      Healthcare Decision Maker:   Click here to complete [address] including selection of the Healthcare Decision Maker Relationship (ie \"Primary\")         Spouse, Kingsley Herring, [phone]                    Transition of Care Plan:                    1100-CM reviewed pt chart & met with pt's spouse at bedside to discuss transitional planning. Pt resides with spouse in a two story home with four steps to entrance and 12 steps to entrance. Prior to admission, pt was independent with adls/iadls. Denies dme. Pt uses [facility] [address] for meds with no copay concerns. CM confirmed pcp & demographics. Spouse transport. CM to follow for transitional care needs. Care Management Interventions  PCP Verified by CM: Yes  Mode of Transport at Discharge:  Other (see comment) (spouse )  Transition of Care Consult (CM Consult): Discharge Planning  MyChart Signup: Yes  Discharge Durable Medical Equipment: No  Health Maintenance Reviewed: Yes  Support Systems: Spouse/Significant Other  Confirm Follow Up Transport: Family  Discharge Location  Patient Expects to be Discharged to[de-identified] Home

## 2022-09-29 NOTE — ED NOTES
Has a final transfer review been performed? Yes    Reason for Admission: Hepatomegaly  Patient comes from: Home  Mental Status: Alert and oriented  ADL:self care  Ambulation:no difficulty  Pertinent Info/Safety Concerns: none  COVID Status: Not Tested  MEWS Score: 01  Vitals:    09/28/22 2045 09/29/22 0239   BP: (!) 148/92 (!) 163/106   Pulse: 97 95   Resp: 18 17   Temp: 97.8 °F (36.6 °C) 98.2 °F (36.8 °C)   SpO2: 99%    Weight: 67.2 kg (148 lb 2.4 oz)    Height: 5' 7\" (1.702 m)      Lines:   Peripheral IV 09/28/22 Left; Anterior Antecubital (Active)   Site Assessment Clean, dry, & intact 09/28/22 2101   Phlebitis Assessment 0 09/28/22 2101   Infiltration Assessment 0 09/28/22 2101   Dressing Status Clean, dry, & intact 09/28/22 2101   Dressing Type Transparent 09/28/22 2101   Hub Color/Line Status Pink;Capped;Flushed;Patent 09/28/22 2101   Action Taken Blood drawn 09/28/22 2101      Transport mode:Wheelchair    Learta Guild  being transferred to [facility] [phone] (unit) for routine progression of care     \"Notification of etransfer note given to (Name) Brianna Cottle (Title) [address] staff

## 2022-09-29 NOTE — ED PROVIDER NOTES
46year old M presenting to the ED for abdominal pain. Pt points to the upper abdomen. + vomiting. Pain worsened with eating. Pain has been present for a while - maybe a year.  + vomiting is new, started 6 months ago.  + constipation, no diarrhea. Pt describes abdominal pain as sharp at times, achey, worse at night, better with activity, seems to notice it more with relaxation. Does seen to have some back pain when abdominal pain is present. No fever or chills. Reports chronic urinary frequency. Saw Dr. Milan Ribeiro last Thursday (9/22) for acute worsening of symptoms- had CT on the 23rd showing hepatomegaly and labs with transaminitis. Dr. Milan Ribeiro called on Tuesday and said that he wanted the patient to be admitted, may need HIDA scan, also to see hepatology. PMHx: pancreatitis  PSx: denies  Social: non-smoker. Recently quit drinking alcohol - about one week ago. Prior to that - was probably drinking about 6-8 beers a day for about 10 years. No drugs.   NKDA    The history is provided by the patient, the spouse and medical records. Abdominal Pain   Associated symptoms include nausea and vomiting. Pertinent negatives include no fever, no dysuria and no chest pain. Vomiting   Associated symptoms include abdominal pain. Pertinent negatives include no fever. Past Medical History:   Diagnosis Date    Pancreatitis        No past surgical history on file. History reviewed. No pertinent family history. Social History     Socioeconomic History    Marital status:      Spouse name: Not on file    Number of children: Not on file    Years of education: Not on file    Highest education level: Not on file   Occupational History    Not on file   Tobacco Use    Smoking status: Never    Smokeless tobacco: Never   Substance and Sexual Activity    Alcohol use:  Yes     Alcohol/week: 6.0 standard drinks     Types: 6 Cans of beer per week    Drug use: No    Sexual activity: Yes   Other Topics Concern    Not on file   Social History Narrative    Not on file     Social Determinants of Health     Financial Resource Strain: Not on file   Food Insecurity: Not on file   Transportation Needs: Not on file   Physical Activity: Not on file   Stress: Not on file   Social Connections: Not on file   Intimate Partner Violence: Not on file   Housing Stability: Not on file         ALLERGIES: Patient has no known allergies. Review of Systems   Constitutional:  Negative for fever. HENT:  Negative for facial swelling. Eyes:  Negative for visual disturbance. Respiratory:  Negative for shortness of breath. Cardiovascular:  Negative for chest pain. Gastrointestinal:  Positive for abdominal pain, nausea and vomiting. Genitourinary:  Negative for dysuria. Skin:  Negative for wound. Neurological:  Negative for syncope. All other systems reviewed and are negative. Vitals:    09/28/22 2045   BP: (!) 148/92   Pulse: 97   Resp: 18   Temp: 97.8 °F (36.6 °C)   SpO2: 99%   Weight: 67.2 kg (148 lb 2.4 oz)   Height: 5' 7\" (1.702 m)            Physical Exam  Vitals and nursing note reviewed. Constitutional:       General: He is not in acute distress. Appearance: He is well-developed. Comments: Pleasant, appears to not feel well, no distress   HENT:      Head: Normocephalic and atraumatic. Right Ear: External ear normal.      Left Ear: External ear normal.   Eyes:      General: Scleral icterus present. Conjunctiva/sclera: Conjunctivae normal.   Neck:      Trachea: No tracheal deviation. Cardiovascular:      Rate and Rhythm: Normal rate and regular rhythm. Heart sounds: Normal heart sounds. No murmur heard. No friction rub. No gallop. Pulmonary:      Effort: Pulmonary effort is normal. No respiratory distress. Breath sounds: Normal breath sounds. No stridor. No wheezing. Abdominal:      General: There is no distension. Palpations: Abdomen is soft.       Tenderness: There is no abdominal tenderness. Musculoskeletal:         General: Normal range of motion. Cervical back: Neck supple. Skin:     General: Skin is warm and dry. Neurological:      Mental Status: He is alert and oriented to person, place, and time. Psychiatric:         Behavior: Behavior normal.        MDM  Number of Diagnoses or Management Options  Hepatomegaly  Transaminitis  Diagnosis management comments: 80-year-old male, history of alcohol abuse, alcoholic pancreatitis presenting to the ED for abdominal pain and abnormal outpatient CT scan and labs, sent by his gastroenterologist for admission. Patient reports longstanding history of abdominal pain and nausea vomiting with recent worsening, CT scan showed hepatomegaly. Patient jaundiced on exam, abdomen is nontender. Differential diagnosis is broad, includes infectious hepatitis, alcoholic hepatitis, cirrhosis, etc.  Labs tonight remarkable for hyperbilirubinemia, transaminitis. Medicine consulted for admission. Amount and/or Complexity of Data Reviewed  Clinical lab tests: ordered and reviewed  Tests in the radiology section of CPT®: reviewed and ordered  Discuss the patient with other providers: yes (Dr. Tobias Black ED attending. Dr. Pat Gray GI)           Procedures                 Discussed with NEEMA Wu. Recommends US, would like patient admitted, will see in consult. JESÚS Torres  9:50 PM    Perfect Serve Consult for Admission  11:47 PM    ED Room Number: R31/R31  Patient Name and age:  Stefani Apodaca 46 y.o.  male  Working Diagnosis:   1. Transaminitis    2.  Hepatomegaly        COVID-19 Suspicion:  no  Sepsis present:  no  Reassessment needed: no  Code Status:  Full Code  Readmission: no  Isolation Requirements:  no  Recommended Level of Care:  med/surg  Department:[facility] Adult ED - 21   Other:  46year old - quit drinking one week ago - previous hx alcohol induced pancreatitis - Dr. Pat Gray sent him in for abnormal CT scan done as an outpatient, transaminitis, bili 11

## 2022-09-29 NOTE — CONSULTS
[facility]  Inpatient Consultation        Admit Date: 9/28/2022  Reason for Consultation: obstructed cystic duct     HPI:  Deion Toscano is a 46 y.o. male with a past medical history significant for alcohol abuse and pancreatitis whom we are asked to see in consultation by Dr. Tong Triplett for the above complaint. Patient states he has been having ongoing intermittent epigastric pain that radiates to LUQ for the past year associated with nausea and vomiting. He states pain happens mostly at night when he is laying still. When he is moving around pain seems to be manageable. He has been taking Tylenol with minimal relief of symptoms. Patient reports he drinks about 10 drinks a day for about ten years now, but has not had any alcoholic beverages for the last 5 days. Denies fever or chills. Denies any history of any abdominal surgery. Denies hematemesis or dark or bloody stool. Has been NPO for 2 days now. No leukocytosis. HIDA scan and ultrasound results noted below. HIDA 9/29/22  No evidence of acute cholecystitis or common bile duct obstruction. Absent gallbladder emptying following CCK administration suggests chronic  cholecystitis/chronic cystic duct dysfunction. Ultrasound abd 9/28/22  1. Hepatomegaly. Diffusely echogenic and heterogeneous liver can be seen with  hepatic steatosis or nonspecific parenchymal liver disease. 2. Borderline Main portal vein velocity and diameter suggesting  portal  hypertension. Small amount of perihepatic ascites. 3. Diminutive middle and left hepatic veins with discontinuous flow to the IVC. Nonvisualized right hepatic vein. These findings may be due to acute liver  inflammation, but Budd-Chiari syndrome is a differential consideration. 4. Gallbladder sludge without shadowing gallstones.  No biliary duct dilatation      Patient Active Problem List    Diagnosis Date Noted    Hepatomegaly 09/29/2022    Hyperbilirubinemia 09/29/2022 Transaminitis 2022    Moderate protein-calorie malnutrition ([facility] [address] 2022    Pancreatitis 2019     Past Medical History:   Diagnosis Date    Pancreatitis       No past surgical history on file. Social History     Tobacco Use    Smoking status: Never    Smokeless tobacco: Never   Substance Use Topics    Alcohol use: Yes     Alcohol/week: 6.0 standard drinks     Types: 6 Cans of beer per week      History reviewed. No pertinent family history. Prior to Admission medications    Medication Sig Start Date End Date Taking? Authorizing Provider   ondansetron (ZOFRAN ODT) 4 mg disintegrating tablet Take 1 Tab by mouth every eight (8) hours as needed for Nausea. 11/10/20  Yes Michelle Fairly, DO     Current Facility-Administered Medications   Medication Dose Route Frequency    sodium chloride (NS) flush 5-40 mL  5-40 mL IntraVENous Q8H    sodium chloride (NS) flush 5-40 mL  5-40 mL IntraVENous PRN    acetaminophen (TYLENOL) tablet 650 mg  650 mg Oral Q6H PRN    Or    acetaminophen (TYLENOL) suppository 650 mg  650 mg Rectal Q6H PRN    polyethylene glycol (MIRALAX) packet 17 g  17 g Oral DAILY PRN    ondansetron (ZOFRAN ODT) tablet 4 mg  4 mg Oral Q8H PRN    Or    ondansetron (ZOFRAN) injection 4 mg  4 mg IntraVENous Q6H PRN    0.9% sodium chloride infusion  75 mL/hr IntraVENous CONTINUOUS    sodium chloride 0.9 % bolus infusion 25 mL  25 mL IntraVENous RAD ONCE    HYDROmorphone (DILAUDID) injection 0.5 mg  0.5 mg IntraVENous Q3H PRN     No Known Allergies       Subjective:     Review of Systems:    A comprehensive review of systems was negative except for that written in the History of Present Illness. Objective:     Blood pressure 123/79, pulse 100, temperature 98.5 °F (36.9 °C), resp. rate 16, height 5' 7\" (1.702 m), weight 148 lb 2.4 oz (67.2 kg), SpO2 98 %.   Temp (24hrs), Av °F (36.7 °C), Min:97.7 °F (36.5 °C), Max:98.5 °F (36.9 °C)      Recent Labs     22  0619 22 WBC 7.7 7.3   HGB UNABLE TO OBTAIN ACCURATE RESULTS DUE TO EXTREME ICTERIA 9.7*   HCT 26.5* 26.6*    272     Recent Labs     09/29/22  0619 09/28/22 2101   * 129*   K 3.0* 3.2*   CL 93* 91*   CO2 29 33*   * 123*   BUN 1* 2*   CREA 0.50* 0.47*   CA 8.7 9.3   MG 1.5*  --    ALB 2.3* 2.4*   TBILI 11.6* 11.1*   ALT 75 82*   INR 1.6*  --      Recent Labs     09/28/22 2101   LPSE 50*         Intake/Output Summary (Last 24 hours) at 9/29/2022 1625  Last data filed at 9/29/2022 0210  Gross per 24 hour   Intake 1000 ml   Output --   Net 1000 ml       _____________________  Physical Exam:     General:  Alert, cooperative, no distress, appears stated age; poor dentition   Eyes:   Sclera clear. Throat: Lips, mucosa, and tongue normal.   Neck: Supple, symmetrical, trachea midline. Lungs:   Clear to auscultation bilaterally. Heart:  Regular rate and rhythm. Abdomen:   +BS, epigastric and LUQ TTP, no guarding or rebound    Extremities: Extremities normal, atraumatic, no cyanosis or edema. Skin: Skin color, texture, turgor normal. No rashes or lesions. Assessment:   Active Problems:    Hepatomegaly (9/29/2022)      Hyperbilirubinemia (9/29/2022)      Transaminitis (9/29/2022)      Moderate protein-calorie malnutrition ([address] (9/29/2022)            Plan:     No acute surgical indication for chronic cholecystitis and chronic cystic duct dysfunction; No obstruction noted. Patient high risk for surgery given the condition of his liver (hepatomegaly and portal hypertension). Hepatology consulted   Continue with supportive care  Replete electrolytes per medical team  D/W Dr. Anjali Friend          Thank you for allowing us to participate in the care of this patient. Total time spent with patient: 30  minutes. Signed By: Wilner Raymundo NP     September 29, 2022    Patient seen and examined  Agree with above  Has been having left and epigastric abdominal pain. Worse with eating.   Patient with heavy alcoholic history. Denies really any right upper quadrant pain. General alert in no acute distress  Eyes icteric  Abdomen soft no right upper quadrant tenderness mild left upper quadrant tenderness to deep palpation no rebound or guarding  Imaging as above  Patient does not have acute cholecystitis. It looks as though he may have chronic cholecystitis though it is unclear whether or not that is causing him abdominal pain. At this point given the condition of his liver no indication for operative intervention at this point. If he improves and develops right sided abdominal pain can consider lap howie but not at this point.   Call if needed

## 2022-09-29 NOTE — CONSULTS
[address] [facility].  [address] [facility] [address] Tony [place]ooten, [address]  [phone]                     GI CONSULTATION NOTE      NAME:  Mukesh Quiroga   :   1970   MRN:   [id number]     Consult Date: 2022     Chief Complaint: hepatomegaly, ascites, N/V, elevated T. bili    History of Present Illness:  Patient is a 46 y.o. who is seen in consultation at the request of Dr. Luis Hayes for the above mentioned problem. Mr. Mars Ríos was seen OP (9/15/22) by Dr. Real Eldridge for abdominal pain, weight loss 35 pounds over 6 months, and abdominal fullness. Dr. Real Eldridge ordered a CT AP- based on CT results he advised patient to report to ER for further assessment. Patient endorses ETOH- 8-10 beers daily for 10 years. He states he stopped drinking 5-6 days ago. He also states he was self medicating his abdominal pain with 3.5- 4.5 G of Tylenol daily. Endorses abdominal pain, bloating, difficulty with bowel movement for 2-3 months. PMH:  Past Medical History:   Diagnosis Date    Pancreatitis        PSH:  History reviewed. No pertinent surgical history. Allergies:  No Known Allergies    Home Medications:  Prior to Admission Medications   Prescriptions Last Dose Informant Patient Reported? Taking?   ondansetron (ZOFRAN ODT) 4 mg disintegrating tablet 2022  No Yes   Sig: Take 1 Tab by mouth every eight (8) hours as needed for Nausea.       Facility-Administered Medications: None       Hospital Medications:  Current Facility-Administered Medications   Medication Dose Route Frequency    sodium chloride (NS) flush 5-40 mL  5-40 mL IntraVENous Q8H    sodium chloride (NS) flush 5-40 mL  5-40 mL IntraVENous PRN    acetaminophen (TYLENOL) tablet 650 mg  650 mg Oral Q6H PRN    Or    acetaminophen (TYLENOL) suppository 650 mg  650 mg Rectal Q6H PRN    polyethylene glycol (MIRALAX) packet 17 g  17 g Oral DAILY PRN    ondansetron (ZOFRAN ODT) tablet 4 mg  4 mg Oral Q8H PRN    Or    ondansetron (ZOFRAN) injection 4 mg 4 mg IntraVENous Q6H PRN    0.9% sodium chloride infusion  75 mL/hr IntraVENous CONTINUOUS    sodium chloride 0.9 % bolus infusion 25 mL  25 mL IntraVENous RAD ONCE    HYDROmorphone (DILAUDID) injection 0.5 mg  0.5 mg IntraVENous Q3H PRN       Social History:  Social History     Tobacco Use    Smoking status: Never    Smokeless tobacco: Never   Substance Use Topics    Alcohol use: Yes     Alcohol/week: 6.0 standard drinks     Types: 6 Cans of beer per week       Family History:  History reviewed. No pertinent family history. Review of Systems:    Constitutional: negative fever, negative chills, negative weight loss  Eyes:   negative visual changes  ENT:   negative sore throat, tongue or lip swelling  Respiratory:  negative cough, negative dyspnea  Cards:  negative for chest pain, palpitations, lower extremity edema  GI:   See HPI  :  negative for frequency, dysuria  Integument:  negative for rash and pruritus  Heme:  negative for easy bruising and gum/nose bleeding  Musculoskel: negative for myalgias,  back pain and muscle weakness  Neuro:  negative for headaches, dizziness, vertigo  Psych:  negative for feelings of anxiety, depression      Objective:   Patient Vitals for the past 8 hrs:   BP Temp Pulse Resp SpO2 Height   09/29/22 1544 123/79 -- 100 16 98 % --   09/29/22 1441 (!) 163/101 98.5 °F (36.9 °C) 100 17 99 % --   09/29/22 1320 -- -- -- -- -- 5' 7\" (1.702 m)   09/29/22 0846 (!) 143/91 97.8 °F (36.6 °C) 84 18 98 % --     No intake/output data recorded. 09/27 1901 - 09/29 0700  In: 1000 [I.V.:1000]  Out: -       PHYSICAL EXAM:  General appearance: cooperative, no distress, appears stated age  Skin: Extremities and face reveal no rashes. Jaundice   HEENT: Sclerae icteric. Cardiovascular: Regular rate and rhythm. No murmurs, gallops, or rubs. Respiratory: . Clear breath sounds with no wheezes, rales, or rhonchi. GI: Abdomen distended, soft, and tender LLQ. Normal active bowel sounds.    Rectal: Deferred   Musculoskeletal: No pitting edema of the lower legs. Psychiatric:  No anxiety or agitation. Data Review     Recent Labs     09/29/22  0619 09/28/22  2101   WBC 7.7 7.3   HGB UNABLE TO OBTAIN ACCURATE RESULTS DUE TO EXTREME ICTERIA 9.7*   HCT 26.5* 26.6*    272     Recent Labs     09/29/22  0619 09/28/22  2101   * 129*   K 3.0* 3.2*   CL 93* 91*   CO2 29 33*   BUN 1* 2*   CREA 0.50* 0.47*   * 123*   CA 8.7 9.3     Recent Labs     09/29/22  0619 09/28/22  2101   * 240*   TP 7.1 7.8   ALB 2.3* 2.4*   GLOB 4.8* 5.4*   LPSE  --  50*     Recent Labs     09/29/22 0619   INR 1.6*   PTP 16.5*   APTT 38.0*        Imaging studies reviewed    Assessment / Plan :     Mr. Juan Ramon Fitzpatrick was seen OP (9/15/22) by Dr. Gail Garcia for abdominal pain, weight loss, and abdominal fullness. Dr. Gail Garcia ordered a CT AP- based on CT results he advised patient to report to ER for further assessment. Patient endorses ETOH- 8-10 beers daily for 10 years. He states he stopped drinking 5-6 days ago. He also states he was self medicating his abdominal pain with 3.5- 4.5 G of Tylenol daily. CT AP 9/23/22  IMPRESSION  1. Hepatomegaly with parenchymal heterogeneity suspicious for acute hepatitis,  recommend correlation with liver function tests. 2. Biliary sludge in the gallbladder, with mild gallbladder wall thickening. Bladder wall thickening is likely reactive. If there is strong clinical  suspicion for acute cholecystitis, recommend nuclear HIDA scan. ABD US  IMPRESSION  1. Hepatomegaly. Diffusely echogenic and heterogeneous liver can be seen with  hepatic steatosis or nonspecific parenchymal liver disease. 2. Borderline Main portal vein velocity and diameter suggesting  portal  hypertension. Small amount of perihepatic ascites. 3. Diminutive middle and left hepatic veins with discontinuous flow to the IVC. Nonvisualized right hepatic vein.  These findings may be due to acute liver  inflammation, but Budd-Chiari syndrome is a differential consideration. 4. Gallbladder sludge without shadowing gallstones. No biliary duct dilatation. HIDA scan 9/29/22  IMPRESSION  No evidence of acute cholecystitis or common bile duct obstruction. Absent gallbladder emptying following CCK administration suggests chronic  cholecystitis/chronic cystic duct dysfunction. LABS:   K+ 3.0, Magnesium 1.5, T bili 11.6, , Alk phos 226    Ddx: Drug/ETOH induced acute hepatitis, biliary dyskinesia, cirrhosis, fatty liver    - Hepatology consulted   - General surgery consulted   - Clear liquid diet   - Trend LFT's   - Prednisolone 40 mg daily   - Replace electrolytes           Patient Active Hospital Problem List:   Active Problems:    Hepatomegaly (9/29/2022)      Hyperbilirubinemia (9/29/2022)      Transaminitis (9/29/2022)      Moderate protein-calorie malnutrition ([address] (9/29/2022)     Annalise Brunner NP  I have personally reviewed the history and independently examined the patient. I have reviewed the chart and agree with the documentation recorded by the Mid Level Provider, including the assessment, treatment plan, and disposition. ASSESSMENT AND PLAN:  46 yr old male has presented with history of LUQ pain and 35 lb weight loss. Physical exam shows large, firm , hepatomegaly and labs , CT are consistent with alcoholic hepatitis, portal HTN and chronic cholecystitis. 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 Replete K+  Start Prednisolone  Serial LFTs  PT/INR level  Hepatology consult  Surgery consult. Thank you for consultation.     Mirza Mason MD

## 2022-09-29 NOTE — PROGRESS NOTES
Pt is laying in bed with even and unlabored respirations on room air. Pt was given pain medication prior to shift change. No distress noted. Continuing IV hydration and pain management. Pt was started on a clear liquid diet and has a poor appetite. Safety precautions are in place. Bed in low position and locked. Call bell within reach. Problem: Falls - Risk of  Goal: *Absence of Falls  Description: Document [place] Fall Risk and appropriate interventions in the flowsheet.   Outcome: Progressing Towards Goal  Note: Fall Risk Interventions:            Medication Interventions: Evaluate medications/consider consulting pharmacy                   Problem: Patient Education: Go to Patient Education Activity  Goal: Patient/Family Education  Outcome: Progressing Towards Goal

## 2022-09-30 ENCOUNTER — APPOINTMENT (OUTPATIENT)
Dept: GENERAL RADIOLOGY | Age: 52
DRG: 433 | End: 2022-09-30
Payer: COMMERCIAL

## 2022-09-30 ENCOUNTER — APPOINTMENT (OUTPATIENT)
Dept: CT IMAGING | Age: 52
DRG: 433 | End: 2022-09-30
Payer: COMMERCIAL

## 2022-09-30 LAB
ALBUMIN SERPL-MCNC: 2.2 G/DL (ref 3.5–5)
ALBUMIN/GLOB SERPL: 0.4 {RATIO} (ref 1.1–2.2)
ALP SERPL-CCNC: 224 U/L (ref 45–117)
ALT SERPL-CCNC: 73 U/L (ref 12–78)
AMMONIA PLAS-SCNC: 37 UMOL/L
ANION GAP SERPL CALC-SCNC: 8 MMOL/L (ref 5–15)
AST SERPL-CCNC: 322 U/L (ref 15–37)
ATRIAL RATE: 106 BPM
BASOPHILS # BLD: 0 K/UL (ref 0–0.1)
BASOPHILS NFR BLD: 0 % (ref 0–1)
BILIRUB DIRECT SERPL-MCNC: 11.3 MG/DL (ref 0–0.2)
BILIRUB SERPL-MCNC: 15 MG/DL (ref 0.2–1)
BUN SERPL-MCNC: 2 MG/DL (ref 6–20)
BUN/CREAT SERPL: 3 (ref 12–20)
CALCIUM SERPL-MCNC: 9 MG/DL (ref 8.5–10.1)
CALCULATED P AXIS, ECG09: 68 DEGREES
CALCULATED R AXIS, ECG10: -16 DEGREES
CALCULATED T AXIS, ECG11: -2 DEGREES
CHLORIDE SERPL-SCNC: 93 MMOL/L (ref 97–108)
CO2 SERPL-SCNC: 25 MMOL/L (ref 21–32)
CREAT SERPL-MCNC: 0.75 MG/DL (ref 0.7–1.3)
DIAGNOSIS, 93000: NORMAL
DIFFERENTIAL METHOD BLD: ABNORMAL
EOSINOPHIL # BLD: 0 K/UL (ref 0–0.4)
EOSINOPHIL NFR BLD: 0 % (ref 0–7)
ERYTHROCYTE [DISTWIDTH] IN BLOOD BY AUTOMATED COUNT: 14.6 % (ref 11.5–14.5)
GLOBULIN SER CALC-MCNC: 5.1 G/DL (ref 2–4)
GLUCOSE SERPL-MCNC: 89 MG/DL (ref 65–100)
HCT VFR BLD AUTO: 29.5 % (ref 36.6–50.3)
HGB BLD-MCNC: 10.9 G/DL (ref 12.1–17)
IMM GRANULOCYTES # BLD AUTO: 0.1 K/UL (ref 0–0.04)
IMM GRANULOCYTES NFR BLD AUTO: 1 % (ref 0–0.5)
LYMPHOCYTES # BLD: 0.6 K/UL (ref 0.8–3.5)
LYMPHOCYTES NFR BLD: 5 % (ref 12–49)
MAGNESIUM SERPL-MCNC: 1.6 MG/DL (ref 1.6–2.4)
MCH RBC QN AUTO: 38.9 PG (ref 26–34)
MCHC RBC AUTO-ENTMCNC: 36.9 G/DL (ref 30–36.5)
MCV RBC AUTO: 105.4 FL (ref 80–99)
MONOCYTES # BLD: 0.8 K/UL (ref 0–1)
MONOCYTES NFR BLD: 7 % (ref 5–13)
NEUTS SEG # BLD: 10 K/UL (ref 1.8–8)
NEUTS SEG NFR BLD: 87 % (ref 32–75)
NRBC # BLD: 0 K/UL (ref 0–0.01)
NRBC BLD-RTO: 0 PER 100 WBC
P-R INTERVAL, ECG05: 128 MS
PLATELET # BLD AUTO: 257 K/UL (ref 150–400)
PMV BLD AUTO: 10.8 FL (ref 8.9–12.9)
POTASSIUM SERPL-SCNC: 4 MMOL/L (ref 3.5–5.1)
PROT SERPL-MCNC: 7.3 G/DL (ref 6.4–8.2)
Q-T INTERVAL, ECG07: 352 MS
QRS DURATION, ECG06: 90 MS
QTC CALCULATION (BEZET), ECG08: 467 MS
RBC # BLD AUTO: 2.8 M/UL (ref 4.1–5.7)
RBC MORPH BLD: ABNORMAL
SODIUM SERPL-SCNC: 126 MMOL/L (ref 136–145)
VENTRICULAR RATE, ECG03: 106 BPM
WBC # BLD AUTO: 11.5 K/UL (ref 4.1–11.1)

## 2022-09-30 PROCEDURE — 65270000029 HC RM PRIVATE

## 2022-09-30 PROCEDURE — 93005 ELECTROCARDIOGRAM TRACING: CPT

## 2022-09-30 PROCEDURE — 74011636637 HC RX REV CODE- 636/637

## 2022-09-30 PROCEDURE — 80053 COMPREHEN METABOLIC PANEL: CPT

## 2022-09-30 PROCEDURE — 74177 CT ABD & PELVIS W/CONTRAST: CPT

## 2022-09-30 PROCEDURE — 82140 ASSAY OF AMMONIA: CPT

## 2022-09-30 PROCEDURE — 74011000250 HC RX REV CODE- 250: Performed by: FAMILY MEDICINE

## 2022-09-30 PROCEDURE — 74011250636 HC RX REV CODE- 250/636: Performed by: FAMILY MEDICINE

## 2022-09-30 PROCEDURE — 74011250636 HC RX REV CODE- 250/636: Performed by: INTERNAL MEDICINE

## 2022-09-30 PROCEDURE — 74018 RADEX ABDOMEN 1 VIEW: CPT

## 2022-09-30 PROCEDURE — 74011000636 HC RX REV CODE- 636: Performed by: RADIOLOGY

## 2022-09-30 PROCEDURE — 74011250637 HC RX REV CODE- 250/637

## 2022-09-30 PROCEDURE — 83735 ASSAY OF MAGNESIUM: CPT

## 2022-09-30 PROCEDURE — 74011250637 HC RX REV CODE- 250/637: Performed by: INTERNAL MEDICINE

## 2022-09-30 PROCEDURE — 36415 COLL VENOUS BLD VENIPUNCTURE: CPT

## 2022-09-30 PROCEDURE — 85025 COMPLETE CBC W/AUTO DIFF WBC: CPT

## 2022-09-30 PROCEDURE — 82248 BILIRUBIN DIRECT: CPT

## 2022-09-30 RX ORDER — TRAMADOL HYDROCHLORIDE 50 MG/1
50 TABLET ORAL
Status: DISCONTINUED | OUTPATIENT
Start: 2022-09-30 | End: 2022-10-07 | Stop reason: HOSPADM

## 2022-09-30 RX ORDER — POLYETHYLENE GLYCOL 3350 17 G/17G
17 POWDER, FOR SOLUTION ORAL 2 TIMES DAILY
Status: DISCONTINUED | OUTPATIENT
Start: 2022-09-30 | End: 2022-10-06

## 2022-09-30 RX ORDER — POLYETHYLENE GLYCOL 3350 17 G/17G
17 POWDER, FOR SOLUTION ORAL DAILY
Status: DISCONTINUED | OUTPATIENT
Start: 2022-09-30 | End: 2022-09-30

## 2022-09-30 RX ORDER — PREDNISOLONE SODIUM PHOSPHATE 15 MG/5ML
40 SOLUTION ORAL DAILY
Status: DISCONTINUED | OUTPATIENT
Start: 2022-09-30 | End: 2022-10-07 | Stop reason: HOSPADM

## 2022-09-30 RX ADMIN — TRAMADOL HYDROCHLORIDE 50 MG: 50 TABLET, COATED ORAL at 13:32

## 2022-09-30 RX ADMIN — HYDROMORPHONE HYDROCHLORIDE 0.5 MG: 1 INJECTION, SOLUTION INTRAMUSCULAR; INTRAVENOUS; SUBCUTANEOUS at 09:50

## 2022-09-30 RX ADMIN — IOPAMIDOL 100 ML: 755 INJECTION, SOLUTION INTRAVENOUS at 19:56

## 2022-09-30 RX ADMIN — HYDROMORPHONE HYDROCHLORIDE 0.5 MG: 1 INJECTION, SOLUTION INTRAMUSCULAR; INTRAVENOUS; SUBCUTANEOUS at 06:30

## 2022-09-30 RX ADMIN — ONDANSETRON 4 MG: 2 INJECTION INTRAMUSCULAR; INTRAVENOUS at 02:34

## 2022-09-30 RX ADMIN — TRAMADOL HYDROCHLORIDE 50 MG: 50 TABLET, COATED ORAL at 21:03

## 2022-09-30 RX ADMIN — POLYETHYLENE GLYCOL 3350 17 G: 17 POWDER, FOR SOLUTION ORAL at 10:49

## 2022-09-30 RX ADMIN — HYDROMORPHONE HYDROCHLORIDE 0.5 MG: 1 INJECTION, SOLUTION INTRAMUSCULAR; INTRAVENOUS; SUBCUTANEOUS at 02:34

## 2022-09-30 RX ADMIN — SODIUM CHLORIDE, PRESERVATIVE FREE 10 ML: 5 INJECTION INTRAVENOUS at 21:04

## 2022-09-30 RX ADMIN — LACTULOSE 30 ML: 20 SOLUTION ORAL at 09:45

## 2022-09-30 RX ADMIN — SODIUM CHLORIDE 75 ML/HR: 9 INJECTION, SOLUTION INTRAVENOUS at 10:49

## 2022-09-30 RX ADMIN — Medication 40 MG: at 13:32

## 2022-09-30 NOTE — PROGRESS NOTES
Comprehensive Nutrition Assessment    Type and Reason for Visit: Reassess    Nutrition Recommendations/Plan:   Diet advancement per MD  Once on a diet please order Strawberry ensure enlive TID  Pt c/o constipation- consider addition of bowel regimen  Continue with IV Mg, IV K repletion       Malnutrition Assessment:  Malnutrition Status: Moderate malnutrition (09/29/22 1321)    Context:  Acute illness     Findings of the 6 clinical characteristics of malnutrition:   Energy Intake:  75% or less of est energy req for 7 or more days  Weight Loss:  Greater than 7.5% over 3 months     Body Fat Loss: Moderate body fat loss, Triceps, Orbital   Muscle Mass Loss:  Mild muscle mass loss, Hand (interosseous), Calf  Fluid Accumulation:  No significant fluid accumulation,     Strength:  Not performed        Nutrition Assessment:    Past Medical History:   Diagnosis Date    Pancreatitis      46 y.o. male admitted with jaundice, pancreatitis r/t h/o alcohol abuse. Per MD note abd ultrasound showed \"hepatomegaly, diffuse heterogeneous liver, possible hepatic steatosis or nonspecific parenchymal liver disease, portal hypertension, perihepatic ascites, diminutive middle and left hepatic veins with discontinuous flow to IVC with findings suggesting possible acute liver inflammation and gallbladder sludge without shadowing gallstones; no biliary duct dilatation. \" Pt NPO today, had HIDA scan this morning, awaiting surgery consult. Visited pt at bedside where he stated he's been eating poorly for 6 months d/t n/v/lack of appetite. Stated #, thinks he's lost 35-40# in last 3-6 months. NFPE performed revealed moderate fat and muscle wasting. Pt meets ASPEN criteria for moderate malnutrition. Pt stated his appetite is better today, likely d/t being NPO since admission. He'd like strawberry ensure TID, boiled eggs, yogurt, cheese as snacks.  We talked about use of easy to prepare, small frequent meals dense in kcals/pro for wt gain/maintenance in context of poor appetite and liver dysfunction. Na 131, K 3.0, Mg 1.5- pt receiving IV repletion of all. Recent Labs     09/30/22  0248 09/29/22  0619 09/28/22  2101   GLU 89 124* 123*   BUN 2* 1* 2*   CREA 0.75 0.50* 0.47*   * 131* 129*   K 4.0 3.0* 3.2*   CL 93* 93* 91*   CO2 25 29 33*   CA 9.0 8.7 9.3   MG 1.6 1.5*  --          Nutritionally Significant Medications:  IV NaCl, IV Mg, IV K  Prn morphine, zofran      Estimated Daily Nutrient Needs:  Energy Requirements Based On: Kcal/kg     Energy (kcal/day): 2010  Weight Used for Protein Requirements: Current  Protein (g/day): 101  Method Used for Fluid Requirements: 1 ml/kcal  Fluid (ml/day): 2000    Nutrition Related Findings:   Edema: No data recorded    Last BM: 09/29/22,      Wounds: None      Current Nutrition Therapies:  Diet:  Clear Liquids  Supplements:  order once diet advances  Meal intake: No data found. Supplement intake: No data found. Nutrition Support: none      Anthropometric Measures:  Height: 5' 7\" (170.2 cm)  Ideal Body Weight (IBW): 148 lbs (67 kg)     Current Body Wt:  67.2 kg (148 lb 2.4 oz), 100.1 % IBW. Standing scale  Current BMI (kg/m2): 23.2  Usual Body Weight: 77.1 kg (170 lb)  % Weight Change (Calculated): -12.9  Weight Adjustment: No adjustment     BMI Category: Normal weight (BMI 18.5-24. 9)    Wt Readings from Last 10 Encounters:   09/28/22 67.2 kg (148 lb 2.4 oz)   02/27/19 76.7 kg (169 lb 1.5 oz)   08/01/18 75 kg (165 lb 5.5 oz)           Nutrition Diagnosis:   Moderate malnutrition, In context of acute illness or injury related to inadequate protein-energy intake as evidenced by Criteria as identified in malnutrition assessment  Unintended weight loss related to other (specify) (hepatic dysfunction) as evidenced by poor intake prior to admission, nausea, vomiting, weight loss    Nutrition Interventions:   Food and/or Nutrient Delivery: Start oral diet, Start oral nutrition supplement, Snacks (specify) (upon appropriate advancement)  Nutrition Education/Counseling: Counseling completed  Coordination of Nutrition Care: Continue to monitor while inpatient       Goals:     Goals: PO intake 50% or greater, by next RD assessment       Nutrition Monitoring and Evaluation:   Behavioral-Environmental Outcomes: None identified  Food/Nutrient Intake Outcomes: Diet advancement/tolerance, Supplement intake, Food and nutrient intake  Physical Signs/Symptoms Outcomes: Biochemical data, GI status, Meal time behavior, Nutrition focused physical findings, Weight    Discharge Planning:    Continue oral nutrition supplement    Margie Riley RD, CSP  Available via [employer]

## 2022-09-30 NOTE — PROGRESS NOTES
[facility]                                                                                          Hospitalist Progress Note  Joe Acosta MD  Answering service: [phone] from in house phone        Date of Service:  2022  NAME:  Mukesh Quiroga  :  1970  MRN:  [id number]      Admission Summary:   Mukesh Quiroga is a 46 y.o. male with past medical history of alcohol abuse and pancreatitis presented to the emergency department with abnormal CT findings. Patient reportedly underwent CT abdomen pelvis with IV contrast on 2022; findings included hepatomegaly with parenchymal heterogeneity suspicious for acute pancreatitis, biliary sludge in the gallbladder with gallbladder wall thickening. Patient reportedly was called by the gastroenterologist office regarding the same and was referred to the emergency department for further evaluation. There is concern for hepatitis along with enlarged liver. Patient had significant history of alcohol abuse (formerly ~ 8 beers / day); reportedly quit drinking alcohol approximately a week ago. On arrival emergency department initial recorded vital signs temperature 97.8 °F, /92, heart rate 97, respiratory rate 18, O2 saturation 99% room air. Limited abdominal ultrasound showed evidence of hepatomegaly, diffuse heterogeneous liver, possible hepatic steatosis or nonspecific parenchymal liver disease, portal hypertension, perihepatic ascites, diminutive middle and left hepatic veins with discontinuous flow to IVC with findings suggesting possible acute liver inflammation and gallbladder sludge without shadowing gallstones; no biliary duct dilatation. ED ordered Dilaudid 2 mg IV, Zofran 4 mg IV, oxycodone 5 mg p.o., 0.9% normal saline 1000 mL IV fluid bolus x1 dose. Patient is now seen for admission to the hospital service for continued evaluation and treatment.  Patient complains of ongoing, recent nausea, vomiting, poor oral intake, and significant weight loss ~ 35 - 40 lbs x last 12 months       Interval history / Subjective: Follow up pt with abd pain, jaundice, acute liver injury  HIDA noted, discussed with General surgeon, no intervention as recommended, no acute cholecystitis  GI,  and general surgeon consulted, input appreciated  Per GI steroids were initiated,   + abd pain RUQ  Wife at bed side  Advised alcohol cessation  Hepatology consulted, evaluation is pending     Assessment & Plan:     Transaminitis   -f/u LFTs   -ammonia level  -GI and/or hepatologist consulted, input appreciated, diet and steroids were initiated  HIDA reviewed  Gen Sx consulted, no procedure was recommended     Hyperbilirubinemia  -Total bilirubin 11.1  Continue supportive care, fluids IV  Follow up GI and Hepatologist  Diet initiated, advance if OK per hepatologist and GI     Hepatomegaly  -Hepatic steatosis or nonspecific parenchymal liver disease.  -Perihepatic ascites  -Findings suggesting portal hypertension  -Gallbladder sludge  -HIDA scan reviewed, no acute cholecystitis, ? cystic duct obstruction  General surgeon consulted, no procedure was recommended  Add Ultram to control abd pain     Nausea vomiting, improved  -mild generalized abdominal pain/ distention (secondary to ascites), acute liver injury  -Order Zofran 4 mg IV every 6 hours as needed    Hyponatremia, acute  -Serum sodium 129. Repeat sodium levels trending down to 126, possible related to acute liver Ds  Continue to monitor     Acute hypokalemia  -K 3.2. Oral KCl 30 mEq p.o. x1 dose. Repeat K level post and will replace    Anemia, macrocytic  -Hemoglobin 9.7, .5  -Check vitamin B12, folate levels. Check iron profile. Repeat H&H.   Transfuse if hemoglobin less than 7.0  -Order stool occult blood test    History of alcohol abuse  -Reportedly quit alcohol  -Plan as above     Jaundice  -plan as above    Leukocytosis   possible related to steroids        Code status: Full code  Prophylaxis: SCD  Care Plan discussed with: patient and RN  Anticipated Disposition: TBD, pending clinical improvement, 48-72 hrs       Hospital Problems  Never Reviewed            Codes Class Noted POA    Hepatomegaly ICD-10-CM: R16.0  ICD-9-CM: 789.1  9/29/2022 Unknown        Hyperbilirubinemia ICD-10-CM: E80.6  ICD-9-CM: 782.4  9/29/2022 Unknown        Transaminitis ICD-10-CM: R74.01  ICD-9-CM: 790.4  9/29/2022 Unknown        Moderate protein-calorie malnutrition ([facility] [address] ICD-10-CM: E44.0  ICD-9-CM: 263.0  9/29/2022 Yes           Review of Systems:   A comprehensive review of systems was negative except for that written in the HPI. Vital Signs:    Last 24hrs VS reviewed since prior progress note. Most recent are:  Visit Vitals  /80 (BP 1 Location: Right arm, BP Patient Position: At rest)   Pulse (!) 114   Temp 98.2 °F (36.8 °C)   Resp 18   Ht 5' 7\" (1.702 m)   Wt 67.2 kg (148 lb 2.4 oz)   SpO2 97%   BMI 23.20 kg/m²       No intake or output data in the 24 hours ending 09/30/22 1225       Physical Examination:     I had a face to face encounter with this patient and independently examined them on 9/30/2022 as outlined below:          Constitutional:  + abdominal pain, cooperative, pleasant    ENT:  Oral mucosa moist, oropharynx benign. Icteric sclera   Resp:  CTA bilaterally. No wheezing/rhonchi/rales. No accessory muscle use. CV:  Regular rhythm, normal rate, no murmurs, gallops, rubs    GI:  Soft, + distended, + tender, normoactive bowel sounds, +hepatosplenomegaly     Musculoskeletal:  No edema, warm, 2+ pulses throughout    Neurologic:  Moves all extremities. AAOx3, CN II-XII reviewed            Data Review:    Review and/or order of clinical lab test  HIDA:  IMPRESSION  No evidence of acute cholecystitis or common bile duct obstruction.   Absent gallbladder emptying following CCK administration suggests chronic  cholecystitis/chronic cystic duct dysfunction. Labs:     Recent Labs     09/30/22 0248 09/29/22 0619   WBC 11.5* 7.7   HGB 10.9* UNABLE TO OBTAIN ACCURATE RESULTS DUE TO EXTREME ICTERIA   HCT 29.5* 26.5*    234       Recent Labs     09/30/22 0248 09/29/22 0619 09/28/22  2101   * 131* 129*   K 4.0 3.0* 3.2*   CL 93* 93* 91*   CO2 25 29 33*   BUN 2* 1* 2*   CREA 0.75 0.50* 0.47*   GLU 89 124* 123*   CA 9.0 8.7 9.3   MG 1.6 1.5*  --        Recent Labs     09/30/22 0248 09/29/22 0619 09/28/22 2101   ALT 73 75 82*   * 226* 240*   TBILI 15.0* 11.6* 11.1*   TP 7.3 7.1 7.8   ALB 2.2* 2.3* 2.4*   GLOB 5.1* 4.8* 5.4*   LPSE  --   --  50*       Recent Labs     09/29/22 0619   INR 1.6*   PTP 16.5*   APTT 38.0*        No results for input(s): FE, TIBC, PSAT, FERR in the last 72 hours. No results found for: FOL, RBCF   No results for input(s): PH, PCO2, PO2 in the last 72 hours. No results for input(s): CPK, CKNDX, TROIQ in the last 72 hours.     No lab exists for component: CPKMB  Lab Results   Component Value Date/Time    Triglyceride 85 03/02/2019 03:46 AM     No results found for: [facility]  Lab Results   Component Value Date/Time    Color DARK YELLOW 09/28/2022 10:31 PM    Appearance CLEAR 09/28/2022 10:31 PM    Specific gravity 1.005 09/28/2022 10:31 PM    Specific gravity PATIENT DISCHARGED BEFORE SAMPLE COLLECTED 11/10/2020 08:00 PM    pH (UA) 7.5 09/28/2022 10:31 PM    Protein Negative 09/28/2022 10:31 PM    Glucose Negative 09/28/2022 10:31 PM    Ketone Negative 09/28/2022 10:31 PM    Bilirubin PATIENT DISCHARGED BEFORE SAMPLE COLLECTED 11/10/2020 08:00 PM    Urobilinogen 1.0 09/28/2022 10:31 PM    Nitrites Negative 09/28/2022 10:31 PM    Leukocyte Esterase Negative 09/28/2022 10:31 PM    Epithelial cells FEW 09/28/2022 10:31 PM    Bacteria Negative 09/28/2022 10:31 PM    WBC 0-4 09/28/2022 10:31 PM    RBC 0-5 09/28/2022 10:31 PM         Medications Reviewed:     Current Facility-Administered Medications   Medication Dose Route Frequency    lactulose (CHRONULAC) 10 gram/15 mL solution 30 mL  20 g Oral DAILY    prednisoLONE (ORAPRED) 15 mg/5 mL (3 mg/mL) solution 40 mg  40 mg Oral DAILY    polyethylene glycol (MIRALAX) packet 17 g  17 g Oral DAILY    traMADoL (ULTRAM) tablet 50 mg  50 mg Oral Q6H PRN    sodium chloride (NS) flush 5-40 mL  5-40 mL IntraVENous Q8H    sodium chloride (NS) flush 5-40 mL  5-40 mL IntraVENous PRN    ondansetron (ZOFRAN ODT) tablet 4 mg  4 mg Oral Q8H PRN    Or    ondansetron (ZOFRAN) injection 4 mg  4 mg IntraVENous Q6H PRN    0.9% sodium chloride infusion  75 mL/hr IntraVENous CONTINUOUS    HYDROmorphone (DILAUDID) injection 0.5 mg  0.5 mg IntraVENous Q3H PRN     ______________________________________________________________________  EXPECTED LENGTH OF STAY: 3d 7h  ACTUAL LENGTH OF STAY:          1                 Danielle Sherman MD

## 2022-10-01 ENCOUNTER — APPOINTMENT (OUTPATIENT)
Dept: CT IMAGING | Age: 52
DRG: 433 | End: 2022-10-01
Attending: INTERNAL MEDICINE
Payer: COMMERCIAL

## 2022-10-01 LAB
ALBUMIN SERPL-MCNC: 2 G/DL (ref 3.5–5)
ALBUMIN/GLOB SERPL: 0.5 {RATIO} (ref 1.1–2.2)
ALP SERPL-CCNC: 175 U/L (ref 45–117)
ALT SERPL-CCNC: 56 U/L (ref 12–78)
ANION GAP SERPL CALC-SCNC: 9 MMOL/L (ref 5–15)
AST SERPL-CCNC: 204 U/L (ref 15–37)
BASOPHILS # BLD: 0 K/UL (ref 0–0.1)
BASOPHILS NFR BLD: 0 % (ref 0–1)
BILIRUB SERPL-MCNC: 14 MG/DL (ref 0.2–1)
BUN SERPL-MCNC: 4 MG/DL (ref 6–20)
BUN/CREAT SERPL: 8 (ref 12–20)
CALCIUM SERPL-MCNC: 8.7 MG/DL (ref 8.5–10.1)
CHLORIDE SERPL-SCNC: 93 MMOL/L (ref 97–108)
CO2 SERPL-SCNC: 25 MMOL/L (ref 21–32)
CREAT SERPL-MCNC: 0.52 MG/DL (ref 0.7–1.3)
DIFFERENTIAL METHOD BLD: ABNORMAL
EOSINOPHIL # BLD: 0 K/UL (ref 0–0.4)
EOSINOPHIL NFR BLD: 0 % (ref 0–7)
ERYTHROCYTE [DISTWIDTH] IN BLOOD BY AUTOMATED COUNT: 14.1 % (ref 11.5–14.5)
GLOBULIN SER CALC-MCNC: 4.1 G/DL (ref 2–4)
GLUCOSE SERPL-MCNC: 105 MG/DL (ref 65–100)
HCT VFR BLD AUTO: 25.9 % (ref 36.6–50.3)
HGB BLD-MCNC: 9.5 G/DL (ref 12.1–17)
IMM GRANULOCYTES # BLD AUTO: 0.1 K/UL (ref 0–0.04)
IMM GRANULOCYTES NFR BLD AUTO: 1 % (ref 0–0.5)
INR PPP: 2.2 (ref 0.9–1.1)
LYMPHOCYTES # BLD: 0.5 K/UL (ref 0.8–3.5)
LYMPHOCYTES NFR BLD: 5 % (ref 12–49)
MCH RBC QN AUTO: 38.2 PG (ref 26–34)
MCHC RBC AUTO-ENTMCNC: 36.7 G/DL (ref 30–36.5)
MCV RBC AUTO: 104 FL (ref 80–99)
MONOCYTES # BLD: 0.4 K/UL (ref 0–1)
MONOCYTES NFR BLD: 4 % (ref 5–13)
NEUTS SEG # BLD: 8.2 K/UL (ref 1.8–8)
NEUTS SEG NFR BLD: 90 % (ref 32–75)
NRBC # BLD: 0 K/UL (ref 0–0.01)
NRBC BLD-RTO: 0 PER 100 WBC
PLATELET # BLD AUTO: 215 K/UL (ref 150–400)
PMV BLD AUTO: 10.9 FL (ref 8.9–12.9)
POTASSIUM SERPL-SCNC: 4.1 MMOL/L (ref 3.5–5.1)
PROCALCITONIN SERPL-MCNC: 0.45 NG/ML
PROT SERPL-MCNC: 6.1 G/DL (ref 6.4–8.2)
PROTHROMBIN TIME: 22 SEC (ref 9–11.1)
RBC # BLD AUTO: 2.49 M/UL (ref 4.1–5.7)
RBC MORPH BLD: ABNORMAL
RBC MORPH BLD: ABNORMAL
SODIUM SERPL-SCNC: 127 MMOL/L (ref 136–145)
WBC # BLD AUTO: 9.2 K/UL (ref 4.1–11.1)

## 2022-10-01 PROCEDURE — 84145 PROCALCITONIN (PCT): CPT

## 2022-10-01 PROCEDURE — 74011636637 HC RX REV CODE- 636/637

## 2022-10-01 PROCEDURE — 65270000029 HC RM PRIVATE

## 2022-10-01 PROCEDURE — 85025 COMPLETE CBC W/AUTO DIFF WBC: CPT

## 2022-10-01 PROCEDURE — 85610 PROTHROMBIN TIME: CPT

## 2022-10-01 PROCEDURE — 94760 N-INVAS EAR/PLS OXIMETRY 1: CPT

## 2022-10-01 PROCEDURE — 99223 1ST HOSP IP/OBS HIGH 75: CPT | Performed by: INTERNAL MEDICINE

## 2022-10-01 PROCEDURE — 36415 COLL VENOUS BLD VENIPUNCTURE: CPT

## 2022-10-01 PROCEDURE — 74011250636 HC RX REV CODE- 250/636: Performed by: INTERNAL MEDICINE

## 2022-10-01 PROCEDURE — 74011250637 HC RX REV CODE- 250/637: Performed by: INTERNAL MEDICINE

## 2022-10-01 PROCEDURE — 80053 COMPREHEN METABOLIC PANEL: CPT

## 2022-10-01 PROCEDURE — 74011000258 HC RX REV CODE- 258: Performed by: INTERNAL MEDICINE

## 2022-10-01 RX ADMIN — TRAMADOL HYDROCHLORIDE 50 MG: 50 TABLET, COATED ORAL at 21:01

## 2022-10-01 RX ADMIN — TRAMADOL HYDROCHLORIDE 50 MG: 50 TABLET, COATED ORAL at 14:43

## 2022-10-01 RX ADMIN — TRAMADOL HYDROCHLORIDE 50 MG: 50 TABLET, COATED ORAL at 03:31

## 2022-10-01 RX ADMIN — Medication 40 MG: at 08:53

## 2022-10-01 RX ADMIN — AMPICILLIN SODIUM AND SULBACTAM SODIUM 1.5 G: 1; .5 INJECTION, POWDER, FOR SOLUTION INTRAMUSCULAR; INTRAVENOUS at 17:57

## 2022-10-01 RX ADMIN — AMPICILLIN SODIUM AND SULBACTAM SODIUM 1.5 G: 1; .5 INJECTION, POWDER, FOR SOLUTION INTRAMUSCULAR; INTRAVENOUS at 12:06

## 2022-10-01 RX ADMIN — TRAMADOL HYDROCHLORIDE 50 MG: 50 TABLET, COATED ORAL at 09:49

## 2022-10-01 NOTE — CONSULTS
[facility], MD, FACP, Kj Danni Chapman, [place]      Henry Mendoza, MIKAL Rajput, [facility]   Lin Smith, [facility]-   Chapis Duff, [facility]   RIVER Carter, [facility]       Butch Corrales [facility] [address]    at [address], [facility] Annabelle Schneider  22.    [phone]    FAX: [address] [place] [address]    at [address], [address]    [phone]    FAX: [phone]         HEPATOLOGY CONSULT NOTE  I was asked to see this patient in consultation for management of alcohol induced hepatitis. I have reviewed the Emergency room note, Hospital admission note, Notes by all other physicians who have seen the patient during this hospitalization to date. I have reviewed the problem list, all past medical history and the reason for this hospitalization. I have reviewed the allergies and the medications the patient was taking at home prior to this hospitalization. HISTORY:  The patient is a 46 y.o. Black male who regularly consumes 10 alcoholic drinks, usually beer sometimes mixed drinks, per day. He has done this for many years. He had an episode of acute pancreatitis in 2019. He was abstinent for a few months after that but then slowly started drinking alcohol again in increasing amounts. A few weeks ago he developed abdominal pain/fullness that was different than his previous pancreatitis pain. He then became jaundice. He stopped consuming alcohol and after 2 days when the jaundice did not improve he came to the ED. There was no nausea, vomiting, swelling of the abdomen, swelling of the lower extremity, confusion.   .      In the ED Laboratory studies were significant for:    WBC 7.3, HB 9.7 gms, , Sna 129, Scr 0.47 mg, AST 305, ALT 82, , TBILI 11.1 mg, INR 1.6, Lipase 50    Imaging of the liver with CT scan demonstrated hepatomegaly, fatty liver, no liver mass, mild ascites. Hospital Course to date: The TBILI and INR have increased to 15.0 and 2.2  He was started on prednisolone oral solution 40 mg every day. Hepatology Plan:  I have discussed with he and wife that he has severe alcoholic hepatitis and this is associated with a 30% risk of liver failure within 90 days. I have offered enrollment into the [id number] clinical trial.  Georgette Man stimulates the liver to produce repair proteins. He would like to enrol into this study. He will get dosed with study drug or placebo on Monday evening and can go home Tuesday AM if otherwise clinically stable. He can remain on prednisolone at the current dose. Monitor hepatic panel and INR every day. ASSESSMENT AND PLAN:  Alcohol hepatitis  There is elevation in liver transaminases in a pattern consistent with alcohol. There is an elevation in TBILI to 15. The INR is prolonged to 2.2. The DF is 59. The alcoholic hepatitis is severe and associated with a 30% mortality in the next 3 months. He was started on oral prednisolone yesterday. He will have received 4 days of prednisolone before study drug which is fine. We will keep him on prednisolone for 28 days and supply that as a study medication starting Tuesday AM.    It is not yet clear if he has cirrhosis of the liver. Alcoholic hepatitis can make the liver numbers and look like cirrhosis and can cause ascites in the absence of cirrhosis. There is no evidence of alcohol withdrawal    Alcohol liver disease  The diagnosis is based upon a history of consuming alcohol in excess, imaging, pattern of AST>ALT,   Will check serology for other causes of chronic liver disease. Will check for HIV. A liver biopsy has not been performed. A Fibroscan has not been perfomred.       The patient was consuming 10 alcoholic beverages daily. The patient has been abstinent from alcohol since 2022. Discussed the need to remain abstinent from alcohol. Hyponatremia  This is secondary alcohol. Will hold on giving IV albumin since he has no significant ascites and no edema. IF Sna does not start coming up by tomorrow ay need some IV albumin. Anemia   This is due to multifactorial causes including bone marrow suppression secondary to alcohol. There is no evidence for acute blood loss. Will obtain FE panel to assess for iron stores. Hepatic encephalopathy   Overt HE has not developed to date. There is no reason for treatment with lactulose of xifaxan  He was given lactulose once every day for constipation. Grabiel Rasmussen was only 34  Will stop the lactulose. SYSTEM REVIEW:  Constitution systems: Negative for fever, chills, weight gain, weight loss. Eyes: Negative for visual changes. ENT: Negative for sore throat, painful swallowing. Respiratory: Negative for cough, hemoptysis, SOB. Cardiology: Negative for chest pain, palpitations. GI:  Negative for constipation or diarrhea. : Negative for urinary frequency, dysuria, hematuria, nocturia. Skin: Negative for rash. Hematology: Negative for easy bruising, blood clots. Musculo-skelatal: Negative for back pain, muscle pain, weakness. Neurologic: Negative for headaches, dizziness, vertigo, memory problems not related to HE. Psychology: Negative for anxiety, depression. FAMILY HISTORY:  The father  of assault when he was a child. The mother  of cancer. There is no family history of liver disease. SOCIAL HISTORY:  The patient is . The patient has 3 children,   The patient has never used tobacco products. The patient consumes 10 alcoholic beverages per day   The patient currently works full time as fork . PHYSICAL EXAMINATION:  VS: per nursing note  General:  No acute distress.    Eyes: Sclera icteric  ENT:  No oral lesions. Thyroid normal.  Nodes:  No adenopathy. Skin:  No spider angiomata. Jaundice. Respiratory:  Lungs clear to auscultation. Cardiovascular:  Regular heart rate. Abdomen:  Soft non-tender, on/palpation. Hepatomegally with liver 4 fingers below the right costal margin. Spleen not palpable. No obvious ascites. Extremities:  No lower extremity edema. NO muscle wasting  Neurologic:  Alert and oriented. Cranial nerves grossly intact. No asterixis. LABORATORY:   Latest Reference Range & Units 9/28/22 21:01 9/29/22 06:19 9/30/22 02:48 10/1/22 02:29   WBC 4.1 - 11.1 K/uL 7.3 7.7 11.5 (H) 9.2   HGB 12.1 - 17.0 g/dL 9.7 (L) UNABLE TO OBTAIN ACCURATE RESULTS DUE TO EXTREME ICTERIA 10.9 (L) 9.5 (L)   PLATELET 532 - 700 K/uL 272 234 257 215   INR 0.9 - 1.1    1.6 (H)  2.2 (H)   Sodium 136 - 145 mmol/L 129 (L) 131 (L) 126 (L) 127 (L)   Potassium 3.5 - 5.1 mmol/L 3.2 (L) 3.0 (L) 4.0 4.1   Chloride 97 - 108 mmol/L 91 (L) 93 (L) 93 (L) 93 (L)   CO2 21 - 32 mmol/L 33 (H) 29 25 25   Glucose 65 - 100 mg/dL 123 (H) 124 (H) 89 105 (H)   BUN 6 - 20 MG/DL 2 (L) 1 (L) 2 (L) 4 (L)   Creatinine 0.70 - 1.30 MG/DL 0.47 (L) 0.50 (L) 0.75 0.52 (L)   Bilirubin, total 0.2 - 1.0 MG/DL 11.1 (H) 11.6 (H) 15.0 (H) 14.0 (H)   Albumin 3.5 - 5.0 g/dL 2.4 (L) 2.3 (L) 2.2 (L) 2.0 (L)   ALT 12 - 78 U/L 82 (H) 75 73 56   AST 15 - 37 U/L 305 (H) 288 (H) 322 (H) 204 (H)   Alk.  phosphatase 45 - 117 U/L 240 (H) 226 (H) 224 (H) 175 (H)   Ammonia, plasma <32 UMOL/L   37 (H)    (H): Data is abnormally high  (L): Data is abnormally low      MD Mitzi [address]  [address]. [phone]  FAX:  [address]

## 2022-10-01 NOTE — PROGRESS NOTES
[facility] Adult  Hospitalist Group                                                                                          Hospitalist Progress Note  Ayala Crawford MD  Answering service: [phone] from in house phone        Date of Service:  10/1/2022  NAME:  Laura Zamarripa  :  1970  MRN:  [id number]      Admission Summary:   Laura Zamarripa is a 46 y.o. male with past medical history of alcohol abuse and pancreatitis presented to the emergency department with abnormal CT findings. Patient reportedly underwent CT abdomen pelvis with IV contrast on 2022; findings included hepatomegaly with parenchymal heterogeneity suspicious for acute pancreatitis, biliary sludge in the gallbladder with gallbladder wall thickening. Patient reportedly was called by the gastroenterologist office regarding the same and was referred to the emergency department for further evaluation. There is concern for hepatitis along with enlarged liver. Patient had significant history of alcohol abuse (formerly ~ 8 beers / day); reportedly quit drinking alcohol approximately a week ago. On arrival emergency department initial recorded vital signs temperature 97.8 °F, /92, heart rate 97, respiratory rate 18, O2 saturation 99% room air. Limited abdominal ultrasound showed evidence of hepatomegaly, diffuse heterogeneous liver, possible hepatic steatosis or nonspecific parenchymal liver disease, portal hypertension, perihepatic ascites, diminutive middle and left hepatic veins with discontinuous flow to IVC with findings suggesting possible acute liver inflammation and gallbladder sludge without shadowing gallstones; no biliary duct dilatation. ED ordered Dilaudid 2 mg IV, Zofran 4 mg IV, oxycodone 5 mg p.o., 0.9% normal saline 1000 mL IV fluid bolus x1 dose. Patient is now seen for admission to the hospital service for continued evaluation and treatment.  Patient complains of ongoing, recent nausea, vomiting, poor oral intake, and significant weight loss ~ 35 - 40 lbs x last 12 months       Interval history / Subjective: Follow up pt with abd pain, jaundice, acute liver injury  Right low jaw pain, pt think he might have a dental abscess  Abx Unasyn IV  CT facial was requested for evaluation of possible abscess  CT abd reviewed    GI,  and general surgeon consulted, input appreciated   HIDA noted, discussed with General surgeon, no intervention as recommended, no acute cholecystitis  Per GI steroids were initiated,   + abd pain RUQ improved 2/10  Wife at bed side  alcohol cessation was recommended  Hepatology consulted, evaluation is pending     Assessment & Plan:     Transaminitis   -f/u LFTs   -ammonia level 37  -GI and/or hepatologist consulted, input appreciated, diet and steroids were initiated  HIDA reviewed, dilated gallbladder  Gen Sx consulted, no procedure was recommended  Request acute hepatitis panel     Hyperbilirubinemia  -Total bilirubin 11.1  Continue supportive care, fluids IV  Follow up GI and Hepatologist  Diet initiated, advance if OK per hepatologist and GI     Hepatomegaly  -Hepatic steatosis or nonspecific parenchymal liver disease.  -Perihepatic ascites  -Findings suggesting portal hypertension  -Gallbladder sludge  -HIDA scan reviewed, no acute cholecystitis, no cystic duct obstruction per  General surgeon, no procedure was recommended  Ultram to control abd pain PRN     Nausea vomiting, resolved  -mild generalized abdominal pain/ distention (secondary to ascites), acute liver injury  -Order Zofran 4 mg IV every 6 hours as needed    Hyponatremia, acute  -Serum sodium 129. Repeat sodium levels trending down to 127, possible related to acute liver Ds  Continue to monitor  D/c fluids IV     Acute hypokalemia sever  replaced    Anemia, macrocytic  -Hemoglobin 9.7, .5  -Check vitamin B12, folate levels. Check iron profile. Repeat H&H.   Transfuse if hemoglobin less than 7.0  -Order stool occult blood test    History of alcohol abuse  -Reportedly quit alcohol  -Plan as above     Jaundice  -plan as above    Leukocytosis   possible related to steroids    Right mandibular pain, possible dental abscess  Unasyn IV  CT facial bones was requested  PCT 0.45        Code status: Full code  Prophylaxis: SCD  Care Plan discussed with: patient and RN  Anticipated Disposition: TBD, pending clinical improvement, 48-72 hrs, follow up CT facial bones, hepatology evaluation is pending       Hospital Problems  Never Reviewed            Codes Class Noted POA    Hepatomegaly ICD-10-CM: R16.0  ICD-9-CM: 789.1  9/29/2022 Unknown        Hyperbilirubinemia ICD-10-CM: E80.6  ICD-9-CM: 782.4  9/29/2022 Unknown        Transaminitis ICD-10-CM: R74.01  ICD-9-CM: 790.4  9/29/2022 Unknown        Moderate protein-calorie malnutrition ([facility] [address] ICD-10-CM: E44.0  ICD-9-CM: 263.0  9/29/2022 Yes         Review of Systems:   A comprehensive review of systems was negative except for that written in the HPI. Vital Signs:    Last 24hrs VS reviewed since prior progress note. Most recent are:  Visit Vitals  /86 (BP 1 Location: Right arm, BP Patient Position: At rest)   Pulse 83   Temp 98 °F (36.7 °C)   Resp 17   Ht 5' 7\" (1.702 m)   Wt 67.2 kg (148 lb 2.4 oz)   SpO2 96%   BMI 23.20 kg/m²       No intake or output data in the 24 hours ending 10/01/22 1057       Physical Examination:     I had a face to face encounter with this patient and independently examined them on 10/1/2022 as outlined below:          Constitutional:  + abdominal pain, cooperative, pleasant    ENT:  Oral mucosa moist, oropharynx benign. Icteric sclera, tender edema right low jaw   Resp:  CTA bilaterally. No wheezing/rhonchi/rales. No accessory muscle use.     CV:  Regular rhythm, normal rate, no murmurs, gallops, rubs    GI:  Soft, distended, + tender RUQ, normoactive bowel sounds, +hepatosplenomegaly     Musculoskeletal:  No edema, warm, 2+ pulses throughout    Neurologic:  Moves all extremities. AAOx3, CN II-XII reviewed            Data Review:    Review and/or order of clinical lab test  HIDA:  IMPRESSION  No evidence of acute cholecystitis or common bile duct obstruction. Absent gallbladder emptying following CCK administration suggests chronic  cholecystitis/chronic cystic duct dysfunction. CT abd:  IMPRESSION  Hepatomegaly with early cirrhotic change and ascites. Gallbladder  distention is redemonstrated without other CT evidence of cholecystitis. Otherwise no acute findings or findings correlate with left lower quadrant  abdominal pain. Labs:     Recent Labs     10/01/22  0229 09/30/22  0248   WBC 9.2 11.5*   HGB 9.5* 10.9*   HCT 25.9* 29.5*    257       Recent Labs     10/01/22  0229 09/30/22  0248 09/29/22  0619   * 126* 131*   K 4.1 4.0 3.0*   CL 93* 93* 93*   CO2 25 25 29   BUN 4* 2* 1*   CREA 0.52* 0.75 0.50*   * 89 124*   CA 8.7 9.0 8.7   MG  --  1.6 1.5*       Recent Labs     10/01/22  0229 09/30/22  0248 09/29/22  0619 09/28/22  2101   ALT 56 73 75 82*   * 224* 226* 240*   TBILI 14.0* 15.0* 11.6* 11.1*   TP 6.1* 7.3 7.1 7.8   ALB 2.0* 2.2* 2.3* 2.4*   GLOB 4.1* 5.1* 4.8* 5.4*   LPSE  --   --   --  50*       Recent Labs     10/01/22  0229 09/29/22  0619   INR 2.2* 1.6*   PTP 22.0* 16.5*   APTT  --  38.0*        No results for input(s): FE, TIBC, PSAT, FERR in the last 72 hours. No results found for: FOL, RBCF   No results for input(s): PH, PCO2, PO2 in the last 72 hours. No results for input(s): CPK, CKNDX, TROIQ in the last 72 hours.     No lab exists for component: CPKMB  Lab Results   Component Value Date/Time    Triglyceride 85 03/02/2019 03:46 AM     No results found for: [facility]  Lab Results   Component Value Date/Time    Color DARK YELLOW 09/28/2022 10:31 PM    Appearance CLEAR 09/28/2022 10:31 PM    Specific gravity 1.005 09/28/2022 10:31 PM    Specific gravity PATIENT DISCHARGED BEFORE SAMPLE COLLECTED 11/10/2020 08:00 PM    pH (UA) 7.5 09/28/2022 10:31 PM    Protein Negative 09/28/2022 10:31 PM    Glucose Negative 09/28/2022 10:31 PM    Ketone Negative 09/28/2022 10:31 PM    Bilirubin PATIENT DISCHARGED BEFORE SAMPLE COLLECTED 11/10/2020 08:00 PM    Urobilinogen 1.0 09/28/2022 10:31 PM    Nitrites Negative 09/28/2022 10:31 PM    Leukocyte Esterase Negative 09/28/2022 10:31 PM    Epithelial cells FEW 09/28/2022 10:31 PM    Bacteria Negative 09/28/2022 10:31 PM    WBC 0-4 09/28/2022 10:31 PM    RBC 0-5 09/28/2022 10:31 PM         Medications Reviewed:     Current Facility-Administered Medications   Medication Dose Route Frequency    ampicillin-sulbactam (UNASYN) 1.5 g in 0.9% sodium chloride (MBP/ADV) 50 mL MBP  1.5 g IntraVENous Q6H    lactulose (CHRONULAC) 10 gram/15 mL solution 30 mL  20 g Oral DAILY    prednisoLONE (ORAPRED) 15 mg/5 mL (3 mg/mL) solution 40 mg  40 mg Oral DAILY    traMADoL (ULTRAM) tablet 50 mg  50 mg Oral Q6H PRN    polyethylene glycol (MIRALAX) packet 17 g  17 g Oral BID    sodium chloride (NS) flush 5-40 mL  5-40 mL IntraVENous Q8H    sodium chloride (NS) flush 5-40 mL  5-40 mL IntraVENous PRN    ondansetron (ZOFRAN ODT) tablet 4 mg  4 mg Oral Q8H PRN    Or    ondansetron (ZOFRAN) injection 4 mg  4 mg IntraVENous Q6H PRN    HYDROmorphone (DILAUDID) injection 0.5 mg  0.5 mg IntraVENous Q3H PRN     ______________________________________________________________________  EXPECTED LENGTH OF STAY: 3d 7h  ACTUAL LENGTH OF STAY:          2                 Nuzhat Cornelius MD

## 2022-10-02 ENCOUNTER — APPOINTMENT (OUTPATIENT)
Dept: CT IMAGING | Age: 52
DRG: 433 | End: 2022-10-02
Attending: INTERNAL MEDICINE
Payer: COMMERCIAL

## 2022-10-02 LAB
ALBUMIN SERPL-MCNC: 2.1 G/DL (ref 3.5–5)
ALBUMIN SERPL-MCNC: 2.1 G/DL (ref 3.5–5)
ALBUMIN/GLOB SERPL: 0.5 {RATIO} (ref 1.1–2.2)
ALBUMIN/GLOB SERPL: 0.5 {RATIO} (ref 1.1–2.2)
ALP SERPL-CCNC: 182 U/L (ref 45–117)
ALP SERPL-CCNC: 186 U/L (ref 45–117)
ALT SERPL-CCNC: 62 U/L (ref 12–78)
ALT SERPL-CCNC: 92 U/L (ref 12–78)
ANION GAP SERPL CALC-SCNC: 5 MMOL/L (ref 5–15)
ANION GAP SERPL CALC-SCNC: 5 MMOL/L (ref 5–15)
AST SERPL-CCNC: 188 U/L (ref 15–37)
AST SERPL-CCNC: 369 U/L (ref 15–37)
BASOPHILS # BLD: 0 K/UL (ref 0–0.1)
BASOPHILS NFR BLD: 0 % (ref 0–1)
BILIRUB SERPL-MCNC: 12.3 MG/DL (ref 0.2–1)
BILIRUB SERPL-MCNC: 13 MG/DL (ref 0.2–1)
BUN SERPL-MCNC: 6 MG/DL (ref 6–20)
BUN SERPL-MCNC: 7 MG/DL (ref 6–20)
BUN/CREAT SERPL: 12 (ref 12–20)
BUN/CREAT SERPL: 12 (ref 12–20)
CALCIUM SERPL-MCNC: 9.1 MG/DL (ref 8.5–10.1)
CALCIUM SERPL-MCNC: 9.1 MG/DL (ref 8.5–10.1)
CHLORIDE SERPL-SCNC: 91 MMOL/L (ref 97–108)
CHLORIDE SERPL-SCNC: 93 MMOL/L (ref 97–108)
CO2 SERPL-SCNC: 28 MMOL/L (ref 21–32)
CO2 SERPL-SCNC: 29 MMOL/L (ref 21–32)
CREAT SERPL-MCNC: 0.51 MG/DL (ref 0.7–1.3)
CREAT SERPL-MCNC: 0.57 MG/DL (ref 0.7–1.3)
DIFFERENTIAL METHOD BLD: ABNORMAL
EOSINOPHIL # BLD: 0 K/UL (ref 0–0.4)
EOSINOPHIL NFR BLD: 0 % (ref 0–7)
ERYTHROCYTE [DISTWIDTH] IN BLOOD BY AUTOMATED COUNT: 13.9 % (ref 11.5–14.5)
FERRITIN SERPL-MCNC: 1222 NG/ML (ref 26–388)
GLOBULIN SER CALC-MCNC: 4.4 G/DL (ref 2–4)
GLOBULIN SER CALC-MCNC: 4.4 G/DL (ref 2–4)
GLUCOSE SERPL-MCNC: 123 MG/DL (ref 65–100)
GLUCOSE SERPL-MCNC: 129 MG/DL (ref 65–100)
HCT VFR BLD AUTO: 28.3 % (ref 36.6–50.3)
HGB BLD-MCNC: 10.5 G/DL (ref 12.1–17)
HIV 1+2 AB+HIV1 P24 AG SERPL QL IA: NONREACTIVE
HIV12 RESULT COMMENT, HHIVC: NORMAL
IMM GRANULOCYTES # BLD AUTO: 0.1 K/UL (ref 0–0.04)
IMM GRANULOCYTES NFR BLD AUTO: 1 % (ref 0–0.5)
INR PPP: 2.2 (ref 0.9–1.1)
IRON SATN MFR SERPL: 45 % (ref 20–50)
IRON SERPL-MCNC: 64 UG/DL (ref 35–150)
LYMPHOCYTES # BLD: 0.2 K/UL (ref 0.8–3.5)
LYMPHOCYTES NFR BLD: 3 % (ref 12–49)
MCH RBC QN AUTO: 38.5 PG (ref 26–34)
MCHC RBC AUTO-ENTMCNC: 37.1 G/DL (ref 30–36.5)
MCV RBC AUTO: 103.7 FL (ref 80–99)
MONOCYTES # BLD: 0.1 K/UL (ref 0–1)
MONOCYTES NFR BLD: 2 % (ref 5–13)
NEUTS SEG # BLD: 6.6 K/UL (ref 1.8–8)
NEUTS SEG NFR BLD: 94 % (ref 32–75)
NRBC # BLD: 0 K/UL (ref 0–0.01)
NRBC BLD-RTO: 0 PER 100 WBC
PLATELET # BLD AUTO: 228 K/UL (ref 150–400)
PMV BLD AUTO: 10.6 FL (ref 8.9–12.9)
POTASSIUM SERPL-SCNC: 4.3 MMOL/L (ref 3.5–5.1)
POTASSIUM SERPL-SCNC: 4.3 MMOL/L (ref 3.5–5.1)
PROT SERPL-MCNC: 6.5 G/DL (ref 6.4–8.2)
PROT SERPL-MCNC: 6.5 G/DL (ref 6.4–8.2)
PROTHROMBIN TIME: 21.9 SEC (ref 9–11.1)
RBC # BLD AUTO: 2.73 M/UL (ref 4.1–5.7)
RBC MORPH BLD: ABNORMAL
RBC MORPH BLD: ABNORMAL
SODIUM SERPL-SCNC: 125 MMOL/L (ref 136–145)
SODIUM SERPL-SCNC: 126 MMOL/L (ref 136–145)
TIBC SERPL-MCNC: 143 UG/DL (ref 250–450)
WBC # BLD AUTO: 7 K/UL (ref 4.1–11.1)

## 2022-10-02 PROCEDURE — 65270000029 HC RM PRIVATE

## 2022-10-02 PROCEDURE — 87389 HIV-1 AG W/HIV-1&-2 AB AG IA: CPT

## 2022-10-02 PROCEDURE — 85610 PROTHROMBIN TIME: CPT

## 2022-10-02 PROCEDURE — 74011636637 HC RX REV CODE- 636/637

## 2022-10-02 PROCEDURE — 86015 ACTIN ANTIBODY EACH: CPT

## 2022-10-02 PROCEDURE — 74011000250 HC RX REV CODE- 250: Performed by: INTERNAL MEDICINE

## 2022-10-02 PROCEDURE — 82728 ASSAY OF FERRITIN: CPT

## 2022-10-02 PROCEDURE — 80053 COMPREHEN METABOLIC PANEL: CPT

## 2022-10-02 PROCEDURE — 70487 CT MAXILLOFACIAL W/DYE: CPT

## 2022-10-02 PROCEDURE — 85025 COMPLETE CBC W/AUTO DIFF WBC: CPT

## 2022-10-02 PROCEDURE — 74011250637 HC RX REV CODE- 250/637: Performed by: INTERNAL MEDICINE

## 2022-10-02 PROCEDURE — 74011000258 HC RX REV CODE- 258: Performed by: INTERNAL MEDICINE

## 2022-10-02 PROCEDURE — 83540 ASSAY OF IRON: CPT

## 2022-10-02 PROCEDURE — 74011000250 HC RX REV CODE- 250: Performed by: FAMILY MEDICINE

## 2022-10-02 PROCEDURE — 74011250636 HC RX REV CODE- 250/636: Performed by: INTERNAL MEDICINE

## 2022-10-02 PROCEDURE — 36415 COLL VENOUS BLD VENIPUNCTURE: CPT

## 2022-10-02 PROCEDURE — 86038 ANTINUCLEAR ANTIBODIES: CPT

## 2022-10-02 PROCEDURE — 99233 SBSQ HOSP IP/OBS HIGH 50: CPT | Performed by: INTERNAL MEDICINE

## 2022-10-02 PROCEDURE — 74011000636 HC RX REV CODE- 636: Performed by: RADIOLOGY

## 2022-10-02 PROCEDURE — 74011250637 HC RX REV CODE- 250/637: Performed by: NURSE PRACTITIONER

## 2022-10-02 RX ORDER — CALCIUM CARBONATE 200(500)MG
200 TABLET,CHEWABLE ORAL
Status: DISCONTINUED | OUTPATIENT
Start: 2022-10-02 | End: 2022-10-07 | Stop reason: HOSPADM

## 2022-10-02 RX ORDER — CHLORHEXIDINE GLUCONATE 1.2 MG/ML
15 RINSE ORAL EVERY 12 HOURS
Status: DISCONTINUED | OUTPATIENT
Start: 2022-10-02 | End: 2022-10-07 | Stop reason: HOSPADM

## 2022-10-02 RX ADMIN — SODIUM CHLORIDE, PRESERVATIVE FREE 10 ML: 5 INJECTION INTRAVENOUS at 23:25

## 2022-10-02 RX ADMIN — TRAMADOL HYDROCHLORIDE 50 MG: 50 TABLET, COATED ORAL at 18:01

## 2022-10-02 RX ADMIN — AMPICILLIN SODIUM AND SULBACTAM SODIUM 1.5 G: 1; .5 INJECTION, POWDER, FOR SOLUTION INTRAMUSCULAR; INTRAVENOUS at 23:25

## 2022-10-02 RX ADMIN — CHLORHEXIDINE GLUCONATE 15 ML: 1.2 RINSE ORAL at 20:29

## 2022-10-02 RX ADMIN — IOPAMIDOL 100 ML: 612 INJECTION, SOLUTION INTRAVENOUS at 08:45

## 2022-10-02 RX ADMIN — CALCIUM CARBONATE (ANTACID) CHEW TAB 500 MG 200 MG: 500 CHEW TAB at 06:52

## 2022-10-02 RX ADMIN — TRAMADOL HYDROCHLORIDE 50 MG: 50 TABLET, COATED ORAL at 03:22

## 2022-10-02 RX ADMIN — CHLORHEXIDINE GLUCONATE 15 ML: 1.2 RINSE ORAL at 14:55

## 2022-10-02 RX ADMIN — AMPICILLIN SODIUM AND SULBACTAM SODIUM 1.5 G: 1; .5 INJECTION, POWDER, FOR SOLUTION INTRAMUSCULAR; INTRAVENOUS at 11:25

## 2022-10-02 RX ADMIN — SODIUM CHLORIDE, PRESERVATIVE FREE 10 ML: 5 INJECTION INTRAVENOUS at 06:54

## 2022-10-02 RX ADMIN — AMPICILLIN SODIUM AND SULBACTAM SODIUM 1.5 G: 1; .5 INJECTION, POWDER, FOR SOLUTION INTRAMUSCULAR; INTRAVENOUS at 06:52

## 2022-10-02 RX ADMIN — AMPICILLIN SODIUM AND SULBACTAM SODIUM 1.5 G: 1; .5 INJECTION, POWDER, FOR SOLUTION INTRAMUSCULAR; INTRAVENOUS at 17:59

## 2022-10-02 RX ADMIN — CALCIUM CARBONATE (ANTACID) CHEW TAB 500 MG 200 MG: 500 CHEW TAB at 21:24

## 2022-10-02 RX ADMIN — AMPICILLIN SODIUM AND SULBACTAM SODIUM 1.5 G: 1; .5 INJECTION, POWDER, FOR SOLUTION INTRAMUSCULAR; INTRAVENOUS at 01:13

## 2022-10-02 RX ADMIN — TRAMADOL HYDROCHLORIDE 50 MG: 50 TABLET, COATED ORAL at 09:10

## 2022-10-02 RX ADMIN — Medication 40 MG: at 09:06

## 2022-10-02 NOTE — PROGRESS NOTES
Problem: Falls - Risk of  Goal: *Absence of Falls  Description: Document [place] Fall Risk and appropriate interventions in the flowsheet.   Outcome: Progressing Towards Goal  Note: Fall Risk Interventions:            Medication Interventions: Evaluate medications/consider consulting pharmacy, Patient to call before getting OOB                   Problem: Patient Education: Go to Patient Education Activity  Goal: Patient/Family Education  Outcome: Progressing Towards Goal

## 2022-10-02 NOTE — DISCHARGE INSTRUCTIONS
Discharge Instructions       PATIENT ID: Ludmila Clifton  MRN: [id number]   YOB: 1970    DATE OF ADMISSION: 9/27/2022  DATE OF DISCHARGE: 10/7/2022    ATTENDING PHYSICIAN: Little Ramos DO   DISCHARGING PROVIDER: Little Ramos DO    To contact this individual call [phone] and ask the  to page. If unavailable ask to be transferred the Adult Hospitalist Department. DISCHARGE DIAGNOSES     Alcoholic hepatitis     CONSULTATIONS: hepatology, GI    PROCEDURES/SURGERIES: * No surgery found *    PENDING TEST RESULTS:   At the time of discharge the following test results are still pending: none    FOLLOW UP APPOINTMENTS:   Please follow up with hepatology and oral surgery     ADDITIONAL CARE RECOMMENDATIONS:    Please follow new medication list       DISCHARGE MEDICATIONS:   See Medication Reconciliation Form    It is important that you take the medication exactly as they are prescribed. Keep your medication in the bottles provided by the pharmacist and keep a list of the medication names, dosages, and times to be taken in your wallet. Do not take other medications without consulting your doctor. NOTIFY YOUR PHYSICIAN FOR ANY OF THE FOLLOWING:   Fever over 101 degrees for 24 hours. Chest pain, shortness of breath, fever, chills, nausea, vomiting, diarrhea, change in mentation, falling, weakness, bleeding. Severe pain or pain not relieved by medications. Or, any other signs or symptoms that you may have questions about.         Signed:   Little Ramos DO  10/7/2022  1:07 PM

## 2022-10-02 NOTE — PROGRESS NOTES
[facility] Adult  Hospitalist Group                                                                                          Hospitalist Progress Note  Magen Lucio MD  Answering service: [phone] from in house phone        Date of Service:  10/2/2022  NAME:  Kati Nava  :  1970  MRN:  [id number]      Admission Summary:   Kati Nava is a 46 y.o. male with past medical history of alcohol abuse and pancreatitis presented to the emergency department with abnormal CT findings. Patient reportedly underwent CT abdomen pelvis with IV contrast on 2022; findings included hepatomegaly with parenchymal heterogeneity suspicious for acute pancreatitis, biliary sludge in the gallbladder with gallbladder wall thickening. Patient reportedly was called by the gastroenterologist office regarding the same and was referred to the emergency department for further evaluation. There is concern for hepatitis along with enlarged liver. Patient had significant history of alcohol abuse (formerly ~ 8 beers / day); reportedly quit drinking alcohol approximately a week ago. On arrival emergency department initial recorded vital signs temperature 97.8 °F, /92, heart rate 97, respiratory rate 18, O2 saturation 99% room air. Limited abdominal ultrasound showed evidence of hepatomegaly, diffuse heterogeneous liver, possible hepatic steatosis or nonspecific parenchymal liver disease, portal hypertension, perihepatic ascites, diminutive middle and left hepatic veins with discontinuous flow to IVC with findings suggesting possible acute liver inflammation and gallbladder sludge without shadowing gallstones; no biliary duct dilatation. ED ordered Dilaudid 2 mg IV, Zofran 4 mg IV, oxycodone 5 mg p.o., 0.9% normal saline 1000 mL IV fluid bolus x1 dose. Patient is now seen for admission to the hospital service for continued evaluation and treatment.  Patient complains of ongoing, recent nausea, vomiting, poor oral intake, and significant weight loss ~ 35 - 40 lbs x last 12 months       Interval history / Subjective: Follow up pt with abd pain, jaundice, acute liver injury  Right low jaw pain, pt think he might have a dental abscess  Abx Unasyn IV  CT facial was requested for evaluation of possible abscess  CT abd reviewed    GI,  and general surgeon consulted, input appreciated   HIDA noted, discussed with General surgeon, no intervention as recommended, no acute cholecystitis  Per GI steroids were initiated,   + abd pain RUQ improved 2/10  Wife at bed side  alcohol cessation was recommended  Hepatology consulted, evaluation is pending     Assessment & Plan:     Transaminitis   -f/u LFTs   -ammonia level 37  -GI and/or hepatologist consulted, input appreciated, diet and steroids were initiated  HIDA reviewed, dilated gallbladder  Gen Sx consulted, no procedure was recommended  Request acute hepatitis panel     Hyperbilirubinemia  -Total bilirubin 11.1  Continue supportive care, fluids IV  Follow up GI and Hepatologist  Diet initiated, advance if OK per hepatologist and GI     Hepatomegaly  -Hepatic steatosis or nonspecific parenchymal liver disease.  -Perihepatic ascites  -Findings suggesting portal hypertension  -Gallbladder sludge  -HIDA scan reviewed, no acute cholecystitis, no cystic duct obstruction per  General surgeon, no procedure was recommended  Ultram to control abd pain PRN     Nausea vomiting, resolved  -mild generalized abdominal pain/ distention (secondary to ascites), acute liver injury  -Order Zofran 4 mg IV every 6 hours as needed    Hyponatremia, acute  -Serum sodium 129. Repeat sodium levels trending down to 127, possible related to acute liver Ds  Continue to monitor  D/c fluids IV     Acute hypokalemia sever  replaced    Anemia, macrocytic  -Hemoglobin 9.7, .5  -Check vitamin B12, folate levels. Check iron profile. Repeat H&H.   Transfuse if hemoglobin less than 7.0  -Order stool occult blood test    History of alcohol abuse  -Reportedly quit alcohol  -Plan as above     Jaundice  -plan as above    Leukocytosis   possible related to steroids    Right mandibular pain,   dental abscess right mandibular  cellulitis  Continue Unasyn IV  Pt is on steroids  CT facial bones was reviewed  PCT 0.45  Will consult oral surgeon or ENT, might need I&D abscess,   Discussed with oral surgeon on call Dr. Radha Monae, reccommended to follow up as soon as discharged for possible tooth extraction tel [phone] and continue Abx Augmentin as outpt        Code status: Full code  Prophylaxis: SCD  Care Plan discussed with: patient and RN  Anticipated Disposition: TBD, pending clinical improvement, 48-72 hrs, oral surgeon consulted,  hepatology follow up       Hospital Problems  Never Reviewed            Codes Class Noted POA    Hepatomegaly ICD-10-CM: R16.0  ICD-9-CM: 789.1  9/29/2022 Unknown        Hyperbilirubinemia ICD-10-CM: E80.6  ICD-9-CM: 782.4  9/29/2022 Unknown        Transaminitis ICD-10-CM: R74.01  ICD-9-CM: 790.4  9/29/2022 Unknown        Moderate protein-calorie malnutrition ([facility] [address] ICD-10-CM: E44.0  ICD-9-CM: 263.0  9/29/2022 Yes         Review of Systems:   A comprehensive review of systems was negative except for that written in the HPI. Vital Signs:    Last 24hrs VS reviewed since prior progress note. Most recent are:  Visit Vitals  /81 (BP 1 Location: Right arm, BP Patient Position: At rest)   Pulse 79   Temp 98.2 °F (36.8 °C)   Resp 18   Ht 5' 7\" (1.702 m)   Wt 67.2 kg (148 lb 2.4 oz)   SpO2 98%   BMI 23.20 kg/m²       No intake or output data in the 24 hours ending 10/02/22 1235       Physical Examination:     I had a face to face encounter with this patient and independently examined them on 10/2/2022 as outlined below:          Constitutional:  + abdominal pain, cooperative, pleasant    ENT:  Oral mucosa moist, oropharynx benign.  Icteric sclera, tender edema right low jaw   Resp:  CTA bilaterally. No wheezing/rhonchi/rales. No accessory muscle use. CV:  Regular rhythm, normal rate, no murmurs, gallops, rubs    GI:  Soft, distended, + tender RUQ, normoactive bowel sounds, +hepatosplenomegaly     Musculoskeletal:  No edema, warm, 2+ pulses throughout    Neurologic:  Moves all extremities. AAOx3, CN II-XII reviewed            Data Review:    Review and/or order of clinical lab test  HIDA:  IMPRESSION  No evidence of acute cholecystitis or common bile duct obstruction. Absent gallbladder emptying following CCK administration suggests chronic  cholecystitis/chronic cystic duct dysfunction. CT abd:  IMPRESSION  Hepatomegaly with early cirrhotic change and ascites. Gallbladder  distention is redemonstrated without other CT evidence of cholecystitis. Otherwise no acute findings or findings correlate with left lower quadrant  abdominal pain. CT facial:  IMPRESSION  1. A 1.9 x 0.9 x 1.6 cm odontogenic abscess in the right mandibular buccal  gingiva with surrounding cellulitis. Periapical lucency/radicular cyst at the  root of tooth number 29. Labs:     Recent Labs     10/01/22  0229 09/30/22  0248   WBC 9.2 11.5*   HGB 9.5* 10.9*   HCT 25.9* 29.5*    257       Recent Labs     10/01/22  0229 09/30/22  0248   * 126*   K 4.1 4.0   CL 93* 93*   CO2 25 25   BUN 4* 2*   CREA 0.52* 0.75   * 89   CA 8.7 9.0   MG  --  1.6       Recent Labs     10/01/22  0229 09/30/22  0248   ALT 56 73   * 224*   TBILI 14.0* 15.0*   TP 6.1* 7.3   ALB 2.0* 2.2*   GLOB 4.1* 5.1*       Recent Labs     10/01/22  0229   INR 2.2*   PTP 22.0*        Recent Labs     10/02/22  0128   TIBC 143*   PSAT 45   FERR 1,222*        No results found for: FOL, RBCF   No results for input(s): PH, PCO2, PO2 in the last 72 hours. No results for input(s): CPK, CKNDX, TROIQ in the last 72 hours.     No lab exists for component: CPKMB  Lab Results   Component Value Date/Time    Triglyceride 85 03/02/2019 03:46 AM     No results found for: [facility]  Lab Results   Component Value Date/Time    Color DARK YELLOW 09/28/2022 10:31 PM    Appearance CLEAR 09/28/2022 10:31 PM    Specific gravity 1.005 09/28/2022 10:31 PM    Specific gravity PATIENT DISCHARGED BEFORE SAMPLE COLLECTED 11/10/2020 08:00 PM    pH (UA) 7.5 09/28/2022 10:31 PM    Protein Negative 09/28/2022 10:31 PM    Glucose Negative 09/28/2022 10:31 PM    Ketone Negative 09/28/2022 10:31 PM    Bilirubin PATIENT DISCHARGED BEFORE SAMPLE COLLECTED 11/10/2020 08:00 PM    Urobilinogen 1.0 09/28/2022 10:31 PM    Nitrites Negative 09/28/2022 10:31 PM    Leukocyte Esterase Negative 09/28/2022 10:31 PM    Epithelial cells FEW 09/28/2022 10:31 PM    Bacteria Negative 09/28/2022 10:31 PM    WBC 0-4 09/28/2022 10:31 PM    RBC 0-5 09/28/2022 10:31 PM         Medications Reviewed:     Current Facility-Administered Medications   Medication Dose Route Frequency    calcium carbonate (TUMS) chewable tablet 200 mg [elemental]  200 mg Oral TID PRN    ampicillin-sulbactam (UNASYN) 1.5 g in 0.9% sodium chloride (MBP/ADV) 50 mL MBP  1.5 g IntraVENous Q6H    lactulose (CHRONULAC) 10 gram/15 mL solution 30 mL  20 g Oral DAILY    prednisoLONE (ORAPRED) 15 mg/5 mL (3 mg/mL) solution 40 mg  40 mg Oral DAILY    traMADoL (ULTRAM) tablet 50 mg  50 mg Oral Q6H PRN    polyethylene glycol (MIRALAX) packet 17 g  17 g Oral BID    sodium chloride (NS) flush 5-40 mL  5-40 mL IntraVENous Q8H    sodium chloride (NS) flush 5-40 mL  5-40 mL IntraVENous PRN    ondansetron (ZOFRAN ODT) tablet 4 mg  4 mg Oral Q8H PRN    Or    ondansetron (ZOFRAN) injection 4 mg  4 mg IntraVENous Q6H PRN    HYDROmorphone (DILAUDID) injection 0.5 mg  0.5 mg IntraVENous Q3H PRN     ______________________________________________________________________  EXPECTED LENGTH OF STAY: 3d 7h  ACTUAL LENGTH OF STAY:          3                 Valentino Oiler, MD

## 2022-10-02 NOTE — CONSULTS
formerly [facility], MD, FACP, Cite Danni Chapman, [place]      MIKAL Valle, [facility]   Taylor Martinez, [facility]-   Ellaree Bloch, CARMELO Laird FNJUDE-FRANKLIN Young, [facility]       Butch Corrales Kumar [address]    at [address], [facility] Annabelle Schneider  22.    [phone]    FAX: [address] [place] [address] Avenue    [place] [address], [address] [place] [address], [facility] [address]    [phone]    FAX: [phone]         HEPATOLOGY CONSULT NOTE  I was asked to see this patient in consultation for management of alcohol induced hepatitis. I have reviewed the Emergency room note, Hospital admission note, Notes by all other physicians who have seen the patient during this hospitalization to date. I have reviewed the problem list, all past medical history and the reason for this hospitalization. I have reviewed the allergies and the medications the patient was taking at home prior to this hospitalization. HISTORY:  The patient is a 46 y.o. Black male who regularly consumes 10 alcoholic drinks, usually beer sometimes mixed drinks, per day. He has done this for many years. He had an episode of acute pancreatitis in 2019. He was abstinent for a few months after that but then slowly started drinking alcohol again in increasing amounts. A few weeks ago he developed abdominal pain/fullness that was different than his previous pancreatitis pain. He then became jaundice. He stopped consuming alcohol and after 2 days when the jaundice did not improve he came to the ED. There was no nausea, vomiting, swelling of the abdomen, swelling of the lower extremity, confusion.   .      In the ED Laboratory studies were significant for:    WBC 7.3, HB 9.7 gms, , Sna 129, Scr 0.47 mg, AST 305, ALT 82, , TBILI 11.1 mg, INR 1.6, Lipase 50    Imaging of the liver with CT scan demonstrated hepatomegaly, fatty liver, no liver mass, mild ascites. Hospital Course to date: The TBILI and INR have increased to 15.0 and 2.2  He was started on prednisolone oral solution 40 mg every day. Hepatology Plan:  I have discussed with he and wife that he has severe alcoholic hepatitis and this is associated with a 30% risk of liver failure within 90 days. I have offered enrollment into the DURECT-928 clinical trial.  Beni Coy stimulates the liver to produce repair proteins. He would like to enrol into this study. He will get dosed with study drug or placebo on Monday evening and can go home Tuesday AM if otherwise clinically stable. He can remain on prednisolone at the current dose. Monitor hepatic panel and INR every day. ASSESSMENT AND PLAN:  Alcohol hepatitis  There is elevation in liver transaminases in a pattern consistent with alcohol. There is an elevation in TBILI to 15. The INR is prolonged to 2.2. The DF is 59. The alcoholic hepatitis is severe and associated with a 30% mortality in the next 3 months. He was started on oral prednisolone yesterday. He will have received 4 days of prednisolone before study drug which is fine. We will keep him on prednisolone for 28 days and supply that as a study medication starting Tuesday AM.    It is not yet clear if he has cirrhosis of the liver. Alcoholic hepatitis can make the liver numbers and look like cirrhosis and can cause ascites in the absence of cirrhosis. There is no evidence of alcohol withdrawal    Alcohol liver disease  The diagnosis is based upon a history of consuming alcohol in excess, imaging, pattern of AST>ALT, an elevation in ferritin  Will check serology for other causes of chronic liver disease. Will check for HIV. A liver biopsy has not been performed. A Fibroscan has not been perfomred.       The patient was consuming 10 alcoholic beverages daily. The patient has been abstinent from alcohol since 9/26/2022. Discussed the need to remain abstinent from alcohol. Elevation in Ferritin  There is an elevation in ferritin with Normal iron saturation. It is unlikely that the patient has hemochromatosis or is a carrier for an HFE gene. Suspect the elevation in ferritin is secondary to alcohol use and will come down to normal with abstinence. Hyponatremia  This is secondary alcohol. Will hold on giving IV albumin since he has no significant ascites and no edema. The Sna has increased to to 126-127. Anemia   This is due to multifactorial causes including bone marrow suppression secondary to alcohol. There is no evidence for acute blood loss. The most recent FE studies were from 9/2022. The serum ferritin is 1222  The FE saturation is 45%    Hepatic encephalopathy   Overt HE has not developed to date. There is no reason for treatment with lactulose of xifaxan  He was given lactulose once every day for constipation. Matty Lowry was only 34  Will stop the lactulose. PHYSICAL EXAMINATION:  VS: per nursing note  General:  No acute distress. Eyes:  Sclera icteric  ENT:  No oral lesions. Thyroid normal.  Nodes:  No adenopathy. Skin:  No spider angiomata. Jaundice. Respiratory:  Lungs clear to auscultation. Cardiovascular:  Regular heart rate. Abdomen:  Soft non-tender, on/palpation. Hepatomegally with liver 4 fingers below the right costal margin. Spleen not palpable. No obvious ascites. Extremities:  No lower extremity edema. NO muscle wasting  Neurologic:  Alert and oriented. Cranial nerves grossly intact. No asterixis.       LABORATORY:   Latest Reference Range & Units 10/1/22 02:29 10/2/22 01:28   WBC 4.1 - 11.1 K/uL 9.2    HGB 12.1 - 17.0 g/dL 9.5 (L)    PLATELET 000 - 920 K/uL 215    INR 0.9 - 1.1   2.2 (H)    Sodium 136 - 145 mmol/L 127 (L) 126 (L)   Potassium 3.5 - 5.1 mmol/L 4.1 4.3   Chloride 97 - 108 mmol/L 93 (L) 93 (L)   CO2 21 - 32 mmol/L 25 28   Glucose 65 - 100 mg/dL 105 (H) 123 (H)   BUN 6 - 20 MG/DL 4 (L) 7   Creatinine 0.70 - 1.30 MG/DL 0.52 (L) 0.57 (L)   Bilirubin, total 0.2 - 1.0 MG/DL 14.0 (H) 12.3 (H)   Albumin 3.5 - 5.0 g/dL 2.0 (L) 2.1 (L)   ALT 12 - 78 U/L 56 62   AST 15 - 37 U/L 204 (H) 188 (H)   Alk.  phosphatase 45 - 117 U/L 175 (H) 186 (H)   (L): Data is abnormally low  (H): Data is abnormally high      Lazaro Libman, MD   [address] [address] Helen Lehman [address] [place] [address], [facility] 22. [phone]  FAX:  [address]

## 2022-10-03 LAB
ALBUMIN SERPL-MCNC: 2 G/DL (ref 3.5–5)
ALBUMIN/GLOB SERPL: 0.5 {RATIO} (ref 1.1–2.2)
ALP SERPL-CCNC: 187 U/L (ref 45–117)
ALT SERPL-CCNC: 97 U/L (ref 12–78)
ANION GAP SERPL CALC-SCNC: 6 MMOL/L (ref 5–15)
AST SERPL-CCNC: 325 U/L (ref 15–37)
BILIRUB SERPL-MCNC: 12.7 MG/DL (ref 0.2–1)
BUN SERPL-MCNC: 6 MG/DL (ref 6–20)
BUN/CREAT SERPL: 11 (ref 12–20)
CALCIUM SERPL-MCNC: 8.9 MG/DL (ref 8.5–10.1)
CHLORIDE SERPL-SCNC: 90 MMOL/L (ref 97–108)
CO2 SERPL-SCNC: 29 MMOL/L (ref 21–32)
CREAT SERPL-MCNC: 0.53 MG/DL (ref 0.7–1.3)
ERYTHROCYTE [DISTWIDTH] IN BLOOD BY AUTOMATED COUNT: 13.8 % (ref 11.5–14.5)
GLOBULIN SER CALC-MCNC: 4.4 G/DL (ref 2–4)
GLUCOSE SERPL-MCNC: 83 MG/DL (ref 65–100)
HCT VFR BLD AUTO: 28.2 % (ref 36.6–50.3)
HGB BLD-MCNC: 10.5 G/DL (ref 12.1–17)
INR PPP: 2.3 (ref 0.9–1.1)
MCH RBC QN AUTO: 38.7 PG (ref 26–34)
MCHC RBC AUTO-ENTMCNC: 37.2 G/DL (ref 30–36.5)
MCV RBC AUTO: 104.1 FL (ref 80–99)
NRBC # BLD: 0 K/UL (ref 0–0.01)
NRBC BLD-RTO: 0 PER 100 WBC
PLATELET # BLD AUTO: 235 K/UL (ref 150–400)
PMV BLD AUTO: 11 FL (ref 8.9–12.9)
POTASSIUM SERPL-SCNC: 4.5 MMOL/L (ref 3.5–5.1)
PROT SERPL-MCNC: 6.4 G/DL (ref 6.4–8.2)
PROTHROMBIN TIME: 22.4 SEC (ref 9–11.1)
RBC # BLD AUTO: 2.71 M/UL (ref 4.1–5.7)
SMA IGG SER-ACNC: 8 UNITS (ref 0–19)
SODIUM SERPL-SCNC: 125 MMOL/L (ref 136–145)
WBC # BLD AUTO: 6.8 K/UL (ref 4.1–11.1)

## 2022-10-03 PROCEDURE — 80053 COMPREHEN METABOLIC PANEL: CPT

## 2022-10-03 PROCEDURE — 85027 COMPLETE CBC AUTOMATED: CPT

## 2022-10-03 PROCEDURE — 74011250637 HC RX REV CODE- 250/637: Performed by: NURSE PRACTITIONER

## 2022-10-03 PROCEDURE — 74011250636 HC RX REV CODE- 250/636: Performed by: INTERNAL MEDICINE

## 2022-10-03 PROCEDURE — 36415 COLL VENOUS BLD VENIPUNCTURE: CPT

## 2022-10-03 PROCEDURE — 74011000258 HC RX REV CODE- 258: Performed by: INTERNAL MEDICINE

## 2022-10-03 PROCEDURE — 94760 N-INVAS EAR/PLS OXIMETRY 1: CPT

## 2022-10-03 PROCEDURE — 74011250637 HC RX REV CODE- 250/637: Performed by: INTERNAL MEDICINE

## 2022-10-03 PROCEDURE — 85610 PROTHROMBIN TIME: CPT

## 2022-10-03 PROCEDURE — 99233 SBSQ HOSP IP/OBS HIGH 50: CPT | Performed by: INTERNAL MEDICINE

## 2022-10-03 PROCEDURE — P9047 ALBUMIN (HUMAN), 25%, 50ML: HCPCS | Performed by: INTERNAL MEDICINE

## 2022-10-03 PROCEDURE — 65270000029 HC RM PRIVATE

## 2022-10-03 PROCEDURE — 74011250637 HC RX REV CODE- 250/637

## 2022-10-03 PROCEDURE — 74011636637 HC RX REV CODE- 636/637

## 2022-10-03 PROCEDURE — 74011000250 HC RX REV CODE- 250: Performed by: FAMILY MEDICINE

## 2022-10-03 RX ORDER — FAMOTIDINE 20 MG/1
20 TABLET, FILM COATED ORAL DAILY
Status: DISCONTINUED | OUTPATIENT
Start: 2022-10-03 | End: 2022-10-06

## 2022-10-03 RX ORDER — ALBUMIN HUMAN 250 G/1000ML
25 SOLUTION INTRAVENOUS EVERY 6 HOURS
Status: DISCONTINUED | OUTPATIENT
Start: 2022-10-03 | End: 2022-10-06

## 2022-10-03 RX ORDER — METOCLOPRAMIDE HYDROCHLORIDE 5 MG/ML
5 INJECTION INTRAMUSCULAR; INTRAVENOUS EVERY 6 HOURS
Status: DISCONTINUED | OUTPATIENT
Start: 2022-10-03 | End: 2022-10-05

## 2022-10-03 RX ADMIN — AMPICILLIN SODIUM AND SULBACTAM SODIUM 1.5 G: 1; .5 INJECTION, POWDER, FOR SOLUTION INTRAMUSCULAR; INTRAVENOUS at 12:18

## 2022-10-03 RX ADMIN — SODIUM CHLORIDE, PRESERVATIVE FREE 10 ML: 5 INJECTION INTRAVENOUS at 18:24

## 2022-10-03 RX ADMIN — SODIUM CHLORIDE, PRESERVATIVE FREE 10 ML: 5 INJECTION INTRAVENOUS at 05:43

## 2022-10-03 RX ADMIN — ALBUMIN (HUMAN) 25 G: 0.25 INJECTION, SOLUTION INTRAVENOUS at 14:15

## 2022-10-03 RX ADMIN — ALBUMIN (HUMAN) 25 G: 0.25 INJECTION, SOLUTION INTRAVENOUS at 19:19

## 2022-10-03 RX ADMIN — CHLORHEXIDINE GLUCONATE 15 ML: 1.2 RINSE ORAL at 08:58

## 2022-10-03 RX ADMIN — CHLORHEXIDINE GLUCONATE 15 ML: 1.2 RINSE ORAL at 20:58

## 2022-10-03 RX ADMIN — AMPICILLIN SODIUM AND SULBACTAM SODIUM 1.5 G: 1; .5 INJECTION, POWDER, FOR SOLUTION INTRAMUSCULAR; INTRAVENOUS at 05:43

## 2022-10-03 RX ADMIN — POLYETHYLENE GLYCOL 3350 17 G: 17 POWDER, FOR SOLUTION ORAL at 18:24

## 2022-10-03 RX ADMIN — FAMOTIDINE 20 MG: 20 TABLET, FILM COATED ORAL at 08:58

## 2022-10-03 RX ADMIN — TRAMADOL HYDROCHLORIDE 50 MG: 50 TABLET, COATED ORAL at 18:24

## 2022-10-03 RX ADMIN — TRAMADOL HYDROCHLORIDE 50 MG: 50 TABLET, COATED ORAL at 12:15

## 2022-10-03 RX ADMIN — TRAMADOL HYDROCHLORIDE 50 MG: 50 TABLET, COATED ORAL at 05:47

## 2022-10-03 RX ADMIN — AMPICILLIN SODIUM AND SULBACTAM SODIUM 1.5 G: 1; .5 INJECTION, POWDER, FOR SOLUTION INTRAMUSCULAR; INTRAVENOUS at 18:24

## 2022-10-03 RX ADMIN — Medication 40 MG: at 08:52

## 2022-10-03 RX ADMIN — METOCLOPRAMIDE HYDROCHLORIDE 5 MG: 5 INJECTION INTRAMUSCULAR; INTRAVENOUS at 18:24

## 2022-10-03 RX ADMIN — METOCLOPRAMIDE HYDROCHLORIDE 5 MG: 5 INJECTION INTRAMUSCULAR; INTRAVENOUS at 12:19

## 2022-10-03 RX ADMIN — SODIUM CHLORIDE, PRESERVATIVE FREE 10 ML: 5 INJECTION INTRAVENOUS at 14:35

## 2022-10-03 RX ADMIN — ALBUMIN (HUMAN) 25 G: 0.25 INJECTION, SOLUTION INTRAVENOUS at 07:18

## 2022-10-03 NOTE — PROGRESS NOTES
[facility], MD, FACP, Kj Danni Ari, [place]      MIKAL Junior, [facility]   Ben Huddleston, [facility]   CARMLEO Ledesma Se FNASAD Gutierrez, [facility]       Butch Corrales [facility] [address]    at [address], [address] [place], [place] [address]Annabelle Ashton  22. [phone]    FAX: [address] [place] [address], [address] [phone]    FAX: [phone]       HEPATOLOGY NOTE  I have reviewed the consent form for the DUR-928 study protocol with the patient and his wife in detail. I have answered all of their questions. The patient and his wife as a witness, signed the ICF. INR was not obtained this AM. I ordered this earlier today when I saw him and this has now returned. The INR is still 2.3  TBILI is 13  DF is 61. MELD score 31. Serology for HIV, HBV, HCV are all negative. Serology for JAMES, ASMA are pending. He stopped using alcohol 2 days before entering the hospital which is less than 1 week ago. He consumed 10 alcohol drinks per day. He was started on prednisolone 40 mg every day on Friday 9/30. He has a tooth abscess/infection but this is minor localized and does not meet protocol definition of active systemic infection. He meets all criteria to enter the DUR-928 clinical trial.  He will be randomized Monday, receive study medication Monday evening and could go home Tuesday afternoon after study labs drawn if clinically stable.       Guerita Justin MD  [facility]  [address] [facility]  Helen [address] 7  Annabelle Wooten  22. [phone]  FAX:  [address] [place] [address]

## 2022-10-03 NOTE — PROGRESS NOTES
[facility], MD, Darilyn Ormond, Kj Danni Brennanbigg, [place]      MIKAL Vizcarra, [facility]   Sunny Carver, [facility]   Karla Stein, JUDE Parker, ASAD Fordegustavo Vinay, [facility]       Butch Robbiabram Kumar [address]    at [address], [facility] Annabelle Schneider  22. [phone]    FAX: [address] [place] [address], [address]    [phone]    FAX: [phone]       HEPATOLOGY PROGRESS NOTE  The patient is a 46 y.o. Black male who regularly consumes 10 alcoholic drinks, usually beer sometimes mixed drinks, per day. He has done this for many years. He had an episode of acute pancreatitis in 2019. He was abstinent for a few months after that but then slowly started drinking alcohol again in increasing amounts. A few weeks ago he developed abdominal pain/fullness that was different than his previous pancreatitis pain. He then became jaundice. He stopped consuming alcohol and after 2 days when the jaundice did not improve he came to the ED. There was no nausea, vomiting, swelling of the abdomen, swelling of the lower extremity, confusion. .      In the ED Laboratory studies were significant for:    WBC 7.3, HB 9.7 gms, , Sna 129, Scr 0.47 mg, , ALT 82, , TBILI 11.1 mg, INR 1.6, Lipase 50    Imaging of the liver with CT scan demonstrated hepatomegaly, fatty liver, no liver mass, mild ascites. Hospital Course to date: The TBILI increased to 15.0 and and has since drifted down to 12.7 mg today  The INR has slowly drifted up to 2.3  Sna has dropped to 125  DF is now 61  MELD score is 31. There is no obvious HE or aterixis.   He was started on prednisolone oral solution 40 mg every day on Friday 9/20.. Hepatology Plan: We signed consent form to enrol into the DUR-928 clinical trial to treat severe alcoholic hepatitis last evening and I was hoping to dose him with study medication tonight. Last night I found out my  is out until Thursday AM.      Will start IV albumin to help Sna. Continue PO solumedrol  He needs to remain in hospital until we see some improvement     Alcohol hepatitis  There is elevation in liver transaminases in a pattern consistent with alcohol. There is an elevation in TBILI to 15. The INR is prolonged to 2.2. The DF is 59. The alcoholic hepatitis is severe and associated with a 30% mortality in the next 3 months. He was started on oral prednisolone Friday 9/30 and should remain on this. We will keep him on prednisolone for 28 days and supply that as a study medication at [facility]. It is not yet clear if he has cirrhosis of the liver. Alcoholic hepatitis can make the liver numbers and look like cirrhosis and can cause ascites in the absence of cirrhosis. There is no evidence of alcohol withdrawal    Alcohol liver disease  The diagnosis is based upon a history of consuming alcohol in excess, imaging, pattern of AST>ALT, an elevation in ferritin  Will check serology for other causes of chronic liver disease. Will check for HIV. A liver biopsy has not been performed. A Fibroscan has not been perfomred. The patient was consuming 10 alcoholic beverages daily. The patient has been abstinent from alcohol since 9/26/2022. Discussed the need to remain abstinent from alcohol. Elevation in Ferritin  There is an elevation in ferritin with Normal iron saturation. It is unlikely that the patient has hemochromatosis or is a carrier for an HFE gene. Suspect the elevation in ferritin is secondary to alcohol use and will come down to normal with abstinence. Hyponatremia  This is secondary alcohol.     Will hold on giving IV albumin since he has no significant ascites and no edema. The Sna has dropped to 125. Will start IV albumin. Anemia   This is due to multifactorial causes including bone marrow suppression secondary to alcohol. There is no evidence for acute blood loss. The most recent FE studies were from 9/2022. The serum ferritin is 1222  The FE saturation is 45%    Hepatic encephalopathy   Overt HE has not developed to date. There is no reason for treatment with lactulose of xifaxan  He was given lactulose once every day for constipation. Ritchie Connolly was only 34  Will stop the lactulose. PHYSICAL EXAMINATION:  VS: per nursing note  General:  No acute distress. Eyes:  Sclera icteric  ENT:  No oral lesions. Thyroid normal.  Nodes:  No adenopathy. Skin:  No spider angiomata. Jaundice. Respiratory:  Lungs clear to auscultation. Cardiovascular:  Regular heart rate. Abdomen:  Soft non-tender, on/palpation. Hepatomegally with liver 4 fingers below the right costal margin. Spleen not palpable. No obvious ascites. Extremities:  No lower extremity edema. NO muscle wasting  Neurologic:  Alert and oriented. Cranial nerves grossly intact. No asterixis. LABORATORY:   Latest Reference Range & Units 10/1/22 02:29 10/2/22 16:32 10/3/22 02:26   WBC 4.1 - 11.1 K/uL 9.2 7.0 6.8   HGB 12.1 - 17.0 g/dL 9.5 (L) 10.5 (L) 10.5 (L)   PLATELET 134 - 592 K/uL 215 228 235   INR 0.9 - 1.1   2.2 (H) 2.2 (H) 2.3 (H)   Sodium 136 - 145 mmol/L 127 (L) 125 (L) 125 (L)   Potassium 3.5 - 5.1 mmol/L 4.1 4.3 4.5   Chloride 97 - 108 mmol/L 93 (L) 91 (L) 90 (L)   CO2 21 - 32 mmol/L 25 29 29   Glucose 65 - 100 mg/dL 105 (H) 129 (H) 83   BUN 6 - 20 MG/DL 4 (L) 6 6   Creatinine 0.70 - 1.30 MG/DL 0.52 (L) 0.51 (L) 0.53 (L)   Bilirubin, total 0.2 - 1.0 MG/DL 14.0 (H) 13.0 (H) 12.7 (H)   Albumin 3.5 - 5.0 g/dL 2.0 (L) 2.1 (L) 2.0 (L)   ALT 12 - 78 U/L 56 92 (H) 97 (H)   AST 15 - 37 U/L 204 (H) 369 (H) 325 (H)   Alk.  phosphatase 45 - 117 U/L 175 (H) 182 (H) 187 (H)   (L): Data is abnormally low  (H): Data is abnormally high      Pooja Webster MD  [place] [address] of [address] [address] Helen Lehman [facility] CenterAuburnAnnabelle ryan  22. [phone]  FAX:  [address]

## 2022-10-03 NOTE — PROGRESS NOTES
[address] [facility] Adult  Hospitalist Group                                                                                          Hospitalist Progress Note  Shane Toscano MD  Answering service: [phone] from in house phone        Date of Service:  10/3/2022  NAME:  Bruno De La Paz  :  1970  MRN:  [id number]      Admission Summary:   Bruno De La Paz is a 46 y.o. male with past medical history of alcohol abuse and pancreatitis presented to the emergency department with abnormal CT findings. Patient reportedly underwent CT abdomen pelvis with IV contrast on 2022; findings included hepatomegaly with parenchymal heterogeneity suspicious for acute pancreatitis, biliary sludge in the gallbladder with gallbladder wall thickening. Patient reportedly was called by the gastroenterologist office regarding the same and was referred to the emergency department for further evaluation. There is concern for hepatitis along with enlarged liver. Patient had significant history of alcohol abuse (formerly ~ 8 beers / day); reportedly quit drinking alcohol approximately a week ago. On arrival emergency department initial recorded vital signs temperature 97.8 °F, /92, heart rate 97, respiratory rate 18, O2 saturation 99% room air. Limited abdominal ultrasound showed evidence of hepatomegaly, diffuse heterogeneous liver, possible hepatic steatosis or nonspecific parenchymal liver disease, portal hypertension, perihepatic ascites, diminutive middle and left hepatic veins with discontinuous flow to IVC with findings suggesting possible acute liver inflammation and gallbladder sludge without shadowing gallstones; no biliary duct dilatation. ED ordered Dilaudid 2 mg IV, Zofran 4 mg IV, oxycodone 5 mg p.o., 0.9% normal saline 1000 mL IV fluid bolus x1 dose. Patient is now seen for admission to the hospital service for continued evaluation and treatment.  Patient complains of ongoing, recent nausea, vomiting, poor oral intake, and significant weight loss ~ 35 - 40 lbs x last 12 months       Interval history / Subjective: Follow up pt with abd pain, jaundice, acute liver injury, alcoholic hepatitis  Right low jaw pain, pt think he might have a dental abscess  Abx Unasyn IV  CT facial was requested for evaluation of possible abscess  CT abd reviewed    GI,  and general surgeon consulted, input appreciated   HIDA noted, discussed with General surgeon, no intervention as recommended, no acute cholecystitis  Per GI steroids were initiated,   + abd pain RUQ improved 2/10  Wife at bed side  alcohol cessation was recommended  Hepatology consulted, evaluation is pending     Assessment & Plan:     Transaminitis   Hyperbilirubinemia  Hepatomegaly  Coagulopathy  Acute Alcohol induced Hepatitis  -f/u LFTs   -ammonia level 37  -GI and/or hepatologist consulted, input appreciated, diet and steroids were initiated  HIDA reviewed, dilated gallbladder  Gen Sx consulted, no procedure was recommended  Request acute hepatitis panel  Per GI steroids were initiated, LFT's still elevated without trend  INR 2.3  MELD and DF are high, with high mortality per hepatologist 50%  -Hepatic steatosis or nonspecific parenchymal liver disease.  -Perihepatic ascites  -Findings suggesting portal hypertension  -Gallbladder sludge  -HIDA scan reviewed, no acute cholecystitis, no cystic duct obstruction per  General surgeon, no procedure was recommended  Ultram to control abd pain PRN  Continue steroids, monitor LFT     Nausea vomiting, improved  Hiccups  -mild generalized abdominal pain/ distention (secondary to ascites), acute liver injury  -Order Zofran 4 mg IV every 6 hours as needed  Add Reglan QID    Hyponatremia, acute, worsening  -Serum sodium 129 on admission.   Repeat sodium levels trending down to 125,  related to acute liver Ds  Continue to monitor  Off fluids IV     Acute hypokalemia sever  replaced    Anemia, macrocytic  -stable H&H  -Check vitamin B12, folate levels. Check iron profile. Repeat H&H. Transfuse if hemoglobin less than 7.0  No GI bleeding over night    History of alcohol abuse  -Reportedly quit alcohol  -Plan as above     Jaundice  -plan as above    Leukocytosis   possible related to dental abscess  resolved    Right mandibular pain,   dental abscess right mandibular  cellulitis  Continue Unasyn IV  Pt is on steroids  CT facial bones was reviewed, dicussed with oral surgeon Dr. Zain Briseno  PCT 0.45  Will consult oral surgeon or ENT, might need I&D abscess,   Discussed with oral surgeon on call Dr. Zain Briseno, reccommended to follow up as soon as discharged for possible tooth extraction tel [phone] and continue Abx Augmentin as outpt        Code status: Full code  Prophylaxis: SCD  Care Plan discussed with: patient and RN  Anticipated Disposition: TBD, pending clinical improvement, 48-72 hrs, oral surgeon consulted, pt will follow up as outpt,  hepatology following       Hospital Problems  Never Reviewed            Codes Class Noted POA    Hepatomegaly ICD-10-CM: R16.0  ICD-9-CM: 789.1  9/29/2022 Unknown        Hyperbilirubinemia ICD-10-CM: E80.6  ICD-9-CM: 782.4  9/29/2022 Unknown        Transaminitis ICD-10-CM: R74.01  ICD-9-CM: 790.4  9/29/2022 Unknown        Moderate protein-calorie malnutrition ([facility] [address] ICD-10-CM: E44.0  ICD-9-CM: 263.0  9/29/2022 Yes       Review of Systems:   A comprehensive review of systems was negative except for that written in the HPI. Vital Signs:    Last 24hrs VS reviewed since prior progress note.  Most recent are:  Visit Vitals  /85 (BP 1 Location: Right upper arm, BP Patient Position: At rest;Sitting)   Pulse 86   Temp 98.7 °F (37.1 °C)   Resp 18   Ht 5' 7\" (1.702 m)   Wt 67.2 kg (148 lb 2.4 oz)   SpO2 98%   BMI 23.20 kg/m²       No intake or output data in the 24 hours ending 10/03/22 1211       Physical Examination:     I had a face to face encounter with this patient and independently examined them on 10/3/2022 as outlined below:          Constitutional:  NAD, + hiccups, cooperative, pleasant    ENT:  Oral mucosa moist, oropharynx benign. Icteric sclera, tender edema right low jaw   Resp:  CTA bilaterally. No wheezing/rhonchi/rales. No accessory muscle use. CV:  Regular rhythm, normal rate, no murmurs, gallops, rubs    GI:  Soft, distended, + tender RUQ, normoactive bowel sounds, +hepatosplenomegaly     Musculoskeletal:  No edema, warm, 2+ pulses throughout    Neurologic:  Moves all extremities. AAOx3, CN II-XII reviewed            Data Review:    Review and/or order of clinical lab test  HIDA:  IMPRESSION  No evidence of acute cholecystitis or common bile duct obstruction. Absent gallbladder emptying following CCK administration suggests chronic  cholecystitis/chronic cystic duct dysfunction. CT abd:  IMPRESSION  Hepatomegaly with early cirrhotic change and ascites. Gallbladder  distention is redemonstrated without other CT evidence of cholecystitis. Otherwise no acute findings or findings correlate with left lower quadrant  abdominal pain. CT facial:  IMPRESSION  1. A 1.9 x 0.9 x 1.6 cm odontogenic abscess in the right mandibular buccal  gingiva with surrounding cellulitis. Periapical lucency/radicular cyst at the  root of tooth number 29.      Labs:     Recent Labs     10/03/22  0255 10/02/22  1632   WBC 6.8 7.0   HGB 10.5* 10.5*   HCT 28.2* 28.3*    228       Recent Labs     10/03/22  0226 10/02/22  1632 10/02/22  0128   * 125* 126*   K 4.5 4.3 4.3   CL 90* 91* 93*   CO2 29 29 28   BUN 6 6 7   CREA 0.53* 0.51* 0.57*   GLU 83 129* 123*   CA 8.9 9.1 9.1       Recent Labs     10/03/22  0226 10/02/22  1632 10/02/22  0128   ALT 97* 92* 62   * 182* 186*   TBILI 12.7* 13.0* 12.3*   TP 6.4 6.5 6.5   ALB 2.0* 2.1* 2.1*   GLOB 4.4* 4.4* 4.4*       Recent Labs     10/03/22  0226 10/02/22  1632 10/01/22  0229   INR 2.3* 2.2* 2.2*   PTP 22.4* 21.9* 22.0*        Recent Labs     10/02/22  0128   TIBC 143*   PSAT 45   FERR 1,222*        No results found for: FOL, RBCF   No results for input(s): PH, PCO2, PO2 in the last 72 hours. No results for input(s): CPK, CKNDX, TROIQ in the last 72 hours.     No lab exists for component: CPKMB  Lab Results   Component Value Date/Time    Triglyceride 85 03/02/2019 03:46 AM     No results found for: [facility]  Lab Results   Component Value Date/Time    Color DARK YELLOW 09/28/2022 10:31 PM    Appearance CLEAR 09/28/2022 10:31 PM    Specific gravity 1.005 09/28/2022 10:31 PM    Specific gravity PATIENT DISCHARGED BEFORE SAMPLE COLLECTED 11/10/2020 08:00 PM    pH (UA) 7.5 09/28/2022 10:31 PM    Protein Negative 09/28/2022 10:31 PM    Glucose Negative 09/28/2022 10:31 PM    Ketone Negative 09/28/2022 10:31 PM    Bilirubin PATIENT DISCHARGED BEFORE SAMPLE COLLECTED 11/10/2020 08:00 PM    Urobilinogen 1.0 09/28/2022 10:31 PM    Nitrites Negative 09/28/2022 10:31 PM    Leukocyte Esterase Negative 09/28/2022 10:31 PM    Epithelial cells FEW 09/28/2022 10:31 PM    Bacteria Negative 09/28/2022 10:31 PM    WBC 0-4 09/28/2022 10:31 PM    RBC 0-5 09/28/2022 10:31 PM         Medications Reviewed:     Current Facility-Administered Medications   Medication Dose Route Frequency    famotidine (PEPCID) tablet 20 mg  20 mg Oral DAILY    albumin human 25% (BUMINATE) solution 25 g  25 g IntraVENous Q6H    metoclopramide HCl (REGLAN) injection 5 mg  5 mg IntraVENous Q6H    calcium carbonate (TUMS) chewable tablet 200 mg [elemental]  200 mg Oral TID PRN    chlorhexidine (PERIDEX) 0.12 % mouthwash 15 mL  15 mL Oral Q12H    ampicillin-sulbactam (UNASYN) 1.5 g in 0.9% sodium chloride (MBP/ADV) 50 mL MBP  1.5 g IntraVENous Q6H    lactulose (CHRONULAC) 10 gram/15 mL solution 30 mL  20 g Oral DAILY    prednisoLONE (ORAPRED) 15 mg/5 mL (3 mg/mL) solution 40 mg  40 mg Oral DAILY    traMADoL (ULTRAM) tablet 50 mg  50 mg Oral Q6H PRN    polyethylene glycol (MIRALAX) packet 17 g  17 g Oral BID    sodium chloride (NS) flush 5-40 mL  5-40 mL IntraVENous Q8H    sodium chloride (NS) flush 5-40 mL  5-40 mL IntraVENous PRN    ondansetron (ZOFRAN ODT) tablet 4 mg  4 mg Oral Q8H PRN    Or    ondansetron (ZOFRAN) injection 4 mg  4 mg IntraVENous Q6H PRN    HYDROmorphone (DILAUDID) injection 0.5 mg  0.5 mg IntraVENous Q3H PRN     ______________________________________________________________________  EXPECTED LENGTH OF STAY: 3d 7h  ACTUAL LENGTH OF STAY:          4                 Massimo Evangelista MD

## 2022-10-03 NOTE — PROGRESS NOTES
Problem: Falls - Risk of  Goal: *Absence of Falls  Description: Document [place] Fall Risk and appropriate interventions in the flowsheet.   Outcome: Progressing Towards Goal  Note: Fall Risk Interventions:            Medication Interventions: Evaluate medications/consider consulting pharmacy                   Problem: Patient Education: Go to Patient Education Activity  Goal: Patient/Family Education  Outcome: Progressing Towards Goal

## 2022-10-04 ENCOUNTER — APPOINTMENT (OUTPATIENT)
Dept: NON INVASIVE DIAGNOSTICS | Age: 52
DRG: 433 | End: 2022-10-04
Attending: INTERNAL MEDICINE
Payer: COMMERCIAL

## 2022-10-04 LAB
ALBUMIN SERPL-MCNC: 2.7 G/DL (ref 3.5–5)
ANA SER QL: NEGATIVE
ANION GAP SERPL CALC-SCNC: 9 MMOL/L (ref 5–15)
BUN SERPL-MCNC: 4 MG/DL (ref 6–20)
BUN/CREAT SERPL: 9 (ref 12–20)
CALCIUM SERPL-MCNC: 9 MG/DL (ref 8.5–10.1)
CHLORIDE SERPL-SCNC: 91 MMOL/L (ref 97–108)
CO2 SERPL-SCNC: 29 MMOL/L (ref 21–32)
COMMENT, HOLDF: NORMAL
CREAT SERPL-MCNC: 0.46 MG/DL (ref 0.7–1.3)
GLUCOSE SERPL-MCNC: 75 MG/DL (ref 65–100)
INR PPP: 2.4 (ref 0.9–1.1)
POTASSIUM SERPL-SCNC: 3.3 MMOL/L (ref 3.5–5.1)
PROTHROMBIN TIME: 23.6 SEC (ref 9–11.1)
SAMPLES BEING HELD,HOLD: NORMAL
SODIUM SERPL-SCNC: 129 MMOL/L (ref 136–145)

## 2022-10-04 PROCEDURE — P9047 ALBUMIN (HUMAN), 25%, 50ML: HCPCS | Performed by: INTERNAL MEDICINE

## 2022-10-04 PROCEDURE — 82040 ASSAY OF SERUM ALBUMIN: CPT

## 2022-10-04 PROCEDURE — 74011000250 HC RX REV CODE- 250: Performed by: FAMILY MEDICINE

## 2022-10-04 PROCEDURE — 74011636637 HC RX REV CODE- 636/637

## 2022-10-04 PROCEDURE — 74011250636 HC RX REV CODE- 250/636: Performed by: INTERNAL MEDICINE

## 2022-10-04 PROCEDURE — 80048 BASIC METABOLIC PNL TOTAL CA: CPT

## 2022-10-04 PROCEDURE — 74011000258 HC RX REV CODE- 258: Performed by: INTERNAL MEDICINE

## 2022-10-04 PROCEDURE — 65270000029 HC RM PRIVATE

## 2022-10-04 PROCEDURE — 99233 SBSQ HOSP IP/OBS HIGH 50: CPT | Performed by: INTERNAL MEDICINE

## 2022-10-04 PROCEDURE — 93306 TTE W/DOPPLER COMPLETE: CPT

## 2022-10-04 PROCEDURE — 85610 PROTHROMBIN TIME: CPT

## 2022-10-04 PROCEDURE — 74011250637 HC RX REV CODE- 250/637

## 2022-10-04 PROCEDURE — 74011250637 HC RX REV CODE- 250/637: Performed by: NURSE PRACTITIONER

## 2022-10-04 PROCEDURE — 74011250637 HC RX REV CODE- 250/637: Performed by: INTERNAL MEDICINE

## 2022-10-04 PROCEDURE — 36415 COLL VENOUS BLD VENIPUNCTURE: CPT

## 2022-10-04 RX ADMIN — CHLORHEXIDINE GLUCONATE 15 ML: 1.2 RINSE ORAL at 09:55

## 2022-10-04 RX ADMIN — Medication 40 MG: at 09:59

## 2022-10-04 RX ADMIN — METOCLOPRAMIDE HYDROCHLORIDE 5 MG: 5 INJECTION INTRAMUSCULAR; INTRAVENOUS at 00:13

## 2022-10-04 RX ADMIN — ALBUMIN (HUMAN) 25 G: 0.25 INJECTION, SOLUTION INTRAVENOUS at 11:56

## 2022-10-04 RX ADMIN — SODIUM CHLORIDE, PRESERVATIVE FREE 10 ML: 5 INJECTION INTRAVENOUS at 06:48

## 2022-10-04 RX ADMIN — ALBUMIN (HUMAN) 25 G: 0.25 INJECTION, SOLUTION INTRAVENOUS at 02:29

## 2022-10-04 RX ADMIN — AMPICILLIN SODIUM AND SULBACTAM SODIUM 1.5 G: 1; .5 INJECTION, POWDER, FOR SOLUTION INTRAMUSCULAR; INTRAVENOUS at 19:01

## 2022-10-04 RX ADMIN — AMPICILLIN SODIUM AND SULBACTAM SODIUM 1.5 G: 1; .5 INJECTION, POWDER, FOR SOLUTION INTRAMUSCULAR; INTRAVENOUS at 14:40

## 2022-10-04 RX ADMIN — POLYETHYLENE GLYCOL 3350 17 G: 17 POWDER, FOR SOLUTION ORAL at 09:56

## 2022-10-04 RX ADMIN — TRAMADOL HYDROCHLORIDE 50 MG: 50 TABLET, COATED ORAL at 12:33

## 2022-10-04 RX ADMIN — CHLORHEXIDINE GLUCONATE 15 ML: 1.2 RINSE ORAL at 20:56

## 2022-10-04 RX ADMIN — ALBUMIN (HUMAN) 25 G: 0.25 INJECTION, SOLUTION INTRAVENOUS at 20:50

## 2022-10-04 RX ADMIN — AMPICILLIN SODIUM AND SULBACTAM SODIUM 1.5 G: 1; .5 INJECTION, POWDER, FOR SOLUTION INTRAMUSCULAR; INTRAVENOUS at 06:48

## 2022-10-04 RX ADMIN — METOCLOPRAMIDE HYDROCHLORIDE 5 MG: 5 INJECTION INTRAMUSCULAR; INTRAVENOUS at 19:02

## 2022-10-04 RX ADMIN — TRAMADOL HYDROCHLORIDE 50 MG: 50 TABLET, COATED ORAL at 06:55

## 2022-10-04 RX ADMIN — AMPICILLIN SODIUM AND SULBACTAM SODIUM 1.5 G: 1; .5 INJECTION, POWDER, FOR SOLUTION INTRAMUSCULAR; INTRAVENOUS at 00:12

## 2022-10-04 RX ADMIN — PHYTONADIONE 5 MG: 10 INJECTION, EMULSION INTRAMUSCULAR; INTRAVENOUS; SUBCUTANEOUS at 09:46

## 2022-10-04 RX ADMIN — METOCLOPRAMIDE HYDROCHLORIDE 5 MG: 5 INJECTION INTRAMUSCULAR; INTRAVENOUS at 12:29

## 2022-10-04 RX ADMIN — SODIUM CHLORIDE, PRESERVATIVE FREE 10 ML: 5 INJECTION INTRAVENOUS at 14:38

## 2022-10-04 RX ADMIN — FAMOTIDINE 20 MG: 20 TABLET, FILM COATED ORAL at 09:58

## 2022-10-04 RX ADMIN — TRAMADOL HYDROCHLORIDE 50 MG: 50 TABLET, COATED ORAL at 19:00

## 2022-10-04 RX ADMIN — METOCLOPRAMIDE HYDROCHLORIDE 5 MG: 5 INJECTION INTRAMUSCULAR; INTRAVENOUS at 06:48

## 2022-10-04 NOTE — PROGRESS NOTES
[facility]                                                                                          Hospitalist Progress Note  Beatrice White MD  Answering service: [phone] or [phone] from in house phone        Date of Service:  10/4/2022  NAME:  Frank Vargas  :  1970  MRN:  [id number]      Admission Summary:   Frank Vargas is a 46 y.o. male with past medical history of alcohol abuse and pancreatitis presented to the emergency department with abnormal CT findings. Patient reportedly underwent CT abdomen pelvis with IV contrast on 2022; findings included hepatomegaly with parenchymal heterogeneity suspicious for acute pancreatitis, biliary sludge in the gallbladder with gallbladder wall thickening. Patient reportedly was called by the gastroenterologist office regarding the same and was referred to the emergency department for further evaluation. There is concern for hepatitis along with enlarged liver. Patient had significant history of alcohol abuse (formerly ~ 8 beers / day); reportedly quit drinking alcohol approximately a week ago. On arrival emergency department initial recorded vital signs temperature 97.8 °F, /92, heart rate 97, respiratory rate 18, O2 saturation 99% room air. Limited abdominal ultrasound showed evidence of hepatomegaly, diffuse heterogeneous liver, possible hepatic steatosis or nonspecific parenchymal liver disease, portal hypertension, perihepatic ascites, diminutive middle and left hepatic veins with discontinuous flow to IVC with findings suggesting possible acute liver inflammation and gallbladder sludge without shadowing gallstones; no biliary duct dilatation. ED ordered Dilaudid 2 mg IV, Zofran 4 mg IV, oxycodone 5 mg p.o., 0.9% normal saline 1000 mL IV fluid bolus x1 dose. Patient is now seen for admission to the hospital service for continued evaluation and treatment.  Patient complains of ongoing, recent nausea, vomiting, poor oral intake, and significant weight loss ~ 35 - 40 lbs x last 12 months       Interval history / Subjective: Follow up pt with abd pain, jaundice, acute liver injury, alcoholic hepatitis  Right low jaw pain and edema improved, able to tolerate PO  Abx Unasyn IV  Hepatologist consulted, discussed, input appreciated, LFT still up  Bill 12  Na 125, albumin per Hepatologist  INR 2.4  + hiccups, Reglan was added  Abd pain controlled  Wife at bed side    GI,  and general surgeon consulted, input appreciated   HIDA noted, discussed with General surgeon, no intervention as recommended, no acute cholecystitis  Per GI steroids were initiated,   + abd pain RUQ improved 2/10     Assessment & Plan:     Transaminitis   Hyperbilirubinemia  Hepatomegaly  Coagulopathy, slight worsen  Acute Alcohol induced Hepatitis  -LFTs remained elevated  -ammonia level 37  -GI and hepatologist consulted, input appreciated, diet and steroids were initiated  HIDA reviewed, dilated gallbladder  Gen Sx consulted, no procedure was recommended  Request acute hepatitis panel  Per GI steroids were initiated, LFT's still elevated without trend  INR 2.3  MELD and DF are high, with high mortality per hepatologist 50%  -Hepatic steatosis or nonspecific parenchymal liver disease.  -Perihepatic ascites  -Findings suggesting portal hypertension  -Gallbladder sludge  -HIDA scan reviewed, no acute cholecystitis, no cystic duct obstruction per  Per General surgeon no procedure was recommended  Ultram to control abd pain PRN  Continue steroids per hepatologist for 28 days, monitor LFT's     Nausea improved   Vomiting resolved,   Hiccups  Reglan QID, migth need to increase to 10 mg QID  -Zofran 4 mg IV every 6 hours as needed      Hyponatremia, acute, worsening  -Serum sodium 129 on admission.   Repeat sodium levels trending down to 125,  related to acute liver Ds, alcohol  Continue to monitor  Off fluids IV  Albumin per hepatologist Acute hypokalemia sever  replaced    Anemia, macrocytic  -stable H&H  -Check vitamin B12, folate levels. Check iron profile. Repeat H&H. Transfuse if hemoglobin less than 7.0  No GI bleeding over night    History of alcohol abuse  -discussed , advised to quit alcohol       Jaundice  -plan as above    Leukocytosis   possible related to dental abscess  resolved    Right mandibular pain,   Dental abscess right mandibular  cellulitis  Continue Unasyn IV  Pt is on steroids  CT facial bones was reviewed, dicussed with oral surgeon Dr. Michaela Hwang  PCT 0.45  Discussed with oral surgeon on call Dr. Michaela Hwang, reccommended to follow up as soon as discharged for possible tooth extraction tel [phone] and continue Abx Augmentin as outpt        Code status: Full code  Prophylaxis: SCD  Care Plan discussed with: patient and RN  Anticipated Disposition: TBD, pending clinical improvement, 48-72 hrs, follow up hepatologist, monitor CMP  oral surgeon consulted, pt will follow up as outpt,  hepatology following       Hospital Problems  Never Reviewed            Codes Class Noted POA    Hepatomegaly ICD-10-CM: R16.0  ICD-9-CM: 789.1  9/29/2022 Unknown        Hyperbilirubinemia ICD-10-CM: E80.6  ICD-9-CM: 782.4  9/29/2022 Unknown        Transaminitis ICD-10-CM: R74.01  ICD-9-CM: 790.4  9/29/2022 Unknown        Moderate protein-calorie malnutrition ([facility] [address] ICD-10-CM: E44.0  ICD-9-CM: 263.0  9/29/2022 Yes       Review of Systems:   A comprehensive review of systems was negative except for that written in the HPI. Vital Signs:    Last 24hrs VS reviewed since prior progress note.  Most recent are:  Visit Vitals  /81 (BP 1 Location: Right arm, BP Patient Position: At rest)   Pulse 83   Temp 98 °F (36.7 °C)   Resp 18   Ht 5' 7\" (1.702 m)   Wt 67.2 kg (148 lb 2.4 oz)   SpO2 98%   BMI 23.20 kg/m²       No intake or output data in the 24 hours ending 10/04/22 8105       Physical Examination:     I had a face to face encounter with this patient and independently examined them on 10/4/2022 as outlined below:          Constitutional:  NAD, + hiccups, cooperative, pleasant    ENT:  Oral mucosa moist, oropharynx benign. Icteric sclera, tender edema right low jaw with some palpable abscess   Resp:  CTA bilaterally. No wheezing/rhonchi/rales. No accessory muscle use. CV:  Regular rhythm, normal rate, no murmurs, gallops, rubs    GI:  Soft, distended, + tender RUQ, normoactive bowel sounds, +hepatosplenomegaly     Musculoskeletal:  No edema, warm, 2+ pulses throughout    Neurologic:  Moves all extremities. AAOx3, CN II-XII reviewed            Data Review:    Review and/or order of clinical lab test  HIDA:  IMPRESSION  No evidence of acute cholecystitis or common bile duct obstruction. Absent gallbladder emptying following CCK administration suggests chronic  cholecystitis/chronic cystic duct dysfunction. CT abd:  IMPRESSION  Hepatomegaly with early cirrhotic change and ascites. Gallbladder  distention is redemonstrated without other CT evidence of cholecystitis. Otherwise no acute findings or findings correlate with left lower quadrant  abdominal pain. CT facial:  IMPRESSION  1. A 1.9 x 0.9 x 1.6 cm odontogenic abscess in the right mandibular buccal  gingiva with surrounding cellulitis. Periapical lucency/radicular cyst at the  root of tooth number 29.      Labs:     Recent Labs     10/03/22  0255 10/02/22  1632   WBC 6.8 7.0   HGB 10.5* 10.5*   HCT 28.2* 28.3*    228       Recent Labs     10/03/22  0226 10/02/22  1632 10/02/22  0128   * 125* 126*   K 4.5 4.3 4.3   CL 90* 91* 93*   CO2 29 29 28   BUN 6 6 7   CREA 0.53* 0.51* 0.57*   GLU 83 129* 123*   CA 8.9 9.1 9.1       Recent Labs     10/03/22  0226 10/02/22  1632 10/02/22  0128   ALT 97* 92* 62   * 182* 186*   TBILI 12.7* 13.0* 12.3*   TP 6.4 6.5 6.5   ALB 2.0* 2.1* 2.1*   GLOB 4.4* 4.4* 4.4*       Recent Labs     10/04/22  0417 10/03/22  0226 10/02/22  1632   INR 2.4* 2. 3* 2.2*   PTP 23.6* 22.4* 21.9*        Recent Labs     10/02/22  0128   TIBC 143*   PSAT 45   FERR 1,222*        No results found for: FOL, RBCF   No results for input(s): PH, PCO2, PO2 in the last 72 hours. No results for input(s): CPK, CKNDX, TROIQ in the last 72 hours.     No lab exists for component: CPKMB  Lab Results   Component Value Date/Time    Triglyceride 85 03/02/2019 03:46 AM     No results found for: [facility]  Lab Results   Component Value Date/Time    Color DARK YELLOW 09/28/2022 10:31 PM    Appearance CLEAR 09/28/2022 10:31 PM    Specific gravity 1.005 09/28/2022 10:31 PM    Specific gravity PATIENT DISCHARGED BEFORE SAMPLE COLLECTED 11/10/2020 08:00 PM    pH (UA) 7.5 09/28/2022 10:31 PM    Protein Negative 09/28/2022 10:31 PM    Glucose Negative 09/28/2022 10:31 PM    Ketone Negative 09/28/2022 10:31 PM    Bilirubin PATIENT DISCHARGED BEFORE SAMPLE COLLECTED 11/10/2020 08:00 PM    Urobilinogen 1.0 09/28/2022 10:31 PM    Nitrites Negative 09/28/2022 10:31 PM    Leukocyte Esterase Negative 09/28/2022 10:31 PM    Epithelial cells FEW 09/28/2022 10:31 PM    Bacteria Negative 09/28/2022 10:31 PM    WBC 0-4 09/28/2022 10:31 PM    RBC 0-5 09/28/2022 10:31 PM         Medications Reviewed:     Current Facility-Administered Medications   Medication Dose Route Frequency    phytonadione (vitamin K1) (AQUA-MEPHYTON) 5 mg in 0.9% sodium chloride 50 mL IVPB  5 mg IntraVENous ONCE    famotidine (PEPCID) tablet 20 mg  20 mg Oral DAILY    albumin human 25% (BUMINATE) solution 25 g  25 g IntraVENous Q6H    metoclopramide HCl (REGLAN) injection 5 mg  5 mg IntraVENous Q6H    calcium carbonate (TUMS) chewable tablet 200 mg [elemental]  200 mg Oral TID PRN    chlorhexidine (PERIDEX) 0.12 % mouthwash 15 mL  15 mL Oral Q12H    ampicillin-sulbactam (UNASYN) 1.5 g in 0.9% sodium chloride (MBP/ADV) 50 mL MBP  1.5 g IntraVENous Q6H    lactulose (CHRONULAC) 10 gram/15 mL solution 30 mL  20 g Oral DAILY    prednisoLONE (ORAPRED) 15 mg/5 mL (3 mg/mL) solution 40 mg  40 mg Oral DAILY    traMADoL (ULTRAM) tablet 50 mg  50 mg Oral Q6H PRN    polyethylene glycol (MIRALAX) packet 17 g  17 g Oral BID    sodium chloride (NS) flush 5-40 mL  5-40 mL IntraVENous Q8H    sodium chloride (NS) flush 5-40 mL  5-40 mL IntraVENous PRN    ondansetron (ZOFRAN ODT) tablet 4 mg  4 mg Oral Q8H PRN    Or    ondansetron (ZOFRAN) injection 4 mg  4 mg IntraVENous Q6H PRN    HYDROmorphone (DILAUDID) injection 0.5 mg  0.5 mg IntraVENous Q3H PRN     ______________________________________________________________________  EXPECTED LENGTH OF STAY: 3d 7h  ACTUAL LENGTH OF STAY:          5                 Chico Sandra MD

## 2022-10-04 NOTE — PROGRESS NOTES
Pt is laying in bed with even and unlabored respirations on room air. Pt was given pain medication prior to shift change. No distress noted. Continuing IV antibiotics at this time. Safety precautions are in place. Bed in low position and locked. Call bell within reach. Documentation done by Maty Joyce LPN was reviewed by Lorenzo Carvajal RN. Problem: Falls - Risk of  Goal: *Absence of Falls  Description: Document Rere Ross Fall Risk and appropriate interventions in the flowsheet.   Outcome: Progressing Towards Goal  Note: Fall Risk Interventions:            Medication Interventions: Evaluate medications/consider consulting pharmacy                   Problem: Patient Education: Go to Patient Education Activity  Goal: Patient/Family Education  Outcome: Progressing Towards Goal

## 2022-10-04 NOTE — PROGRESS NOTES
ALBERT:  1. RUR-11%  2. Return home when stable with spouse. 3. Dr. Awa Carpio following. CM following for any further discharge needs. Previous cm note indicates alcohol program information given to spouse.        Lorenzo Amezcua [facility]

## 2022-10-04 NOTE — PROGRESS NOTES
[address] [facility], MD, Debra, Kj Abigail Ari, [place]      Enrrique Cortés, MIKAL    April S Atiya, AGPCNP-BC   Martha Corbett, ACN-AG   Trupti Copeland, CARMELO Sandoval FNPNETTA Juarezguido Susanna, PCNP-BC       Butchrobert Corrales Kumar [address]    at [facility]    [address] [facility], [address] [facility], [place]  [address].    [phone]    FAX: [address]    [address], [address], [address]    [phone]    FAX: [phone]       HEPATOLOGY PROGRESS NOTE  The patient is a 46 y.o. Black male who regularly consumes 10 alcoholic drinks, usually beer sometimes mixed drinks, per day. He has done this for many years. He had an episode of acute pancreatitis in 2019. He was abstinent for a few months after that but then slowly started drinking alcohol again in increasing amounts. A few weeks ago he developed abdominal pain/fullness that was different than his previous pancreatitis pain. He then became jaundice. He stopped consuming alcohol and after 2 days when the jaundice did not improve he came to the ED. There was no nausea, vomiting, swelling of the abdomen, swelling of the lower extremity, confusion. .      In the ED Laboratory studies were significant for:    WBC 7.3, HB 9.7 gms, , Sna 129, Scr 0.47 mg, , ALT 82, , TBILI 11.1 mg, INR 1.6, Lipase 50    Imaging of the liver with CT scan demonstrated hepatomegaly, fatty liver, no liver mass, mild ascites. Hospital Course to date: The TBILI increased to 15.0 and and has since drifted down to 12.7 mg  The INR has slowly drifted up to 2.4  Sna has dropped to 125  DF is now 61  MELD score is 31. There is no obvious HE or aterixis.   He was started on prednisolone oral solution 40 mg every day on Friday 9/20.. Hepatology Plan: We signed consent form to enrol into the [id number] clinical trial to treat severe alcoholic hepatitis last evening and I was hoping to dose him with study medication Monday. Unfortunately, my  is out of town and we cannot do this until Thursday AM.      Continue IV albumin to help Sna. Continue PO solumedrol  Start IV Vitamin K.    ASSESSMENT:  Alcohol hepatitis  There is elevation in liver transaminases in a pattern consistent with alcohol. There is an elevation in TBILI to 15. The INR is prolonged to 2.2. The DF is 59. The alcoholic hepatitis is severe and associated with a 30% mortality in the next 3 months. He was started on oral prednisolone Friday 9/30 and should remain on this. We will keep him on prednisolone for 28 days and supply that as a study medication at CT. It is not yet clear if he has cirrhosis of the liver. Alcoholic hepatitis can make the liver numbers and look like cirrhosis and can cause ascites in the absence of cirrhosis. There is no evidence of alcohol withdrawal    Alcohol liver disease  The diagnosis is based upon a history of consuming alcohol in excess, imaging, pattern of AST>ALT, an elevation in ferritin  Will check serology for other causes of chronic liver disease. Will check for HIV. A liver biopsy has not been performed. A Fibroscan has not been perfomred. The patient was consuming 10 alcoholic beverages daily. The patient has been abstinent from alcohol since 9/26/2022. Discussed the need to remain abstinent from alcohol. Elevation in Ferritin  There is an elevation in ferritin with Normal iron saturation. It is unlikely that the patient has hemochromatosis or is a carrier for an HFE gene. Suspect the elevation in ferritin is secondary to alcohol use and will come down to normal with abstinence. Hyponatremia  This is secondary alcohol.     Will hold on giving IV albumin since he has no significant ascites and no edema. The Sna has dropped to 125. Will start IV albumin. Anemia   This is due to multifactorial causes including bone marrow suppression secondary to alcohol. There is no evidence for acute blood loss. The most recent FE studies were from 9/2022. The serum ferritin is 1222  The FE saturation is 45%    Hepatic encephalopathy   Overt HE has not developed to date. There is no reason for treatment with lactulose of xifaxan  He was given lactulose once every day for constipation. Melstone Cables was only 34  Will stop the lactulose. PHYSICAL EXAMINATION:  VS: per nursing note  General:  No acute distress. Eyes:  Sclera icteric  ENT:  No oral lesions. Thyroid normal.  Nodes:  No adenopathy. Skin:  No spider angiomata. Jaundice. Respiratory:  Lungs clear to auscultation. Cardiovascular:  Regular heart rate. Abdomen:  Soft non-tender, on/palpation. Hepatomegally with liver 4 fingers below the right costal margin. Spleen not palpable. No obvious ascites. Extremities:  No lower extremity edema. NO muscle wasting  Neurologic:  Alert and oriented. Cranial nerves grossly intact. No asterixis. LABORATORY:   Latest Reference Range & Units 10/2/22 16:32 10/3/22 02:26 10/4/22 04:17   WBC 4.1 - 11.1 K/uL 7.0 6.8    HGB 12.1 - 17.0 g/dL 10.5 (L) 10.5 (L)    PLATELET 630 - 602 K/uL 228 235    INR 0.9 - 1.1   2.2 (H) 2.3 (H) 2.4 (H)   Sodium 136 - 145 mmol/L 125 (L) 125 (L)    Potassium 3.5 - 5.1 mmol/L 4.3 4.5    Chloride 97 - 108 mmol/L 91 (L) 90 (L)    CO2 21 - 32 mmol/L 29 29    Glucose 65 - 100 mg/dL 129 (H) 83    BUN 6 - 20 MG/DL 6 6    Creatinine 0.70 - 1.30 MG/DL 0.51 (L) 0.53 (L)    Bilirubin, total 0.2 - 1.0 MG/DL 13.0 (H) 12.7 (H)    Albumin 3.5 - 5.0 g/dL 2.1 (L) 2.0 (L)    ALT 12 - 78 U/L 92 (H) 97 (H)    AST 15 - 37 U/L 369 (H) 325 (H)    Alk.  phosphatase 45 - 117 U/L 182 (H) 187 (H)    (L): Data is abnormally low  (H): Data is abnormally high      Amanda Strinegr MD  [facility]  [address] [facility] [address]  [place]  [address]. [phone]  FAX:  [address]

## 2022-10-05 ENCOUNTER — APPOINTMENT (OUTPATIENT)
Dept: ULTRASOUND IMAGING | Age: 52
DRG: 433 | End: 2022-10-05
Attending: INTERNAL MEDICINE
Payer: COMMERCIAL

## 2022-10-05 ENCOUNTER — APPOINTMENT (OUTPATIENT)
Dept: GENERAL RADIOLOGY | Age: 52
DRG: 433 | End: 2022-10-05
Attending: INTERNAL MEDICINE
Payer: COMMERCIAL

## 2022-10-05 LAB
ALBUMIN SERPL-MCNC: 3.5 G/DL (ref 3.5–5)
ALBUMIN/GLOB SERPL: 0.9 {RATIO} (ref 1.1–2.2)
ALP SERPL-CCNC: 157 U/L (ref 45–117)
ALT SERPL-CCNC: 97 U/L (ref 12–78)
ANION GAP SERPL CALC-SCNC: 6 MMOL/L (ref 5–15)
AST SERPL-CCNC: 236 U/L (ref 15–37)
BILIRUB SERPL-MCNC: 11.7 MG/DL (ref 0.2–1)
BUN SERPL-MCNC: 5 MG/DL (ref 6–20)
BUN/CREAT SERPL: 9 (ref 12–20)
CALCIUM SERPL-MCNC: 9.8 MG/DL (ref 8.5–10.1)
CHLORIDE SERPL-SCNC: 89 MMOL/L (ref 97–108)
CO2 SERPL-SCNC: 29 MMOL/L (ref 21–32)
CREAT SERPL-MCNC: 0.53 MG/DL (ref 0.7–1.3)
ECHO AO ROOT DIAM: 3.9 CM
ECHO AO ROOT INDEX: 2.19 CM/M2
ECHO AV PEAK GRADIENT: 6 MMHG
ECHO AV PEAK VELOCITY: 1.2 M/S
ECHO AV VELOCITY RATIO: 1
ECHO LA DIAMETER INDEX: 2.02 CM/M2
ECHO LA DIAMETER: 3.6 CM
ECHO LA TO AORTIC ROOT RATIO: 0.92
ECHO LV E' LATERAL VELOCITY: 13 CM/S
ECHO LV E' SEPTAL VELOCITY: 9 CM/S
ECHO LV FRACTIONAL SHORTENING: 30 % (ref 28–44)
ECHO LV INTERNAL DIMENSION DIASTOLE INDEX: 2.25 CM/M2
ECHO LV INTERNAL DIMENSION DIASTOLIC: 4 CM (ref 4.2–5.9)
ECHO LV INTERNAL DIMENSION SYSTOLIC INDEX: 1.57 CM/M2
ECHO LV INTERNAL DIMENSION SYSTOLIC: 2.8 CM
ECHO LV IVSD: 0.8 CM (ref 0.6–1)
ECHO LV MASS 2D: 109.7 G (ref 88–224)
ECHO LV MASS INDEX 2D: 61.6 G/M2 (ref 49–115)
ECHO LV POSTERIOR WALL DIASTOLIC: 1 CM (ref 0.6–1)
ECHO LV RELATIVE WALL THICKNESS RATIO: 0.5
ECHO LVOT PEAK GRADIENT: 5 MMHG
ECHO LVOT PEAK VELOCITY: 1.2 M/S
ECHO MV A VELOCITY: 0.75 M/S
ECHO MV AREA PHT: 3.6 CM2
ECHO MV E DECELERATION TIME (DT): 209.5 MS
ECHO MV E VELOCITY: 0.91 M/S
ECHO MV E/A RATIO: 1.21
ECHO MV E/E' LATERAL: 7
ECHO MV E/E' RATIO (AVERAGED): 8.56
ECHO MV E/E' SEPTAL: 10.11
ECHO MV PRESSURE HALF TIME (PHT): 60.8 MS
ECHO PULMONARY ARTERY SYSTOLIC PRESSURE (PASP): 20 MMHG
ECHO PV MAX VELOCITY: 0.6 M/S
ECHO PV PEAK GRADIENT: 2 MMHG
ECHO RV TAPSE: 3.2 CM (ref 1.7–?)
ECHO TV REGURGITANT MAX VELOCITY: 1.98 M/S
ECHO TV REGURGITANT PEAK GRADIENT: 16 MMHG
GLOBULIN SER CALC-MCNC: 3.7 G/DL (ref 2–4)
GLUCOSE SERPL-MCNC: 93 MG/DL (ref 65–100)
INR PPP: 1.8 (ref 0.9–1.1)
POTASSIUM SERPL-SCNC: 3.9 MMOL/L (ref 3.5–5.1)
PROT SERPL-MCNC: 7.2 G/DL (ref 6.4–8.2)
PROTHROMBIN TIME: 18.4 SEC (ref 9–11.1)
SODIUM SERPL-SCNC: 124 MMOL/L (ref 136–145)

## 2022-10-05 PROCEDURE — 76705 ECHO EXAM OF ABDOMEN: CPT

## 2022-10-05 PROCEDURE — 74011250636 HC RX REV CODE- 250/636: Performed by: INTERNAL MEDICINE

## 2022-10-05 PROCEDURE — 77030027138 HC INCENT SPIROMETER -A

## 2022-10-05 PROCEDURE — 74011250637 HC RX REV CODE- 250/637: Performed by: NURSE PRACTITIONER

## 2022-10-05 PROCEDURE — 71045 X-RAY EXAM CHEST 1 VIEW: CPT

## 2022-10-05 PROCEDURE — 80053 COMPREHEN METABOLIC PANEL: CPT

## 2022-10-05 PROCEDURE — 74011636637 HC RX REV CODE- 636/637

## 2022-10-05 PROCEDURE — 85610 PROTHROMBIN TIME: CPT

## 2022-10-05 PROCEDURE — 93306 TTE W/DOPPLER COMPLETE: CPT | Performed by: INTERNAL MEDICINE

## 2022-10-05 PROCEDURE — 65270000029 HC RM PRIVATE

## 2022-10-05 PROCEDURE — P9047 ALBUMIN (HUMAN), 25%, 50ML: HCPCS | Performed by: INTERNAL MEDICINE

## 2022-10-05 PROCEDURE — 94760 N-INVAS EAR/PLS OXIMETRY 1: CPT

## 2022-10-05 PROCEDURE — 74011000250 HC RX REV CODE- 250: Performed by: FAMILY MEDICINE

## 2022-10-05 PROCEDURE — 74011000258 HC RX REV CODE- 258: Performed by: INTERNAL MEDICINE

## 2022-10-05 PROCEDURE — 36415 COLL VENOUS BLD VENIPUNCTURE: CPT

## 2022-10-05 PROCEDURE — 74011250637 HC RX REV CODE- 250/637: Performed by: INTERNAL MEDICINE

## 2022-10-05 RX ORDER — METOCLOPRAMIDE HYDROCHLORIDE 5 MG/ML
10 INJECTION INTRAMUSCULAR; INTRAVENOUS EVERY 6 HOURS
Status: DISCONTINUED | OUTPATIENT
Start: 2022-10-05 | End: 2022-10-07

## 2022-10-05 RX ADMIN — CHLORHEXIDINE GLUCONATE 15 ML: 1.2 RINSE ORAL at 09:44

## 2022-10-05 RX ADMIN — ALBUMIN (HUMAN) 25 G: 0.25 INJECTION, SOLUTION INTRAVENOUS at 23:43

## 2022-10-05 RX ADMIN — Medication 40 MG: at 09:43

## 2022-10-05 RX ADMIN — ALBUMIN (HUMAN) 25 G: 0.25 INJECTION, SOLUTION INTRAVENOUS at 06:20

## 2022-10-05 RX ADMIN — ALBUMIN (HUMAN) 25 G: 0.25 INJECTION, SOLUTION INTRAVENOUS at 14:29

## 2022-10-05 RX ADMIN — AMPICILLIN SODIUM AND SULBACTAM SODIUM 1.5 G: 1; .5 INJECTION, POWDER, FOR SOLUTION INTRAMUSCULAR; INTRAVENOUS at 04:01

## 2022-10-05 RX ADMIN — AMPICILLIN SODIUM AND SULBACTAM SODIUM 1.5 G: 1; .5 INJECTION, POWDER, FOR SOLUTION INTRAMUSCULAR; INTRAVENOUS at 18:49

## 2022-10-05 RX ADMIN — CALCIUM CARBONATE (ANTACID) CHEW TAB 500 MG 200 MG: 500 CHEW TAB at 20:52

## 2022-10-05 RX ADMIN — METOCLOPRAMIDE 10 MG: 5 INJECTION, SOLUTION INTRAMUSCULAR; INTRAVENOUS at 23:44

## 2022-10-05 RX ADMIN — TRAMADOL HYDROCHLORIDE 50 MG: 50 TABLET, COATED ORAL at 14:35

## 2022-10-05 RX ADMIN — FAMOTIDINE 20 MG: 20 TABLET, FILM COATED ORAL at 09:43

## 2022-10-05 RX ADMIN — TRAMADOL HYDROCHLORIDE 50 MG: 50 TABLET, COATED ORAL at 01:02

## 2022-10-05 RX ADMIN — SODIUM CHLORIDE, PRESERVATIVE FREE 10 ML: 5 INJECTION INTRAVENOUS at 13:30

## 2022-10-05 RX ADMIN — CALCIUM CARBONATE (ANTACID) CHEW TAB 500 MG 200 MG: 500 CHEW TAB at 09:42

## 2022-10-05 RX ADMIN — METOCLOPRAMIDE HYDROCHLORIDE 5 MG: 5 INJECTION INTRAMUSCULAR; INTRAVENOUS at 06:22

## 2022-10-05 RX ADMIN — TRAMADOL HYDROCHLORIDE 50 MG: 50 TABLET, COATED ORAL at 20:49

## 2022-10-05 RX ADMIN — ALBUMIN (HUMAN) 25 G: 0.25 INJECTION, SOLUTION INTRAVENOUS at 01:02

## 2022-10-05 RX ADMIN — POTASSIUM BICARBONATE 20 MEQ: 782 TABLET, EFFERVESCENT ORAL at 09:39

## 2022-10-05 RX ADMIN — AMPICILLIN SODIUM AND SULBACTAM SODIUM 1.5 G: 1; .5 INJECTION, POWDER, FOR SOLUTION INTRAMUSCULAR; INTRAVENOUS at 23:44

## 2022-10-05 RX ADMIN — CALCIUM CARBONATE (ANTACID) CHEW TAB 500 MG 200 MG: 500 CHEW TAB at 01:02

## 2022-10-05 RX ADMIN — AMPICILLIN SODIUM AND SULBACTAM SODIUM 1.5 G: 1; .5 INJECTION, POWDER, FOR SOLUTION INTRAMUSCULAR; INTRAVENOUS at 13:26

## 2022-10-05 RX ADMIN — LACTULOSE 30 ML: 20 SOLUTION ORAL at 09:43

## 2022-10-05 RX ADMIN — METOCLOPRAMIDE 10 MG: 5 INJECTION, SOLUTION INTRAMUSCULAR; INTRAVENOUS at 18:41

## 2022-10-05 RX ADMIN — AMPICILLIN SODIUM AND SULBACTAM SODIUM 1.5 G: 1; .5 INJECTION, POWDER, FOR SOLUTION INTRAMUSCULAR; INTRAVENOUS at 05:44

## 2022-10-05 RX ADMIN — ALBUMIN (HUMAN) 25 G: 0.25 INJECTION, SOLUTION INTRAVENOUS at 19:26

## 2022-10-05 RX ADMIN — METOCLOPRAMIDE 10 MG: 5 INJECTION, SOLUTION INTRAMUSCULAR; INTRAVENOUS at 13:26

## 2022-10-05 RX ADMIN — TRAMADOL HYDROCHLORIDE 50 MG: 50 TABLET, COATED ORAL at 07:41

## 2022-10-05 RX ADMIN — METOCLOPRAMIDE HYDROCHLORIDE 5 MG: 5 INJECTION INTRAMUSCULAR; INTRAVENOUS at 01:02

## 2022-10-05 RX ADMIN — CHLORHEXIDINE GLUCONATE 15 ML: 1.2 RINSE ORAL at 20:53

## 2022-10-05 NOTE — PROGRESS NOTES
[address] [facility]  Hospitalist Group                                                                                          Hospitalist Progress Note  Danielle Sherman MD  Answering service: [phone] from in house phone        Date of Service:  10/5/2022  NAME:  Justice Helms  :  1970  MRN:  [id number]      Admission Summary:   Justice Helms is a 46 y.o. male with past medical history of alcohol abuse and pancreatitis presented to the emergency department with abnormal CT findings. Patient reportedly underwent CT abdomen pelvis with IV contrast on 2022; findings included hepatomegaly with parenchymal heterogeneity suspicious for acute pancreatitis, biliary sludge in the gallbladder with gallbladder wall thickening. Patient reportedly was called by the gastroenterologist office regarding the same and was referred to the emergency department for further evaluation. There is concern for hepatitis along with enlarged liver. Patient had significant history of alcohol abuse (formerly ~ 8 beers / day); reportedly quit drinking alcohol approximately a week ago. On arrival emergency department initial recorded vital signs temperature 97.8 °F, /92, heart rate 97, respiratory rate 18, O2 saturation 99% room air. Limited abdominal ultrasound showed evidence of hepatomegaly, diffuse heterogeneous liver, possible hepatic steatosis or nonspecific parenchymal liver disease, portal hypertension, perihepatic ascites, diminutive middle and left hepatic veins with discontinuous flow to IVC with findings suggesting possible acute liver inflammation and gallbladder sludge without shadowing gallstones; no biliary duct dilatation. ED ordered Dilaudid 2 mg IV, Zofran 4 mg IV, oxycodone 5 mg p.o., 0.9% normal saline 1000 mL IV fluid bolus x1 dose. Patient is now seen for admission to the hospital service for continued evaluation and treatment.  Patient complains of ongoing, recent nausea, vomiting, poor oral intake, and significant weight loss ~ 35 - 40 lbs x last 12 months       Interval history / Subjective:    Follow up pt with abd pain, jaundice, acute liver injury, alcoholic hepatitis   + weak, + SOB, abdominal distention  Sodium 124 from 129, albumin per Hepatologist  Hepatologist consulted, discussed, input appreciated, LFT still up  Bill 11  INR 1.8  + hiccups intractable, Reglan was added and increased to 10 mg QID,   + Abd pain distention increased, uncomfortable  Right low jaw pain and edema resolved, able to tolerate PO  Abx Unasyn IV  GI,  and general surgeon consulted, input appreciated   HIDA noted, discussed with General surgeon, no intervention was recommended, no acute cholecystitis  Per GI steroids were initiated,   Wife at bed side  Pt is feeling dizzy     Assessment & Plan:     Transaminitis   Hyperbilirubinemia  Jaundice  Hepatomegaly  Coagulopathy, slight worsen  Acute Alcohol induced Hepatitis  -LFTs remained elevated  -ammonia level 37  -GI and hepatologist consulted, input appreciated, diet and steroids were initiated  HIDA reviewed, dilated gallbladder  Gen Sx consulted, no procedure was recommended  Request acute hepatitis panel: negative  Per GI steroids were initiated, LFT's still elevated without trend  INR 2.3  MELD and DF are high, with high mortality per hepatologist 50%  -Hepatic steatosis or nonspecific parenchymal liver disease.  -Perihepatic ascites  -Findings suggesting portal hypertension  -Gallbladder sludge  -HIDA scan reviewed, no acute cholecystitis, no cystic duct obstruction per  Per General surgeon no procedure was recommended  Ultram to control abd pain PRN  Continue steroids per hepatologist for 28 days, monitor LFT's   JAMES, Actin pending  Albumin Iv per hepatologist     Nausea improved   Vomiting resolved,   Hiccups intractable  Reglan QID, will increase to 10 mg QID  -Zofran 4 mg IV every 6 hours as needed  Will consult GI for evaluation of intractable hiccups  Might need paracentesis    Hyponatremia, acute, worsening  -Serum sodium 129 on admission. Repeat sodium levels trending down to 124,  related to acute liver Ds, alcohol  Continue to monitor  Off fluids IV  Albumin per hepatologist  Will restrict fluids     Acute hypokalemia sever  replaced    Anemia, macrocytic  -stable H&H  -  iron profile reviewed  Ferritin 1200  Repeat H&H. Transfuse if hemoglobin less than 7.0  No GI bleeding over night    History of alcohol abuse  -discussed , advised to quit alcohol       Leukocytosis   possible related to dental abscess  resolved    Right mandibular pain, resolved  Dental abscess right mandibular, slow improving,   resolving  Continue Unasyn IV  Pt is on steroids  CT facial bones was reviewed, dicussed with oral surgeon Dr. Isabella Ace  PCT 0.45  Discussed with oral surgeon on call Dr. Isabella Ace, reccommended to follow up as soon as discharged for possible tooth extraction tel [phone] and continue Abx Augmentin as outpt until abscess drained         Code status: Full code  Prophylaxis: SCD  Care Plan discussed with: patient and RN  Anticipated Disposition: TBD, pending clinical improvement, 48-72 hrs, follow up hepatologist, monitor CMP, CBC, follow up GI recommendations, CXR  oral surgeon consulted, pt will follow up as outpt,  hepatology following       Hospital Problems  Never Reviewed            Codes Class Noted POA    Hepatomegaly ICD-10-CM: R16.0  ICD-9-CM: 789.1  9/29/2022 Unknown        Hyperbilirubinemia ICD-10-CM: E80.6  ICD-9-CM: 782.4  9/29/2022 Unknown        Transaminitis ICD-10-CM: R74.01  ICD-9-CM: 790.4  9/29/2022 Unknown        Moderate protein-calorie malnutrition ([facility] [address] ICD-10-CM: E44.0  ICD-9-CM: 263.0  9/29/2022 Yes       Review of Systems:   A comprehensive review of systems was negative except for that written in the HPI. Vital Signs:    Last 24hrs VS reviewed since prior progress note.  Most recent are:  Visit Vitals  /69 (BP 1 Location: Right upper arm, BP Patient Position: At rest)   Pulse 80   Temp 97.6 °F (36.4 °C)   Resp 16   Ht 5' 7\" (1.702 m)   Wt 67 kg (147 lb 11.3 oz)   SpO2 95%   BMI 23.13 kg/m²         Intake/Output Summary (Last 24 hours) at 10/5/2022 1349  Last data filed at 10/5/2022 0504  Gross per 24 hour   Intake 210 ml   Output --   Net 210 ml          Physical Examination:     I had a face to face encounter with this patient and independently examined them on 10/5/2022 as outlined below:          Constitutional:  + weak, uncomfortable,  + hiccups, cooperative, pleasant    ENT:  Oral mucosa moist, oropharynx benign. Icteric sclera, tender edema right low jaw with some palpable abscess   Resp:  CTA bilaterally. No wheezing/rhonchi/rales. No accessory muscle use. CV:  Regular rhythm, normal rate, no murmurs, gallops, rubs    GI:  Soft, distended, + tender RUQ, normoactive bowel sounds, +hepatosplenomegaly     Musculoskeletal:  No edema, warm, 2+ pulses throughout    Neurologic:  Moves all extremities. AAOx3, CN II-XII reviewed            Data Review:    Review and/or order of clinical lab test  HIDA:  IMPRESSION  No evidence of acute cholecystitis or common bile duct obstruction. Absent gallbladder emptying following CCK administration suggests chronic  cholecystitis/chronic cystic duct dysfunction. CT abd:  IMPRESSION  Hepatomegaly with early cirrhotic change and ascites. Gallbladder  distention is redemonstrated without other CT evidence of cholecystitis. Otherwise no acute findings or findings correlate with left lower quadrant  abdominal pain. CT facial:  IMPRESSION  1. A 1.9 x 0.9 x 1.6 cm odontogenic abscess in the right mandibular buccal  gingiva with surrounding cellulitis. Periapical lucency/radicular cyst at the  root of tooth number 29.      Labs:     Recent Labs     10/03/22  0255 10/02/22  1632   WBC 6.8 7.0   HGB 10.5* 10.5*   HCT 28.2* 28.3*    228       Recent Labs 10/05/22  0251 10/04/22  0826 10/03/22  0226   * 129* 125*   K 3.9 3.3* 4.5   CL 89* 91* 90*   CO2 29 29 29   BUN 5* 4* 6   CREA 0.53* 0.46* 0.53*   GLU 93 75 83   CA 9.8 9.0 8.9       Recent Labs     10/05/22  0251 10/04/22  0826 10/03/22  0226 10/02/22  1632   ALT 97*  --  97* 92*   *  --  187* 182*   TBILI 11.7*  --  12.7* 13.0*   TP 7.2  --  6.4 6.5   ALB 3.5 2.7* 2.0* 2.1*   GLOB 3.7  --  4.4* 4.4*       Recent Labs     10/05/22  0251 10/04/22  0417 10/03/22  0226   INR 1.8* 2.4* 2.3*   PTP 18.4* 23.6* 22.4*        No results for input(s): FE, TIBC, PSAT, FERR in the last 72 hours. No results found for: FOL, RBCF   No results for input(s): PH, PCO2, PO2 in the last 72 hours. No results for input(s): CPK, CKNDX, TROIQ in the last 72 hours.     No lab exists for component: CPKMB  Lab Results   Component Value Date/Time    Triglyceride 85 03/02/2019 03:46 AM     No results found for: [facility]  Lab Results   Component Value Date/Time    Color DARK YELLOW 09/28/2022 10:31 PM    Appearance CLEAR 09/28/2022 10:31 PM    Specific gravity 1.005 09/28/2022 10:31 PM    Specific gravity PATIENT DISCHARGED BEFORE SAMPLE COLLECTED 11/10/2020 08:00 PM    pH (UA) 7.5 09/28/2022 10:31 PM    Protein Negative 09/28/2022 10:31 PM    Glucose Negative 09/28/2022 10:31 PM    Ketone Negative 09/28/2022 10:31 PM    Bilirubin PATIENT DISCHARGED BEFORE SAMPLE COLLECTED 11/10/2020 08:00 PM    Urobilinogen 1.0 09/28/2022 10:31 PM    Nitrites Negative 09/28/2022 10:31 PM    Leukocyte Esterase Negative 09/28/2022 10:31 PM    Epithelial cells FEW 09/28/2022 10:31 PM    Bacteria Negative 09/28/2022 10:31 PM    WBC 0-4 09/28/2022 10:31 PM    RBC 0-5 09/28/2022 10:31 PM         Medications Reviewed:     Current Facility-Administered Medications   Medication Dose Route Frequency    metoclopramide HCl (REGLAN) injection 10 mg  10 mg IntraVENous Q6H    famotidine (PEPCID) tablet 20 mg  20 mg Oral DAILY    albumin human 25% (BUMINATE) solution 25 g  25 g IntraVENous Q6H    calcium carbonate (TUMS) chewable tablet 200 mg [elemental]  200 mg Oral TID PRN    chlorhexidine (PERIDEX) 0.12 % mouthwash 15 mL  15 mL Oral Q12H    ampicillin-sulbactam (UNASYN) 1.5 g in 0.9% sodium chloride (MBP/ADV) 50 mL MBP  1.5 g IntraVENous Q6H    lactulose (CHRONULAC) 10 gram/15 mL solution 30 mL  20 g Oral DAILY    prednisoLONE (ORAPRED) 15 mg/5 mL (3 mg/mL) solution 40 mg  40 mg Oral DAILY    traMADoL (ULTRAM) tablet 50 mg  50 mg Oral Q6H PRN    polyethylene glycol (MIRALAX) packet 17 g  17 g Oral BID    sodium chloride (NS) flush 5-40 mL  5-40 mL IntraVENous Q8H    sodium chloride (NS) flush 5-40 mL  5-40 mL IntraVENous PRN    ondansetron (ZOFRAN ODT) tablet 4 mg  4 mg Oral Q8H PRN    Or    ondansetron (ZOFRAN) injection 4 mg  4 mg IntraVENous Q6H PRN    HYDROmorphone (DILAUDID) injection 0.5 mg  0.5 mg IntraVENous Q3H PRN     ______________________________________________________________________  EXPECTED LENGTH OF STAY: 3d 7h  ACTUAL LENGTH OF STAY:          6                 Halima Roberson MD

## 2022-10-05 NOTE — PROGRESS NOTES
Problem: Falls - Risk of  Goal: *Absence of Falls  Description: Document Elisabet Dye Fall Risk and appropriate interventions in the flowsheet.   Outcome: Progressing Towards Goal  Note: Fall Risk Interventions:       Mentation Interventions: Adequate sleep, hydration, pain control    Medication Interventions: Evaluate medications/consider consulting pharmacy, Patient to call before getting OOB     Problem: Patient Education: Go to Patient Education Activity  Goal: Patient/Family Education  Outcome: Progressing Towards Goal

## 2022-10-05 NOTE — PROGRESS NOTES
Problem: Falls - Risk of  Goal: *Absence of Falls  Description: Document Aruna Lowry Fall Risk and appropriate interventions in the flowsheet.   Outcome: Progressing Towards Goal  Note: Fall Risk Interventions:       Mentation Interventions: Adequate sleep, hydration, pain control    Medication Interventions: Evaluate medications/consider consulting pharmacy, Teach patient to arise slowly                   Problem: Patient Education: Go to Patient Education Activity  Goal: Patient/Family Education  Outcome: Progressing Towards Goal

## 2022-10-05 NOTE — ADT AUTH CERT NOTES
Gastroenterology [facility] Day 6 (10/4/2022) by Taryn Dugan       Review Status Review Entered   Completed 10/5/2022 1026       Created By   Taryn Dugan      Criteria Review      Care Day: 6 Care Date: 10/4/2022 Level of Care: Inpatient Floor    Guideline Day 1    Level Of Care    ( ) ICU or intermediate care    10/5/2022 10:26:37 EDT by Tavo Sequin      Medical bed    Clinical Status    ( ) * Clinical Indications met    Interventions    (X) Inpatient interventions as needed    10/5/2022 10:26:37 EDT by Tavo Sanchez      Continuous scd's Monitor I and O Neuro/vascular checks per unit routine Monitor vital signs Weigh patient daily    ( ) NPO if enteral studies planned or bowel rest needed    10/5/2022 10:26:37 EDT by Ayo Souza [address] [place] [address] Breakfast, Lunch, Dinner; Standard High Calorie/High Protein    * Milestone   Additional Notes    10/04      Pertinent Updates:   -still have c/o abdominal pain 4/10.   -+ hiccups, Reglan was added      Vitals: 98.4 °F (36.9 °C) 76 148/80 15 98% RA      Physical Exam:   Constitutional: NAD, + hiccups, cooperative, pleasant    ENT: Oral mucosa moist, oropharynx benign. Icteric sclera, tender edema right low jaw with some palpable abscess   Resp: CTA bilaterally. No wheezing/rhonchi/rales. No accessory muscle use. CV: Regular rhythm, normal rate, no murmurs, gallops, rubs   GI: Soft, distended, + tender RUQ, normoactive bowel sounds, +hepatosplenomegaly    Musculoskeletal: No edema, warm, 2+ pulses throughout   Neurologic: Moves all extremities. AAOx3, CN II-XII reviewed.       MD Consults/Assessments & Plans:   Transaminitis    Hyperbilirubinemia   Hepatomegaly   Coagulopathy, slight worsen   Acute Alcohol induced Hepatitis   -LFTs remained elevated   -ammonia level 37   -GI and hepatologist consulted, input appreciated, diet and steroids were initiated   HIDA reviewed, dilated gallbladder   Gen Sx consulted, no procedure was recommended   Request acute hepatitis panel   Per GI steroids were initiated, LFT's still elevated without trend   INR 2.3   MELD and DF are high, with high mortality per hepatologist 50%   -Hepatic steatosis or nonspecific parenchymal liver disease.   -Perihepatic ascites   -Findings suggesting portal hypertension   -Gallbladder sludge   -HIDA scan reviewed, no acute cholecystitis, no cystic duct obstruction per   Per General surgeon no procedure was recommended   Ultram to control abd pain PRN   Continue steroids per hepatologist for 28 days, monitor LFT's      Hyponatremia, acute, worsening   -Serum sodium 129 on admission.   Repeat sodium levels trending down to 125,  related to acute liver Ds, alcohol   Continue to monitor   Off fluids IV   Albumin per hepatologist       Hepatology notes:   *No changes noted       Medications:   albumin human 25% (BUMINATE) solution 25 g iv q6   ampicillin-sulbactam (UNASYN) 1.5 g in 0.9% sodium chloride 50 mL iv q6   famotidine (PEPCID) tablet 20 mg po daily   prednisoLONE (ORAPRED) 15 mg/5 mL (3 mg/mL) solution 40 mg po daily   polyethylene glycol (MIRALAX) packet 17 g po bid   traMADoL (ULTRAM) tablet 50 mg po q6 prn x3   metoclopramide HCl (REGLAN) injection 5 mg iv q6           Gastroenterology GRG - Care Day 5 (10/3/2022) by Tanya Mark       Review Status Review Entered   Completed 10/5/2022 1013       Created By   Tanya Mark      Criteria Review      Care Day: 5 Care Date: 10/3/2022 Level of Care: Inpatient Floor    Guideline Day 1    Level Of Care    ( ) ICU or intermediate care    10/5/2022 10:13:04 EDT by Arnaud Monae      Medical bed    Clinical Status    ( ) * Clinical Indications met    Interventions    (X) Inpatient interventions as needed    10/5/2022 10:13:04 EDT by Arnaud Monae      Continuous scd's Monitor I and O Neuro/vascular checks per unit routine Monitor vital signs Weigh patient daily    ( ) NPO if enteral studies planned or bowel rest needed    10/5/2022 10:13:04 EDT by Vincenzo Curtis      ADULT ORAL NUTRITION SUPPLEMENT Breakfast, Lunch, Dinner; Standard High Calorie/High Protein    * Milestone   Additional Notes    10/03      Pertinent Updates:   -still have c/o abdominal pain 4/10. Vitals: 97.8 °F (36.6 °C) 73 134/74 16 98% RA      Abnl/Pertinent Labs/Radiology/Diagnostic Studies:   INR: 2.3 (H)   Prothrombin time: 22.4 (H)   Sodium: 125 (L)   Chloride: 90 (L)   Creatinine: 0.53 (L)   BUN/Creatinine ratio: 11 (L)   Bilirubin, total: 12.7 (H)   Albumin: 2.0 (L)   Globulin: 4.4 (H)   A-G Ratio: 0.5 (L)   ALT: 97 (H)   AST: 325 (H)   Alk. phosphatase: 187 (H)      Physical Exam:   Constitutional: NAD, + hiccups, cooperative, pleasant    ENT: Oral mucosa moist, oropharynx benign. Icteric sclera, tender edema right low jaw   Resp: CTA bilaterally. No wheezing/rhonchi/rales. No accessory muscle use. CV: Regular rhythm, normal rate, no murmurs, gallops, rubs   GI: Soft, distended, + tender RUQ, normoactive bowel sounds, +hepatosplenomegaly    Musculoskeletal: No edema, warm, 2+ pulses throughout    Neurologic: Moves all extremities. AAOx3, CN II-XII reviewed. MD Consults/Assessments & Plans:   *No changes noted        Hepatology notes:   -Will start IV albumin to help Sna.   -Continue PO solumedrol   -He needs to remain in hospital until we see some improvement.        Medications:   albumin human 25% (BUMINATE) solution 25 g iv q6   ampicillin-sulbactam (UNASYN) 1.5 g in 0.9% sodium chloride 50 mL iv q6   famotidine (PEPCID) tablet 20 mg po daily   prednisoLONE (ORAPRED) 15 mg/5 mL (3 mg/mL) solution 40 mg po daily   polyethylene glycol (MIRALAX) packet 17 g po bid   traMADoL (ULTRAM) tablet 50 mg po q6 prn x3   metoclopramide HCl (REGLAN) injection 5 mg iv q6           Gastroenterology GRG - Care Day 4 (10/2/2022) by Abdi Nelson       Review Status Review Entered   Completed 10/5/2022 0958       Created By   Abdi Nelson Criteria Review      Care Day: 4 Care Date: 10/2/2022 Level of Care: Inpatient Floor    Guideline Day 1    Level Of Care    ( ) ICU or intermediate care    10/5/2022 09:58:26 EDT by [facility]guido      Medical [facility]    Clinical Status    ( ) * Clinical Indications met    Interventions    (X) Inpatient interventions as needed    10/5/2022 09:58:26 EDT by [facility]      Continuous scd's Monitor I and O Neuro/vascular checks per unit routine Monitor vital signs Weigh patient daily    ( ) NPO if enteral studies planned or bowel rest needed    10/5/2022 09:58:26 EDT by [facility]      ADULT ORAL NUTRITION SUPPLEMENT Breakfast, Lunch, Dinner; Standard High Calorie/High Protein    * Milestone   Additional Notes    10/02      Pertinent Updates:   -still have c/o LLQ abdominal pain    -alcohol cessation was recommended      Vitals: 98 (36.7) 87 146/86 16 96% RA      Abnl/Pertinent Labs/Radiology/Diagnostic Studies:   RBC: 2.73 (L)   HGB: 10.5 (L)   HCT: 28.3 (L)   MCV: 103.7 (H)   MCH: 38.5 (H)   MCHC: 37.1 (H)   NEUTROPHILS: 94 (H)   LYMPHOCYTES: 3 (L)   MONOCYTES: 2 (L)   IMMATURE GRANULOCYTES: 1 (H)   ABS. IMM. GRANS.: 0.1 (H)   ABS. LYMPHOCYTES: 0.2 (L)   INR: 2.2 (H)   Prothrombin time: 21.9 (H)   Sodium: 125 (L)   Chloride: 91 (L)   Glucose: 129 (H)   Creatinine: 0.51 (L)   Bilirubin, total: 13.0 (H)   Albumin: 2.1 (L)   Globulin: 4.4 (H)   A-G Ratio: 0.5 (L)   ALT: 92 (H)   AST: 369 (H)   Alk. phosphatase: 182 (H)   TIBC: 143 (L)   Ferritin: 1,222 (H)      CT MAXILLOFACIAL W CONT    Impression:     1. A 1.9 x 0.9 x 1.6 cm odontogenic abscess in the right mandibular buccal gingiva with surrounding cellulitis. Periapical lucency/radicular cyst at the    root of tooth number 29. Physical Exam:   Constitutional: + abdominal pain, cooperative, pleasant    ENT: Oral mucosa moist, oropharynx benign. Icteric sclera, tender edema right low jaw   Resp: CTA bilaterally. No wheezing/rhonchi/rales.  No accessory muscle use. CV: Regular rhythm, normal rate, no murmurs, gallops, rubs   GI: Soft, distended, + tender RUQ, normoactive bowel sounds, +hepatosplenomegaly    Musculoskeletal: No edema, warm, 2+ pulses throughout   Neurologic: Moves all extremities.   AAOx3, CN II-XII reviewed      MD Consults/Assessments & Plans:   Right mandibular pain,    dental abscess right mandibular   cellulitis   Continue Unasyn IV   Pt is on steroids   CT facial bones was reviewed   PCT 0.45   Will consult oral surgeon or ENT, might need I&D abscess,    Discussed with oral surgeon on call Dr. Aminta Darling, reccommended to follow up as soon as discharged for possible tooth extraction tel [phone] and continue Abx Augmentin as outpt       Hepatology notes:   *No changes noted       Medications:   ampicillin-sulbactam (UNASYN) 1.5 g in 0.9% sodium chloride 50 mL iv q6   calcium carbonate (TUMS) chewable tablet 200 mg [elemental] po tid prn x2   prednisoLONE (ORAPRED) 15 mg/5 mL (3 mg/mL) solution 40 mg po daily              Gastroenterology GRG - Care Day 3 (10/1/2022) by Luis Enrique Quan       Review Status Review Entered   Completed 10/5/2022 0935       Created By   Luis Enrique Quan      Criteria Review      Care Day: 3 Care Date: 10/1/2022 Level of Care: Inpatient Floor    Guideline Day 1    Level Of Care    ( ) ICU or intermediate care    10/5/2022 09:35:30 EDT by Leo means    Clinical Status    ( ) * Clinical Indications met    Interventions    (X) Inpatient interventions as needed    10/5/2022 09:35:30 EDT by Cristobal Thorpe      Continuous scd's  Monitor I and O  Neuro/vascular checks per unit routine  Monitor vital signs  Weigh patient daily    ( ) NPO if enteral studies planned or bowel rest needed    10/5/2022 09:35:30 EDT by Cristobal Thorpe      ADULT ORAL NUTRITION SUPPLEMENT Breakfast, Lunch, Dinner; Standard High Calorie/High Protein    * Milestone   Additional Notes    10/01      Pertinent Updates:   -Follow up pt with abd pain, jaundice, acute liver injury. + abd pain RUQ improved 2/10   -Right low jaw pain, pt think he might have a dental abscess   -c/o LLQ abdominal pain and abdominal fullness. Vitals: 97.6 (36.4) 88 145/94 16 97% RA      Abnl/Pertinent Labs/Radiology/Diagnostic Studies:   RBC: 2.49 (L)   HGB: 9.5 (L)   HCT: 25.9 (L)   MCV: 104.0 (H)   MCH: 38.2 (H)   MCHC: 36.7 (H)   NEUTROPHILS: 90 (H)   LYMPHOCYTES: 5 (L)   MONOCYTES: 4 (L)   IMMATURE GRANULOCYTES: 1 (H)   ABS. NEUTROPHILS: 8.2 (H)   ABS. IMM. GRANS.: 0.1 (H)   ABS. LYMPHOCYTES: 0.5 (L)   INR: 2.2 (H)   Prothrombin time: 22.0 (H)   Sodium: 127 (L)   Chloride: 93 (L)   Glucose: 105 (H)   BUN: 4 (L)   Creatinine: 0.52 (L)   BUN/Creatinine ratio: 8 (L)   Bilirubin, total: 14.0 (H)   Protein, total: 6.1 (L)   Albumin: 2.0 (L)   Globulin: 4.1 (H)   A-G Ratio: 0.5 (L)   AST: 204 (H)   Alk. phosphatase: 175 (H)      Physical Exam:   Constitutional: + abdominal pain, cooperative, pleasant    ENT: Oral mucosa moist, oropharynx benign. Icteric sclera, tender edema right low jaw   Resp: CTA bilaterally. No wheezing/rhonchi/rales. No accessory muscle use. CV: Regular rhythm, normal rate, no murmurs, gallops, rubs   GI: Soft, distended, + tender RUQ, normoactive bowel sounds, +hepatosplenomegaly    Musculoskeletal: No edema, warm, 2+ pulses throughout   Neurologic: Moves all extremities. AAOx3, CN II-XII reviewed.       MD Consults/Assessments & Plans:   *Transaminitis    -f/u LFTs    -ammonia level 37   -GI and/or hepatologist consulted, input appreciated, diet and steroids were initiated   HIDA reviewed, dilated gallbladder   Gen Sx consulted, no procedure was recommended   Request acute hepatitis panel       *Hyperbilirubinemia   -Total bilirubin 11.1   Continue supportive care, fluids IV   Follow up GI and Hepatologist   Diet initiated, advance if OK per hepatologist and GI       *Hepatomegaly   -Hepatic steatosis or nonspecific parenchymal liver disease.   -Perihepatic ascites   -Findings suggesting portal hypertension   -Gallbladder sludge   -HIDA scan reviewed, no acute cholecystitis, no cystic duct obstruction per   General surgeon, no procedure was recommended   Ultram to control abd pain PRN      Hepatology notes:   Alcohol hepatitis   There is elevation in liver transaminases in a pattern consistent with alcohol. There is an elevation in TBILI to 15. The INR is prolonged to 2.2. The DF is 59. The alcoholic hepatitis is severe and associated with a 30% mortality in the next 3 months. He was started on oral prednisolone yesterday. He will have received 4 days of prednisolone before study drug which is fine. We will keep him on prednisolone for 28 days and supply that as a study medication starting Tuesday AM.       It is not yet clear if he has cirrhosis of the liver. Alcoholic hepatitis can make the liver numbers and look like cirrhosis and can cause ascites in the absence of cirrhosis.        There is no evidence of alcohol withdrawal      Medications:   ampicillin-sulbactam (UNASYN) 1.5 g in 0.9% sodium chloride 50 mL iv q6   prednisoLONE (ORAPRED) 15 mg/5 mL (3 mg/mL) solution 40 mg po daily   traMADoL (ULTRAM) tablet 50 mg po q6 prn x4

## 2022-10-05 NOTE — PROGRESS NOTES
[address] [facility], MD, FACP, Kj Abigailearline Chapman, [place]      Joaquin Botello, MIKAL Rajput, [facility] Hospital[facility]BC   Oj Edwards [facility]   Lorelei Pham, FNP-FRANKLIN Mariee FNP-FRANKLIN Pablo, PCNP-BC       Butch Corrales [address]    at [address], [address] [place], [place] [address]guido    Annabelle W[address] [phone]    FAX: [address]    [address], [address]    [phone]    FAX: [phone]     HEPATOLOGY NOTE    I am not at [facility] today. INR has responded nicely to 1 dose of Vitamin K and is down from 2.4 to 1.8  T BILI is stable at 11.7. DF is 41  MELD score 22    Will enroll into DUR-928 clinical trial in AM/Thursday.   Will dose with study med Thursday at 9 PM.    Can go home Friday after study labs drawn about [facility] [address] [facility]  Annabelle Wooten  [address]. [phone]  FAX:  Mary Velez

## 2022-10-06 LAB
ALBUMIN SERPL-MCNC: 4 G/DL (ref 3.5–5)
ALBUMIN/GLOB SERPL: 1.1 {RATIO} (ref 1.1–2.2)
ALP SERPL-CCNC: 150 U/L (ref 45–117)
ALT SERPL-CCNC: 94 U/L (ref 12–78)
AMPHET UR QL SCN: NEGATIVE
ANION GAP SERPL CALC-SCNC: 4 MMOL/L (ref 5–15)
APPEARANCE UR: CLEAR
AST SERPL-CCNC: 193 U/L (ref 15–37)
ATRIAL RATE: 92 BPM
BACTERIA URNS QL MICRO: NEGATIVE /HPF
BARBITURATES UR QL SCN: NEGATIVE
BASOPHILS # BLD: 0 K/UL (ref 0–0.1)
BASOPHILS NFR BLD: 0 % (ref 0–1)
BENZODIAZ UR QL: NEGATIVE
BILIRUB SERPL-MCNC: 11 MG/DL (ref 0.2–1)
BILIRUB UR QL CFM: POSITIVE
BUN SERPL-MCNC: 6 MG/DL (ref 6–20)
BUN/CREAT SERPL: 11 (ref 12–20)
CALCIUM SERPL-MCNC: 9.9 MG/DL (ref 8.5–10.1)
CALCULATED P AXIS, ECG09: 32 DEGREES
CALCULATED R AXIS, ECG10: -21 DEGREES
CALCULATED T AXIS, ECG11: 10 DEGREES
CANNABINOIDS UR QL SCN: NEGATIVE
CHLORIDE SERPL-SCNC: 89 MMOL/L (ref 97–108)
CO2 SERPL-SCNC: 32 MMOL/L (ref 21–32)
COCAINE UR QL SCN: NEGATIVE
COLOR UR: ABNORMAL
CREAT SERPL-MCNC: 0.53 MG/DL (ref 0.7–1.3)
DIAGNOSIS, 93000: NORMAL
DIFFERENTIAL METHOD BLD: ABNORMAL
DRUG SCRN COMMENT,DRGCM: NORMAL
EOSINOPHIL # BLD: 0 K/UL (ref 0–0.4)
EOSINOPHIL NFR BLD: 0 % (ref 0–7)
EPITH CASTS URNS QL MICRO: ABNORMAL /LPF
ERYTHROCYTE [DISTWIDTH] IN BLOOD BY AUTOMATED COUNT: 13.7 % (ref 11.5–14.5)
GLOBULIN SER CALC-MCNC: 3.5 G/DL (ref 2–4)
GLUCOSE SERPL-MCNC: 89 MG/DL (ref 65–100)
GLUCOSE UR STRIP.AUTO-MCNC: NEGATIVE MG/DL
HCT VFR BLD AUTO: 27.2 % (ref 36.6–50.3)
HGB BLD-MCNC: 10.1 G/DL (ref 12.1–17)
HGB UR QL STRIP: NEGATIVE
HYALINE CASTS URNS QL MICRO: ABNORMAL /LPF (ref 0–5)
IMM GRANULOCYTES # BLD AUTO: 0 K/UL
IMM GRANULOCYTES NFR BLD AUTO: 0 %
INR PPP: 1.9 (ref 0.9–1.1)
KETONES UR QL STRIP.AUTO: ABNORMAL MG/DL
LEUKOCYTE ESTERASE UR QL STRIP.AUTO: NEGATIVE
LYMPHOCYTES # BLD: 1.1 K/UL (ref 0.8–3.5)
LYMPHOCYTES NFR BLD: 14 % (ref 12–49)
MCH RBC QN AUTO: 38.8 PG (ref 26–34)
MCHC RBC AUTO-ENTMCNC: 37.1 G/DL (ref 30–36.5)
MCV RBC AUTO: 104.6 FL (ref 80–99)
METHADONE UR QL: NEGATIVE
MONOCYTES # BLD: 0.8 K/UL (ref 0–1)
MONOCYTES NFR BLD: 10 % (ref 5–13)
NEUTS SEG # BLD: 5.6 K/UL (ref 1.8–8)
NEUTS SEG NFR BLD: 76 % (ref 32–75)
NITRITE UR QL STRIP.AUTO: NEGATIVE
NRBC # BLD: 0.02 K/UL (ref 0–0.01)
NRBC BLD-RTO: 0.3 PER 100 WBC
OPIATES UR QL: NEGATIVE
P-R INTERVAL, ECG05: 152 MS
PCP UR QL: NEGATIVE
PH UR STRIP: 7 [PH] (ref 5–8)
PLATELET # BLD AUTO: 210 K/UL (ref 150–400)
PMV BLD AUTO: 11 FL (ref 8.9–12.9)
POTASSIUM SERPL-SCNC: 4.4 MMOL/L (ref 3.5–5.1)
PROT SERPL-MCNC: 7.5 G/DL (ref 6.4–8.2)
PROT UR STRIP-MCNC: ABNORMAL MG/DL
PROTHROMBIN TIME: 18.9 SEC (ref 9–11.1)
Q-T INTERVAL, ECG07: 384 MS
QRS DURATION, ECG06: 102 MS
QTC CALCULATION (BEZET), ECG08: 474 MS
RBC # BLD AUTO: 2.6 M/UL (ref 4.1–5.7)
RBC #/AREA URNS HPF: ABNORMAL /HPF (ref 0–5)
RBC MORPH BLD: ABNORMAL
SODIUM SERPL-SCNC: 125 MMOL/L (ref 136–145)
SP GR UR REFRACTOMETRY: 1.01 (ref 1–1.03)
UROBILINOGEN UR QL STRIP.AUTO: 2 EU/DL (ref 0.2–1)
VENTRICULAR RATE, ECG03: 92 BPM
WBC # BLD AUTO: 7.5 K/UL (ref 4.1–11.1)
WBC URNS QL MICRO: ABNORMAL /HPF (ref 0–4)

## 2022-10-06 PROCEDURE — 93005 ELECTROCARDIOGRAM TRACING: CPT

## 2022-10-06 PROCEDURE — 74011250636 HC RX REV CODE- 250/636: Performed by: INTERNAL MEDICINE

## 2022-10-06 PROCEDURE — 36415 COLL VENOUS BLD VENIPUNCTURE: CPT

## 2022-10-06 PROCEDURE — 80307 DRUG TEST PRSMV CHEM ANLYZR: CPT

## 2022-10-06 PROCEDURE — 74011250637 HC RX REV CODE- 250/637: Performed by: INTERNAL MEDICINE

## 2022-10-06 PROCEDURE — 74011250637 HC RX REV CODE- 250/637

## 2022-10-06 PROCEDURE — P9047 ALBUMIN (HUMAN), 25%, 50ML: HCPCS | Performed by: INTERNAL MEDICINE

## 2022-10-06 PROCEDURE — 74011000258 HC RX REV CODE- 258: Performed by: INTERNAL MEDICINE

## 2022-10-06 PROCEDURE — 81001 URINALYSIS AUTO W/SCOPE: CPT

## 2022-10-06 PROCEDURE — 74011000250 HC RX REV CODE- 250: Performed by: FAMILY MEDICINE

## 2022-10-06 PROCEDURE — 80053 COMPREHEN METABOLIC PANEL: CPT

## 2022-10-06 PROCEDURE — 74011000250 HC RX REV CODE- 250: Performed by: INTERNAL MEDICINE

## 2022-10-06 PROCEDURE — 65270000029 HC RM PRIVATE

## 2022-10-06 PROCEDURE — 74011636637 HC RX REV CODE- 636/637

## 2022-10-06 PROCEDURE — 99233 SBSQ HOSP IP/OBS HIGH 50: CPT | Performed by: INTERNAL MEDICINE

## 2022-10-06 PROCEDURE — 74011250636 HC RX REV CODE- 250/636: Performed by: FAMILY MEDICINE

## 2022-10-06 PROCEDURE — 85025 COMPLETE CBC W/AUTO DIFF WBC: CPT

## 2022-10-06 PROCEDURE — 74011250637 HC RX REV CODE- 250/637: Performed by: NURSE PRACTITIONER

## 2022-10-06 PROCEDURE — 85610 PROTHROMBIN TIME: CPT

## 2022-10-06 RX ORDER — PANTOPRAZOLE SODIUM 40 MG/1
40 TABLET, DELAYED RELEASE ORAL DAILY
Status: DISCONTINUED | OUTPATIENT
Start: 2022-10-06 | End: 2022-10-07 | Stop reason: HOSPADM

## 2022-10-06 RX ORDER — GABAPENTIN 100 MG/1
200 CAPSULE ORAL 3 TIMES DAILY
Status: DISCONTINUED | OUTPATIENT
Start: 2022-10-06 | End: 2022-10-07 | Stop reason: HOSPADM

## 2022-10-06 RX ADMIN — CHLORHEXIDINE GLUCONATE 15 ML: 1.2 RINSE ORAL at 20:44

## 2022-10-06 RX ADMIN — GABAPENTIN 200 MG: 100 CAPSULE ORAL at 22:00

## 2022-10-06 RX ADMIN — GABAPENTIN 200 MG: 100 CAPSULE ORAL at 17:52

## 2022-10-06 RX ADMIN — PANTOPRAZOLE SODIUM 40 MG: 40 TABLET, DELAYED RELEASE ORAL at 15:11

## 2022-10-06 RX ADMIN — LACTULOSE 30 ML: 20 SOLUTION ORAL at 10:12

## 2022-10-06 RX ADMIN — METOCLOPRAMIDE 10 MG: 5 INJECTION, SOLUTION INTRAMUSCULAR; INTRAVENOUS at 17:54

## 2022-10-06 RX ADMIN — METOCLOPRAMIDE 10 MG: 5 INJECTION, SOLUTION INTRAMUSCULAR; INTRAVENOUS at 05:58

## 2022-10-06 RX ADMIN — POLYETHYLENE GLYCOL 3350 17 G: 17 POWDER, FOR SOLUTION ORAL at 10:15

## 2022-10-06 RX ADMIN — SODIUM CHLORIDE, PRESERVATIVE FREE 10 ML: 5 INJECTION INTRAVENOUS at 05:58

## 2022-10-06 RX ADMIN — AMPICILLIN SODIUM AND SULBACTAM SODIUM 1.5 G: 1; .5 INJECTION, POWDER, FOR SOLUTION INTRAMUSCULAR; INTRAVENOUS at 08:49

## 2022-10-06 RX ADMIN — TRAMADOL HYDROCHLORIDE 50 MG: 50 TABLET, COATED ORAL at 17:50

## 2022-10-06 RX ADMIN — TRAMADOL HYDROCHLORIDE 50 MG: 50 TABLET, COATED ORAL at 10:11

## 2022-10-06 RX ADMIN — ALBUMIN (HUMAN) 25 G: 0.25 INJECTION, SOLUTION INTRAVENOUS at 05:58

## 2022-10-06 RX ADMIN — ONDANSETRON 4 MG: 2 INJECTION INTRAMUSCULAR; INTRAVENOUS at 03:20

## 2022-10-06 RX ADMIN — METOCLOPRAMIDE 10 MG: 5 INJECTION, SOLUTION INTRAMUSCULAR; INTRAVENOUS at 23:35

## 2022-10-06 RX ADMIN — FAMOTIDINE 20 MG: 20 TABLET, FILM COATED ORAL at 10:11

## 2022-10-06 RX ADMIN — SODIUM CHLORIDE, PRESERVATIVE FREE 10 ML: 5 INJECTION INTRAVENOUS at 15:12

## 2022-10-06 RX ADMIN — Medication: at 21:02

## 2022-10-06 RX ADMIN — TRAMADOL HYDROCHLORIDE 50 MG: 50 TABLET, COATED ORAL at 03:21

## 2022-10-06 RX ADMIN — AMPICILLIN SODIUM AND SULBACTAM SODIUM 1.5 G: 1; .5 INJECTION, POWDER, FOR SOLUTION INTRAMUSCULAR; INTRAVENOUS at 17:54

## 2022-10-06 RX ADMIN — CALCIUM CARBONATE (ANTACID) CHEW TAB 500 MG 200 MG: 500 CHEW TAB at 03:22

## 2022-10-06 RX ADMIN — CHLORHEXIDINE GLUCONATE 15 ML: 1.2 RINSE ORAL at 10:15

## 2022-10-06 RX ADMIN — Medication 40 MG: at 10:12

## 2022-10-06 RX ADMIN — AMPICILLIN SODIUM AND SULBACTAM SODIUM 1.5 G: 1; .5 INJECTION, POWDER, FOR SOLUTION INTRAMUSCULAR; INTRAVENOUS at 15:01

## 2022-10-06 RX ADMIN — AMPICILLIN SODIUM AND SULBACTAM SODIUM 1.5 G: 1; .5 INJECTION, POWDER, FOR SOLUTION INTRAMUSCULAR; INTRAVENOUS at 23:24

## 2022-10-06 RX ADMIN — METOCLOPRAMIDE 10 MG: 5 INJECTION, SOLUTION INTRAMUSCULAR; INTRAVENOUS at 15:06

## 2022-10-06 NOTE — PROGRESS NOTES
[address] [facility] Adult  Hospitalist Group                                                                                          Hospitalist Progress Note  [address] [facility],   Answering service: [phone] OR [phone] from in house phone        Date of Service:  10/6/2022  NAME:  Yvonne Tate  :  1970  MRN:  [id number]      Admission Summary:   Yvonne Tate is a 46 y.o. male with past medical history of alcohol abuse and pancreatitis presented to the emergency department with abnormal CT findings. Patient reportedly underwent CT abdomen pelvis with IV contrast on 2022; findings included hepatomegaly with parenchymal heterogeneity suspicious for acute pancreatitis, biliary sludge in the gallbladder with gallbladder wall thickening. Patient reportedly was called by the gastroenterologist office regarding the same and was referred to the emergency department for further evaluation. There is concern for hepatitis along with enlarged liver. Patient had significant history of alcohol abuse (formerly ~ 8 beers / day); reportedly quit drinking alcohol approximately a week ago. On arrival emergency department initial recorded vital signs temperature 97.8 °F, /92, heart rate 97, respiratory rate 18, O2 saturation 99% room air. Limited abdominal ultrasound showed evidence of hepatomegaly, diffuse heterogeneous liver, possible hepatic steatosis or nonspecific parenchymal liver disease, portal hypertension, perihepatic ascites, diminutive middle and left hepatic veins with discontinuous flow to IVC with findings suggesting possible acute liver inflammation and gallbladder sludge without shadowing gallstones; no biliary duct dilatation. ED ordered Dilaudid 2 mg IV, Zofran 4 mg IV, oxycodone 5 mg p.o., 0.9% normal saline 1000 mL IV fluid bolus x1 dose. Patient is now seen for admission to the hospital service for continued evaluation and treatment.  Patient complains of ongoing, recent nausea, vomiting, poor oral intake, and significant weight loss ~ 35 - 40 lbs x last 12 months       Interval history / Subjective: Follow up alcohol hepatitis. Patient seen and examined. Wife at bedside. Reports acid reflux, minimal appetite, ongoing hiccups, shortness of breath. During exam, patient attempted to speak but had recurrent hiccups      Assessment & Plan:     Acute Alcohol induced Hepatitis  Transaminitis   Hyperbilirubinemia  Jaundice  Hepatomegaly  Coagulopathy, slight worsen  -LFTs remained elevated  -ammonia level 37  -MELD and DF are high, with high mortality per hepatologist 50%  -HIDA scan reviewed, no acute cholecystitis, no cystic duct obstruction per  Per General surgeon no procedure was recommended  -continue steroids  -hepatology following. Enrolled in DUR-928 clinical trial   -stop albumin  -Potential plans for discharge 10/7     Nausea improved   Vomiting resolved  Hiccups intractable  Reglan QID 10 mg   -Zofran 4 mg IV every 6 hours as needed  -gabapentin TID  -encourage Ensure    Hyponatremia, acute- stable   -Serum sodium 129 on admission.   Repeat sodium levels trending down to 124,  related to acute liver Ds, alcohol  -off ivfs, albumin      Acute hypokalemia severe  replaced    Anemia, macrocytic  -stable H&H  - iron profile reviewed    History of alcohol abuse  -discussed, advised to quit alcohol    Right mandibular pain, resolved  Dental abscess right mandibular, slow improving,   resolving  Continue Unasyn IV- transition to Augmentin on discharge   Pt is on steroids  CT facial bones was reviewed, dicussed with oral surgeon Dr. George Ann  PCT 0.45  Discussed with oral surgeon on call Dr. George Ann, reccommended to follow up as soon as discharged for possible tooth extraction tel [phone] and continue Abx Augmentin as outpt until abscess drained         Code status: Full code  Prophylaxis: SCD  Care Plan discussed with: patient and RN  Anticipated Disposition: possible discharge 10/7 pending clinical improvement and clearance by GI       Hospital Problems  Never Reviewed            Codes Class Noted POA    Hepatomegaly ICD-10-CM: R16.0  ICD-9-CM: 789.1  9/29/2022 Unknown        Hyperbilirubinemia ICD-10-CM: E80.6  ICD-9-CM: 782.4  9/29/2022 Unknown        Transaminitis ICD-10-CM: R74.01  ICD-9-CM: 790.4  9/29/2022 Unknown        Moderate protein-calorie malnutrition ([facility] [address] ICD-10-CM: E44.0  ICD-9-CM: 263.0  9/29/2022 Yes       Review of Systems:   A comprehensive review of systems was negative except for that written in the HPI. Vital Signs:    Last 24hrs VS reviewed since prior progress note. Most recent are:  Visit Vitals  /84 (BP 1 Location: Left arm, BP Patient Position: At rest)   Pulse 95   Temp 97.7 °F (36.5 °C)   Resp 17   Ht 5' 7\" (1.702 m)   Wt 67 kg (147 lb 11.3 oz)   SpO2 96%   BMI 23.13 kg/m²         Intake/Output Summary (Last 24 hours) at 10/6/2022 1634  Last data filed at 10/6/2022 5346  Gross per 24 hour   Intake 300 ml   Output --   Net 300 ml          Physical Examination:     I had a face to face encounter with this patient and independently examined them on 10/6/2022 as outlined below:          Constitutional:  + weak, uncomfortable,  + hiccups, cooperative, pleasant    ENT:  Oral mucosa moist, oropharynx benign. =   Resp:  CTA bilaterally. No wheezing/rhonchi/rales. No accessory muscle use. CV:  Regular rhythm, normal rate, no murmurs, gallops, rubs    GI:  Soft, mildly distended, + tender RUQ, normoactive bowel sounds, +hepatosplenomegaly     Musculoskeletal:  No edema, warm, 2+ pulses throughout    Neurologic:  Moves all extremities            Data Review:    Review and/or order of clinical lab test  HIDA:  IMPRESSION  No evidence of acute cholecystitis or common bile duct obstruction. Absent gallbladder emptying following CCK administration suggests chronic  cholecystitis/chronic cystic duct dysfunction.     CT abd:  IMPRESSION  Hepatomegaly with early cirrhotic change and ascites. Gallbladder  distention is redemonstrated without other CT evidence of cholecystitis. Otherwise no acute findings or findings correlate with left lower quadrant  abdominal pain. CT facial:  IMPRESSION  1. A 1.9 x 0.9 x 1.6 cm odontogenic abscess in the right mandibular buccal  gingiva with surrounding cellulitis. Periapical lucency/radicular cyst at the  root of tooth number 29. Labs:     Recent Labs     10/06/22  0323   WBC 7.5   HGB 10.1*   HCT 27.2*          Recent Labs     10/06/22  0323 10/05/22  0251 10/04/22  0826   * 124* 129*   K 4.4 3.9 3.3*   CL 89* 89* 91*   CO2 32 29 29   BUN 6 5* 4*   CREA 0.53* 0.53* 0.46*   GLU 89 93 75   CA 9.9 9.8 9.0       Recent Labs     10/06/22  0323 10/05/22  0251 10/04/22  0826   ALT 94* 97*  --    * 157*  --    TBILI 11.0* 11.7*  --    TP 7.5 7.2  --    ALB 4.0 3.5 2.7*   GLOB 3.5 3.7  --        Recent Labs     10/06/22  0323 10/05/22  0251 10/04/22  0417   INR 1.9* 1.8* 2.4*   PTP 18.9* 18.4* 23.6*        No results for input(s): FE, TIBC, PSAT, FERR in the last 72 hours. No results found for: FOL, RBCF   No results for input(s): PH, PCO2, PO2 in the last 72 hours. No results for input(s): CPK, CKNDX, TROIQ in the last 72 hours.     No lab exists for component: CPKMB  Lab Results   Component Value Date/Time    Triglyceride 85 03/02/2019 03:46 AM     No results found for: GLUCPOC  Lab Results   Component Value Date/Time    Color DARK YELLOW 09/28/2022 10:31 PM    Appearance CLEAR 09/28/2022 10:31 PM    Specific gravity 1.005 09/28/2022 10:31 PM    Specific gravity PATIENT DISCHARGED BEFORE SAMPLE COLLECTED 11/10/2020 08:00 PM    pH (UA) 7.5 09/28/2022 10:31 PM    Protein Negative 09/28/2022 10:31 PM    Glucose Negative 09/28/2022 10:31 PM    Ketone Negative 09/28/2022 10:31 PM    Bilirubin PATIENT DISCHARGED BEFORE SAMPLE COLLECTED 11/10/2020 08:00 PM    Urobilinogen 1.0 09/28/2022 10:31 PM    Nitrites Negative 09/28/2022 10:31 PM    Leukocyte Esterase Negative 09/28/2022 10:31 PM    Epithelial cells FEW 09/28/2022 10:31 PM    Bacteria Negative 09/28/2022 10:31 PM    WBC 0-4 09/28/2022 10:31 PM    RBC 0-5 09/28/2022 10:31 PM         Medications Reviewed:     Current Facility-Administered Medications   Medication Dose Route Frequency    pantoprazole (PROTONIX) tablet 40 mg  40 mg Oral DAILY    INV DUR-928/placebo in 0.9% sodium chloride 100 mL INVESTIGATIONAL infusion   IntraVENous ONCE    [START ON 10/7/2022] INV methylPREDNISolone/placebo capsule 32 mg (Patient Supplied)  32 mg Oral DAILY    [START ON 10/7/2022] lactulose (CHRONULAC) 10 gram/15 mL solution 15 mL  10 g Oral DAILY    gabapentin (NEURONTIN) capsule 200 mg  200 mg Oral TID    metoclopramide HCl (REGLAN) injection 10 mg  10 mg IntraVENous Q6H    calcium carbonate (TUMS) chewable tablet 200 mg [elemental]  200 mg Oral TID PRN    chlorhexidine (PERIDEX) 0.12 % mouthwash 15 mL  15 mL Oral Q12H    ampicillin-sulbactam (UNASYN) 1.5 g in 0.9% sodium chloride (MBP/ADV) 50 mL MBP  1.5 g IntraVENous Q6H    prednisoLONE (ORAPRED) 15 mg/5 mL (3 mg/mL) solution 40 mg  40 mg Oral DAILY    traMADoL (ULTRAM) tablet 50 mg  50 mg Oral Q6H PRN    sodium chloride (NS) flush 5-40 mL  5-40 mL IntraVENous Q8H    sodium chloride (NS) flush 5-40 mL  5-40 mL IntraVENous PRN    ondansetron (ZOFRAN ODT) tablet 4 mg  4 mg Oral Q8H PRN    Or    ondansetron (ZOFRAN) injection 4 mg  4 mg IntraVENous Q6H PRN    HYDROmorphone (DILAUDID) injection 0.5 mg  0.5 mg IntraVENous Q3H PRN     ______________________________________________________________________  EXPECTED LENGTH OF STAY: 3d 7h  ACTUAL LENGTH OF STAY:          [facility],

## 2022-10-06 NOTE — PROGRESS NOTES
[address] [facility], [place], [address], [place], [place]      Bogdan Brock PA-C    April S Atiya, [facility]   Dino Gambino, [facility]   Holger Gray JUDE Weaver ASAD    Yingkaci Rodriguez, [facility]       Butch Deputado Kumar [address]    at [address], [address] Annabelle Schneider  22.    [phone]    FAX: [address]    [address], [address]    [phone]    FAX: [phone]       HEPATOLOGY PROGRESS NOTE  The patient is a 46 y.o. Black male who regularly consumes 10 alcoholic drinks, usually beer sometimes mixed drinks, per day. He has done this for many years. He had an episode of acute pancreatitis in 2019. He was abstinent for a few months after that but then slowly started drinking alcohol again in increasing amounts. A few weeks ago he developed abdominal pain/fullness that was different than his previous pancreatitis pain. He then became jaundice. He stopped consuming alcohol and after 2 days when the jaundice did not improve he came to the ED. There was no nausea, vomiting, swelling of the abdomen, swelling of the lower extremity, confusion. .      In the ED Laboratory studies were significant for:    WBC 7.3, HB 9.7 gms, , Sna 129, Scr 0.47 mg, , ALT 82, , TBILI 11.1 mg, INR 1.6, Lipase 50    Imaging of the liver with CT scan demonstrated hepatomegaly, fatty liver, no liver mass, mild ascites. Hospital Course to date: The TBILI increased to 15.0 and and has since drifted down to 11.0 mg  The INR has slowly drifted up to 2.4. I gave him a dose of vitamin K and INR dropped to 1.8. Is 1.9 today  Sna remains stable at 125 despite IV albumin  DF is now 43  MELD score is 23.   MELD-NA is 30  There is no obvious HE or aterixis. He was started on prednisolone oral solution 40 mg every day on Friday 9/30. He has tooth abscess but this is mild infection, controlled with ABX and pain meds, there is no evidence of active systemic infection. This is not contraindication to enter study. Hepatology Plan: We signed consent form to enrol into the [id number] clinical trial to treat severe alcoholic hepatitis Sunday. Unfortunately, my senior  was out of town. He still meets all criteria to enter the study and will formally enrol today and receive study medication this evening at 9PM.  He can DC in AM after study labs are obtained. Will stop IV albumin. Do not give him prescription for prednisolone at [facility]. We will supply this as study medication. ASSESSMENT:  Alcohol hepatitis  There is elevation in liver transaminases in a pattern consistent with alcohol. There is an elevation in TBILI to 11. The INR is prolonged to 1,9. The DF is 43. The alcoholic hepatitis is severe and associated with a 30% mortality in the next 3 months. He was started on oral prednisolone Friday 9/30 and should remain on this until DC. We will keep him on prednisolone for 28 days and supply that as a study medication at [facility]. It is not yet clear if he has cirrhosis of the liver. Alcoholic hepatitis can make the liver numbers and look like cirrhosis and can cause ascites in the absence of cirrhosis. There is no evidence of alcohol withdrawal    Alcohol liver disease  The diagnosis is based upon a history of consuming alcohol in excess, imaging, pattern of AST>ALT, an elevation in ferritin  Will check serology for other causes of chronic liver disease. Will check for HIV. A liver biopsy has not been performed. A Fibroscan has not been perfomred. The patient was consuming 10 alcoholic beverages daily. The patient has been abstinent from alcohol since 9/26/2022.   Discussed the need to remain abstinent from alcohol. Elevation in Ferritin  There is an elevation in ferritin with Normal iron saturation. It is unlikely that the patient has hemochromatosis or is a carrier for an HFE gene. Suspect the elevation in ferritin is secondary to alcohol use and will come down to normal with abstinence. Hyponatremia  This is secondary alcohol. Will hold on giving IV albumin since he has no significant ascites and no edema. The Sna has remained stable at 125 despite IV albumin. Will DC IV albumin. Anemia   This is due to multifactorial causes including bone marrow suppression secondary to alcohol. There is no evidence for acute blood loss. The most recent FE studies were from 9/2022. The serum ferritin is 1222  The FE saturation is 45%    Hepatic encephalopathy   Overt HE has not developed to date. There is no reason for treatment with lactulose of xifaxan  He was given lactulose once every day for constipation. Yanni Batter was only 34  Will stop the lactulose. Tooth Abscess  This is well controlled on ABX and not active systemic infection. PHYSICAL EXAMINATION:  VS: per nursing note  General:  No acute distress. Eyes:  Sclera icteric  ENT:  No oral lesions. Thyroid normal.  Nodes:  No adenopathy. Skin:  No spider angiomata. Jaundice. Respiratory:  Lungs clear to auscultation. Cardiovascular:  Regular heart rate. Abdomen:  Soft non-tender, on/palpation. Hepatomegally with liver 4 fingers below the right costal margin. Spleen not palpable. No obvious ascites. Extremities:  No lower extremity edema. NO muscle wasting  Neurologic:  Alert and oriented. Cranial nerves grossly intact. No asterixis.       LABORATORY:   Latest Reference Range & Units 10/3/22 02:26 10/4/22 08:26 10/5/22 02:51 10/6/22 03:23   WBC 4.1 - 11.1 K/uL 6.8      HGB 12.1 - 17.0 g/dL 10.5 (L)      PLATELET 878 - 394 K/uL 235      INR 0.9 - 1.1   2.3 (H) 2.4 (H) 1.8 (H) 1.9 (H)   Sodium 136 - 145 mmol/L 125 (L) 129 (L) 124 (L) 125 (L)   Potassium 3.5 - 5.1 mmol/L 4.5 3.3 (L) 3.9 4.4   Chloride 97 - 108 mmol/L 90 (L) 91 (L) 89 (L) 89 (L)   CO2 21 - 32 mmol/L 29 29 29 32   Glucose 65 - 100 mg/dL 83 75 93 89   BUN 6 - 20 MG/DL 6 4 (L) 5 (L) 6   Creatinine 0.70 - 1.30 MG/DL 0.53 (L) 0.46 (L) 0.53 (L) 0.53 (L)   Bilirubin, total 0.2 - 1.0 MG/DL 12.7 (H)  11.7 (H) 11.0 (H)   Albumin 3.5 - 5.0 g/dL 2.0 (L) 2.7 (L) 3.5 4.0   ALT 12 - 78 U/L 97 (H)  97 (H) 94 (H)   AST 15 - 37 U/L 325 (H)  236 (H) 193 (H)   Alk.  phosphatase 45 - 117 U/L 187 (H)  157 (H) 150 (H)   (L): Data is abnormally low  (H): Data is abnormally high      Shea Kurtz MD   [address]  [address] [place] [address] Helen [address]  Annabelle Wooten  22.  [phone]  FAX:  [address]

## 2022-10-06 NOTE — CONSULTS
[address] [facility].  [address] [address] [address], [address]  [phone]                     GI CONSULTATION NOTE      NAME:  Argentina Perez   :   1970   MRN:   [id number]     Consult Date: 10/6/2022     Chief Complaint: intractable hiccups    History of Present Illness:  Patient is a 46 y.o. BM who is seen in consultation at the request of Dr. Miguel Ángel Alcaraz for the above mentioned problem. He follows with Dr. Nikita Sotelo of [facility]. He has a pmh of ETOH, pancreatitis in 2019, and acid reflux for which he takes famotidine daily. He was admitted via the ER on  after a CTAP for abdominal pain, abdominal fullness, and weight loss showed hepatomegaly with heterogeneity suspicious for acute hepatitis, biliary sludge and GB wall thickening. He had elevated TB and LFTs on admission with anemia, hyponatremia, and elevated INR. HIDA showed EF 0% suggestive of chronic cholecystitis or cystic duct dysfunction. No evidence of acute cholecystitis or CBD obstruction. He has been assessed by hepatology and this is presumed alcohol hepatitis without confirmation of cirrhosis. He is enrolled in a clinical trial.     Four days ago he developed hiccups. They are persistent, worsening and prevent him from sleeping and eating. He has not vomited but has had some sour brash. Hiccups make it difficult for him to speak and breath. He had an abdominal ultrasound to assess for ascites but minimal fluid was seen. He has been having regular bowel movements with miralax and lactulose in hospital.     Prior EGD/Colonoscopy in 2022. EGD: salmon colored patch in esophagus, diffuse congestion and mosaic pattern in stomach compatible with portal gastropathy, normal duodenum > path: mild reflux changes, negative for barretts  Cscope:poor prep, single sessile 6 mm non bleeding polyp in sigmoid colon > path: focal glandular hyperplasia, negative for dysplasia or malignancy    He is a former smoker.  4 year pack history. PMH:  Past Medical History:   Diagnosis Date    Pancreatitis        PSH:  History reviewed. No pertinent surgical history. Allergies:  No Known Allergies    Home Medications:  Prior to Admission Medications   Prescriptions Last Dose Informant Patient Reported? Taking?   ondansetron (ZOFRAN ODT) 4 mg disintegrating tablet 9/28/2022  No Yes   Sig: Take 1 Tab by mouth every eight (8) hours as needed for Nausea. Facility-Administered Medications: None       Hospital Medications:  Current Facility-Administered Medications   Medication Dose Route Frequency    metoclopramide HCl (REGLAN) injection 10 mg  10 mg IntraVENous Q6H    famotidine (PEPCID) tablet 20 mg  20 mg Oral DAILY    albumin human 25% (BUMINATE) solution 25 g  25 g IntraVENous Q6H    calcium carbonate (TUMS) chewable tablet 200 mg [elemental]  200 mg Oral TID PRN    chlorhexidine (PERIDEX) 0.12 % mouthwash 15 mL  15 mL Oral Q12H    ampicillin-sulbactam (UNASYN) 1.5 g in 0.9% sodium chloride (MBP/ADV) 50 mL MBP  1.5 g IntraVENous Q6H    lactulose (CHRONULAC) 10 gram/15 mL solution 30 mL  20 g Oral DAILY    prednisoLONE (ORAPRED) 15 mg/5 mL (3 mg/mL) solution 40 mg  40 mg Oral DAILY    traMADoL (ULTRAM) tablet 50 mg  50 mg Oral Q6H PRN    polyethylene glycol (MIRALAX) packet 17 g  17 g Oral BID    sodium chloride (NS) flush 5-40 mL  5-40 mL IntraVENous Q8H    sodium chloride (NS) flush 5-40 mL  5-40 mL IntraVENous PRN    ondansetron (ZOFRAN ODT) tablet 4 mg  4 mg Oral Q8H PRN    Or    ondansetron (ZOFRAN) injection 4 mg  4 mg IntraVENous Q6H PRN    HYDROmorphone (DILAUDID) injection 0.5 mg  0.5 mg IntraVENous Q3H PRN       Social History:  Social History     Tobacco Use    Smoking status: Never    Smokeless tobacco: Never   Substance Use Topics    Alcohol use: Yes     Alcohol/week: 6.0 standard drinks     Types: 6 Cans of beer per week       Family History:  History reviewed. No pertinent family history.     Review of Systems:    Constitutional: negative fever, negative chills, + weight loss  Eyes:   negative visual changes  ENT:   +sore throat, neg tongue or lip swelling  Respiratory:  negative cough, negative dyspnea  Cards:  negative for chest pain, palpitations, lower extremity edema  GI:   See HPI  :  negative for frequency, dysuria  Integument:  negative for rash and pruritus  Heme:  negative for easy bruising and gum/nose bleeding  Musculoskel: negative for myalgias,  back pain, muscle weakness  Neuro: negative for headaches, dizziness, vertigo  Psych:  negative for feelings of anxiety, depression      Objective:   Patient Vitals for the past 8 hrs:   BP Temp Pulse Resp SpO2   10/06/22 0826 (!) 149/88 97.9 °F (36.6 °C) 85 17 98 %     No intake/output data recorded. 10/04 1901 - 10/06 0700  In: 56 [P.O.:610]  Out: -       PHYSICAL EXAM:  General appearance: cooperative, uncomfortable secondary to persistent hiccups  Skin: Extremities and face reveal no rashes. No zhong erythema. HEENT: Icterus. Extra-occular muscles are intact. Cardiovascular: Regular rate and rhythm. No murmurs, gallops, or rubs. Respiratory: Shallow breath sounds 2/2 hiccups. No wheezes, rales, or rhonchi. GI: Abdomen mildly distended and tender. Normal active bowel sounds. Rectal: Deferred   Musculoskeletal: No pitting edema of the lower legs. Neurological: Gross memory appears intact. Patient is alert and oriented. Psychiatric: Mood appears appropriate with good judgement. No anxiety or agitation.       Data Review     Recent Labs     10/06/22  0323   WBC 7.5   HGB 10.1*   HCT 27.2*        Recent Labs     10/06/22  0323 10/05/22  0251   * 124*   K 4.4 3.9   CL 89* 89*   CO2 32 29   BUN 6 5*   CREA 0.53* 0.53*   GLU 89 93   CA 9.9 9.8     Recent Labs     10/06/22  0323 10/05/22  0251   * 157*   TP 7.5 7.2   ALB 4.0 3.5   GLOB 3.5 3.7     Recent Labs     10/06/22  0323 10/05/22  0251   INR 1.9* 1.8*   PTP 18.9* 18.4* Imaging studies reviewed    Assessment / Plan :     45 yo AAM w/ presumed alcohol induced hepatitis presents with persistent, worsening hiccups for last four days. Associated with SOB and insomnia. Differential diagnosis includes phrenic nerve stretching 2/2 abdominal inflammation and/or ascites. Discussed with Dr. Josiah Whyte and do no believe upper endoscopy would reveal an alternative etiology for intractable hiccups.  ?side effect of steroids.     - PPI for acid reflux  - defer endoscopic intervention at this time  - consider double prep colonoscopy in future as outpatient once acute issues have resolved  - GI will follow peripherally and see on request     Patient Active Hospital Problem List:   Active Problems:    Hepatomegaly (9/29/2022)      Hyperbilirubinemia (9/29/2022)      Transaminitis (9/29/2022)      Moderate protein-calorie malnutrition ([address] (9/29/2022)      Cary Peng PA-C  10/06/22  12:42 PM

## 2022-10-07 VITALS
SYSTOLIC BLOOD PRESSURE: 158 MMHG | HEIGHT: 67 IN | HEART RATE: 83 BPM | TEMPERATURE: 98.6 F | DIASTOLIC BLOOD PRESSURE: 85 MMHG | OXYGEN SATURATION: 96 % | RESPIRATION RATE: 16 BRPM | BODY MASS INDEX: 22.78 KG/M2 | WEIGHT: 145.1 LBS

## 2022-10-07 DIAGNOSIS — R52 PAIN: Primary | ICD-10-CM

## 2022-10-07 LAB
INR PPP: 1.9 (ref 0.9–1.1)
PROTHROMBIN TIME: 19.4 SEC (ref 9–11.1)

## 2022-10-07 PROCEDURE — 74011250637 HC RX REV CODE- 250/637

## 2022-10-07 PROCEDURE — 74011250637 HC RX REV CODE- 250/637: Performed by: INTERNAL MEDICINE

## 2022-10-07 PROCEDURE — 36415 COLL VENOUS BLD VENIPUNCTURE: CPT

## 2022-10-07 PROCEDURE — 85610 PROTHROMBIN TIME: CPT

## 2022-10-07 PROCEDURE — 93005 ELECTROCARDIOGRAM TRACING: CPT

## 2022-10-07 PROCEDURE — 74011250636 HC RX REV CODE- 250/636: Performed by: INTERNAL MEDICINE

## 2022-10-07 PROCEDURE — 99232 SBSQ HOSP IP/OBS MODERATE 35: CPT | Performed by: INTERNAL MEDICINE

## 2022-10-07 PROCEDURE — 74011000258 HC RX REV CODE- 258: Performed by: INTERNAL MEDICINE

## 2022-10-07 RX ORDER — CHLORPROMAZINE HYDROCHLORIDE 10 MG/1
10 TABLET, FILM COATED ORAL
Qty: 10 TABLET | Refills: 0 | Status: SHIPPED | OUTPATIENT
Start: 2022-10-07 | End: 2022-10-12

## 2022-10-07 RX ORDER — TRAMADOL HYDROCHLORIDE 50 MG/1
50 TABLET ORAL
Qty: 30 TABLET | Refills: 0 | Status: SHIPPED | OUTPATIENT
Start: 2022-10-07 | End: 2022-10-17

## 2022-10-07 RX ORDER — CHLORPROMAZINE HYDROCHLORIDE 10 MG/1
10 TABLET, FILM COATED ORAL 2 TIMES DAILY
Status: DISCONTINUED | OUTPATIENT
Start: 2022-10-07 | End: 2022-10-07 | Stop reason: HOSPADM

## 2022-10-07 RX ORDER — AMOXICILLIN AND CLAVULANATE POTASSIUM 875; 125 MG/1; MG/1
1 TABLET, FILM COATED ORAL EVERY 12 HOURS
Qty: 20 TABLET | Refills: 0 | Status: SHIPPED | OUTPATIENT
Start: 2022-10-07 | End: 2022-10-19

## 2022-10-07 RX ORDER — LACTULOSE 10 G/15ML
10 SOLUTION ORAL; RECTAL DAILY
Qty: 480 ML | Refills: 0 | Status: SHIPPED | OUTPATIENT
Start: 2022-10-07

## 2022-10-07 RX ORDER — OMEPRAZOLE 40 MG/1
40 CAPSULE, DELAYED RELEASE ORAL DAILY
Qty: 30 CAPSULE | Refills: 0 | Status: SHIPPED | OUTPATIENT
Start: 2022-10-07 | End: 2022-10-19 | Stop reason: ALTCHOICE

## 2022-10-07 RX ADMIN — METOCLOPRAMIDE 10 MG: 5 INJECTION, SOLUTION INTRAMUSCULAR; INTRAVENOUS at 05:53

## 2022-10-07 RX ADMIN — CHLORPROMAZINE HYDROCHLORIDE 10 MG: 10 TABLET, FILM COATED ORAL at 08:38

## 2022-10-07 RX ADMIN — TRAMADOL HYDROCHLORIDE 50 MG: 50 TABLET, COATED ORAL at 04:20

## 2022-10-07 RX ADMIN — AMPICILLIN SODIUM AND SULBACTAM SODIUM 1.5 G: 1; .5 INJECTION, POWDER, FOR SOLUTION INTRAMUSCULAR; INTRAVENOUS at 05:53

## 2022-10-07 RX ADMIN — LACTULOSE 15 ML: 20 SOLUTION ORAL at 08:30

## 2022-10-07 RX ADMIN — GABAPENTIN 200 MG: 100 CAPSULE ORAL at 08:30

## 2022-10-07 RX ADMIN — CHLORHEXIDINE GLUCONATE 15 ML: 1.2 RINSE ORAL at 08:30

## 2022-10-07 RX ADMIN — PANTOPRAZOLE SODIUM 40 MG: 40 TABLET, DELAYED RELEASE ORAL at 08:30

## 2022-10-07 NOTE — PROGRESS NOTES
Multiple calls made to respiratory therapy to obtain EKG that was ordered for 0000. RT on other units or not available and stated they would come but EKG not obtained. Final call placed to RT and RN was told to contact EKG tech. No answer from EKG tech on 1st, 2nd, 3rd attempts. EKG tech answered 4th call at [phone] and immediately obtained EKG.

## 2022-10-07 NOTE — PROGRESS NOTES
[address] [facility] October 7, 2022       RE: Laura Zamarripa      To Whom It May Concern,    This is to certify that Laura Zamarripa was hospitalized from 9/28/2022 - 10/7/2022. He can return to work 10/17/2022. Thank you for your assistance in this matter.       Sincerely,  [address] [facility], DO

## 2022-10-07 NOTE — PROGRESS NOTES
Comprehensive Nutrition Assessment    Type and Reason for Visit: Reassess    Nutrition Recommendations/Plan:   Continue regular 2g Na diet. Fluid restriction r/t hyponatremia per MD  Continue ensure enlive TID, continue snacks daily       Malnutrition Assessment:  Malnutrition Status: Moderate malnutrition (09/29/22 1321)    Context:  Acute illness     Findings of the 6 clinical characteristics of malnutrition:   Energy Intake:  75% or less of est energy req for 7 or more days  Weight Loss:  Greater than 7.5% over 3 months     Body Fat Loss: Moderate body fat loss, Triceps, Orbital   Muscle Mass Loss:  Mild muscle mass loss, Hand (interosseous), Calf  Fluid Accumulation:  No significant fluid accumulation,     Strength:  Not performed        Nutrition Assessment:    Past Medical History:   Diagnosis Date    Pancreatitis      10/7: Follow up. Pts wt is down 3# from last week. Overall n/v has improved with medication, tho pt has developed likely multifactoral intractable hiccups that prevent him from sleeping, talking, and sometimes eating. Per GI pt has been having regular bowel movements with miralax and lactulose. Suspect some wt loss d/t clinical course and initiation of bowel movements from above- pt was constipated pta. Pt had HIDA scan- no acute cholecystitis or cystic duct obstruction, surgery recommending steroids. Pt has a dental abscess- on abx therapy. Appetite remains poor. Added soft snacks- boiled eggs, yogurt- as pt likely has some difficulty eating d/t tooth abscess. Will continue with 2g Na diet and ensure enlive TID. Na 125- pt receiving NaCl in unasyn drip, fluid restriction added. 9/30: 46 y.o. male admitted with jaundice, pancreatitis r/t h/o alcohol abuse.  Per MD note abd ultrasound showed \"hepatomegaly, diffuse heterogeneous liver, possible hepatic steatosis or nonspecific parenchymal liver disease, portal hypertension, perihepatic ascites, diminutive middle and left hepatic veins with discontinuous flow to IVC with findings suggesting possible acute liver inflammation and gallbladder sludge without shadowing gallstones; no biliary duct dilatation. \" Pt NPO today, had HIDA scan this morning, awaiting surgery consult. Visited pt at bedside where he stated he's been eating poorly for 6 months d/t n/v/lack of appetite. Stated #, thinks he's lost 35-40# in last 3-6 months. NFPE performed revealed moderate fat and muscle wasting. Pt meets ASPEN criteria for moderate malnutrition. Pt stated his appetite is better today, likely d/t being NPO since admission. He'd like strawberry ensure TID, boiled eggs, yogurt, cheese as snacks. We talked about use of easy to prepare, small frequent meals dense in kcals/pro for wt gain/maintenance in context of poor appetite and liver dysfunction. Na 131, K 3.0, Mg 1.5- pt receiving IV repletion of all. Recent Labs     10/06/22  0323 10/05/22  0251   GLU 89 93   BUN 6 5*   CREA 0.53* 0.53*   * 124*   K 4.4 3.9   CL 89* 89*   CO2 32 29   CA 9.9 9.8         Nutritionally Significant Medications:  Unasyn, thorazine, lactulose, protonix, prednisolone, buminate  Prn: tums, zofran, ultram      Estimated Daily Nutrient Needs:  Energy Requirements Based On: Kcal/kg     Energy (kcal/day): 2010  Weight Used for Protein Requirements: Current  Protein (g/day): 101  Method Used for Fluid Requirements: 1 ml/kcal  Fluid (ml/day): 2000    Nutrition Related Findings:   Edema: No data recorded    Last BM: 10/06/22, Loose    Wounds: None      Current Nutrition Therapies:  Diet: regular 2g Na 1200ml fluid restriction  Supplements: ensure enlive tid  Meal intake: No data found. Supplement intake: No data found. Nutrition Support: none      Anthropometric Measures:  Height: 5' 7\" (170.2 cm)  Ideal Body Weight (IBW): 148 lbs (67 kg)  Admission Body Weight: 148 lb  Current Body Wt:  65.8 kg (145 lb 1 oz), 98 % IBW.  Bed scale  Current BMI (kg/m2): 22.7  Usual Body Weight: 77.1 kg (170 lb)  % Weight Change (Calculated): -12.9  Weight Adjustment: No adjustment     BMI Category: Normal weight (BMI 18.5-24. 9)    Wt Readings from Last 10 Encounters:   10/07/22 65.8 kg (145 lb 1.6 oz)   02/27/19 76.7 kg (169 lb 1.5 oz)   08/01/18 75 kg (165 lb 5.5 oz)           Nutrition Diagnosis:   Moderate malnutrition, In context of acute illness or injury related to inadequate protein-energy intake as evidenced by Criteria as identified in malnutrition assessment  Unintended weight loss related to other (specify) (hepatic dysfunction) as evidenced by poor intake prior to admission, nausea, vomiting, weight loss    Nutrition Interventions:   Food and/or Nutrient Delivery: Continue current diet, Snacks (specify), Continue oral nutrition supplement  Nutrition Education/Counseling: Counseling completed  Coordination of Nutrition Care: Continue to monitor while inpatient       Goals:  Previous Goal Met: Progressing toward goal(s)  Goals: PO intake 50% or greater, by next RD assessment       Nutrition Monitoring and Evaluation:   Behavioral-Environmental Outcomes: None identified  Food/Nutrient Intake Outcomes: Food and nutrient intake, Supplement intake  Physical Signs/Symptoms Outcomes: Biochemical data, Chewing or swallowing, Nausea/vomiting, Meal time behavior, Weight    Discharge Planning:    Continue oral nutrition supplement    Kian Denton RD  Available via [facility]

## 2022-10-07 NOTE — DISCHARGE SUMMARY
Discharge Summary       PATIENT ID: Luis Eduardo Flood  MRN: [id number]   YOB: 1970    DATE OF ADMISSION: 9/28/2022  9:34 PM    DATE OF DISCHARGE: 10/7/2022  PRIMARY CARE PROVIDER: Destin Schmitz MD     ATTENDING PHYSICIAN: Anthony Mendoza DO   DISCHARGING PROVIDER: Anthony Mendoza DO    To contact this individual call [phone] and ask the  to page. If unavailable ask to be transferred the Adult Hospitalist Department. CONSULTATIONS: IP CONSULT TO HOSPITALIST  IP CONSULT TO HEPATOLOGY  IP CONSULT TO GASTROENTEROLOGY  IP CONSULT TO GASTROENTEROLOGY  IP CONSULT TO GENERAL SURGERY  IP CONSULT TO ORAL SURGERY    PROCEDURES/SURGERIES: * No surgery found *      ADMISSION SUMMARY AND HOSPITAL COURSE:   Admission history:  \"53 y.o. male with past medical history of alcohol abuse and pancreatitis presented to the emergency department with abnormal CT findings. Patient reportedly underwent CT abdomen pelvis with IV contrast on 9/23/2022; findings included hepatomegaly with parenchymal heterogeneity suspicious for acute pancreatitis, biliary sludge in the gallbladder with gallbladder wall thickening. Patient reportedly was called by the gastroenterologist office regarding the same and was referred to the emergency department for further evaluation. There is concern for hepatitis along with enlarged liver. Patient had significant history of alcohol abuse (formerly ~ 8 beers / day); reportedly quit drinking alcohol approximately a week ago. On arrival emergency department initial recorded vital signs temperature 97.8 °F, /92, heart rate 97, respiratory rate 18, O2 saturation 99% room air.   Limited abdominal ultrasound showed evidence of hepatomegaly, diffuse heterogeneous liver, possible hepatic steatosis or nonspecific parenchymal liver disease, portal hypertension, perihepatic ascites, diminutive middle and left hepatic veins with discontinuous flow to IVC with findings suggesting possible acute liver inflammation and gallbladder sludge without shadowing gallstones; no biliary duct dilatation. ED ordered Dilaudid 2 mg IV, Zofran 4 mg IV, oxycodone 5 mg p.o., 0.9% normal saline 1000 mL IV fluid bolus x1 dose. Patient is now seen for admission to the hospital service for continued evaluation and treatment. Patient complains of ongoing, recent nausea, vomiting, poor oral intake, and significant weight loss ~ 35 - 40 lbs x last 12 months. \"       DISCHARGE DIAGNOSES / PLAN:      Acute Alcohol induced Hepatitis  Transaminitis   Hyperbilirubinemia  Jaundice  Hepatomegaly  Coagulopathy, slight worsen  -Hepatology consulted. Enrolled in DUR-928 clinical trial  -S/p albumin  -has outpatient follow up    Hiccups intractable  -improved with thorazine. Prescription given      Nausea improved   Vomiting resolved    Hyponatremia, acute- stable   -stable at 125  -off ivfs, albumin      Acute hypokalemia severe  replaced    Anemia, macrocytic  -stable H&H  -iron profile reviewed    History of alcohol abuse  -discussed, advised to quit alcohol     Right mandibular pain, resolved  Dental abscess right mandibular, slow improving,   resolving  transition to Augmentin on discharge   Discussed with oral surgeon on call Dr. Jennifer Doran, reccommended to follow up as soon as discharged for possible tooth extraction tel [phone] and continue Abx Augmentin as outpt until abscess drained. Wife expressed understaning        BMI: Body mass index is 22.73 kg/m². . This patient: Has a BMI within normal limits. PENDING TEST RESULTS:   At the time of discharge the following test results are still pending: none     ADDITIONAL CARE RECOMMENDATIONS:   FOLLOW UP APPOINTMENTS:   Please follow up with hepatology and oral surgery     ADDITIONAL CARE RECOMMENDATIONS:    Please follow new medication list     NOTIFY YOUR PHYSICIAN FOR ANY OF THE FOLLOWING:   Fever over 101 degrees for 24 hours.    Chest pain, shortness of breath, fever, chills, nausea, vomiting, diarrhea, change in mentation, falling, weakness, bleeding. Severe pain or pain not relieved by medications, as well as any other signs or symptoms that you may have questions about. FOLLOW UP APPOINTMENTS:    Follow-up Information       Follow up With Specialties Details Why Contact Info    Daniel [place], [address] [place]      Gustavo Walker DDS Oral Surgery Follow up Please follow up with oral surgeon for dental abscess [address]  [facility] [address] [phone]               DISCHARGE MEDICATIONS:  Current Discharge Medication List        START taking these medications    Details   chlorproMAZINE (THORAZINE) 10 mg tablet Take 1 Tablet by mouth two (2) times daily as needed for PRN Reason (Other) (hiccups) for up to 5 days. Qty: 10 Tablet, Refills: 0  Start date: 10/7/2022, End date: 10/12/2022      lactulose (CHRONULAC) 10 gram/15 mL solution Take 15 mL by mouth daily. Qty: 480 mL, Refills: 0  Start date: 10/7/2022      omeprazole (PRILOSEC) 40 mg capsule Take 1 Capsule by mouth daily. Qty: 30 Capsule, Refills: 0  Start date: 10/7/2022      amoxicillin-clavulanate (Augmentin) 875-125 mg per tablet Take 1 Tablet by mouth every twelve (12) hours for 10 days. Qty: 20 Tablet, Refills: 0  Start date: 10/7/2022, End date: 10/17/2022           CONTINUE these medications which have NOT CHANGED    Details   ondansetron (ZOFRAN ODT) 4 mg disintegrating tablet Take 1 Tab by mouth every eight (8) hours as needed for Nausea.   Qty: 20 Tab, Refills: 0             DISPOSITION:  x  Home With:   OT  PT  HH  RN       Long term SNF/Inpatient Rehab    Independent/assisted living    Hospice    Other:       PATIENT CONDITION AT DISCHARGE:     Functional status    Poor     Deconditioned    x Independent      Cognition   x  Lucid     Forgetful     Dementia      Catheters/lines (plus indication)    Vaughn     PICC PEG    x None      Code status    x Full code     DNR      PHYSICAL EXAMINATION AT DISCHARGE:  Constitutional:  alert, cooperative, pleasant    ENT:  Oral mucosa moist, oropharynx benign. Resp:  CTA bilaterally. No wheezing/rhonchi/rales. No accessory muscle use.     CV:  Regular rhythm, normal rate, no murmurs, gallops, rubs    GI:  Soft, mildly distended, nonteder, normoactive bowel sounds, +hepatosplenomegaly     Musculoskeletal:  No edema, warm, 2+ pulses throughout    Neurologic:  Moves all extremities                                 CHRONIC MEDICAL DIAGNOSES:  Problem List as of 10/7/2022 Never Reviewed            Codes Class Noted - Resolved    Hepatomegaly ICD-10-CM: R16.0  ICD-9-CM: 789.1  9/29/2022 - Present        Hyperbilirubinemia ICD-10-CM: E80.6  ICD-9-CM: 782.4  9/29/2022 - Present        Transaminitis ICD-10-CM: R74.01  ICD-9-CM: 790.4  9/29/2022 - Present        Moderate protein-calorie malnutrition ([facility] ICD-10-CM: E44.0  ICD-9-CM: 263.0  9/29/2022 - Present        Pancreatitis ICD-10-CM: K85.90  ICD-9-CM: 417.8  2/28/2019 - Present           Greater than 30 minutes were spent with the patient on counseling and coordination of care    Signed:   Eulalia Herrera DO  10/7/2022  1:11 PM

## 2022-10-08 LAB
ATRIAL RATE: 86 BPM
ATRIAL RATE: 93 BPM
CALCULATED P AXIS, ECG09: 32 DEGREES
CALCULATED P AXIS, ECG09: 38 DEGREES
CALCULATED R AXIS, ECG10: -20 DEGREES
CALCULATED R AXIS, ECG10: -22 DEGREES
CALCULATED T AXIS, ECG11: 7 DEGREES
CALCULATED T AXIS, ECG11: 9 DEGREES
DIAGNOSIS, 93000: NORMAL
DIAGNOSIS, 93000: NORMAL
P-R INTERVAL, ECG05: 134 MS
P-R INTERVAL, ECG05: 156 MS
Q-T INTERVAL, ECG07: 372 MS
Q-T INTERVAL, ECG07: 394 MS
QRS DURATION, ECG06: 92 MS
QRS DURATION, ECG06: 96 MS
QTC CALCULATION (BEZET), ECG08: 462 MS
QTC CALCULATION (BEZET), ECG08: 471 MS
VENTRICULAR RATE, ECG03: 86 BPM
VENTRICULAR RATE, ECG03: 93 BPM

## 2022-10-08 NOTE — PROGRESS NOTES
[address] [facility], Mohan HILL, Kj Abigailearline [place], [place]      MIKAL Yeager, [facility]-BC   Kenziepaul Gonzalez, [facility]   Lisa Alexander, CARMELO Navarrete ASAD Diamond, [facility]       Butch Corrales Kumar [address]    at [address], [facility] [place] Schneider  22.    [phone]    FAX: [address] [place] [address], [address]    [phone]    FAX: [phone]       HEPATOLOGY PROGRESS NOTE  The patient is a 46 y.o. Black male who regularly consumes 10 alcoholic drinks, usually beer sometimes mixed drinks, per day. He has done this for many years. He had an episode of acute pancreatitis in 2019. He was abstinent for a few months after that but then slowly started drinking alcohol again in increasing amounts. A few weeks ago he developed abdominal pain/fullness that was different than his previous pancreatitis pain. He then became jaundice. He stopped consuming alcohol and after 2 days when the jaundice did not improve he came to the ED. There was no nausea, vomiting, swelling of the abdomen, swelling of the lower extremity, confusion. .      In the ED Laboratory studies were significant for:    WBC 7.3, HB 9.7 gms, , Sna 129, Scr 0.47 mg, , ALT 82, , TBILI 11.1 mg, INR 1.6, Lipase 50    Imaging of the liver with CT scan demonstrated hepatomegaly, fatty liver, no liver mass, mild ascites. Hospital Course to date: The TBILI increased to 15.0 and and has since drifted down to 11.0 mg  The INR has slowly drifted up to 2.4. I gave him a dose of vitamin K and INR dropped to 1.8. Is 1.9 today  Sna remains stable at 125 despite IV albumin  DF is now 43  MELD score is 23.   MELD-NA is 30  There is no obvious HE or aterixis. He was started on prednisolone oral solution 40 mg every day on Friday 9/30. He was dosed with DUR-928 vs placebo last night at 9PM.  Feels fine today. Still has hiccups. Hepatology Plan:  Study labs this AM at about 9AM.  Methylprednisolone from hospital pharmacy is stopped. He will get this today as a study medication. He can go home today after blood drawn for study at [facility] labs not needed. DO not DC on prednisolone. This is being supplied by study and wife already has this bottle to last another 20 days for total of 28 days. We will see him in office next Thursday. ASSESSMENT:  Alcohol hepatitis  There is elevation in liver transaminases in a pattern consistent with alcohol. There is an elevation in TBILI to 11. The INR is prolonged to 1,9. The DF is 43. The alcoholic hepatitis is severe and associated with a 30% mortality in the next 3 months. He was started on oral prednisolone Friday 9/30 and should remain on this until [facility]. We will keep him on prednisolone for 28 days and supply that as a study medication at [facility]. It is not yet clear if he has cirrhosis of the liver. Alcoholic hepatitis can make the liver numbers and look like cirrhosis and can cause ascites in the absence of cirrhosis. There is no evidence of alcohol withdrawal    Alcohol liver disease  The diagnosis is based upon a history of consuming alcohol in excess, imaging, pattern of AST>ALT, an elevation in ferritin  Will check serology for other causes of chronic liver disease. Will check for HIV. A liver biopsy has not been performed. A Fibroscan has not been perfomred. The patient was consuming 10 alcoholic beverages daily. The patient has been abstinent from alcohol since 9/26/2022. Discussed the need to remain abstinent from alcohol. Elevation in Ferritin  There is an elevation in ferritin with Normal iron saturation.     It is unlikely that the patient has hemochromatosis or is a carrier for an HFE gene. Suspect the elevation in ferritin is secondary to alcohol use and will come down to normal with abstinence. Hyponatremia  This is secondary alcohol. Will hold on giving IV albumin since he has no significant ascites and no edema. The Sna has remained stable at 125 despite IV albumin. Will DC IV albumin. Anemia   This is due to multifactorial causes including bone marrow suppression secondary to alcohol. There is no evidence for acute blood loss. The most recent FE studies were from 9/2022. The serum ferritin is 1222  The FE saturation is 45%    Hepatic encephalopathy   Overt HE has not developed to date. There is no reason for treatment with lactulose of xifaxan  He was given lactulose once every day for constipation. Revonda Merl was only 34  Will stop the lactulose. Hooth Abscess  This is well controlled on ABX and not active systemic infection. Hiccups  Best treatment is to drink a glass a water backwards I explained how to do this yesterday and he has not yet done it. PHYSICAL EXAMINATION:  VS: per nursing note  General:  No acute distress. Eyes:  Sclera icteric  ENT:  No oral lesions. Thyroid normal.  Nodes:  No adenopathy. Skin:  No spider angiomata. Jaundice. Respiratory:  Lungs clear to auscultation. Cardiovascular:  Regular heart rate. Abdomen:  Soft non-tender, on/palpation. Hepatomegally with liver 4 fingers below the right costal margin. Spleen not palpable. No obvious ascites. Extremities:  No lower extremity edema. NO muscle wasting  Neurologic:  Alert and oriented. Cranial nerves grossly intact. No asterixis.       LABORATORY:   Latest Reference Range & Units 10/3/22 02:26 10/4/22 08:26 10/5/22 02:51 10/6/22 03:23   WBC 4.1 - 11.1 K/uL 6.8      HGB 12.1 - 17.0 g/dL 10.5 (L)      PLATELET 386 - 183 K/uL 235      INR 0.9 - 1.1   2.3 (H) 2.4 (H) 1.8 (H) 1.9 (H)   Sodium 136 - 145 mmol/L 125 (L) 129 (L) 124 (L) 125 (L)   Potassium 3.5 - 5.1 mmol/L 4.5 3.3 (L) 3.9 4.4   Chloride 97 - 108 mmol/L 90 (L) 91 (L) 89 (L) 89 (L)   CO2 21 - 32 mmol/L 29 29 29 32   Glucose 65 - 100 mg/dL 83 75 93 89   BUN 6 - 20 MG/DL 6 4 (L) 5 (L) 6   Creatinine 0.70 - 1.30 MG/DL 0.53 (L) 0.46 (L) 0.53 (L) 0.53 (L)   Bilirubin, total 0.2 - 1.0 MG/DL 12.7 (H)  11.7 (H) 11.0 (H)   Albumin 3.5 - 5.0 g/dL 2.0 (L) 2.7 (L) 3.5 4.0   ALT 12 - 78 U/L 97 (H)  97 (H) 94 (H)   AST 15 - 37 U/L 325 (H)  236 (H) 193 (H)   Alk.  phosphatase 45 - 117 U/L 187 (H)  157 (H) 150 (H)   (L): Data is abnormally low  (H): Data is abnormally high      MD Mitzi Herrera [address]  [address]  [place] Wooten  22. [phone]  FAX:  [address]

## 2022-10-09 NOTE — PROGRESS NOTES
Hospitalist Progress Note 10/9/2022. Dewane Crimes. Received call from  that patient's wife requested call back for shortness of breath. I called patient's wife via phone number. She states that patient has ongoing hiccups, shortness of breath, and cough. Patient is eating and drinking normally. She describes similar clinical scenario from hospitalization- patient had hiccups when speaking, followed by subjective shortness of breath. The patient did not respond to as needed medications in the hospital- reglan, gabapentin. He appeared to have response to thorazine and was provided outpatient prescription. I encouraged wife to continue thorazine, check pulse oximeter (daughter is EMT), and continue to monitor. Unfortunately, not every patient is responsive to medications for hiccups. Wife voiced understanding. Plan to discuss with Dr. Radha Godinez (hepatology) later today.          Alina Dose, DO

## 2022-10-13 ENCOUNTER — OFFICE VISIT (OUTPATIENT)
Dept: HEMATOLOGY | Age: 52
End: 2022-10-13

## 2022-10-13 ENCOUNTER — HOSPITAL ENCOUNTER (OUTPATIENT)
Dept: NON INVASIVE DIAGNOSTICS | Age: 52
Discharge: HOME | End: 2022-10-13
Payer: COMMERCIAL

## 2022-10-13 ENCOUNTER — TRANSCRIBE ORDER (OUTPATIENT)
Dept: REGISTRATION | Age: 52
End: 2022-10-13

## 2022-10-13 DIAGNOSIS — Z00.6 RESEARCH EXAM: Primary | ICD-10-CM

## 2022-10-13 DIAGNOSIS — Z00.6 RESEARCH EXAM: ICD-10-CM

## 2022-10-13 DIAGNOSIS — R74.01 TRANSAMINITIS: Primary | ICD-10-CM

## 2022-10-13 LAB
ALBUMIN SERPL-MCNC: 3.6 G/DL (ref 3.5–5)
ALBUMIN/GLOB SERPL: 0.9 {RATIO} (ref 1.1–2.2)
ALP SERPL-CCNC: 176 U/L (ref 45–117)
ALT SERPL-CCNC: 105 U/L (ref 12–78)
ANION GAP SERPL CALC-SCNC: 7 MMOL/L (ref 5–15)
AST SERPL-CCNC: 189 U/L (ref 15–37)
ATRIAL RATE: 84 BPM
BILIRUB DIRECT SERPL-MCNC: 10.7 MG/DL (ref 0–0.2)
BILIRUB SERPL-MCNC: 13.5 MG/DL (ref 0.2–1)
BUN SERPL-MCNC: 4 MG/DL (ref 6–20)
BUN/CREAT SERPL: 8 (ref 12–20)
CALCIUM SERPL-MCNC: 9.7 MG/DL (ref 8.5–10.1)
CALCULATED P AXIS, ECG09: 39 DEGREES
CALCULATED R AXIS, ECG10: -29 DEGREES
CALCULATED T AXIS, ECG11: 14 DEGREES
CHLORIDE SERPL-SCNC: 92 MMOL/L (ref 97–108)
CO2 SERPL-SCNC: 31 MMOL/L (ref 21–32)
CREAT SERPL-MCNC: 0.5 MG/DL (ref 0.7–1.3)
DIAGNOSIS, 93000: NORMAL
ERYTHROCYTE [DISTWIDTH] IN BLOOD BY AUTOMATED COUNT: 15.2 % (ref 11.5–14.5)
GLOBULIN SER CALC-MCNC: 4.2 G/DL (ref 2–4)
GLUCOSE SERPL-MCNC: 76 MG/DL (ref 65–100)
HCT VFR BLD AUTO: 27.6 % (ref 36.6–50.3)
HGB BLD-MCNC: 9.7 G/DL (ref 12.1–17)
INR PPP: 1.4 (ref 0.9–1.1)
MCH RBC QN AUTO: 37.6 PG (ref 26–34)
MCHC RBC AUTO-ENTMCNC: 35.1 G/DL (ref 30–36.5)
MCV RBC AUTO: 107 FL (ref 80–99)
NRBC # BLD: 0 K/UL (ref 0–0.01)
NRBC BLD-RTO: 0 PER 100 WBC
P-R INTERVAL, ECG05: 152 MS
PLATELET # BLD AUTO: 213 K/UL (ref 150–400)
PMV BLD AUTO: 12.5 FL (ref 8.9–12.9)
POTASSIUM SERPL-SCNC: 4.2 MMOL/L (ref 3.5–5.1)
PROT SERPL-MCNC: 7.8 G/DL (ref 6.4–8.2)
PROTHROMBIN TIME: 14.5 SEC (ref 9–11.1)
Q-T INTERVAL, ECG07: 386 MS
QRS DURATION, ECG06: 94 MS
QTC CALCULATION (BEZET), ECG08: 456 MS
RBC # BLD AUTO: 2.58 M/UL (ref 4.1–5.7)
SODIUM SERPL-SCNC: 130 MMOL/L (ref 136–145)
VENTRICULAR RATE, ECG03: 84 BPM
WBC # BLD AUTO: 8.6 K/UL (ref 4.1–11.1)

## 2022-10-13 PROCEDURE — 93005 ELECTROCARDIOGRAM TRACING: CPT

## 2022-10-13 PROCEDURE — 99215 OFFICE O/P EST HI 40 MIN: CPT | Performed by: PHYSICIAN ASSISTANT

## 2022-10-19 ENCOUNTER — OFFICE VISIT (OUTPATIENT)
Dept: HEMATOLOGY | Age: 52
End: 2022-10-19

## 2022-10-19 ENCOUNTER — HOSPITAL ENCOUNTER (OUTPATIENT)
Dept: NON INVASIVE DIAGNOSTICS | Age: 52
Discharge: HOME | End: 2022-10-19

## 2022-10-19 ENCOUNTER — TRANSCRIBE ORDER (OUTPATIENT)
Dept: REGISTRATION | Age: 52
End: 2022-10-19

## 2022-10-19 DIAGNOSIS — Z00.6 RESEARCH EXAM: Primary | ICD-10-CM

## 2022-10-19 DIAGNOSIS — K21.9 GASTROESOPHAGEAL REFLUX DISEASE WITHOUT ESOPHAGITIS: ICD-10-CM

## 2022-10-19 DIAGNOSIS — K70.30 ALCOHOLIC CIRRHOSIS OF LIVER WITHOUT ASCITES (HCC): ICD-10-CM

## 2022-10-19 DIAGNOSIS — Z00.6 RESEARCH EXAM: ICD-10-CM

## 2022-10-19 PROCEDURE — 93005 ELECTROCARDIOGRAM TRACING: CPT

## 2022-10-19 PROCEDURE — 99215 OFFICE O/P EST HI 40 MIN: CPT | Performed by: NURSE PRACTITIONER

## 2022-10-19 RX ORDER — ASPIRIN 325 MG/1
100 TABLET, FILM COATED ORAL DAILY
Qty: 30 TABLET | Refills: 4 | Status: SHIPPED | OUTPATIENT
Start: 2022-10-19

## 2022-10-19 RX ORDER — PANTOPRAZOLE SODIUM 40 MG/1
40 TABLET, DELAYED RELEASE ORAL DAILY
Qty: 30 TABLET | Refills: 2 | Status: SHIPPED | OUTPATIENT
Start: 2022-10-19

## 2022-10-19 RX ORDER — LANOLIN ALCOHOL/MO/W.PET/CERES
500 CREAM (GRAM) TOPICAL DAILY
Qty: 30 TABLET | Refills: 5 | Status: SHIPPED | OUTPATIENT
Start: 2022-10-19

## 2022-10-19 RX ORDER — FOLIC ACID 1 MG/1
1 TABLET ORAL DAILY
Qty: 30 TABLET | Refills: 5 | Status: SHIPPED | OUTPATIENT
Start: 2022-10-19

## 2022-10-19 NOTE — PROGRESS NOTES
[facility], MD, FACP, Kj Abigailearline [place], [place]      MIKAL Junior S Atiya, [facility]-BC   Ben Huddleston, [facility]   Marshallsalenaisela Armendariz, RIVER Resendiz Se, FNASAD Gutierrez, [facility]       [facility] [place] [address]    at [address], [address] [place] Schneider  22.    [phone]    FAX: [address] [place] [address], [address]    [phone]    FAX: Lacie Moncada CLINICAL RESEARCH NOTE    Jaqui Palmer is a 46 y.o. male with acute alcohol hepatitis. The patient was seen today for a day 14 visit in the [facility] clinical trial.     Today's visit was conducted per the study protocol. The patient was asked if any symptoms, side effects or adverse events have occurred since the last study visit. A physical examination was performed. All symptoms reported by the patient and the findings of the physical examination were recorded in the clinical trial documents. Symptoms of clinical significance were:  diarrhea with lactulose. Epigastric pain treated with as needed tramadol. Findings on physical examination of clinical significance were: Sclera icteric, liver enlarged 2-3 fingers BCM, splenomegaly. Since the last office visit the patient has remained abstinent from all alcohol since 9/24/2022. Consumption was 10 beers and 4 mixed drinks daily for 4 year. He is not currently enrolled in an IOP or therapy. Appetite is good. He was advised to have restricted free water intake due to hyponatremia. Chronic hiccups resolved on 10/10/2022. The patient was released to light duty at the last office visit. Will continue this for another week. Lactulose is being held due to diarrhea.  Wife was instructed to monitor symptoms closely. The patient has cirrhosis and is in need of the following screening: AFP will be ordered today. [place] [address] Units 10/13/2022   WBC 4.1 - 11.1 K/uL 8.6   ANC 1.8 - 8.0 K/UL    HGB 12.1 - 17.0 g/dL 9.7 (L)    - 400 K/uL 213   INR 0.9 - 1.1   1.4 (H)   AST 15 - 37 U/L 189 (H)   ALT 12 - 78 U/L 105 (H)   Alk Phos 45 - 117 U/L 176 (H)   Bili, Total 0.2 - 1.0 MG/DL 13.5 (H)   Bili, Direct 0.0 - 0.2 MG/DL 10.7 (H)   Albumin 3.5 - 5.0 g/dL 3.6   BUN 6 - 20 MG/DL 4 (L)   Creat 0.70 - 1.30 MG/DL 0.50 (L)   Na 136 - 145 mmol/L 130 (L)   K 3.5 - 5.1 mmol/L 4.2   Cl 97 - 108 mmol/L 92 (L)   CO2 21 - 32 mmol/L 31   Glucose 65 - 100 mg/dL 76     MELD 25 as of 10/13/2022. Endoscopic Procedures:  3/2022. EGD by Dr. Merlin Cote. No varices. Hypnatremia  The sodium has been ranging 125-130. He was advised to have restriction on free water. Will order local labs today. Imagin/2022. CT of abdomen with contrast. Hepatolmegaly with cirrhotic change and ascites. Anemia   This is due to multifactorial causes including portal hypertension with chronic GI blood loss, bone marrow suppression secondary to alcohol. Will obtain FE panel to assess for iron stores. Iron will be administered either orally or IV if low. Epigastric Pain  Will change omeprazole to pantoprazole 40 mg. Advised he take 30 min before breakfast. Lipase ordered. Plan: Will start K20, folic acid and thiamine. Local labs will be ordered. Return per study protocol in 1 week. SHAYY Rangel-[facility] of [address] [address] [facility], [address] [facility], [place] [address].  [address] [facility]

## 2022-10-20 LAB
ALBUMIN SERPL-MCNC: 3.4 G/DL (ref 3.5–5)
ALBUMIN/GLOB SERPL: 0.7 {RATIO} (ref 1.1–2.2)
ALP SERPL-CCNC: 214 U/L (ref 45–117)
ALT SERPL-CCNC: 112 U/L (ref 12–78)
AMMONIA PLAS-SCNC: 42 UMOL/L
ANION GAP SERPL CALC-SCNC: 9 MMOL/L (ref 5–15)
AST SERPL-CCNC: 297 U/L (ref 15–37)
BASOPHILS # BLD: 0.1 K/UL (ref 0–0.1)
BASOPHILS NFR BLD: 1 % (ref 0–1)
BILIRUB DIRECT SERPL-MCNC: 14.3 MG/DL (ref 0–0.2)
BILIRUB SERPL-MCNC: 19.8 MG/DL (ref 0.2–1)
BUN SERPL-MCNC: 6 MG/DL (ref 6–20)
BUN/CREAT SERPL: 11 (ref 12–20)
CALCIUM SERPL-MCNC: 9.9 MG/DL (ref 8.5–10.1)
CHLORIDE SERPL-SCNC: 93 MMOL/L (ref 97–108)
CO2 SERPL-SCNC: 28 MMOL/L (ref 21–32)
CREAT SERPL-MCNC: 0.56 MG/DL (ref 0.7–1.3)
DIFFERENTIAL METHOD BLD: ABNORMAL
EOSINOPHIL # BLD: 0.1 K/UL (ref 0–0.4)
EOSINOPHIL NFR BLD: 2 % (ref 0–7)
ERYTHROCYTE [DISTWIDTH] IN BLOOD BY AUTOMATED COUNT: 14.9 % (ref 11.5–14.5)
GLOBULIN SER CALC-MCNC: 4.8 G/DL (ref 2–4)
GLUCOSE SERPL-MCNC: 82 MG/DL (ref 65–100)
HCT VFR BLD AUTO: 29.1 % (ref 36.6–50.3)
HGB BLD-MCNC: 10.2 G/DL (ref 12.1–17)
IMM GRANULOCYTES # BLD AUTO: 0 K/UL (ref 0–0.04)
IMM GRANULOCYTES NFR BLD AUTO: 0 % (ref 0–0.5)
INR PPP: 1.6 (ref 0.9–1.1)
LIPASE SERPL-CCNC: 76 U/L (ref 73–393)
LYMPHOCYTES # BLD: 0.5 K/UL (ref 0.8–3.5)
LYMPHOCYTES NFR BLD: 7 % (ref 12–49)
MCH RBC QN AUTO: 37.2 PG (ref 26–34)
MCHC RBC AUTO-ENTMCNC: 35.1 G/DL (ref 30–36.5)
MCV RBC AUTO: 106.2 FL (ref 80–99)
MONOCYTES # BLD: 0.5 K/UL (ref 0–1)
MONOCYTES NFR BLD: 7 % (ref 5–13)
NEUTS SEG # BLD: 5.7 K/UL (ref 1.8–8)
NEUTS SEG NFR BLD: 83 % (ref 32–75)
NRBC # BLD: 0 K/UL (ref 0–0.01)
NRBC BLD-RTO: 0 PER 100 WBC
PLATELET # BLD AUTO: 267 K/UL (ref 150–400)
PMV BLD AUTO: 12.9 FL (ref 8.9–12.9)
POTASSIUM SERPL-SCNC: 3.9 MMOL/L (ref 3.5–5.1)
PROT SERPL-MCNC: 8.2 G/DL (ref 6.4–8.2)
PROTHROMBIN TIME: 16.5 SEC (ref 9–11.1)
RBC # BLD AUTO: 2.74 M/UL (ref 4.1–5.7)
RBC MORPH BLD: ABNORMAL
SODIUM SERPL-SCNC: 130 MMOL/L (ref 136–145)
WBC # BLD AUTO: 6.9 K/UL (ref 4.1–11.1)

## 2022-10-21 LAB
AFP L3 MFR SERPL: 23.4 % (ref 0–9.9)
AFP SERPL-MCNC: 7.7 NG/ML (ref 0–8.4)

## 2022-10-21 NOTE — PROGRESS NOTES
will call with results. The MELD is 27. Liver enzymes have increased and liver function declined slightly. We discussed stopping lactulose but he ammonia level was slightly elevated. This may need to be restarted.  Lipase is normal

## 2022-10-24 LAB
ATRIAL RATE: 65 BPM
CALCULATED P AXIS, ECG09: 54 DEGREES
CALCULATED R AXIS, ECG10: -23 DEGREES
CALCULATED T AXIS, ECG11: 9 DEGREES
DIAGNOSIS, 93000: NORMAL
P-R INTERVAL, ECG05: 162 MS
Q-T INTERVAL, ECG07: 420 MS
QRS DURATION, ECG06: 98 MS
QTC CALCULATION (BEZET), ECG08: 436 MS
VENTRICULAR RATE, ECG03: 65 BPM

## 2022-10-26 ENCOUNTER — OFFICE VISIT (OUTPATIENT)
Dept: HEMATOLOGY | Age: 52
End: 2022-10-26

## 2022-10-26 DIAGNOSIS — Z00.6 RESEARCH EXAM: Primary | ICD-10-CM

## 2022-10-26 LAB
INR PPP: 1.6 (ref 0.9–1.1)
PROTHROMBIN TIME: 16.2 SEC (ref 9–11.1)

## 2022-10-26 PROCEDURE — 99215 OFFICE O/P EST HI 40 MIN: CPT | Performed by: NURSE PRACTITIONER

## 2022-10-26 NOTE — PROGRESS NOTES
[facility], MD, FACP, Kj Danni [place], [place]      MIKAL [place], [facility]-BC   [facility]   Lorelei Pham, RIVER Mariee FNJUDE-FRANKLIN Pablo, [facility]-BC       [facility]ch [facility] [address]    at [address], [facility] [place] Schneider  22.    [phone]    FAX: [address] [place] [address], [address]    [phone]    FAX: Lacie Antwan CLINICAL RESEARCH NOTE    Stefani Apodaca is a 46 y.o. male with acute alcohol hepatitis. The patient was seen today for a day 21 visit in the Durect clinical trial. Patient was given prednisone during hospitalization for a DF of 59. A one time infusion was given as part of the DURECT protocol 10/06/2022. Today's visit was conducted per the study protocol. The patient was asked if any symptoms, side effects or adverse events have occurred since the last study visit. A physical examination was performed. All symptoms reported by the patient and the findings of the physical examination were recorded in the clinical trial documents. Symptoms of clinical significance were:  Diarrhea with lactulose. He discontinued 10/19/2022 but developed constipation. Dose was restarted 2 days later but he continue to have constipation. Advised he increase dose. Ammonia slightly elevated. Abdominal pain primarily epigastric. This is most likely due to hepatomegaly. Findings on physical examination of clinical significance were: Sclera icteric. Hepatomegaly on exam 2-3 fingers BCM. Since the last office visit the patient has remained abstinent from all alcohol since 9/24/2022. Consumption was 10 beers and 4 mixed drinks daily for 4 year.  The patient was also taking 3.5-4.5 gm of tylenol daily for treatment of severe abdominal pain. He is not currently enrolled in an IOP or therapy. Appetite is good. He was advised to have restricted free water intake due to hyponatremia. Chronic hiccups resolved on 10/10/2022. The patient was released to light duty at the last office visit. Will continue light duty at this time and monitor closely. The patient has cirrhosis and is in need of the following screening: All testing is up to date. Liver [place] [address] Ref Rng & Units 10/26/2022 10/19/2022   WBC 4.1 - 11.1 K/uL 6.5 6.9   ANC 1.8 - 8.0 K/UL 4.6 5.7   HGB 12.1 - 17.0 g/dL 10.0 (L) 10.2 (L)    - 400 K/uL 297 267   INR 0.9 - 1.1   1.6 (H) 1.6 (H)   AST 15 - 37 U/L 170 (H) 297 (H)   ALT 12 - 78 U/L 69 112 (H)   Alk Phos 45 - 117 U/L 203 (H) 214 (H)   Bili, Total 0.2 - 1.0 MG/DL 18.8 (H) 19.8 (H)   Bili, Direct 0.0 - 0.2 MG/DL 14.1 (H) 14.3 (H)   Albumin 3.5 - 5.0 g/dL 2.9 (L) 3.4 (L)   BUN 6 - 20 MG/DL 6 6   Creat 0.70 - 1.30 MG/DL 0.58 (L) 0.56 (L)   Na 136 - 145 mmol/L 135 (L) 130 (L)   K 3.5 - 5.1 mmol/L 3.8 3.9   Cl 97 - 108 mmol/L 99 93 (L)   CO2 21 - 32 mmol/L 30 28   Glucose 65 - 100 mg/dL 103 (H) 82   Magnesium 1.6 - 2.4 mg/dL     Ammonia <32 UMOL/L  42 (H)     Cancer Screening Latest Ref Rng & Units 10/19/2022   AFP, Serum 0.0 - 8.4 ng/mL 7.7   AFP-L3% 0.0 - 9.9 % 23.4 (H)     The CTP score is 10. Child's Class C. The MELD score is 24 as of 10/26/2022    Discussed the transplant process. If the liver enzymes/function do not start showing improvement will start transplant evaluation. Endoscopic Procedures:  3/2022. EGD by Dr. Janette Carvalho. No varices. Hypnatremia  The sodium has been ranging 125-130. He was advised to have restriction on free water. Will order local labs today. Imagin/2022. CT of abdomen with contrast. Hepatolmegaly with cirrhotic change and ascites.      Anemia   This is due to multifactorial causes including portal hypertension with chronic GI blood loss, bone marrow suppression secondary to alcohol. Ferritin 1222  Iron Sat 45%  This is most likely elevated due to alcohol. The HGB is 10.0. Will consider ordering B12 and Folate. Epigastric Pain  Will change omeprazole to pantoprazole 40 mg. Advised he take 30 min before breakfast. Lipase normal.     Plan: Get local blood work. Return per protocol in 1 week. April SHAYY [facility] of [address] [address] Demond Kramer, [address] [place] [address] 22.  201 [facility]

## 2022-10-27 PROBLEM — F10.21 ALCOHOL DEPENDENCE IN REMISSION (HCC): Status: ACTIVE | Noted: 2022-10-27

## 2022-10-27 PROBLEM — R74.01 TRANSAMINITIS: Status: RESOLVED | Noted: 2022-09-29 | Resolved: 2022-10-27

## 2022-10-27 PROBLEM — D63.8 ANEMIA IN OTHER CHRONIC DISEASES CLASSIFIED ELSEWHERE: Status: ACTIVE | Noted: 2022-10-27

## 2022-10-27 PROBLEM — K85.90 PANCREATITIS: Status: RESOLVED | Noted: 2019-02-28 | Resolved: 2022-10-27

## 2022-10-27 PROBLEM — K70.30 ALCOHOLIC CIRRHOSIS OF LIVER WITHOUT ASCITES (HCC): Status: ACTIVE | Noted: 2022-10-27

## 2022-10-27 LAB
ALBUMIN SERPL-MCNC: 2.9 G/DL (ref 3.5–5)
ALBUMIN/GLOB SERPL: 0.6 {RATIO} (ref 1.1–2.2)
ALP SERPL-CCNC: 203 U/L (ref 45–117)
ALT SERPL-CCNC: 69 U/L (ref 12–78)
ANION GAP SERPL CALC-SCNC: 6 MMOL/L (ref 5–15)
AST SERPL-CCNC: 170 U/L (ref 15–37)
BASOPHILS # BLD: 0.1 K/UL (ref 0–0.1)
BASOPHILS NFR BLD: 1 % (ref 0–1)
BILIRUB DIRECT SERPL-MCNC: 14.1 MG/DL (ref 0–0.2)
BILIRUB SERPL-MCNC: 18.8 MG/DL (ref 0.2–1)
BUN SERPL-MCNC: 6 MG/DL (ref 6–20)
BUN/CREAT SERPL: 10 (ref 12–20)
CALCIUM SERPL-MCNC: 10 MG/DL (ref 8.5–10.1)
CHLORIDE SERPL-SCNC: 99 MMOL/L (ref 97–108)
CO2 SERPL-SCNC: 30 MMOL/L (ref 21–32)
CREAT SERPL-MCNC: 0.58 MG/DL (ref 0.7–1.3)
DIFFERENTIAL METHOD BLD: ABNORMAL
EOSINOPHIL # BLD: 0.3 K/UL (ref 0–0.4)
EOSINOPHIL NFR BLD: 4 % (ref 0–7)
ERYTHROCYTE [DISTWIDTH] IN BLOOD BY AUTOMATED COUNT: 14.6 % (ref 11.5–14.5)
GLOBULIN SER CALC-MCNC: 4.7 G/DL (ref 2–4)
GLUCOSE SERPL-MCNC: 103 MG/DL (ref 65–100)
HCT VFR BLD AUTO: 29 % (ref 36.6–50.3)
HGB BLD-MCNC: 10 G/DL (ref 12.1–17)
IMM GRANULOCYTES # BLD AUTO: 0.1 K/UL (ref 0–0.04)
IMM GRANULOCYTES NFR BLD AUTO: 1 % (ref 0–0.5)
LYMPHOCYTES # BLD: 0.7 K/UL (ref 0.8–3.5)
LYMPHOCYTES NFR BLD: 10 % (ref 12–49)
MCH RBC QN AUTO: 36.4 PG (ref 26–34)
MCHC RBC AUTO-ENTMCNC: 34.5 G/DL (ref 30–36.5)
MCV RBC AUTO: 105.5 FL (ref 80–99)
MONOCYTES # BLD: 0.7 K/UL (ref 0–1)
MONOCYTES NFR BLD: 11 % (ref 5–13)
NEUTS SEG # BLD: 4.6 K/UL (ref 1.8–8)
NEUTS SEG NFR BLD: 73 % (ref 32–75)
NRBC # BLD: 0 K/UL (ref 0–0.01)
NRBC BLD-RTO: 0 PER 100 WBC
PLATELET # BLD AUTO: 297 K/UL (ref 150–400)
PMV BLD AUTO: 11.9 FL (ref 8.9–12.9)
POTASSIUM SERPL-SCNC: 3.8 MMOL/L (ref 3.5–5.1)
PROT SERPL-MCNC: 7.6 G/DL (ref 6.4–8.2)
RBC # BLD AUTO: 2.75 M/UL (ref 4.1–5.7)
RBC MORPH BLD: ABNORMAL
RBC MORPH BLD: ABNORMAL
SODIUM SERPL-SCNC: 135 MMOL/L (ref 136–145)
WBC # BLD AUTO: 6.5 K/UL (ref 4.1–11.1)

## 2022-10-27 NOTE — PROGRESS NOTES
Called patient and spoke to wife regarding the blood work results. There was a very slight improvement in the liver enzymes/function. MELD is down to 24 from 27.

## 2022-11-01 ENCOUNTER — OFFICE VISIT (OUTPATIENT)
Dept: HEMATOLOGY | Age: 52
End: 2022-11-01

## 2022-11-01 DIAGNOSIS — K70.30 ALCOHOLIC CIRRHOSIS OF LIVER WITHOUT ASCITES (HCC): ICD-10-CM

## 2022-11-01 DIAGNOSIS — Z00.6 RESEARCH EXAM: Primary | ICD-10-CM

## 2022-11-01 LAB
ALBUMIN SERPL-MCNC: 2.7 G/DL (ref 3.5–5)
ALBUMIN/GLOB SERPL: 0.6 {RATIO} (ref 1.1–2.2)
ALP SERPL-CCNC: 218 U/L (ref 45–117)
ALT SERPL-CCNC: 47 U/L (ref 12–78)
ANION GAP SERPL CALC-SCNC: 8 MMOL/L (ref 5–15)
AST SERPL-CCNC: 142 U/L (ref 15–37)
BILIRUB DIRECT SERPL-MCNC: 13.1 MG/DL (ref 0–0.2)
BILIRUB SERPL-MCNC: 16.9 MG/DL (ref 0.2–1)
BUN SERPL-MCNC: 5 MG/DL (ref 6–20)
BUN/CREAT SERPL: 9 (ref 12–20)
CALCIUM SERPL-MCNC: 9.3 MG/DL (ref 8.5–10.1)
CHLORIDE SERPL-SCNC: 99 MMOL/L (ref 97–108)
CO2 SERPL-SCNC: 28 MMOL/L (ref 21–32)
CREAT SERPL-MCNC: 0.58 MG/DL (ref 0.7–1.3)
ERYTHROCYTE [DISTWIDTH] IN BLOOD BY AUTOMATED COUNT: 14.3 % (ref 11.5–14.5)
GLOBULIN SER CALC-MCNC: 4.7 G/DL (ref 2–4)
GLUCOSE SERPL-MCNC: 115 MG/DL (ref 65–100)
HCT VFR BLD AUTO: 28.7 % (ref 36.6–50.3)
HGB BLD-MCNC: 10 G/DL (ref 12.1–17)
INR PPP: 1.4 (ref 0.9–1.1)
MCH RBC QN AUTO: 36.6 PG (ref 26–34)
MCHC RBC AUTO-ENTMCNC: 34.8 G/DL (ref 30–36.5)
MCV RBC AUTO: 105.1 FL (ref 80–99)
NRBC # BLD: 0 K/UL (ref 0–0.01)
NRBC BLD-RTO: 0 PER 100 WBC
PLATELET # BLD AUTO: 258 K/UL (ref 150–400)
PMV BLD AUTO: 11.8 FL (ref 8.9–12.9)
POTASSIUM SERPL-SCNC: 3.7 MMOL/L (ref 3.5–5.1)
PROT SERPL-MCNC: 7.4 G/DL (ref 6.4–8.2)
PROTHROMBIN TIME: 14.5 SEC (ref 9–11.1)
RBC # BLD AUTO: 2.73 M/UL (ref 4.1–5.7)
SODIUM SERPL-SCNC: 135 MMOL/L (ref 136–145)
WBC # BLD AUTO: 6.5 K/UL (ref 4.1–11.1)

## 2022-11-01 PROCEDURE — 99214 OFFICE O/P EST MOD 30 MIN: CPT | Performed by: PHYSICIAN ASSISTANT

## 2022-11-01 RX ORDER — LISINOPRIL 10 MG/1
10 TABLET ORAL DAILY
Qty: 30 TABLET | Refills: 2 | Status: SHIPPED | OUTPATIENT
Start: 2022-11-01

## 2022-11-01 RX ORDER — TRAMADOL HYDROCHLORIDE 50 MG/1
50 TABLET ORAL
Qty: 30 TABLET | Refills: 1 | Status: SHIPPED | OUTPATIENT
Start: 2022-11-01 | End: 2022-12-01

## 2022-11-01 NOTE — PROGRESS NOTES
[facility] [facility], MD, FACP, [place] D[place], [place]      Marleni Warner, MIKAL Rajput, [facility]-BC   Yuridia Bautista, [facility]   Noemy Pitts, FNP-C   Hero Carlin, FNP-C    Gean Cheadle, [facility]       [facility] 136    at [place] [address] [address] [facility], [address] [facility], [place]  22.    [phone]    FAX: [address] [place] [address]    [address], [address] [place] [address], [address]    [phone]    FAX: Lacie Moncada CLINICAL RESEARCH NOTE    Maria A Cespedes is a 46 y.o. male with acute alcohol hepatitis. The patient was seen today for a day 28 visit in the Durect clinical trial. Patient was given prednisone during hospitalization for a DF of 59. A one time infusion was given as part of the DURECT protocol 10/06/2022. Today's visit was conducted per the study protocol. The patient was asked if any symptoms, side effects or adverse events have occurred since the last study visit. A physical examination was performed. All symptoms reported by the patient and the findings of the physical examination were recorded in the clinical trial documents. Symptoms of clinical significance were:  Improved regulation in bowel output since last office visit. He is now taking 30 mL daily lactulose and having 2-3 movements daily. He has had no increased confusion. Abdominal pain primarily epigastric - not related to meals or positioning. This is most likely due to hepatomegaly. Findings on physical examination of clinical significance were: Sclera icteric. Hepatomegaly on exam 2-3 fingers BCM. Since the last office visit the patient has remained abstinent from all alcohol since 9/24/2022. Consumption was 10 beers and 4 mixed drinks daily for 4 year.  The patient was also taking 3.5-4.5 gm of tylenol daily for treatment of severe abdominal pain. He is not currently enrolled in an IOP or therapy. Appetite is good. He was advised to have restricted free water intake due to hyponatremia at past visits and has had some improvement in Na levels on recent labs. The patient was released to light duty 2 weeks ago and has been doing well with this schedule. Will continue light duty at this time and monitor closely. The patient has cirrhosis and is in need of the following screening: Patient noted to have elevation in AFP L3%. He has had CT in the hospital (with contrast),this showed small hypodensities too small to characterize. Will proceed with MRI to better evaluate these findings and to rule out secondary cause for continued elevation in bilirubin. Liver [place] of [address] [place] [address] Ref Rng & Units 10/26/2022 10/19/2022   WBC 4.1 - 11.1 K/uL 6.5 6.9   ANC 1.8 - 8.0 K/UL 4.6 5.7   HGB 12.1 - 17.0 g/dL 10.0 (L) 10.2 (L)    - 400 K/uL 297 267   INR 0.9 - 1.1   1.6 (H) 1.6 (H)   AST 15 - 37 U/L 170 (H) 297 (H)   ALT 12 - 78 U/L 69 112 (H)   Alk Phos 45 - 117 U/L 203 (H) 214 (H)   Bili, Total 0.2 - 1.0 MG/DL 18.8 (H) 19.8 (H)   Bili, Direct 0.0 - 0.2 MG/DL 14.1 (H) 14.3 (H)   Albumin 3.5 - 5.0 g/dL 2.9 (L) 3.4 (L)   BUN 6 - 20 MG/DL 6 6   Creat 0.70 - 1.30 MG/DL 0.58 (L) 0.56 (L)   Na 136 - 145 mmol/L 135 (L) 130 (L)   K 3.5 - 5.1 mmol/L 3.8 3.9   Cl 97 - 108 mmol/L 99 93 (L)   CO2 21 - 32 mmol/L 30 28   Glucose 65 - 100 mg/dL 103 (H) 82   Magnesium 1.6 - 2.4 mg/dL     Ammonia <32 UMOL/L  42 (H)     Cancer Screening Latest Ref Rng & Units 10/19/2022   AFP, Serum 0.0 - 8.4 ng/mL 7.7   AFP-L3% 0.0 - 9.9 % 23.4 (H)     The CTP score is 10. Child's Class C. The MELD score is 24 as of 10/26/2022    Discussed the transplant process. If the liver enzymes/function do not start showing improvement will start transplant evaluation. Endoscopic Procedures:  3/2022.  EGD by  Alvarez. No varices. Hypnatremia  The sodium has been ranging 125-130 on past labs, improved to 135 as of 10/26. Will order local labs today. Imagin/2022. CT of abdomen with contrast. Hepatomegaly with cirrhotic change and ascites. Several too small to fully characterize rounded hypodensities redemonstrated without significant change. Anemia   This is due to multifactorial causes including portal hypertension with chronic GI blood loss, bone marrow suppression secondary to alcohol. Ferritin 1222  Iron Sat 45%  This is most likely elevated due to alcohol. The HGB is 10.0. Will consider ordering B12 and Folate. Epigastric Pain  Will change omeprazole to pantoprazole 40 mg. Advised he take 30 min before breakfast. Lipase normal.  He has yet to make this change as he is using up the last 3-4 days of omeprazole. He has had tramadol for prn use in the past and this has been helpful. Will provide refill at present. Hypertension  Will plan on starting lisinopril 10 mg daily. This has been noted on several past visits and he has never been on medications in the past.  He has noted ongoing mild headaches. 146/97 in office today. He has no scheduled follow-up with PCP at present. Will start and have him monitor BP at home and return as scheduled in 2-3 weeks. Plan: Get local blood work and MRI. Return per protocol in 2 weeks. MRI in meantime. Documentation reviewed and updated to reflect current, accurate patient information.     Christine Landaverde PA-C  Liver [place] [facility] 59, [address] [place] [address].  [phone]  [address] [place] [address]

## 2022-11-02 NOTE — PROGRESS NOTES
Current MELD is 22 (down from 24). Will proceed with MRI and follow-up in 2 wks as per protocol. Pt advised.

## 2022-11-08 ENCOUNTER — HOSPITAL ENCOUNTER (OUTPATIENT)
Dept: MRI IMAGING | Age: 52
Discharge: HOME | End: 2022-11-08
Attending: PHYSICIAN ASSISTANT
Payer: COMMERCIAL

## 2022-11-08 DIAGNOSIS — K70.30 ALCOHOLIC CIRRHOSIS OF LIVER WITHOUT ASCITES (HCC): ICD-10-CM

## 2022-11-08 PROCEDURE — A9576 INJ PROHANCE MULTIPACK: HCPCS

## 2022-11-08 PROCEDURE — 74011000258 HC RX REV CODE- 258: Performed by: PHYSICIAN ASSISTANT

## 2022-11-08 PROCEDURE — 74011250636 HC RX REV CODE- 250/636

## 2022-11-08 PROCEDURE — 74183 MRI ABD W/O CNTR FLWD CNTR: CPT

## 2022-11-08 RX ORDER — SODIUM CHLORIDE 0.9 % (FLUSH) 0.9 %
10 SYRINGE (ML) INJECTION
Status: DISCONTINUED | OUTPATIENT
Start: 2022-11-08 | End: 2022-11-09 | Stop reason: HOSPADM

## 2022-11-08 RX ADMIN — GADOTERIDOL 15 ML: 279.3 INJECTION, SOLUTION INTRAVENOUS at 18:57

## 2022-11-08 RX ADMIN — SODIUM CHLORIDE 100 ML: 900 INJECTION, SOLUTION INTRAVENOUS at 18:58

## 2022-11-11 NOTE — PROGRESS NOTES
Pt notified via [address] letter of findings, no concerning liver mass/lesion. +Gallstones without inflammation/infection. Follow-up per protocol.

## 2022-11-21 ENCOUNTER — OFFICE VISIT (OUTPATIENT)
Dept: HEMATOLOGY | Age: 52
End: 2022-11-21

## 2022-11-21 DIAGNOSIS — Z00.6 RESEARCH EXAM: Primary | ICD-10-CM

## 2022-11-21 DIAGNOSIS — L29.9 ITCHING: ICD-10-CM

## 2022-11-21 DIAGNOSIS — K70.30 ALCOHOLIC CIRRHOSIS OF LIVER WITHOUT ASCITES (HCC): ICD-10-CM

## 2022-11-21 LAB
ALBUMIN SERPL-MCNC: 2.3 G/DL (ref 3.5–5)
ALBUMIN/GLOB SERPL: 0.4 {RATIO} (ref 1.1–2.2)
ALP SERPL-CCNC: 188 U/L (ref 45–117)
ALT SERPL-CCNC: 27 U/L (ref 12–78)
ANION GAP SERPL CALC-SCNC: 5 MMOL/L (ref 5–15)
AST SERPL-CCNC: 63 U/L (ref 15–37)
BASOPHILS # BLD: 0.1 K/UL (ref 0–0.1)
BASOPHILS NFR BLD: 2 % (ref 0–1)
BILIRUB DIRECT SERPL-MCNC: 7.5 MG/DL (ref 0–0.2)
BILIRUB SERPL-MCNC: 9.3 MG/DL (ref 0.2–1)
BUN SERPL-MCNC: 7 MG/DL (ref 6–20)
BUN/CREAT SERPL: 11 (ref 12–20)
CALCIUM SERPL-MCNC: 9.3 MG/DL (ref 8.5–10.1)
CHLORIDE SERPL-SCNC: 99 MMOL/L (ref 97–108)
CO2 SERPL-SCNC: 31 MMOL/L (ref 21–32)
CREAT SERPL-MCNC: 0.63 MG/DL (ref 0.7–1.3)
DIFFERENTIAL METHOD BLD: ABNORMAL
EOSINOPHIL # BLD: 0.1 K/UL (ref 0–0.4)
EOSINOPHIL NFR BLD: 2 % (ref 0–7)
ERYTHROCYTE [DISTWIDTH] IN BLOOD BY AUTOMATED COUNT: 13.5 % (ref 11.5–14.5)
GLOBULIN SER CALC-MCNC: 5.4 G/DL (ref 2–4)
GLUCOSE SERPL-MCNC: 103 MG/DL (ref 65–100)
HCT VFR BLD AUTO: 28.3 % (ref 36.6–50.3)
HGB BLD-MCNC: 9.8 G/DL (ref 12.1–17)
IMM GRANULOCYTES # BLD AUTO: 0 K/UL (ref 0–0.04)
IMM GRANULOCYTES NFR BLD AUTO: 1 % (ref 0–0.5)
LYMPHOCYTES # BLD: 0.6 K/UL (ref 0.8–3.5)
LYMPHOCYTES NFR BLD: 17 % (ref 12–49)
MCH RBC QN AUTO: 36 PG (ref 26–34)
MCHC RBC AUTO-ENTMCNC: 34.6 G/DL (ref 30–36.5)
MCV RBC AUTO: 104 FL (ref 80–99)
MONOCYTES # BLD: 0.4 K/UL (ref 0–1)
MONOCYTES NFR BLD: 11 % (ref 5–13)
NEUTS SEG # BLD: 2.2 K/UL (ref 1.8–8)
NEUTS SEG NFR BLD: 67 % (ref 32–75)
NRBC # BLD: 0 K/UL (ref 0–0.01)
NRBC BLD-RTO: 0 PER 100 WBC
PLATELET # BLD AUTO: 157 K/UL (ref 150–400)
PMV BLD AUTO: 11.7 FL (ref 8.9–12.9)
POTASSIUM SERPL-SCNC: 4.1 MMOL/L (ref 3.5–5.1)
PROT SERPL-MCNC: 7.7 G/DL (ref 6.4–8.2)
RBC # BLD AUTO: 2.72 M/UL (ref 4.1–5.7)
RBC MORPH BLD: ABNORMAL
SODIUM SERPL-SCNC: 135 MMOL/L (ref 136–145)
WBC # BLD AUTO: 3.4 K/UL (ref 4.1–11.1)

## 2022-11-21 PROCEDURE — 99215 OFFICE O/P EST HI 40 MIN: CPT | Performed by: NURSE PRACTITIONER

## 2022-11-21 RX ORDER — HYDROXYZINE HYDROCHLORIDE 10 MG/1
10 TABLET, FILM COATED ORAL
Qty: 30 TABLET | Refills: 3 | Status: SHIPPED | OUTPATIENT
Start: 2022-11-21

## 2022-11-21 NOTE — PROGRESS NOTES
[address], MD, FACP, [place], [place]      MIKAL Pineda, [facility]   Macrina Munoz, [facility]   Rita Child, JUDE-C   Adrian Li, JUDE-FRANKLIN Mulligan, [facility]       Butch Corrales [facility] 136    at [address], [place]  22.    [phone]    FAX: [address] [place] [address], [address]    [phone]    FAX: Dexter Porter 144 CLINICAL RESEARCH NOTE    Garvin Merlin is a 46 y.o. male with acute alcohol hepatitis. The patient was seen today for a day 45 visit in the Durect clinical trial. Patient was given prednisone during hospitalization for a DF of 59. A one time infusion was given as part of the DURECT protocol 10/06/2022. Today's visit was conducted per the study protocol. The patient was asked if any symptoms, side effects or adverse events have occurred since the last study visit. A physical examination was performed. All symptoms reported by the patient and the findings of the physical examination were recorded in the clinical trial documents. Symptoms of clinical significance were:  Itching, abdominal pain. No symptoms of HE. Findings on physical examination of clinical significance were: Sclera icteric. Abdominal tenderness with palpation, liver 2 finger BCM. The patient has remained abstinent from all alcohol since 9/24/2022. Previous consumption consisted of 10 beers and 4 mixed drinks daily for 4 year. The patient was also taking 3.5-4.5 gm of tylenol daily for treatment of severe abdominal pain. He is not currently enrolled in an IOP or therapy. Appetite is good.  He was advised to have restricted free water intake due to hyponatremia at past visits and has had some improvement in Na levels on recent labs. The patient was released to light duty 2 weeks ago and has been doing well with this schedule. Will continue on light duty at this time. The patient has cirrhosis and is in need of the following screening: Patient noted to have elevation in AFP L3%. He has had CT in the hospital (with contrast),this showed small hypodensities too small to characterize. MRI was performed 11/2022. Small cysts and hepatic hemangioma. No concerning liver mass or lesion.      Liver [place] [address] Units 11/1/2022 10/26/2022   WBC 4.1 - 11.1 K/uL 6.5 6.5   ANC 1.8 - 8.0 K/UL  4.6   HGB 12.1 - 17.0 g/dL 10.0 (L) 10.0 (L)    - 400 K/uL 258 297   INR 0.9 - 1.1   1.4 (H) 1.6 (H)   AST 15 - 37 U/L 142 (H) 170 (H)   ALT 12 - 78 U/L 47 69   Alk Phos 45 - 117 U/L 218 (H) 203 (H)   Bili, Total 0.2 - 1.0 MG/DL 16.9 (H) 18.8 (H)   Bili, Direct 0.0 - 0.2 MG/DL 13.1 (H) 14.1 (H)   Albumin 3.5 - 5.0 g/dL 2.7 (L) 2.9 (L)   BUN 6 - 20 MG/DL 5 (L) 6   Creat 0.70 - 1.30 MG/DL 0.58 (L) 0.58 (L)   Na 136 - 145 mmol/L 135 (L) 135 (L)   K 3.5 - 5.1 mmol/L 3.7 3.8   Cl 97 - 108 mmol/L 99 99   CO2 21 - 32 mmol/L 28 30   Glucose 65 - 100 mg/dL 115 (H) 103 (H)     Liver [facility] Latest Ref Rng & Units 10/19/2022   WBC 4.1 - 11.1 K/uL 6.9   ANC 1.8 - 8.0 K/UL 5.7   HGB 12.1 - 17.0 g/dL 10.2 (L)    - 400 K/uL 267   INR 0.9 - 1.1   1.6 (H)   AST 15 - 37 U/L 297 (H)   ALT 12 - 78 U/L 112 (H)   Alk Phos 45 - 117 U/L 214 (H)   Bili, Total 0.2 - 1.0 MG/DL 19.8 (H)   Bili, Direct 0.0 - 0.2 MG/DL 14.3 (H)   Albumin 3.5 - 5.0 g/dL 3.4 (L)   BUN 6 - 20 MG/DL 6   Creat 0.70 - 1.30 MG/DL 0.56 (L)   Na 136 - 145 mmol/L 130 (L)   K 3.5 - 5.1 mmol/L 3.9   Cl 97 - 108 mmol/L 93 (L)   CO2 21 - 32 mmol/L 28   Glucose 65 - 100 mg/dL 82     Cancer Screening Latest Ref Rng & Units 10/19/2022   AFP, Serum 0.0 - 8.4 ng/mL 7.7   AFP-L3% 0.0 - 9.9 % 23.4 (H)     The CTP score is 10. Child's Class C. The MELD score is 11 as of 2022    Discussed the transplant process. If the liver enzymes/function do not start showing improvement will start transplant evaluation. Endoscopic Procedures:  3/2022. EGD by Dr. Lennox Pian. No varices. Hyponatremia  Improved with the most recent blood work. Imagin/2022. CT of abdomen with contrast. Hepatomegaly with cirrhotic change and ascites. Several too small to fully characterize rounded hypodensities redemonstrated without significant change. 2022. MRI of liver. Changes consistent with cirrhosis. Hepatomegaly. Small cysts, hemangioma. No concerning liver mass or lesion. Anemia   This is due to multifactorial causes including portal hypertension with chronic GI blood loss, bone marrow suppression secondary to alcohol. Ferritin 1222  Iron Sat 45%  This is most likely elevated due to alcohol. The HGB is 10.0. Epigastric Pain  Well controlled on pantoprazole 40 mg daily. Will continue at the current dose. Hypertension  Improved with lisinopril 10 mg daily. Plan: Get local labs    Return per protocol in 2 weeks. SHAYY Dominguez-[facility] of [address] [address] [place], [address] [place]  [address] [facility]

## 2022-11-23 NOTE — PROGRESS NOTES
Letter sent via my chart. The INR was not collected properly therefore unable to check MELD. The liver enzymes and function have improved. Will repeat at the next visit.

## 2022-12-07 ENCOUNTER — OFFICE VISIT (OUTPATIENT)
Dept: HEMATOLOGY | Age: 52
End: 2022-12-07

## 2022-12-07 DIAGNOSIS — K70.30 ALCOHOLIC CIRRHOSIS OF LIVER WITHOUT ASCITES (HCC): Primary | ICD-10-CM

## 2022-12-07 DIAGNOSIS — Z00.6 RESEARCH EXAM: ICD-10-CM

## 2022-12-07 PROCEDURE — 99214 OFFICE O/P EST MOD 30 MIN: CPT | Performed by: NURSE PRACTITIONER

## 2022-12-08 LAB
INR PPP: 1.2 (ref 0.9–1.2)
PROTHROMBIN TIME: 12.9 SEC (ref 9.1–12)

## 2022-12-09 LAB
ALBUMIN SERPL-MCNC: 2.9 G/DL (ref 3.8–4.9)
ALP SERPL-CCNC: 159 IU/L (ref 44–121)
ALT SERPL-CCNC: 14 IU/L (ref 0–44)
AST SERPL-CCNC: 38 IU/L (ref 0–40)
BASOPHILS # BLD AUTO: 0 X10E3/UL (ref 0–0.2)
BASOPHILS NFR BLD AUTO: 1 %
BILIRUB DIRECT SERPL-MCNC: 3.59 MG/DL (ref 0–0.4)
BILIRUB SERPL-MCNC: 4.8 MG/DL (ref 0–1.2)
BUN SERPL-MCNC: 6 MG/DL (ref 6–24)
BUN/CREAT SERPL: 9 (ref 9–20)
CALCIUM SERPL-MCNC: 9.3 MG/DL (ref 8.7–10.2)
CHLORIDE SERPL-SCNC: 103 MMOL/L (ref 96–106)
CO2 SERPL-SCNC: 23 MMOL/L (ref 20–29)
CREAT SERPL-MCNC: 0.66 MG/DL (ref 0.76–1.27)
EGFR: 113 ML/MIN/1.73
EOSINOPHIL # BLD AUTO: 0.1 X10E3/UL (ref 0–0.4)
EOSINOPHIL NFR BLD AUTO: 2 %
ERYTHROCYTE [DISTWIDTH] IN BLOOD BY AUTOMATED COUNT: 12.3 % (ref 11.6–15.4)
GLUCOSE SERPL-MCNC: 87 MG/DL (ref 70–99)
HCT VFR BLD AUTO: 25.6 % (ref 37.5–51)
HGB BLD-MCNC: 9.1 G/DL (ref 13–17.7)
IMM GRANULOCYTES # BLD AUTO: 0 X10E3/UL (ref 0–0.1)
IMM GRANULOCYTES NFR BLD AUTO: 0 %
LYMPHOCYTES # BLD AUTO: 0.4 X10E3/UL (ref 0.7–3.1)
LYMPHOCYTES NFR BLD AUTO: 14 %
MCH RBC QN AUTO: 36.4 PG (ref 26.6–33)
MCHC RBC AUTO-ENTMCNC: 35.5 G/DL (ref 31.5–35.7)
MCV RBC AUTO: 102 FL (ref 79–97)
MONOCYTES # BLD AUTO: 0.4 X10E3/UL (ref 0.1–0.9)
MONOCYTES NFR BLD AUTO: 15 %
NEUTROPHILS # BLD AUTO: 1.8 X10E3/UL (ref 1.4–7)
NEUTROPHILS NFR BLD AUTO: 68 %
PLATELET # BLD AUTO: 155 X10E3/UL (ref 150–450)
POTASSIUM SERPL-SCNC: 3.8 MMOL/L (ref 3.5–5.2)
PROT SERPL-MCNC: 6.5 G/DL (ref 6–8.5)
RBC # BLD AUTO: 2.5 X10E6/UL (ref 4.14–5.8)
SODIUM SERPL-SCNC: 137 MMOL/L (ref 134–144)
WBC # BLD AUTO: 2.7 X10E3/UL (ref 3.4–10.8)

## 2022-12-12 ENCOUNTER — OFFICE VISIT (OUTPATIENT)
Dept: ORTHOPEDIC SURGERY | Age: 52
End: 2022-12-12
Payer: COMMERCIAL

## 2022-12-12 VITALS — HEIGHT: 67 IN | WEIGHT: 157 LBS | BODY MASS INDEX: 24.64 KG/M2

## 2022-12-12 DIAGNOSIS — M19.011 PRIMARY OSTEOARTHRITIS, RIGHT SHOULDER: ICD-10-CM

## 2022-12-12 DIAGNOSIS — M25.511 CHRONIC RIGHT SHOULDER PAIN: Primary | ICD-10-CM

## 2022-12-12 DIAGNOSIS — G89.29 CHRONIC RIGHT SHOULDER PAIN: Primary | ICD-10-CM

## 2022-12-12 RX ORDER — BUPIVACAINE HYDROCHLORIDE 7.5 MG/ML
3 INJECTION, SOLUTION EPIDURAL; RETROBULBAR ONCE
Status: COMPLETED | OUTPATIENT
Start: 2022-12-12 | End: 2022-12-12

## 2022-12-12 RX ORDER — METHYLPREDNISOLONE ACETATE 40 MG/ML
80 INJECTION, SUSPENSION INTRA-ARTICULAR; INTRALESIONAL; INTRAMUSCULAR; SOFT TISSUE ONCE
Status: COMPLETED | OUTPATIENT
Start: 2022-12-12 | End: 2022-12-12

## 2022-12-12 RX ADMIN — METHYLPREDNISOLONE ACETATE 80 MG: 40 INJECTION, SUSPENSION INTRA-ARTICULAR; INTRALESIONAL; INTRAMUSCULAR; SOFT TISSUE at 16:42

## 2022-12-12 RX ADMIN — BUPIVACAINE HYDROCHLORIDE 22.5 MG: 7.5 INJECTION, SOLUTION EPIDURAL; RETROBULBAR at 16:42

## 2022-12-12 NOTE — PROGRESS NOTES
Letter sent to patient via [employer], liver numbers slightly improved will continue to monitor.

## 2022-12-12 NOTE — LETTER
12/12/2022    Patient: Ya Jean   YOB: 1970   Date of Visit: 12/12/2022     Jose Francisco Segundo, [address]  [address] [id number]  Via Fax: [phone]    Dear Jose Francisco Segundo MD,      Thank you for referring Mr. Ya Jean to [facility] for evaluation. My notes for this consultation are attached. If you have questions, please do not hesitate to call me. I look forward to following your patient along with you.       Sincerely,    Sayra Mccabe, DO

## 2022-12-12 NOTE — PROGRESS NOTES
Argentina Perez (: 1970) is a 46 y.o. male, established patient, here for evaluation of the following chief complaint(s):  Shoulder Pain (Right Shoulder Pain)       ASSESSMENT/PLAN:  Below is the assessment and plan developed based on review of pertinent history, physical exam, labs, studies, and medications. We discussed his chronic right shoulder pain and his x-ray findings. 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 We discussed surgical and conservative treatment options. He elected to try corticosteroid injection today. We discussed the risks and benefits of injection and informed consent was obtained. After a sterile preparation, 3 cc of bupivicaine and 80 mg of Depo-Medrol were injected into the right shoulder. The patient tolerated the procedure well and there were no complications. Post injection pain, blood sugar elevation, skin discoloration, fatty atrophy and the signs of infection were discussed in detail. The patient was instructed to contact us if there were any questions or concerns prior to their follow up appointment. 1. Chronic right shoulder pain  -     XR SHOULDER RT AP/LAT MIN 2 V; Future  2. Primary osteoarthritis, right shoulder      Return in about 3 months (around 3/12/2023), or if symptoms worsen or fail to improve. SUBJECTIVE/OBJECTIVE:  Argentina Perez (: 1970) is a 46 y.o. male. He notes aching pain in the right shoulder for a number of years. He notes occasional clicking and popping in the shoulder. He denies any specific injury recently but notes that he used to do some weightlifting. No Known Allergies    Current Outpatient Medications   Medication Sig    hydrOXYzine HCL (ATARAX) 10 mg tablet Take 1 Tablet by mouth three (3) times daily as needed for Itching. lisinopriL (PRINIVIL, ZESTRIL) 10 mg tablet Take 1 Tablet by mouth daily. thiamine mononitrate (B-1) 100 mg tablet Take 1 Tablet by mouth daily. folic acid (FOLVITE) 1 mg tablet Take 1 Tablet by mouth daily. cyanocobalamin (VITAMIN B12) 500 mcg tablet Take 1 Tablet by mouth daily. pantoprazole (PROTONIX) 40 mg tablet Take 1 Tablet by mouth daily. lactulose (CHRONULAC) 10 gram/15 mL solution Take 15 mL by mouth daily. ondansetron (ZOFRAN ODT) 4 mg disintegrating tablet Take 1 Tab by mouth every eight (8) hours as needed for Nausea. No current facility-administered medications for this visit. Social History     Socioeconomic History    Marital status:      Spouse name: Not on file    Number of children: Not on file    Years of education: Not on file    Highest education level: Not on file   Occupational History    Not on file   Tobacco Use    Smoking status: Never    Smokeless tobacco: Never   Substance and Sexual Activity    Alcohol use: Yes     Alcohol/week: 6.0 standard drinks     Types: 6 Cans of beer per week    Drug use: No    Sexual activity: Yes   Other Topics Concern    Not on file   Social History Narrative    Not on file     Social Determinants of Health     Financial Resource Strain: Not on file   Food Insecurity: Not on file   Transportation Needs: Not on file   Physical Activity: Not on file   Stress: Not on file   Social Connections: Not on file   Intimate Partner Violence: Not on file   Housing Stability: Not on file       History reviewed. No pertinent surgical history. History reviewed. No pertinent family history. REVIEW OF SYSTEMS:    Patient denies any recent fever, chills, nausea, vomiting, chest pain, or shortness of breath. Vitals:  Ht 5' 7\" (1.702 m)   Wt 157 lb (71.2 kg)   BMI 24.59 kg/m²    Body mass index is 24.59 kg/m². PHYSICAL EXAM:  General exam: Patient is awake, alert, and oriented x3. Well-appearing. No acute distress. Ambulates with a normal gait. Heart/Lungs:  no respiratory distress, palpable pulses    Right shoulder: Neurovascular and sensory intact.   There is decreased range of motion in all planes on active and passive exam.  There is crepitus with range of motion of the shoulder. There is pain with impingement testing including Bradley exam.  There is weakness noted with resisted abduction and resisted external rotation on exam.  Normal stability is noted. IMAGING:    XR Results (most recent):  Results from Appointment encounter on 12/12/22    XR SHOULDER RT AP/LAT MIN 2 V    Narrative  X-rays of the right shoulder 3 views done today show advanced glenohumeral joint space narrowing and osteophyte formation bone-on-bone. There is evidence of subchondral lucencies at the humeral head and glenoid. Results from [place] encounter on 09/28/22    XR CHEST PORT    Narrative  EXAM: XR CHEST PORT    DATE: 10/5/2022 11:32 AM    INDICATION: SOB    COMPARISON: None. TECHNIQUE: A portable AP radiograph of the chest was obtained. FINDINGS:  Cardiomediastinal silhouette is unremarkable. No pulmonary edema. No focal  consolidations or pleural effusions. Mild atelectasis at the left lung base. Bones and soft tissues are unremarkable. Impression  Normal chest.      XR ABD (KUB)    Narrative  PROCEDURE: XR ABD (KUB)    COMPARISON: Ultrasound dated 9/26/2022    REASON FOR STUDY: abdmoinal pain, no flatus    FINDINGS: Single AP portable supine view the abdomen demonstrates an enlarged  liver. No evidence of mechanical bowel obstruction. No pathologic calcifications  identified. Impression  Hepatomegaly. No acute pathology. Orders Placed This Encounter    XR SHOULDER RT AP/LAT MIN 2 V     Standing Status:   Future     Number of Occurrences:   1     Standing Expiration Date:   12/13/2023              An electronic signature was used to authenticate this note.   -- Ashok Perez, DO

## 2022-12-20 ENCOUNTER — OFFICE VISIT (OUTPATIENT)
Dept: HEMATOLOGY | Age: 52
End: 2022-12-20

## 2022-12-20 DIAGNOSIS — K70.30 ALCOHOLIC CIRRHOSIS OF LIVER WITHOUT ASCITES (HCC): Primary | ICD-10-CM

## 2022-12-20 DIAGNOSIS — Z00.6 RESEARCH EXAM: ICD-10-CM

## 2022-12-20 PROCEDURE — 99213 OFFICE O/P EST LOW 20 MIN: CPT | Performed by: NURSE PRACTITIONER

## 2022-12-20 NOTE — PROGRESS NOTES
[address], MD, FACP, [place], [place]      MIKAL Vizcarra, [facility]-BC   Sunny [facility]   Karla Stein, FNJUDE-C   Caitlyn Parker FNJUDE-C    Sebastien Vinay, [facility]       Butch [facility] 136    at [address], [facility] [place]  22.    [phone]    FAX: [address] [place] [address], [address], [address]    [phone]    FAX: Dexter Porter 144 CLINICAL RESEARCH NOTE    Frank Vargas is a 46 y.o. male with acute alcohol hepatitis. The patient was seen today for a day 75 visit in the Durect clinical trial. Patient was given prednisone during hospitalization for a DF of 59. A one time infusion was given as part of the DURECT protocol 10/06/2022. Today's visit was conducted per the study protocol. The patient was asked if any symptoms, side effects or adverse events have occurred since the last study visit. A physical examination was deferred. All symptoms reported by the patient and the findings of the physical examination were recorded in the clinical trial documents. Symptoms of clinical significance were:  Itching, well controlled with atarax. No symptoms of HE. The patient has a persistent tooth abscess for which he has taken two rounds of antibiotics. He is in contact with his dentist about extraction. The patient was released to light duty several weeks ago and has been doing well with this schedule. Will continue on light duty at this time. Feeling better and wanting to go to full time. Reports stable energy level. Appetite is good. In clinic today patient is complaining of R shoulder pain. He recently had cortisone injections for bone spurs in the R shoulder. The patient had  previously remained abstinent from all alcohol since 9/24/2022. Previous consumption consisted of 10 beers and 4 mixed drinks daily for 4 year. The patient was also taking 3.5-4.5 gm of tylenol daily for treatment of severe abdominal pain. He is not currently enrolled in an IOP or therapy. Patient initially denied alcohol use at today's visit but breathalyzer performed in clinic had a positive result. When asked, patient did endorse drinking, saying he had a 12 oz beer the evening prior. Patient was informed he could not drive himself from clinic and we contacted his wife who came to pick him up. We discussed the impact of continued drinking on his health and trajectory of liver disease, as well as potential transplant candidacy. Reinforced the need for seeking treatment via IOP or therapy with both patient and his wife. The patient has cirrhosis and [facility]. Patient was noted to have elevation in AFP L3%. He has had CT in the hospital (with contrast),this showed small hypodensities too small to characterize. MRI was performed 11/2022. Small cysts and hepatic hemangioma. No concerning liver mass or lesion. Will repeat AFP at next visit.     Liver [place] of [address] Units 12/7/2022 11/21/2022   WBC 3.4 - 10.8 x10E3/uL 2.7 (L) 3.4 (L)   ANC 1.4 - 7.0 x10E3/uL 1.8 2.2   HGB 13.0 - 17.7 g/dL 9.1 (L) 9.8 (L)    - 450 x10E3/uL 155 157   INR 0.9 - 1.2 1.2    AST 0 - 40 IU/L 38 63 (H)   ALT 0 - 44 IU/L 14 27   Alk Phos 44 - 121 IU/L 159 (H) 188 (H)   Bili, Total 0.0 - 1.2 mg/dL 4.8 (H) 9.3 (H)   Bili, Direct 0.00 - 0.40 mg/dL 3.59 (H) 7.5 (H)   Albumin 3.8 - 4.9 g/dL 2.9 (L) 2.3 (L)   BUN 6 - 24 mg/dL 6 7   Creat 0.76 - 1.27 mg/dL 0.66 (L) 0.63 (L)   Na 134 - 144 mmol/L 137 135 (L)   K 3.5 - 5.2 mmol/L 3.8 4.1   Cl 96 - 106 mmol/L 103 99   CO2 20 - 29 mmol/L 23 31   Glucose 70 - 99 mg/dL 87 103 (H)     Cancer Screening Latest Ref Rng & Units 10/19/2022   AFP, Serum 0.0 - 8.4 ng/mL 7.7   AFP-L3% 0.0 - 9.9 % 23.4 (H)     The CTP score is 8. Child's Class B. The MELD score is 14 as of 2022. Endoscopic Procedures:  3/2022. EGD by Dr. Omer Hernández. No varices. Hyponatremia  Improved with the most recent blood work. Imagin/2022. CT of abdomen with contrast. Hepatomegaly with cirrhotic change and ascites. Several too small to fully characterize rounded hypodensities redemonstrated without significant change. 2022. MRI of liver. Changes consistent with cirrhosis. Hepatomegaly. Small cysts, hemangioma. No concerning liver mass or lesion. Anemia   This is due to multifactorial causes including portal hypertension with chronic GI blood loss, bone marrow suppression secondary to alcohol. Ferritin 1,222  Iron Sat 45%  This is most likely elevated due to alcohol. The HGB is 9.1. Will recheck Iron profile today. Epigastric Pain  Well controlled on pantoprazole 40 mg daily. Will continue at the current dose. Hypertension  Continue lisinopril 10 mg daily. Plan: Will obtain labs locally today. Return per protocol in 2 weeks.      Rosette Laureano, ACNPC-AG  [facility] of [address] [address] [place], [address] [place] [address] 22.  [phone]  [address] [facility]

## 2022-12-21 DIAGNOSIS — K76.82 HEPATIC ENCEPHALOPATHY: Primary | ICD-10-CM

## 2022-12-21 LAB
INR PPP: 1.3 (ref 0.9–1.2)
PROTHROMBIN TIME: 13.5 SEC (ref 9.1–12)

## 2022-12-21 RX ORDER — LACTULOSE 10 G/15ML
20 SOLUTION ORAL; RECTAL DAILY
Qty: 946 ML | Refills: 5 | Status: SHIPPED | OUTPATIENT
Start: 2022-12-21

## 2022-12-22 LAB
ALBUMIN SERPL-MCNC: 3.8 G/DL (ref 3.8–4.9)
ALP SERPL-CCNC: 190 IU/L (ref 44–121)
ALT SERPL-CCNC: 26 IU/L (ref 0–44)
AST SERPL-CCNC: 53 IU/L (ref 0–40)
BASOPHILS # BLD AUTO: 0 X10E3/UL (ref 0–0.2)
BASOPHILS NFR BLD AUTO: 1 %
BILIRUB DIRECT SERPL-MCNC: 2.04 MG/DL (ref 0–0.4)
BILIRUB SERPL-MCNC: 3.4 MG/DL (ref 0–1.2)
BUN SERPL-MCNC: 8 MG/DL (ref 6–24)
BUN/CREAT SERPL: 11 (ref 9–20)
CALCIUM SERPL-MCNC: 9.8 MG/DL (ref 8.7–10.2)
CHLORIDE SERPL-SCNC: 99 MMOL/L (ref 96–106)
CO2 SERPL-SCNC: 30 MMOL/L (ref 20–29)
CREAT SERPL-MCNC: 0.76 MG/DL (ref 0.76–1.27)
EGFR: 108 ML/MIN/1.73
EOSINOPHIL # BLD AUTO: 0 X10E3/UL (ref 0–0.4)
EOSINOPHIL NFR BLD AUTO: 0 %
ERYTHROCYTE [DISTWIDTH] IN BLOOD BY AUTOMATED COUNT: 11.5 % (ref 11.6–15.4)
FERRITIN SERPL-MCNC: 846 NG/ML (ref 30–400)
GLUCOSE SERPL-MCNC: 121 MG/DL (ref 70–99)
HCT VFR BLD AUTO: 33.6 % (ref 37.5–51)
HGB BLD-MCNC: 11.3 G/DL (ref 13–17.7)
IMM GRANULOCYTES # BLD AUTO: 0 X10E3/UL (ref 0–0.1)
IMM GRANULOCYTES NFR BLD AUTO: 0 %
IRON SATN MFR SERPL: 38 % (ref 15–55)
IRON SERPL-MCNC: 128 UG/DL (ref 38–169)
LYMPHOCYTES # BLD AUTO: 0.8 X10E3/UL (ref 0.7–3.1)
LYMPHOCYTES NFR BLD AUTO: 25 %
MCH RBC QN AUTO: 35.5 PG (ref 26.6–33)
MCHC RBC AUTO-ENTMCNC: 33.6 G/DL (ref 31.5–35.7)
MCV RBC AUTO: 106 FL (ref 79–97)
MONOCYTES # BLD AUTO: 0.3 X10E3/UL (ref 0.1–0.9)
MONOCYTES NFR BLD AUTO: 8 %
NEUTROPHILS # BLD AUTO: 2.1 X10E3/UL (ref 1.4–7)
NEUTROPHILS NFR BLD AUTO: 66 %
PLATELET # BLD AUTO: 253 X10E3/UL (ref 150–450)
POTASSIUM SERPL-SCNC: 4.2 MMOL/L (ref 3.5–5.2)
PROT SERPL-MCNC: 7.9 G/DL (ref 6–8.5)
RBC # BLD AUTO: 3.18 X10E6/UL (ref 4.14–5.8)
SODIUM SERPL-SCNC: 142 MMOL/L (ref 134–144)
TIBC SERPL-MCNC: 337 UG/DL (ref 250–450)
UIBC SERPL-MCNC: 209 UG/DL (ref 111–343)
WBC # BLD AUTO: 3.2 X10E3/UL (ref 3.4–10.8)

## 2022-12-23 LAB — ETHANOL BLD GC-MCNC: 0.18 %

## 2023-01-12 ENCOUNTER — OFFICE VISIT (OUTPATIENT)
Dept: HEMATOLOGY | Age: 53
End: 2023-01-12

## 2023-01-12 ENCOUNTER — DOCUMENTATION ONLY (OUTPATIENT)
Dept: HEMATOLOGY | Age: 53
End: 2023-01-12

## 2023-01-12 ENCOUNTER — HOSPITAL ENCOUNTER (OUTPATIENT)
Dept: NON INVASIVE DIAGNOSTICS | Age: 53
Discharge: HOME | End: 2023-01-12
Payer: COMMERCIAL

## 2023-01-12 ENCOUNTER — TRANSCRIBE ORDER (OUTPATIENT)
Dept: REGISTRATION | Age: 53
End: 2023-01-12

## 2023-01-12 DIAGNOSIS — Z00.6 RESEARCH EXAM: ICD-10-CM

## 2023-01-12 DIAGNOSIS — K70.30 ALCOHOLIC CIRRHOSIS OF LIVER WITHOUT ASCITES (HCC): Primary | ICD-10-CM

## 2023-01-12 DIAGNOSIS — Z00.6 RESEARCH EXAM: Primary | ICD-10-CM

## 2023-01-12 LAB
ATRIAL RATE: 84 BPM
CALCULATED P AXIS, ECG09: 42 DEGREES
CALCULATED R AXIS, ECG10: -15 DEGREES
CALCULATED T AXIS, ECG11: 35 DEGREES
DIAGNOSIS, 93000: NORMAL
P-R INTERVAL, ECG05: 156 MS
Q-T INTERVAL, ECG07: 384 MS
QRS DURATION, ECG06: 96 MS
QTC CALCULATION (BEZET), ECG08: 453 MS
VENTRICULAR RATE, ECG03: 84 BPM

## 2023-01-12 PROCEDURE — 93005 ELECTROCARDIOGRAM TRACING: CPT

## 2023-01-12 NOTE — PROGRESS NOTES
[facility] [address], MD, FACP, [facility], [place]      Jeronimo Herrera, MIKAL Rajput, [facility]   Jose Solis, [facility]   Isis De Leon, RIVER Perez [facility]NP-BC       [facility] 136    at [address], [address] Annabelle Schneider  22.    [phone]    FAX: [address] [place] [address]    [address], [facility], [address]    [phone]    FAX: [phone]     Had a beer 2 days ago, breathalizer positive today  Discussed support groups and he said he would do one. Having some confusion was not taking lactulose but will start again  Liver 3 fingers below CM  Final study visit    Markt 84 NOTE    Tamica Huynh is a 46 y.o. male with acute alcohol hepatitis. The patient was seen today for his final study visit in the Durect clinical trial. Patient was given prednisone during hospitalization for a DF of 59. A one time infusion was given as part of the DURECT protocol 10/06/2022. Today's visit was conducted per the study protocol. The patient was asked if any symptoms, side effects or adverse events have occurred since the last study visit. A physical examination was deferred. All symptoms reported by the patient and the findings of the physical examination were recorded in the clinical trial documents. Symptoms of clinical significance were:  Itching, well controlled with atarax. No symptoms of HE. The patient has a persistent tooth abscess for which he has taken two rounds of antibiotics. He is in contact with his dentist about extraction. The patient was released to light duty several weeks ago and has been doing well with this schedule. Will continue on light duty at this time. normal mood with appropriate affect Feeling better and wanting to go to full time. Reports stable energy level. Appetite is good. In clinic today patient is complaining of R shoulder pain. He recently had cortisone injections for bone spurs in the R shoulder. The patient had  previously remained abstinent from all alcohol since 9/24/2022. Previous consumption consisted of 10 beers and 4 mixed drinks daily for 4 year. The patient was also taking 3.5-4.5 gm of tylenol daily for treatment of severe abdominal pain. He is not currently enrolled in an IOP or therapy. Patient initially denied alcohol use at today's visit but breathalyzer performed in clinic had a positive result. When asked, patient did endorse drinking, saying he had a 12 oz beer the evening prior. Patient was informed he could not drive himself from clinic and we contacted his wife who came to pick him up. We discussed the impact of continued drinking on his health and trajectory of liver disease, as well as potential transplant candidacy. Reinforced the need for seeking treatment via IOP or therapy with both patient and his wife. The patient has cirrhosis and is in need of [facility] 75. screening. Patient was noted to have elevation in AFP L3%. He has had CT in the hospital (with contrast),this showed small hypodensities too small to characterize. MRI was performed 11/2022. Small cysts and hepatic hemangioma. No concerning liver mass or lesion. Will repeat AFP at next visit.     Liver [place] of [address] Units 12/7/2022 11/21/2022   WBC 3.4 - 10.8 x10E3/uL 2.7 (L) 3.4 (L)   ANC 1.4 - 7.0 x10E3/uL 1.8 2.2   HGB 13.0 - 17.7 g/dL 9.1 (L) 9.8 (L)    - 450 x10E3/uL 155 157   INR 0.9 - 1.2 1.2    AST 0 - 40 IU/L 38 63 (H)   ALT 0 - 44 IU/L 14 27   Alk Phos 44 - 121 IU/L 159 (H) 188 (H)   Bili, Total 0.0 - 1.2 mg/dL 4.8 (H) 9.3 (H)   Bili, Direct 0.00 - 0.40 mg/dL 3.59 (H) 7.5 (H)   Albumin 3.8 - 4.9 g/dL 2.9 (L) 2.3 (L)   BUN 6 - 24 mg/dL 6 7   Creat 0.76 - 1.27 mg/dL 0.66 (L) 0.63 (L)   Na 134 - 144 mmol/L 137 135 (L)   K 3.5 - 5.2 mmol/L 3.8 4.1   Cl 96 - 106 mmol/L 103 99   CO2 20 - 29 mmol/L 23 31   Glucose 70 - 99 mg/dL 87 103 (H)     Cancer Screening Latest Ref Rng & Units 10/19/2022   AFP, Serum 0.0 - 8.4 ng/mL 7.7   AFP-L3% 0.0 - 9.9 % 23.4 (H)     The CTP score is 8. Child's Class B. The MELD score is 14 as of 2022. Endoscopic Procedures:  3/2022. EGD by Dr. Heladio Aparicio. No varices. Hyponatremia  Improved with the most recent blood work. Imagin/2022. CT of abdomen with contrast. Hepatomegaly with cirrhotic change and ascites. Several too small to fully characterize rounded hypodensities redemonstrated without significant change. 2022. MRI of liver. Changes consistent with cirrhosis. Hepatomegaly. Small cysts, hemangioma. No concerning liver mass or lesion. Anemia   This is due to multifactorial causes including portal hypertension with chronic GI blood loss, bone marrow suppression secondary to alcohol. Ferritin 1,222  Iron Sat 45%  This is most likely elevated due to alcohol. The HGB is 9.1. Will recheck Iron profile today. Epigastric Pain  Well controlled on pantoprazole 40 mg daily. Will continue at the current dose. Hypertension  Continue lisinopril 10 mg daily. Plan: Will obtain labs locally today. Return per protocol in 2 weeks.      RIVER Blankenship  Liver [place] [facility] 59, [address] [facility] Annabelle Kramer  22.  [phone]  [address] [facility]

## 2023-01-13 LAB
AFP L3 MFR SERPL: NORMAL % (ref 0–9.9)
AFP SERPL-MCNC: 3.7 NG/ML (ref 0–8.4)
ALBUMIN SERPL-MCNC: 3.5 G/DL (ref 3.8–4.9)
ALP SERPL-CCNC: 240 IU/L (ref 44–121)
ALT SERPL-CCNC: 18 IU/L (ref 0–44)
APTT PPP: 30 SEC (ref 24–33)
AST SERPL-CCNC: 56 IU/L (ref 0–40)
BASOPHILS # BLD AUTO: 0 X10E3/UL (ref 0–0.2)
BASOPHILS NFR BLD AUTO: 0 %
BILIRUB DIRECT SERPL-MCNC: 1.19 MG/DL (ref 0–0.4)
BILIRUB SERPL-MCNC: 1.9 MG/DL (ref 0–1.2)
BUN SERPL-MCNC: 7 MG/DL (ref 6–24)
BUN/CREAT SERPL: 9 (ref 9–20)
CALCIUM SERPL-MCNC: 9 MG/DL (ref 8.7–10.2)
CHLORIDE SERPL-SCNC: 102 MMOL/L (ref 96–106)
CO2 SERPL-SCNC: 25 MMOL/L (ref 20–29)
CREAT SERPL-MCNC: 0.75 MG/DL (ref 0.76–1.27)
EGFR: 109 ML/MIN/1.73
EOSINOPHIL # BLD AUTO: 0 X10E3/UL (ref 0–0.4)
EOSINOPHIL NFR BLD AUTO: 0 %
ERYTHROCYTE [DISTWIDTH] IN BLOOD BY AUTOMATED COUNT: 14.1 % (ref 11.6–15.4)
GLUCOSE SERPL-MCNC: 111 MG/DL (ref 70–99)
HCT VFR BLD AUTO: 30 % (ref 37.5–51)
HGB BLD-MCNC: 10.2 G/DL (ref 13–17.7)
IMM GRANULOCYTES # BLD AUTO: 0 X10E3/UL (ref 0–0.1)
IMM GRANULOCYTES NFR BLD AUTO: 0 %
LYMPHOCYTES # BLD AUTO: 0.6 X10E3/UL (ref 0.7–3.1)
LYMPHOCYTES NFR BLD AUTO: 25 %
MCH RBC QN AUTO: 36.4 PG (ref 26.6–33)
MCHC RBC AUTO-ENTMCNC: 34 G/DL (ref 31.5–35.7)
MCV RBC AUTO: 107 FL (ref 79–97)
MONOCYTES # BLD AUTO: 0.2 X10E3/UL (ref 0.1–0.9)
MONOCYTES NFR BLD AUTO: 8 %
NEUTROPHILS # BLD AUTO: 1.6 X10E3/UL (ref 1.4–7)
NEUTROPHILS NFR BLD AUTO: 67 %
PLATELET # BLD AUTO: 157 X10E3/UL (ref 150–450)
POTASSIUM SERPL-SCNC: 3.3 MMOL/L (ref 3.5–5.2)
PROT SERPL-MCNC: 7.3 G/DL (ref 6–8.5)
RBC # BLD AUTO: 2.8 X10E6/UL (ref 4.14–5.8)
SODIUM SERPL-SCNC: 140 MMOL/L (ref 134–144)
WBC # BLD AUTO: 2.4 X10E3/UL (ref 3.4–10.8)

## 2023-01-31 DIAGNOSIS — K21.9 GASTROESOPHAGEAL REFLUX DISEASE WITHOUT ESOPHAGITIS: ICD-10-CM

## 2023-01-31 DIAGNOSIS — K70.30 ALCOHOLIC CIRRHOSIS OF LIVER WITHOUT ASCITES (HCC): ICD-10-CM

## 2023-01-31 RX ORDER — LISINOPRIL 10 MG/1
TABLET ORAL
Qty: 30 TABLET | Refills: 2 | Status: SHIPPED | OUTPATIENT
Start: 2023-01-31

## 2023-01-31 RX ORDER — PANTOPRAZOLE SODIUM 40 MG/1
TABLET, DELAYED RELEASE ORAL
Qty: 30 TABLET | Refills: 1 | Status: SHIPPED | OUTPATIENT
Start: 2023-01-31

## 2023-02-13 ENCOUNTER — OFFICE VISIT (OUTPATIENT)
Dept: HEMATOLOGY | Age: 53
End: 2023-02-13
Payer: COMMERCIAL

## 2023-02-13 VITALS
HEART RATE: 92 BPM | HEIGHT: 67 IN | WEIGHT: 150.2 LBS | SYSTOLIC BLOOD PRESSURE: 165 MMHG | OXYGEN SATURATION: 99 % | DIASTOLIC BLOOD PRESSURE: 98 MMHG | TEMPERATURE: 97.8 F | RESPIRATION RATE: 17 BRPM | BODY MASS INDEX: 23.57 KG/M2

## 2023-02-13 DIAGNOSIS — K70.30 ALCOHOLIC CIRRHOSIS OF LIVER WITHOUT ASCITES (HCC): ICD-10-CM

## 2023-02-13 DIAGNOSIS — I10 HYPERTENSION, UNSPECIFIED TYPE: Primary | ICD-10-CM

## 2023-02-13 PROCEDURE — 3074F SYST BP LT 130 MM HG: CPT | Performed by: NURSE PRACTITIONER

## 2023-02-13 PROCEDURE — 99214 OFFICE O/P EST MOD 30 MIN: CPT | Performed by: NURSE PRACTITIONER

## 2023-02-13 PROCEDURE — 3078F DIAST BP <80 MM HG: CPT | Performed by: NURSE PRACTITIONER

## 2023-02-13 RX ORDER — TRAMADOL HYDROCHLORIDE 50 MG/1
50 TABLET ORAL
COMMUNITY
Start: 2023-01-28 | End: 2023-02-13

## 2023-02-13 RX ORDER — LISINOPRIL 20 MG/1
20 TABLET ORAL DAILY
Qty: 90 TABLET | Refills: 3 | Status: SHIPPED | OUTPATIENT
Start: 2023-02-13

## 2023-02-13 NOTE — PROGRESS NOTES
Identified pt with two pt identifiers(name and ). Reviewed record in preparation for visit and have obtained necessary documentation. Chief Complaint   Patient presents with    Cirrhosis Of Liver     One month f/u      Vitals:    23 1513 23 1524   BP: (!) 154/109 (!) 146/106   Pulse: 92    Resp: 17    Temp: 97.8 °F (36.6 °C)    TempSrc: Temporal    SpO2: 99%    Weight: 150 lb 3.2 oz (68.1 kg)    Height: 5' 7\" (1.702 m)    PainSc:  10 - Worst pain ever    PainLoc: Shoulder        Health Maintenance Review: Patient reminded of \"due or due soon\" health maintenance. I have asked the patient to contact his/her primary care provider (PCP) for follow-up on his/her health maintenance. Coordination of Care Questionnaire:  :   1) Have you been to an emergency room, urgent care, or hospitalized since your last visit? If yes, where when, and reason for visit? no       2. Have seen or consulted any other health care provider since your last visit? If yes, where when, and reason for visit? YES, Orthopedic at [facility] [place]       Patient is accompanied by wife I have received verbal consent from Beata Mathur to discuss any/all medical information while they are present in the room.

## 2023-02-13 NOTE — PROGRESS NOTES
[address] [facility] [place], MD, FACP, [place]j Danni Chapman, [place]      MIKAL Nelson, AGPCNP-BC   Glory Childress, [facility]-AG   Cheryl Oliva, FNP-C  Cathy Dietz FNP-C   Johnna Mcdonough, AGPCNP-BC      [address] 75   at [address], [address] [place] [address], [address] 22.   [phone]   FAX: [address]   at [address], [address] [place] [address], [address] [place] [address] Street [address] [phone]   FAX: [phone]           Patient Care Team:  Tara Echeverria MD as PCP - General (Family Medicine)  Tara Echeverria MD as PCP - [facility]      Problem List  Date Reviewed: 1/18/2023            Codes Class Noted    Alcoholic cirrhosis of liver without ascites ([facility] ICD-10-CM: K70.30  ICD-9-CM: 571.2  10/27/2022        Anemia in other chronic diseases classified elsewhere ICD-10-CM: D63.8  ICD-9-CM: 285.29  10/27/2022        Alcohol dependence in remission [facility]) ICD-10-CM: F10.21  ICD-9-CM: 303.93  10/27/2022        Hepatomegaly ICD-10-CM: R16.0  ICD-9-CM: 789.1  9/29/2022        Hyperbilirubinemia ICD-10-CM: E80.6  ICD-9-CM: 782.4  9/29/2022        Moderate protein-calorie malnutrition ([facility] ICD-10-CM: E44.0  ICD-9-CM: 263.0  9/29/2022             HEPATOLOGY PROGRESS NOTE  Geno Ribeiro is being seen at [address] for management of 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 The active problem list, all pertinent past medical history, medications, liver histology, endoscopic studies, radiologic findings and laboratory findings related to the liver disorder were reviewed and discussed with the patient. The patient is a 46 y.o. Black male who was hospitalized in 10/2022 when he developed abdominal pain/fullness and jaundice.   There was no nausea, vomiting, swelling of the abdomen, swelling of the lower extremity, confusion. He had an episode of acute pancreatitis in 2019. He was abstinent for a few months after that but then slowly started drinking alcohol again in increasing amounts. The patient had  previously remained abstinent from all alcohol since 9/24/2022. Previous consumption consisted of 10 beers and 4 mixed drinks daily for 4 year. The patient was also taking 3.5-4.5 gm of tylenol daily for treatment of severe abdominal pain. The patient was enrolled in the Durect clinical trial. Patient was given prednisone during hospitalization for a DF of 59. A one time infusion was given as part of the DURECT protocol 10/06/2022. He has completed this clinical trial and is now seen for regular follow up. Imaging of the liver with CT scan demonstrated hepatomegaly, fatty liver, no liver mass, mild ascites. An assessment of liver fibrosis with biopsy or elastography has not been performed. Serologic evaluation for markers of chronic liver disease was negative for HCV, HBV, ASMA, was positive for an elevation in   Ferritin. The patient has not developed any of the major complications of cirrhosis to date. In the office today the patient has the following symptoms:  The patient feels well and has no complaints. fatigue,   pain in the right side over the liver,     The patient is not experiencing the following symptoms which are commonly seen with this liver disorder:   yellowing of the eyes or skin,   itching,   dark urine,   problems concentrating,   swelling of the abdomen,   swelling of the lower extremities,   hematemesis,   hematochezia. The patient completes all daily activities without any functional limitations. He is back working full time. Since the last office appointment: He has completed the Durect clinical trial. He admits to having at least 2 beers last Sunday. He wants to have R shoulder surgery.  BP is elevated today in clinic to 170/110 second reading down to 165/98. Advised to go to the ER, patient declined. His wife called PCP and got an appointment for 3/7/2023 for BP control and will try to get in sooner. ASSESSMENT AND PLAN:  Alcohol hepatitis  There is elevation in liver transaminases in a pattern consistent with alcohol. There is an elevation in TBILI to 11. The INR is prolonged to 1,9. The DF is 43. The alcoholic hepatitis is severe and associated with a 30% mortality in the next 3 months. He was started on oral prednisolone Friday 9/30 and should remain on this until [facility]. We will keep him on prednisolone for 28 days and supply that as a study medication at [facility]. It is not yet clear if he has cirrhosis of the liver. Alcoholic hepatitis can make the liver numbers and look like cirrhosis and can cause ascites in the absence of cirrhosis. There is no evidence of alcohol withdrawal     Alcohol liver disease  The diagnosis is based upon a history of consuming alcohol in excess, imaging, pattern of AST>ALT, an elevation in ferritin  Will check serology for other causes of chronic liver disease. Will check for HIV. A liver biopsy has not been performed. A Fibroscan has not been perfomred. The patient was consuming 10 alcoholic beverages daily. The patient has been abstinent from alcohol since 9/26/2022. Discussed the need to remain abstinent from alcohol. Elevation in Ferritin  There is an elevation in ferritin with Normal iron saturation. It is unlikely that the patient has hemochromatosis or is a carrier for an HFE gene. Suspect the elevation in ferritin is secondary to alcohol use and will come down to normal with abstinence. Anemia   This is due to multifactorial causes including portal hypertension with chronic GI blood loss, bone marrow suppression secondary to alcohol. The most recent FE studies were from 12/2022.   Ferritin 846  Iron Sat 38%  This is most likely elevated due to alcohol. The HGB is 10.2. Epigastric Pain  Well controlled on pantoprazole 40 mg daily. Will continue at the current dose. Hypertension  In office today BP was 170/110  repeat 165/98. Increase lisinopril to 20 mg daily. Hepatic encephalopathy   Overt HE has not developed to date. There is no reason for treatment with lactulose of xifaxan    Cirrhosis  The diagnosis of cirrhosis is based upon imaging and laboratory studies. Cirrhosis is presumed secondary to alcohol. The patient has normal liver function. The patient has never developed any complications of cirrhosis to date. The CTP score is 8. Child's Class B. The MELD score is 15 as of 2/15/2023. Will perform laboratory testing to monitor liver function and degree of liver injury. This included   BMP,   hepatic panel,   CBC with platelet count,   INR. Liver transaminases are elevated. ALP is elevated. Liver function is depressed. INR is prolonged. The platelet count is depressed. Based upon laboratory studies and imaging  the patient appears to have significant liver injury. Will perform additional serologic tests to screen for other causes of chronic liver disease. Screening for Esophageal varices   The patient has not had an EGD to screen for varices. The PLT count is low but liver stiffness by Fibroscan has not been assessed. Will perform Fibroscan to see if the patient is at risk for having esophageal varices. Thrombocytopenia   This is secondary to cirrhosis. There is no evidence of overt bleeding. No treatment is required. The platelet count is adequate for the patient to undergo procedures without the need for platelet transfusion or platelet growth factors. Screening for Hepatocellular Carcinoma  [address] screening was performed in 1/2023 and does not suggest [address] Will repeat ultrasound in 6 months.     Treatment of other medical problems in patients with chronic liver disease  There are no contraindications for the patient to take most medications that are necessary for treatment of other medical issues. The patient may have cirrhrosis and should avoid taking NSAIDs which are associated with a higher rate of developing JOSEPH. The patient can take   any medications utilized for treatment of DM  statins to treat hypercholesterolemia    The patient has alcohol induced liver disease but has been abstinent from alcohol for greater than 6 months. Normal doses of acetaminophen, as recommended on the label of the bottle, are not hepatotoxic except in the setting of daily alcohol use, even in patients with cirrhosis and can be utilized for pain. The patient consumes alcohol on a daily basis or has recently stopped consuming alcohol. Regular alcohol use increases the risk of toxicity from acetaminophen. This analgesic should be avoided until the patient has been abstinent from alcohol for 6 months. Counseling for alcohol in patients with chronic liver disease  The patient was counseled regarding alcohol consumption and the effect of alcohol on chronic liver disease. The patient may have cirrhosis and was advised to be abstinent from all alcohol including non-alcoholic beer which does contain some alcohol. The patient has not consumed alcohol since 9/2022. But has admitted to having a few drinks since then. It was recommended that all alcohol consumption be stopped and the patient be abstinent from alcohol for at least 6 months. If the patient cannot stop consuming alcohol then there is an aclohol use disorder and the patient should consider entering alcohol counseling and/or attending AA. This has been recommended. Vaccinations   Vaccination for viral hepatitis B is recommended since the patient has no serologic evidence of previous exposure or vaccination with immunity. The patient has received 2 doses and the booster dose of COVID-19 vaccine. Routine vaccinations against other bacterial and viral agents can be performed as indicated. Annual flu vaccination should be administered if indicated. ALLERGIES  No Known Allergies    MEDICATIONS  Current Outpatient Medications   Medication Sig    lisinopriL (PRINIVIL, ZESTRIL) 10 mg tablet TAKE 1 TABLET BY MOUTH EVERY DAY    pantoprazole (PROTONIX) 40 mg tablet TAKE 1 TABLET BY MOUTH EVERY DAY    lactulose (CHRONULAC) 10 gram/15 mL solution Take 30 mL by mouth daily. Indications: impaired brain function due to liver disease (Patient not taking: Reported on 2/13/2023)    thiamine mononitrate (B-1) 100 mg tablet Take 1 Tablet by mouth daily. folic acid (FOLVITE) 1 mg tablet Take 1 Tablet by mouth daily. cyanocobalamin (VITAMIN B12) 500 mcg tablet Take 1 Tablet by mouth daily. traMADoL (ULTRAM) 50 mg tablet Take 50 mg by mouth nightly as needed. hydrOXYzine HCL (ATARAX) 10 mg tablet Take 1 Tablet by mouth three (3) times daily as needed for Itching. (Patient not taking: Reported on 2/13/2023)    ondansetron (ZOFRAN ODT) 4 mg disintegrating tablet Take 1 Tab by mouth every eight (8) hours as needed for Nausea. (Patient not taking: Reported on 2/13/2023)     No current facility-administered medications for this visit. SYSTEM REVIEW NOT RELATED TO LIVER DISEASE OR REVIEWED ABOVE:  Constitution systems: Negative for fever, chills, weight gain, weight loss. Eyes: Negative for visual changes. ENT: Negative for sore throat, painful swallowing. Respiratory: Negative for cough, hemoptysis, SOB. Cardiology: Negative for chest pain, palpitations. GI:  Negative for constipation or diarrhea. : Negative for urinary frequency, dysuria, hematuria, nocturia. Skin: Negative for rash. Hematology: Negative for easy bruising, blood clots. Musculo-skelatal: Negative for back pain, muscle pain, weakness.   Neurologic: Negative for headaches, dizziness, vertigo, memory problems not related to HE.  Psychology: Negative for anxiety, depression. FAMILY HISTORY:  The father  of murdered. The mother  of unknown cause. There is no family history of liver disease. There is no family history of immune disorders. SOCIAL HISTORY:  The patient is . The patient has 3 children,   The patient stopped using tobacco products in 40 years ago. The patient has previously consumed alcohol in excess. The patient has been abstinent from alcohol since 2022. The patient currently works full time as fork . PHYSICAL EXAMINATION:  Visit Vitals  BP (!) 165/98   Pulse 92   Temp 97.8 °F (36.6 °C) (Temporal)   Resp 17   Ht 5' 7\" (1.702 m)   Wt 150 lb 3.2 oz (68.1 kg)   SpO2 99%   BMI 23.52 kg/m²         General: No acute distress. Eyes: Sclera anicteric. ENT: No oral lesions. Thyroid normal.  Nodes: No adenopathy. Skin: No spider angiomata. No jaundice. No palmar erythema. Respiratory: Lungs clear to auscultation. Cardiovascular: Regular heart rate. No murmurs. No JVD. Abdomen: Soft non-tender. Liver size 3 fingers BCM. Spleen not palpable. No obvious ascites. Extremities: No edema. No muscle wasting. No gross arthritic changes. Neurologic: Alert and oriented. Cranial nerves grossly intact. No asterixis.     LABORATORY STUDIES:  Liver [place] of [address] [place] [address] & Units 2023   WBC 3.4 - 10.8 x10E3/uL 2.6 (L) 2.4 (LL)   ANC 1.4 - 7.0 x10E3/uL 1.6 1.6   HGB 13.0 - 17.7 g/dL 11.4 (L) 10.2 (L)    - 450 x10E3/uL 107 (L) 157   INR 0.9 - 1.2     AST 0 - 40 IU/L 101 (H) 56 (H)   ALT 0 - 44 IU/L 42 18   Alk Phos 44 - 121 IU/L 263 (H) 240 (H)   Bili, Total 0.0 - 1.2 mg/dL 2.0 (H) 1.9 (H)   Bili, Direct 0.00 - 0.40 mg/dL 1.25 (H) 1.19 (H)   Albumin 3.8 - 4.9 g/dL 4.3 3.5 (L)   BUN 6 - 24 mg/dL 6 7   Creat 0.76 - 1.27 mg/dL 0.88 0.75 (L)   Na 134 - 144 mmol/L 134 140   K 3.5 - 5.2 mmol/L 3.4 (L) 3.3 (L)   Cl 96 - 106 mmol/L 97 102   CO2 20 - 29 mmol/L 24 25   Glucose 70 - 99 mg/dL 92 111 (H)   Magnesium 1.6 - 2.4 mg/dL     Ammonia <32 UMOL/L       Cancer Screening Latest Ref Rng & Units 1/12/2023   AFP, Serum 0.0 - 8.4 ng/mL 3.7   AFP-L3% 0.0 - 9.9 % Comment     SEROLOGIES:  Serologies Latest Ref Rng & Units 9/28/2022   Hep B Surface Ag Index <0.10   Hep B Surface Ag Interp NEG   Negative   Hep C Ab NR   NONREACTIVE     Serologies Latest Ref Rng & Units 10/2/2022   Ferritin 30 - 400 ng/mL 1,222 (H)   Iron % Saturation 15 - 55 % 45   ASMCA 0 - 19 Units 8     LIVER HISTOLOGY:  Not available or performed    ENDOSCOPIC PROCEDURES:  3/2022. EGD by Dr. Sly Swain. No varices. RADIOLOGY:  9/2022. CT of abdomen with contrast. Hepatomegaly with cirrhotic change and ascites. Several too small to fully characterize rounded hypodensities redemonstrated without significant change. 11/2022. MRI of liver. Changes consistent with cirrhosis. Hepatomegaly. Small cysts, hemangioma. No concerning liver mass or lesion. OTHER TESTING:  Not available or performed    FOLLOW-UP:  All of the issues listed above in the Assessment and Plan were discussed with the patient. All questions were answered. The patient expressed a clear understanding of the above. [address] [address] in 2 weeks for assessment of medication change.       Maria Teresa Phillips FNP-C  Liver [place] [facility] 59, [address] [facility] [place]ram[place]  22.  [phone]  [address] [facility]

## 2023-02-14 LAB
ALBUMIN SERPL-MCNC: 4.3 G/DL (ref 3.8–4.9)
ALP SERPL-CCNC: 263 IU/L (ref 44–121)
ALT SERPL-CCNC: 42 IU/L (ref 0–44)
AST SERPL-CCNC: 101 IU/L (ref 0–40)
BASOPHILS # BLD AUTO: 0 X10E3/UL (ref 0–0.2)
BASOPHILS NFR BLD AUTO: 0 %
BILIRUB DIRECT SERPL-MCNC: 1.25 MG/DL (ref 0–0.4)
BILIRUB SERPL-MCNC: 2 MG/DL (ref 0–1.2)
BUN SERPL-MCNC: 6 MG/DL (ref 6–24)
BUN/CREAT SERPL: 7 (ref 9–20)
CALCIUM SERPL-MCNC: 9.8 MG/DL (ref 8.7–10.2)
CHLORIDE SERPL-SCNC: 97 MMOL/L (ref 96–106)
CO2 SERPL-SCNC: 24 MMOL/L (ref 20–29)
CREAT SERPL-MCNC: 0.88 MG/DL (ref 0.76–1.27)
EGFRCR SERPLBLD CKD-EPI 2021: 103 ML/MIN/1.73
EOSINOPHIL # BLD AUTO: 0 X10E3/UL (ref 0–0.4)
EOSINOPHIL NFR BLD AUTO: 1 %
ERYTHROCYTE [DISTWIDTH] IN BLOOD BY AUTOMATED COUNT: 13.5 % (ref 11.6–15.4)
GLUCOSE SERPL-MCNC: 92 MG/DL (ref 70–99)
HCT VFR BLD AUTO: 33.3 % (ref 37.5–51)
HGB BLD-MCNC: 11.4 G/DL (ref 13–17.7)
IMM GRANULOCYTES # BLD AUTO: 0 X10E3/UL (ref 0–0.1)
IMM GRANULOCYTES NFR BLD AUTO: 0 %
LYMPHOCYTES # BLD AUTO: 0.6 X10E3/UL (ref 0.7–3.1)
LYMPHOCYTES NFR BLD AUTO: 22 %
MCH RBC QN AUTO: 33.5 PG (ref 26.6–33)
MCHC RBC AUTO-ENTMCNC: 34.2 G/DL (ref 31.5–35.7)
MCV RBC AUTO: 98 FL (ref 79–97)
MONOCYTES # BLD AUTO: 0.3 X10E3/UL (ref 0.1–0.9)
MONOCYTES NFR BLD AUTO: 13 %
NEUTROPHILS # BLD AUTO: 1.6 X10E3/UL (ref 1.4–7)
NEUTROPHILS NFR BLD AUTO: 64 %
PLATELET # BLD AUTO: 107 X10E3/UL (ref 150–450)
POTASSIUM SERPL-SCNC: 3.4 MMOL/L (ref 3.5–5.2)
PROT SERPL-MCNC: 8.2 G/DL (ref 6–8.5)
RBC # BLD AUTO: 3.4 X10E6/UL (ref 4.14–5.8)
SODIUM SERPL-SCNC: 134 MMOL/L (ref 134–144)
WBC # BLD AUTO: 2.6 X10E3/UL (ref 3.4–10.8)

## 2023-02-15 ENCOUNTER — DOCUMENTATION ONLY (OUTPATIENT)
Dept: HEMATOLOGY | Age: 53
End: 2023-02-15

## 2023-02-15 ENCOUNTER — TELEPHONE (OUTPATIENT)
Dept: HEMATOLOGY | Age: 53
End: 2023-02-15

## 2023-02-15 NOTE — PROGRESS NOTES
Returned call, spoke with patient's wife answered all of her questions. Liver numbers have gotten a little worse than last labs. Patient still drinking some, unsure how much. Recommended counseling or AA. She says they have talked about it and he will not schedule it. She is trying to get him in sooner with his PCP for BP control, and Ortho wants to give him diclofenac topical gel for shoulder pain. Told is ok for small area.

## 2023-02-16 LAB
INR PPP: 1.3 (ref 0.9–1.2)
PROTHROMBIN TIME: 13.8 SEC (ref 9.1–12)

## 2023-02-22 ENCOUNTER — PATIENT MESSAGE (OUTPATIENT)
Dept: HEMATOLOGY | Age: 53
End: 2023-02-22

## 2023-02-23 ENCOUNTER — DOCUMENTATION ONLY (OUTPATIENT)
Dept: HEMATOLOGY | Age: 53
End: 2023-02-23

## 2023-02-23 NOTE — PROGRESS NOTES
Spoke with patient's wife, patient is doing better today and went to work. Patient has follow up appointment on Monday.

## 2023-02-27 ENCOUNTER — OFFICE VISIT (OUTPATIENT)
Dept: HEMATOLOGY | Age: 53
End: 2023-02-27
Payer: COMMERCIAL

## 2023-02-27 VITALS
HEIGHT: 67 IN | OXYGEN SATURATION: 99 % | DIASTOLIC BLOOD PRESSURE: 91 MMHG | SYSTOLIC BLOOD PRESSURE: 151 MMHG | WEIGHT: 155 LBS | TEMPERATURE: 97 F | BODY MASS INDEX: 24.33 KG/M2 | HEART RATE: 92 BPM

## 2023-02-27 DIAGNOSIS — K70.30 ALCOHOLIC CIRRHOSIS OF LIVER WITHOUT ASCITES (HCC): Primary | ICD-10-CM

## 2023-02-27 PROCEDURE — 99214 OFFICE O/P EST MOD 30 MIN: CPT | Performed by: NURSE PRACTITIONER

## 2023-02-27 NOTE — PROGRESS NOTES
[address] \[facility] [place] [facility]\"", MD, FACP, Kj Abigail Anu, [place]      MIKAL Peterson, PCNP-BC   Don Tea, [facility]-   Andrea Santoyo, FNP-C  Lorenzo Strange, FNP-C   Zi Robertson, AGPCNP-BC      [address]   at [address], [facility] Annabelle Schneider  22.   [phone]   FAX: [address]   at [address], [address] [place] [address], [address]   [phone]   FAX: [phone]           Patient Care Team:  Nuno Whaley MD as PCP - General (Family Medicine)  Nuno Whaley MD as PCP - [facility]      Problem List  Date Reviewed: 1/18/2023            Codes Class Noted    Alcoholic cirrhosis of liver without ascites ([facility] ICD-10-CM: K70.30  ICD-9-CM: 571.2  10/27/2022        Anemia in other chronic diseases classified elsewhere ICD-10-CM: D63.8  ICD-9-CM: 285.29  10/27/2022        Alcohol dependence in remission [facility]) ICD-10-CM: F10.21  ICD-9-CM: 303.93  10/27/2022        Hepatomegaly ICD-10-CM: R16.0  ICD-9-CM: 789.1  9/29/2022        Hyperbilirubinemia ICD-10-CM: E80.6  ICD-9-CM: 782.4  9/29/2022        Moderate protein-calorie malnutrition ([facility] ICD-10-CM: E44.0  ICD-9-CM: 263.0  9/29/2022               HEPATOLOGY PROGRESS NOTE  Phillip Che is being seen at [address] for management of 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 The active problem list, all pertinent past medical history, medications, liver histology, endoscopic studies, radiologic findings and laboratory findings related to the liver disorder were reviewed and discussed with the patient. The patient is a 46 y.o. Black male who was hospitalized in 10/2022 when he developed abdominal pain/fullness and jaundice.   There was no nausea, vomiting, swelling of the abdomen, swelling of the lower extremity, confusion. He had an episode of acute pancreatitis in 2019. He was abstinent for a few months after that but then slowly started drinking alcohol again in increasing amounts. The patient had  previously remained abstinent from all alcohol since 9/24/2022. Previous consumption consisted of 10 beers and 4 mixed drinks daily for 4 year. The patient was also taking 3.5-4.5 gm of tylenol daily for treatment of severe abdominal pain. The patient was enrolled in the Durect clinical trial. Patient was given prednisone during hospitalization for a DF of 59. A one time infusion was given as part of the DURECT protocol 10/06/2022. He has completed this clinical trial and is now seen for regular follow up. Imaging of the liver with CT scan demonstrated hepatomegaly, fatty liver, no liver mass, mild ascites. An assessment of liver fibrosis with biopsy or elastography has not been performed. Serologic evaluation for markers of chronic liver disease was negative for HCV, HBV, ASMA, JAMES    The patient has not developed any of the major complications of cirrhosis to date. In the office today the patient has the following symptoms:  fatigue,       The patient is not experiencing the following symptoms which are commonly seen with this liver disorder:   yellowing of the eyes or skin,   pain in the right side over the liver,   itching,   dark urine,   problems concentrating,   swelling of the abdomen,   swelling of the lower extremities,   hematemesis,   hematochezia. The patient completes all daily activities without any functional limitations. He is back working full time. Since the last office appointment: He states he has not had any alcohol since last office visit. He wants to have R shoulder surgery. BP is elevated today in clinic to 151/91. He has an appointment with his PCP for 3/7/2023 for BP control.       ASSESSMENT AND PLAN:  Cirrhosis  The diagnosis of cirrhosis is based upon imaging and laboratory studies. Cirrhosis is presumed secondary to alcohol. The patient has never developed any complications of cirrhosis to date. Liver transaminases are elevated. ALP is elevated. Liver function is depressed. INR is prolonged. The platelet count is depressed. Based upon laboratory studies and imaging  the patient appears to have significant liver injury. Will perform additional serologic tests to screen for other causes of chronic liver disease. The CTP score is 8. Child Class B. The MELD score is 15 as of 2/13/2023. Alcohol hepatitis  There is elevation in liver transaminases in a pattern consistent with alcohol. There is an elevation in TBILI to 11. The INR is prolonged to 1.9. The DF is 43. The alcoholic hepatitis is severe and associated with a 30% mortality in the next 3 months. It is not yet clear if he has cirrhosis of the liver. Alcoholic hepatitis can make the liver numbers and look like cirrhosis and can cause ascites in the absence of cirrhosis. Alcohol liver disease  The diagnosis is based upon a history of consuming alcohol in excess, imaging, pattern of AST>ALT, an elevation in ferritin  Will check serology for other causes of chronic liver disease. Will check for HIV. NONREACTIVE      A liver biopsy has not been performed. A Fibroscan has not been performed. This will be done at the next visit. The patient was consuming 10 alcoholic beverages daily. The patient has been abstinent from alcohol since 9/26/2022. Discussed the need to remain abstinent from alcohol. Elevation in Ferritin  There is an elevation in ferritin with Normal iron saturation. It is unlikely that the patient has hemochromatosis or is a carrier for an HFE gene. Suspect the elevation in ferritin is secondary to alcohol use and will come down to normal with abstinence.      Anemia   This is due to multifactorial causes including portal hypertension with chronic GI blood loss, bone marrow suppression secondary to alcohol. The most recent FE studies were from 12/2022. Ferritin 846  Iron Sat 38%  This is most likely elevated due to alcohol. The HGB is 11.4. Epigastric Pain  Well controlled on pantoprazole 40 mg daily. Will continue at the current dose. Hypertension  In office today BP was 151/91. Continue lisinopril to 20 mg daily. Has appointment with PCP for BP monitoring next week. Hepatic encephalopathy   Overt HE has not developed to date. There is no reason for treatment with lactulose of xifaxan    Screening for Esophageal varices   An EGD was performed by Dr. Beth Bagley on 3/2022 which showed no varices. The PLT count is low but liver stiffness by Fibroscan has not been assessed. Will perform Fibroscan to see if the patient is at risk for having esophageal varices. Thrombocytopenia   This is secondary to cirrhosis. There is no evidence of overt bleeding. No treatment is required. The platelet count is adequate for the patient to undergo procedures without the need for platelet transfusion or platelet growth factors. Screening for Hepatocellular Carcinoma  [address] screening was performed in 1/2023 and does not suggest [address] Will repeat ultrasound in 6 months. Treatment of other medical problems in patients with chronic liver disease  There are no contraindications for the patient to take most medications that are necessary for treatment of other medical issues. The patient may have cirrhrosis and should avoid taking NSAIDs which are associated with a higher rate of developing JOSEPH. The patient can take   any medications utilized for treatment of DM  statins to treat hypercholesterolemia    The patient has alcohol induced liver disease but has been abstinent from alcohol for greater than 6 months.   Normal doses of acetaminophen, as recommended on the label of the bottle, are not hepatotoxic except in the setting of daily alcohol use, even in patients with cirrhosis and can be utilized for pain. The patient consumes alcohol on a daily basis or has recently stopped consuming alcohol. Regular alcohol use increases the risk of toxicity from acetaminophen. This analgesic should be avoided until the patient has been abstinent from alcohol for 6 months. Counseling for alcohol in patients with chronic liver disease  The patient was counseled regarding alcohol consumption and the effect of alcohol on chronic liver disease. The patient may have cirrhosis and was advised to be abstinent from all alcohol including non-alcoholic beer which does contain some alcohol. The patient has not consumed alcohol since 9/2022. But has admitted to having a few drinks since then. It was recommended that all alcohol consumption be stopped and the patient be abstinent from alcohol for at least 6 months. If the patient cannot stop consuming alcohol then there is an aclohol use disorder and the patient should consider entering alcohol counseling and/or attending AA. This has been recommended. Vaccinations   Vaccination for viral hepatitis B is recommended since the patient has no serologic evidence of previous exposure or vaccination with immunity. The patient has received 2 doses and the booster dose of COVID-19 vaccine. Routine vaccinations against other bacterial and viral agents can be performed as indicated. Annual flu vaccination should be administered if indicated. ALLERGIES  No Known Allergies    MEDICATIONS  Current Outpatient Medications   Medication Sig    lisinopriL (PRINIVIL, ZESTRIL) 20 mg tablet Take 1 Tablet by mouth daily. pantoprazole (PROTONIX) 40 mg tablet TAKE 1 TABLET BY MOUTH EVERY DAY    thiamine mononitrate (B-1) 100 mg tablet Take 1 Tablet by mouth daily. folic acid (FOLVITE) 1 mg tablet Take 1 Tablet by mouth daily.     cyanocobalamin (VITAMIN B12) 500 mcg tablet Take 1 Tablet by mouth daily. No current facility-administered medications for this visit. SYSTEM REVIEW NOT RELATED TO LIVER DISEASE OR REVIEWED ABOVE:  Constitution systems: Negative for fever, chills, weight gain, weight loss. Eyes: Negative for visual changes. ENT: Negative for sore throat, painful swallowing. Respiratory: Negative for cough, hemoptysis, SOB. Cardiology: Negative for chest pain, palpitations. GI:  Negative for constipation or diarrhea. : Negative for urinary frequency, dysuria, hematuria, nocturia. Skin: Negative for rash. Hematology: Negative for easy bruising, blood clots. Musculo-skelatal: Negative for back pain, muscle pain, weakness. Neurologic: Negative for headaches, dizziness, vertigo, memory problems not related to HE. Psychology: Negative for anxiety, depression. FAMILY HISTORY:  The father  of murdered. The mother  of unknown cause. There is no family history of liver disease. There is no family history of immune disorders. SOCIAL HISTORY:  The patient is . The patient has 3 children,   The patient stopped using tobacco products in 40 years ago. The patient has previously consumed alcohol in excess. The patient has been abstinent from alcohol since 2022. The patient currently works full time as fork . PHYSICAL EXAMINATION:  Visit Vitals  BP (!) 151/91 (BP 1 Location: Left upper arm, BP Patient Position: Sitting, BP Cuff Size: Adult)   Pulse 92   Temp 97 °F (36.1 °C) (Temporal)   Ht 5' 7\" (1.702 m)   Wt 155 lb (70.3 kg)   SpO2 99%   BMI 24.28 kg/m²       General: No acute distress. Eyes: Sclera anicteric. ENT: No oral lesions. Thyroid normal.  Nodes: No adenopathy. Skin: No spider angiomata. No jaundice. No palmar erythema. Respiratory: Lungs clear to auscultation. Cardiovascular: Regular heart rate. No murmurs. No JVD. Abdomen: Soft non-tender. Liver size 3 fingers BCM. Spleen not palpable. No obvious ascites. Extremities: No edema. No muscle wasting. No gross arthritic changes. Neurologic: Alert and oriented. Cranial nerves grossly intact. No asterixis. LABORATORY STUDIES:  Liver [place] of [address] [place] [address] & Units 2/13/2023 1/12/2023   WBC 3.4 - 10.8 x10E3/uL 2.6 (L) 2.4 (LL)   ANC 1.4 - 7.0 x10E3/uL 1.6 1.6   HGB 13.0 - 17.7 g/dL 11.4 (L) 10.2 (L)    - 450 x10E3/uL 107 (L) 157   INR 0.9 - 1.2     AST 0 - 40 IU/L 101 (H) 56 (H)   ALT 0 - 44 IU/L 42 18   Alk Phos 44 - 121 IU/L 263 (H) 240 (H)   Bili, Total 0.0 - 1.2 mg/dL 2.0 (H) 1.9 (H)   Bili, Direct 0.00 - 0.40 mg/dL 1.25 (H) 1.19 (H)   Albumin 3.8 - 4.9 g/dL 4.3 3.5 (L)   BUN 6 - 24 mg/dL 6 7   Creat 0.76 - 1.27 mg/dL 0.88 0.75 (L)   Na 134 - 144 mmol/L 134 140   K 3.5 - 5.2 mmol/L 3.4 (L) 3.3 (L)   Cl 96 - 106 mmol/L 97 102   CO2 20 - 29 mmol/L 24 25   Glucose 70 - 99 mg/dL 92 111 (H)   Magnesium 1.6 - 2.4 mg/dL     Ammonia <32 UMOL/L       Cancer Screening Latest Ref Rng & Units 1/12/2023   AFP, Serum 0.0 - 8.4 ng/mL 3.7   AFP-L3% 0.0 - 9.9 % Comment     SEROLOGIES:  Serologies Latest Ref Rng & Units 9/28/2022   Hep B Surface Ag Index <0.10   Hep B Surface Ag Interp NEG   Negative   Hep C Ab NR   NONREACTIVE     Serologies Latest Ref Rng & Units 10/2/2022   Ferritin 30 - 400 ng/mL 1,222 (H)   Iron % Saturation 15 - 55 % 45   ASMCA 0 - 19 Units 8     Serologies Latest Ref Rng & Units 12/20/2022   Ferritin 30 - 400 ng/mL 846 (H)   Iron % Saturation 15 - 55 % 38     10/2022: JAMES negative    LIVER HISTOLOGY:  Not available or performed    ENDOSCOPIC PROCEDURES:  3/2022. EGD by Dr. Alfonso Barillas. No varices. RADIOLOGY:  9/2022. CT of abdomen with contrast. Hepatomegaly with cirrhotic change and ascites. Several too small to fully characterize rounded hypodensities redemonstrated without significant change. 11/2022. MRI of liver. Changes consistent with cirrhosis. Hepatomegaly. Small cysts, hemangioma. No concerning liver mass or lesion. OTHER TESTING:  Not available or performed    FOLLOW-UP:  All of the issues listed above in the Assessment and Plan were discussed with the patient. All questions were answered. The patient expressed a clear understanding of the above. [facility] [place] [address] in 2 months for fibroscan and routine monitoring.           CARMELO Medina-C  Liver [place] [facility], [address] [facility] Annabelle Kramer  22.  [phone]  [address]

## 2023-02-27 NOTE — PROGRESS NOTES
Identified pt with two pt identifiers(name and ). Reviewed record in preparation for visit and have obtained necessary documentation. Chief Complaint   Patient presents with    Follow-up      Vitals:    23 1510 23 1519   BP: (!) 146/94 (!) 151/91   Pulse: 96 92   Temp: 97 °F (36.1 °C)    TempSrc: Temporal    SpO2: 99%    Weight: 155 lb (70.3 kg)    Height: 5' 7\" (1.702 m)    PainSc:   0 - No pain        Health Maintenance Review: Patient reminded of \"due or due soon\" health maintenance. I have asked the patient to contact his/her primary care provider (PCP) for follow-up on his/her health maintenance. Coordination of Care Questionnaire:  :   1) Have you been to an emergency room, urgent care, or hospitalized since your last visit? If yes, where when, and reason for visit? no       2. Have seen or consulted any other health care provider since your last visit? If yes, where when, and reason for visit? NO      Patient is accompanied by wife  I have received verbal consent from Breanne Ariza to discuss any/all medical information while they are present in the room.

## 2023-02-28 LAB
A1AT SERPL-MCNC: 164 MG/DL (ref 101–187)
ALBUMIN SERPL-MCNC: 4.1 G/DL (ref 3.8–4.9)
ALP SERPL-CCNC: 236 IU/L (ref 44–121)
ALT SERPL-CCNC: 125 IU/L (ref 0–44)
AST SERPL-CCNC: 155 IU/L (ref 0–40)
BASOPHILS # BLD AUTO: 0 X10E3/UL (ref 0–0.2)
BASOPHILS NFR BLD AUTO: 1 %
BILIRUB DIRECT SERPL-MCNC: 1.16 MG/DL (ref 0–0.4)
BILIRUB SERPL-MCNC: 1.7 MG/DL (ref 0–1.2)
BUN SERPL-MCNC: 8 MG/DL (ref 6–24)
BUN/CREAT SERPL: 14 (ref 9–20)
CALCIUM SERPL-MCNC: 9.5 MG/DL (ref 8.7–10.2)
CERULOPLASMIN SERPL-MCNC: 29.9 MG/DL (ref 16–31)
CHLORIDE SERPL-SCNC: 99 MMOL/L (ref 96–106)
CO2 SERPL-SCNC: 22 MMOL/L (ref 20–29)
CREAT SERPL-MCNC: 0.59 MG/DL (ref 0.76–1.27)
EGFRCR SERPLBLD CKD-EPI 2021: 117 ML/MIN/1.73
EOSINOPHIL # BLD AUTO: 0.1 X10E3/UL (ref 0–0.4)
EOSINOPHIL NFR BLD AUTO: 2 %
ERYTHROCYTE [DISTWIDTH] IN BLOOD BY AUTOMATED COUNT: 14.3 % (ref 11.6–15.4)
GLUCOSE SERPL-MCNC: 107 MG/DL (ref 70–99)
HAV AB SER QL IA: POSITIVE
HBV CORE AB SERPL QL IA: NEGATIVE
HCT VFR BLD AUTO: 33.2 % (ref 37.5–51)
HGB BLD-MCNC: 11.2 G/DL (ref 13–17.7)
IMM GRANULOCYTES # BLD AUTO: 0 X10E3/UL (ref 0–0.1)
IMM GRANULOCYTES NFR BLD AUTO: 0 %
LYMPHOCYTES # BLD AUTO: 0.5 X10E3/UL (ref 0.7–3.1)
LYMPHOCYTES NFR BLD AUTO: 20 %
MCH RBC QN AUTO: 33 PG (ref 26.6–33)
MCHC RBC AUTO-ENTMCNC: 33.7 G/DL (ref 31.5–35.7)
MCV RBC AUTO: 98 FL (ref 79–97)
MITOCHONDRIA M2 IGG SER-ACNC: <20 UNITS (ref 0–20)
MONOCYTES # BLD AUTO: 0.3 X10E3/UL (ref 0.1–0.9)
MONOCYTES NFR BLD AUTO: 13 %
NEUTROPHILS # BLD AUTO: 1.6 X10E3/UL (ref 1.4–7)
NEUTROPHILS NFR BLD AUTO: 64 %
PLATELET # BLD AUTO: 159 X10E3/UL (ref 150–450)
POTASSIUM SERPL-SCNC: 3.6 MMOL/L (ref 3.5–5.2)
PROT SERPL-MCNC: 7.4 G/DL (ref 6–8.5)
RBC # BLD AUTO: 3.39 X10E6/UL (ref 4.14–5.8)
SODIUM SERPL-SCNC: 134 MMOL/L (ref 134–144)
WBC # BLD AUTO: 2.5 X10E3/UL (ref 3.4–10.8)

## 2023-02-28 NOTE — PROGRESS NOTES
Sent a [employer] and spoke with patient's wife regarding lab work. liver enzymes have bumped back up. Patient's wife stated that he has not used the Voltaren gel for his shoulder. Advised not to use. The other tests that we did to check for chronic liver disease were negative, he shows immunity to Hep A. Keep follow up.

## 2023-03-01 LAB
INR PPP: 1.3 (ref 0.9–1.2)
PROTHROMBIN TIME: 13.3 SEC (ref 9.1–12)

## 2023-03-10 ENCOUNTER — PATIENT MESSAGE (OUTPATIENT)
Dept: HEMATOLOGY | Age: 53
End: 2023-03-10

## 2023-03-10 NOTE — TELEPHONE ENCOUNTER
----- Message from Wilmar Marshall sent at 3/10/2023  2:05 PM EST -----  Regarding: FW: Xochitl Doll -Very Concerned   Can you reach out to this patient to triage?  ----- Message -----  From: Xochitl Doll  Sent: 3/10/2023   2:02 PM EST  To: [facility]es  Subject: Xochitl Doll -Very Concerned                    Michelle Soler. It has been a really long week and Dino Graham has been acting not himself. Think his ammonia level is really high. Exhibiting alot of anger and aggression, talking to himself more than usual, some level of confusion & forgetfulness and at times feel like people are talking negatively about him when no one is. When we were last in was his ammonia levels up? Any way of getting him in for blood work to see where it is? I remember when he was admitted they said high ammonia level can cause one to pass out or go into a comma and I don't want that to happen. Please email / call. Thanks so much. [email or web address] Spoke w/patient wife Faina Moulton) concerning above message. Patient has been discussed with Kylie Gardner and in patient best interest he needs to come to the ER for medical attention. Patient has been drinking per wife. These symptoms have been going on all this week. Wife concerned patient will not come by car advise called ambulance service. Wife did take patient blood pressure today elevated 140\"s over 100's. Wife will get patient medical attention today. (KF)

## 2023-03-20 DIAGNOSIS — K70.30 ALCOHOLIC CIRRHOSIS OF LIVER WITHOUT ASCITES (HCC): ICD-10-CM

## 2023-03-20 RX ORDER — ASPIRIN 325 MG/1
TABLET, FILM COATED ORAL
Qty: 30 TABLET | Refills: 2 | Status: SHIPPED | OUTPATIENT
Start: 2023-03-20

## 2023-03-23 ENCOUNTER — OFFICE VISIT (OUTPATIENT)
Dept: ORTHOPEDIC SURGERY | Age: 53
End: 2023-03-23

## 2023-03-23 VITALS — WEIGHT: 155 LBS | HEIGHT: 67 IN | BODY MASS INDEX: 24.33 KG/M2

## 2023-03-23 DIAGNOSIS — G89.29 CHRONIC RIGHT SHOULDER PAIN: ICD-10-CM

## 2023-03-23 DIAGNOSIS — M19.011 PRIMARY OSTEOARTHRITIS, RIGHT SHOULDER: Primary | ICD-10-CM

## 2023-03-23 DIAGNOSIS — M25.511 CHRONIC RIGHT SHOULDER PAIN: ICD-10-CM

## 2023-03-23 RX ORDER — METHYLPREDNISOLONE ACETATE 40 MG/ML
80 INJECTION, SUSPENSION INTRA-ARTICULAR; INTRALESIONAL; INTRAMUSCULAR; SOFT TISSUE ONCE
Status: COMPLETED | OUTPATIENT
Start: 2023-03-23 | End: 2023-03-23

## 2023-03-23 RX ORDER — BUPIVACAINE HYDROCHLORIDE 5 MG/ML
3 INJECTION, SOLUTION EPIDURAL; INTRACAUDAL ONCE
Status: COMPLETED | OUTPATIENT
Start: 2023-03-23 | End: 2023-03-23

## 2023-03-23 RX ADMIN — BUPIVACAINE HYDROCHLORIDE 15 MG: 5 INJECTION, SOLUTION EPIDURAL; INTRACAUDAL at 08:20

## 2023-03-23 RX ADMIN — METHYLPREDNISOLONE ACETATE 80 MG: 40 INJECTION, SUSPENSION INTRA-ARTICULAR; INTRALESIONAL; INTRAMUSCULAR; SOFT TISSUE at 08:20

## 2023-03-23 NOTE — LETTER
3/23/2023    Patient: Sarita Selby   YOB: 1970   Date of Visit: 3/23/2023     Tejas Jeffries, [address]  [place] [address]  Via Fax: [phone]    Dear Tejas Jeffries MD,      Thank you for referring Mr. Sarita Selby to [facility] for evaluation. My notes for this consultation are attached. If you have questions, please do not hesitate to call me. I look forward to following your patient along with you.       Sincerely,    Rosette Garcia, DO

## 2023-04-03 ENCOUNTER — PATIENT MESSAGE (OUTPATIENT)
Dept: HEMATOLOGY | Age: 53
End: 2023-04-03

## 2023-04-03 NOTE — TELEPHONE ENCOUNTER
----- Message from Wilmar Marshall sent at 4/3/2023 12:50 PM EDT -----  Regarding: FW: Thais Viveros Concern  Can you contact this patient to triage?  ----- Message -----  From: Thais Viveros  Sent: 4/3/2023  11:43 AM EDT  To: Ivonne Lujan Nurses  Subject: Thais Viveros Concern                            Augusto Borges. I am emailing with a concern about Ti Oh ( [phone]), he has an abcess for which an antibiotic was called in this morning. He has also been having stomach issues and not able to keep anything thing this weekend so he is weak. I will give me power aide and broth, but just wanted you to know. Think we come in this month from the fiber scan. Garrett Sarabia  [phone]      [email or web address] Spoke w/patient wife which is on PHI/. Patient will start antibiotics for right side tooth abcess. This past weekend patient unable to tolerate food--on Sat 23 had a salad and  not eating anything. Patient has been chewing on ice chips. Advise no power aide or broth related to too much sodium. Advise jello, apple sauce, soft scramble egg, oatmeal, toast/peanut butter, mash pot etc that are soft and easy to go down. Explain is patient not staying hydrated he will become dehydrated. Wife Jennifer Gutierrez) will try to get soft food in patient tonight if that doesn't work she will bring to the ER tomorrow morning. At this time patient decline ER visit. Gloria Borges made aware. (KF)

## 2023-04-23 DIAGNOSIS — K70.30 ALCOHOLIC CIRRHOSIS OF LIVER WITHOUT ASCITES (HCC): ICD-10-CM

## 2023-04-24 RX ORDER — FOLIC ACID 1 MG/1
TABLET ORAL
Qty: 30 TABLET | Refills: 3 | Status: SHIPPED | OUTPATIENT
Start: 2023-04-24

## 2023-04-26 ENCOUNTER — OFFICE VISIT (OUTPATIENT)
Dept: HEMATOLOGY | Age: 53
End: 2023-04-26
Payer: COMMERCIAL

## 2023-04-26 VITALS
WEIGHT: 145.2 LBS | OXYGEN SATURATION: 97 % | HEIGHT: 67 IN | HEART RATE: 93 BPM | DIASTOLIC BLOOD PRESSURE: 95 MMHG | BODY MASS INDEX: 22.79 KG/M2 | SYSTOLIC BLOOD PRESSURE: 152 MMHG | TEMPERATURE: 97 F

## 2023-04-26 DIAGNOSIS — K70.30 ALCOHOLIC CIRRHOSIS OF LIVER WITHOUT ASCITES (HCC): Primary | ICD-10-CM

## 2023-04-26 PROCEDURE — 91200 LIVER ELASTOGRAPHY: CPT | Performed by: NURSE PRACTITIONER

## 2023-04-26 PROCEDURE — 99214 OFFICE O/P EST MOD 30 MIN: CPT | Performed by: NURSE PRACTITIONER

## 2023-04-26 NOTE — PROGRESS NOTES
Identified pt with two pt identifiers(name and ). Reviewed record in preparation for visit and have obtained necessary documentation. Chief Complaint   Patient presents with    Cirrhosis Of Liver     FS 2month follow up      Vitals:    23 1405   BP: (!) 152/95   Pulse: 93   Temp: 97 °F (36.1 °C)   TempSrc: Temporal   SpO2: 97%   Weight: 145 lb 3.2 oz (65.9 kg)   Height: 5' 7\" (1.702 m)   PainSc:   0 - No pain       Health Maintenance Review: Patient reminded of \"due or due soon\" health maintenance. I have asked the patient to contact his/her primary care provider (PCP) for follow-up on his/her health maintenance. Coordination of Care Questionnaire:  :   1) Have you been to an emergency room, urgent care, or hospitalized since your last visit? If yes, where when, and reason for visit? no       2. Have seen or consulted any other health care provider since your last visit? If yes, where when, and reason for visit? NO      Patient is accompanied by wife I have received verbal consent from Boby Cotton to discuss any/all medical information while they are present in the room.

## 2023-04-27 ENCOUNTER — TRANSCRIBE ORDERS (OUTPATIENT)
Facility: HOSPITAL | Age: 53
End: 2023-04-27

## 2023-04-27 DIAGNOSIS — K70.30 ALCOHOLIC CIRRHOSIS OF LIVER WITHOUT ASCITES (HCC): Primary | ICD-10-CM

## 2023-04-27 LAB
ALBUMIN SERPL-MCNC: 4.1 G/DL (ref 3.8–4.9)
ALP SERPL-CCNC: 274 IU/L (ref 44–121)
ALT SERPL-CCNC: 35 IU/L (ref 0–44)
AST SERPL-CCNC: 88 IU/L (ref 0–40)
BILIRUB DIRECT SERPL-MCNC: 2.23 MG/DL (ref 0–0.4)
BILIRUB SERPL-MCNC: 3.4 MG/DL (ref 0–1.2)
BUN SERPL-MCNC: 5 MG/DL (ref 6–24)
BUN/CREAT SERPL: 8 (ref 9–20)
CALCIUM SERPL-MCNC: 9.6 MG/DL (ref 8.7–10.2)
CHLORIDE SERPL-SCNC: 92 MMOL/L (ref 96–106)
CO2 SERPL-SCNC: 24 MMOL/L (ref 20–29)
CREAT SERPL-MCNC: 0.61 MG/DL (ref 0.76–1.27)
EGFRCR SERPLBLD CKD-EPI 2021: 116 ML/MIN/1.73
GLUCOSE SERPL-MCNC: 88 MG/DL (ref 70–99)
POTASSIUM SERPL-SCNC: 3.4 MMOL/L (ref 3.5–5.2)
PROT SERPL-MCNC: 7.8 G/DL (ref 6–8.5)
SODIUM SERPL-SCNC: 132 MMOL/L (ref 134–144)

## 2023-04-28 ENCOUNTER — TELEPHONE (OUTPATIENT)
Dept: HEMATOLOGY | Age: 53
End: 2023-04-28

## 2023-04-28 LAB
BASOPHILS # BLD AUTO: 0 X10E3/UL (ref 0–0.2)
BASOPHILS NFR BLD AUTO: 1 %
EOSINOPHIL # BLD AUTO: 0 X10E3/UL (ref 0–0.4)
EOSINOPHIL NFR BLD AUTO: 1 %
ERYTHROCYTE [DISTWIDTH] IN BLOOD BY AUTOMATED COUNT: 13.1 % (ref 11.6–15.4)
HCT VFR BLD AUTO: 31.7 % (ref 37.5–51)
HGB BLD-MCNC: 11.3 G/DL (ref 13–17.7)
IMM GRANULOCYTES # BLD AUTO: 0 X10E3/UL (ref 0–0.1)
IMM GRANULOCYTES NFR BLD AUTO: 0 %
INR PPP: 1.3 (ref 0.9–1.2)
LYMPHOCYTES # BLD AUTO: 0.5 X10E3/UL (ref 0.7–3.1)
LYMPHOCYTES NFR BLD AUTO: 18 %
MCH RBC QN AUTO: 36 PG (ref 26.6–33)
MCHC RBC AUTO-ENTMCNC: 35.6 G/DL (ref 31.5–35.7)
MCV RBC AUTO: 101 FL (ref 79–97)
MONOCYTES # BLD AUTO: 0.3 X10E3/UL (ref 0.1–0.9)
MONOCYTES NFR BLD AUTO: 12 %
MORPHOLOGY BLD-IMP: ABNORMAL
NEUTROPHILS # BLD AUTO: 1.9 X10E3/UL (ref 1.4–7)
NEUTROPHILS NFR BLD AUTO: 68 %
PLATELET # BLD AUTO: 99 X10E3/UL (ref 150–450)
PROTHROMBIN TIME: 13.1 SEC (ref 9.1–12)
RBC # BLD AUTO: 3.14 X10E6/UL (ref 4.14–5.8)
WBC # BLD AUTO: 2.8 X10E3/UL (ref 3.4–10.8)

## 2023-04-28 NOTE — PROGRESS NOTES
Spoke with patient's wife by phone and reported labs results as requested. His T. Bili is up from last office visit and the rest of his numbers are stable.  It was recommended again that patient start in counseling for alcohol or an OP program.

## 2023-04-28 NOTE — TELEPHONE ENCOUNTER
Spoke with patient's wife by phone and reported labs results as requested. His T. Bili is up from last office visit and the rest of his numbers are stable. It was recommended again that patient start in counseling for alcohol or an OP program.  Wife states that he has been angry and argumentative lately.

## 2023-04-28 NOTE — TELEPHONE ENCOUNTER
----- Message from Suma Liu RN sent at 4/27/2023  3:20 PM EDT -----  Regarding: FW: Fiber Scan on 4/26 Jasen Short  Contact: [phone]    ----- Message -----  From: Kathi Solano  Sent: 4/27/2023   3:13 PM EDT  To: Derrell Lopez Nurses  Subject: Fiber Scan on 4/26 - Meldon Peabody afternoon Nico Sherwood. When ou get a moment can you plz give me a call re lab results. Thanks so much. Garrett Sarabia  [phone]

## 2023-05-05 ENCOUNTER — TELEPHONE (OUTPATIENT)
Dept: HEMATOLOGY | Age: 53
End: 2023-05-05

## 2023-05-16 RX ORDER — CALCIUM CARB/VIT D3/MINERALS 600 MG-200
TABLET,CHEWABLE ORAL
Qty: 90 TABLET | Refills: 3 | Status: SHIPPED | OUTPATIENT
Start: 2023-05-16

## 2023-05-20 RX ORDER — CHLORPROMAZINE HYDROCHLORIDE 25 MG/1
25 TABLET, FILM COATED ORAL 2 TIMES DAILY
Qty: 60 TABLET | Refills: 0 | Status: SHIPPED | OUTPATIENT
Start: 2023-05-20

## 2023-06-08 ENCOUNTER — HOSPITAL ENCOUNTER (OUTPATIENT)
Facility: HOSPITAL | Age: 53
Discharge: HOME | End: 2023-06-08
Payer: COMMERCIAL

## 2023-06-08 DIAGNOSIS — K70.30 ALCOHOLIC CIRRHOSIS OF LIVER WITHOUT ASCITES (HCC): ICD-10-CM

## 2023-06-08 PROCEDURE — 76700 US EXAM ABDOM COMPLETE: CPT

## 2023-06-22 ENCOUNTER — OFFICE VISIT (OUTPATIENT)
Age: 53
End: 2023-06-22
Payer: COMMERCIAL

## 2023-06-22 VITALS
WEIGHT: 144 LBS | HEART RATE: 106 BPM | TEMPERATURE: 98.1 F | OXYGEN SATURATION: 98 % | SYSTOLIC BLOOD PRESSURE: 152 MMHG | BODY MASS INDEX: 22.6 KG/M2 | DIASTOLIC BLOOD PRESSURE: 100 MMHG | HEIGHT: 67 IN

## 2023-06-22 DIAGNOSIS — K70.30 ALCOHOLIC CIRRHOSIS OF LIVER WITHOUT ASCITES (HCC): Primary | ICD-10-CM

## 2023-06-22 PROCEDURE — 99214 OFFICE O/P EST MOD 30 MIN: CPT | Performed by: NURSE PRACTITIONER

## 2023-06-22 ASSESSMENT — PATIENT HEALTH QUESTIONNAIRE - PHQ9
SUM OF ALL RESPONSES TO PHQ QUESTIONS 1-9: 0
2. FEELING DOWN, DEPRESSED OR HOPELESS: 0
SUM OF ALL RESPONSES TO PHQ QUESTIONS 1-9: 0
SUM OF ALL RESPONSES TO PHQ9 QUESTIONS 1 & 2: 0
1. LITTLE INTEREST OR PLEASURE IN DOING THINGS: 0
SUM OF ALL RESPONSES TO PHQ QUESTIONS 1-9: 0
SUM OF ALL RESPONSES TO PHQ QUESTIONS 1-9: 0

## 2023-06-22 NOTE — PROGRESS NOTES
Identified pt with two pt identifiers(name and ). Reviewed record in preparation for visit and have obtained necessary documentation. Chief Complaint   Patient presents with    Cirrhosis     2month follow up     BP (!) 152/100 (Site: Left Upper Arm, Position: Sitting, Cuff Size: Medium Adult)   Pulse (!) 106   Temp 98.1 °F (36.7 °C) (Temporal)   Ht 5' 7\" (1.702 m)   Wt 144 lb (65.3 kg)   SpO2 98%   BMI 22.55 kg/m²       1. \"Have you been to the ER, urgent care clinic since your last visit? Hospitalized since your last visit? \" No    2. \"Have you seen or consulted any other health care providers outside of the [address] [place] [address] since your last visit? \" No     Patient is accompanied by partner I have received verbal consent from Suraj Listen to discuss any/all medical information while they are present in the room.

## 2023-06-22 NOTE — PROGRESS NOTES
[address] [facility], MD, [address], [facility], [place]      COLLIN Ball, [facility] CenterNP-BC   Ritu Estrella, ACN-AG   Rd Saenz, FNP-C  Oly Evangelista, FNP-C   Benito Adams, AGPCNP-BC      [address]   at [place] [id number] [facility], [address] [address]  [address].   [phone]   FAX: [phone]   at [address], [address] [place] [address], [address]   [phone]   FAX: [phone]           Patient Care Team:  Erna José MD as PCP - General (Family Medicine)  Erna José MD as PCP - [facility]    Patient Active Problem List   Diagnosis    Hepatomegaly    Hyperbilirubinemia    Moderate protein-calorie malnutrition ([facility]    Alcoholic cirrhosis of liver without ascites ([facility]    Anemia in other chronic diseases classified elsewhere    Alcohol dependence in remission [facility])         Crow Mendoza is being seen at [facility] for management of 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 The active problem list, all pertinent past medical history, medications, liver histology, endoscopic studies, radiologic findings and laboratory findings related to the liver disorder were reviewed and discussed with the patient. The patient is a 48 y.o. Black male who was hospitalized in 10/2022 when he developed abdominal pain/fullness and jaundice. There was no nausea, vomiting, swelling of the abdomen, swelling of the lower extremity, confusion. He had an episode of acute pancreatitis in 2019. He was abstinent for a few months after that but then slowly started drinking alcohol again in increasing amounts. The patient had  previously remained abstinent from all alcohol since 9/24/2022.  Previous consumption consisted of 10 beers and 4

## 2023-06-23 ENCOUNTER — PATIENT MESSAGE (OUTPATIENT)
Age: 53
End: 2023-06-23

## 2023-06-23 LAB
AFP L3 MFR SERPL: 11.7 % (ref 0–9.9)
AFP SERPL-MCNC: 5.5 NG/ML (ref 0–8.4)
ALBUMIN SERPL-MCNC: 4.3 G/DL (ref 3.8–4.9)
ALP SERPL-CCNC: 276 IU/L (ref 44–121)
ALT SERPL-CCNC: 46 IU/L (ref 0–44)
AST SERPL-CCNC: 168 IU/L (ref 0–40)
BASOPHILS # BLD AUTO: 0 X10E3/UL (ref 0–0.2)
BASOPHILS NFR BLD AUTO: 1 %
BILIRUB DIRECT SERPL-MCNC: 2.44 MG/DL (ref 0–0.4)
BILIRUB SERPL-MCNC: 3.7 MG/DL (ref 0–1.2)
BUN SERPL-MCNC: 5 MG/DL (ref 6–24)
BUN/CREAT SERPL: 7 (ref 9–20)
CALCIUM SERPL-MCNC: 9.9 MG/DL (ref 8.7–10.2)
CHLORIDE SERPL-SCNC: 95 MMOL/L (ref 96–106)
CO2 SERPL-SCNC: 23 MMOL/L (ref 20–29)
CREAT SERPL-MCNC: 0.75 MG/DL (ref 0.76–1.27)
EGFRCR SERPLBLD CKD-EPI 2021: 108 ML/MIN/1.73
EOSINOPHIL # BLD AUTO: 0 X10E3/UL (ref 0–0.4)
EOSINOPHIL NFR BLD AUTO: 1 %
ERYTHROCYTE [DISTWIDTH] IN BLOOD BY AUTOMATED COUNT: 12.3 % (ref 11.6–15.4)
FERRITIN SERPL-MCNC: 547 NG/ML (ref 30–400)
GLUCOSE SERPL-MCNC: 86 MG/DL (ref 70–99)
HCT VFR BLD AUTO: 33.8 % (ref 37.5–51)
HGB BLD-MCNC: 12 G/DL (ref 13–17.7)
IMM GRANULOCYTES # BLD AUTO: 0 X10E3/UL (ref 0–0.1)
IMM GRANULOCYTES NFR BLD AUTO: 0 %
LYMPHOCYTES # BLD AUTO: 0.6 X10E3/UL (ref 0.7–3.1)
LYMPHOCYTES NFR BLD AUTO: 19 %
MCH RBC QN AUTO: 36 PG (ref 26.6–33)
MCHC RBC AUTO-ENTMCNC: 35.5 G/DL (ref 31.5–35.7)
MCV RBC AUTO: 102 FL (ref 79–97)
MONOCYTES # BLD AUTO: 0.3 X10E3/UL (ref 0.1–0.9)
MONOCYTES NFR BLD AUTO: 9 %
NEUTROPHILS # BLD AUTO: 2.2 X10E3/UL (ref 1.4–7)
NEUTROPHILS NFR BLD AUTO: 70 %
PLATELET # BLD AUTO: 145 X10E3/UL (ref 150–450)
POTASSIUM SERPL-SCNC: 3.9 MMOL/L (ref 3.5–5.2)
PROT SERPL-MCNC: 8.3 G/DL (ref 6–8.5)
RBC # BLD AUTO: 3.33 X10E6/UL (ref 4.14–5.8)
SODIUM SERPL-SCNC: 138 MMOL/L (ref 134–144)
WBC # BLD AUTO: 3.1 X10E3/UL (ref 3.4–10.8)

## 2023-06-23 RX ORDER — FERROUS SULFATE 325(65) MG
325 TABLET ORAL EVERY OTHER DAY
Qty: 60 TABLET | Refills: 0 | Status: SHIPPED | OUTPATIENT
Start: 2023-06-23

## 2023-06-24 LAB
INR PPP: 1.4 (ref 0.9–1.2)
PROTHROMBIN TIME: 14.5 SEC (ref 9.1–12)

## 2023-06-25 DIAGNOSIS — K70.30 ALCOHOLIC CIRRHOSIS OF LIVER WITHOUT ASCITES (HCC): Primary | ICD-10-CM

## 2023-06-26 ENCOUNTER — CLINICAL DOCUMENTATION (OUTPATIENT)
Age: 53
End: 2023-06-26

## 2023-06-26 ENCOUNTER — TELEPHONE (OUTPATIENT)
Age: 53
End: 2023-06-26

## 2023-06-29 LAB
PETH BLD QL SCN: POSITIVE
PETH BLD-MCNC: 1479 NG/ML

## 2023-07-01 LAB
ANA SER QL IF: POSITIVE
ANA SPECKLED TITR SER: ABNORMAL {TITER}
LABORATORY COMMENT REPORT: ABNORMAL

## 2023-07-14 DIAGNOSIS — K70.30 ALCOHOLIC CIRRHOSIS OF LIVER WITHOUT ASCITES (HCC): ICD-10-CM

## 2023-07-14 RX ORDER — GAUZE BANDAGE 2" X 2"
BANDAGE TOPICAL
Qty: 90 TABLET | Refills: 3 | Status: SHIPPED | OUTPATIENT
Start: 2023-07-14

## 2023-07-17 ENCOUNTER — HOSPITAL ENCOUNTER (OUTPATIENT)
Facility: HOSPITAL | Age: 53
Discharge: HOME | End: 2023-07-20
Payer: COMMERCIAL

## 2023-07-17 DIAGNOSIS — K70.30 ALCOHOLIC CIRRHOSIS OF LIVER WITHOUT ASCITES (HCC): ICD-10-CM

## 2023-07-17 PROCEDURE — 6360000004 HC RX CONTRAST MEDICATION

## 2023-07-17 PROCEDURE — 74183 MRI ABD W/O CNTR FLWD CNTR: CPT

## 2023-07-17 PROCEDURE — A9579 GAD-BASE MR CONTRAST NOS,1ML: HCPCS

## 2023-07-17 RX ORDER — SODIUM CHLORIDE 9 MG/ML
INJECTION, SOLUTION INTRAVENOUS CONTINUOUS
OUTPATIENT
Start: 2023-07-17

## 2023-07-17 RX ADMIN — GADOTERIDOL 15 ML: 279.3 INJECTION, SOLUTION INTRAVENOUS at 18:51

## 2023-07-20 ENCOUNTER — TELEPHONE (OUTPATIENT)
Age: 53
End: 2023-07-20

## 2023-07-20 NOTE — TELEPHONE ENCOUNTER
Called patient's wife back, no answer, left VM that no results are available yet for the MRI and that I am out of the office tomorrow, will report MRI results on Monday if they are ready. Otherwise she can call and see if another provider can give results.

## 2023-07-21 RX ORDER — CHLORPROMAZINE HYDROCHLORIDE 25 MG/1
TABLET, FILM COATED ORAL
Qty: 30 TABLET | Refills: 0 | Status: SHIPPED | OUTPATIENT
Start: 2023-07-21

## 2023-07-24 ENCOUNTER — TELEPHONE (OUTPATIENT)
Age: 53
End: 2023-07-24

## 2023-07-24 NOTE — TELEPHONE ENCOUNTER
Spouse of patient contacted radiology indicating that provider relayed to them that she had not received report of the MRI completed on 7.17.23. Radiology staff called our office to confirm that report could be viewed within Epic and wanted to ensure provider was aware of this.

## 2023-08-10 DIAGNOSIS — K21.9 GASTRO-ESOPHAGEAL REFLUX DISEASE WITHOUT ESOPHAGITIS: ICD-10-CM

## 2023-08-10 RX ORDER — PANTOPRAZOLE SODIUM 40 MG/1
TABLET, DELAYED RELEASE ORAL
Qty: 90 TABLET | Refills: 3 | Status: SHIPPED | OUTPATIENT
Start: 2023-08-10

## 2023-08-18 ENCOUNTER — TELEPHONE (OUTPATIENT)
Age: 53
End: 2023-08-18

## 2023-08-18 ENCOUNTER — OFFICE VISIT (OUTPATIENT)
Age: 53
End: 2023-08-18
Payer: COMMERCIAL

## 2023-08-18 VITALS
HEART RATE: 93 BPM | WEIGHT: 138.4 LBS | TEMPERATURE: 98.2 F | HEIGHT: 67 IN | DIASTOLIC BLOOD PRESSURE: 101 MMHG | OXYGEN SATURATION: 98 % | SYSTOLIC BLOOD PRESSURE: 144 MMHG | BODY MASS INDEX: 21.72 KG/M2

## 2023-08-18 DIAGNOSIS — K70.30 ALCOHOLIC CIRRHOSIS OF LIVER WITHOUT ASCITES (HCC): Primary | ICD-10-CM

## 2023-08-18 PROCEDURE — 99214 OFFICE O/P EST MOD 30 MIN: CPT | Performed by: NURSE PRACTITIONER

## 2023-08-18 RX ORDER — ONDANSETRON 4 MG/1
4 TABLET, FILM COATED ORAL DAILY PRN
Qty: 30 TABLET | Refills: 1 | Status: SHIPPED | OUTPATIENT
Start: 2023-08-18

## 2023-08-18 ASSESSMENT — PATIENT HEALTH QUESTIONNAIRE - PHQ9
2. FEELING DOWN, DEPRESSED OR HOPELESS: 0
SUM OF ALL RESPONSES TO PHQ QUESTIONS 1-9: 0
1. LITTLE INTEREST OR PLEASURE IN DOING THINGS: 0
SUM OF ALL RESPONSES TO PHQ9 QUESTIONS 1 & 2: 0
SUM OF ALL RESPONSES TO PHQ QUESTIONS 1-9: 0

## 2023-08-18 NOTE — PROGRESS NOTES
MD Emanuel, [address] [place], [place]      Alexandria Beckman, COLLIN Youssef S Arlet, AGPCNP-BC   Buzz Mistry, [facility]-AG   Sharda Roberto, FNP-C  Afia [facility], FNP-C   Berhane Taco, AGPCNP-BC      [address]   at [facility]   1775 [facility], [facility]   [facility], [address]   [phone]   FAX: [phone]   at [facility], 8[address] [facility], [address]   [phone]   FAX: [phone]           Patient Care Team:  Jr Fitzgerald MD as PCP - General (Family Medicine)  Jr Fitzgerald MD as PCP - [facility] [place]    Patient Active Problem List   Diagnosis    Hepatomegaly    Hyperbilirubinemia    Moderate protein-calorie malnutrition ([address])    Alcoholic cirrhosis of liver without ascites ([address])    Anemia in other chronic diseases classified elsewhere    Alcohol dependence in remission [facility])         Margarita Jimenez is being seen at [facility] for management of 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 The active problem list, all pertinent past medical history, medications, liver histology, endoscopic studies, radiologic findings and laboratory findings related to the liver disorder were reviewed and discussed with the patient. The patient is a 48 y.o. Black male who was hospitalized in 10/2022 when he developed abdominal pain/fullness and jaundice. There was no nausea, vomiting, swelling of the abdomen, swelling of the lower extremity, confusion. He had an episode of acute pancreatitis in 2019. He was abstinent for a few months after that but then slowly started drinking alcohol again in increasing amounts. The patient had  previously remained abstinent from all alcohol since 9/24/2022.  Previous consumption consisted of 10 beers and 4

## 2023-08-18 NOTE — TELEPHONE ENCOUNTER
Called patient's wife back, no answer and left a message. Patient told me all of those things except the shoulder pain. Zofran and labs ordered today with 2 month follow up.

## 2023-08-18 NOTE — TELEPHONE ENCOUNTER
----- Message from Valerie Gilliam RN sent at 8/18/2023  8:49 AM EDT -----  Regarding: FW: Today's Visit - [place] Polo  Contact: [phone]    ----- Message -----  From: Lisy Collazo: 8/18/2023   6:00 AM EDT  To: , #  Subject: Today's Visit - Calli Mckeon,. Unfortunately I will not be at [facility] 9:00 appt. He asked me not to come. So I wanted to provide a few updates since I won't be in person: he has had stomach associated illness where he couldn't keep food down at least twice since his last appt.; his shoulder has really been aching so he took pain pills; his pressure elevated; and he has slowed drinking but has maybe 2 cans a couple of days. He may share these issues or not so just wanted you to know. If he doesn't, plz don't reveal that I did. Just want the best health outcome for him always. Plz call me after today's appt to advise of any testings to be scheduled, his next appointment date, how the appt went and if there is anything in his care plan I need to do. Hope to see you next appt. Aquilino Teague  [phone]

## 2023-08-19 LAB
ALBUMIN SERPL-MCNC: 4.2 G/DL (ref 3.8–4.9)
ALP SERPL-CCNC: 265 IU/L (ref 44–121)
ALT SERPL-CCNC: 37 IU/L (ref 0–44)
AST SERPL-CCNC: 119 IU/L (ref 0–40)
BASOPHILS # BLD AUTO: 0 X10E3/UL (ref 0–0.2)
BASOPHILS NFR BLD AUTO: 1 %
BILIRUB DIRECT SERPL-MCNC: 5.52 MG/DL (ref 0–0.4)
BILIRUB SERPL-MCNC: 7.5 MG/DL (ref 0–1.2)
BUN SERPL-MCNC: 4 MG/DL (ref 6–24)
BUN/CREAT SERPL: 6 (ref 9–20)
CALCIUM SERPL-MCNC: 9.7 MG/DL (ref 8.7–10.2)
CHLORIDE SERPL-SCNC: 91 MMOL/L (ref 96–106)
CO2 SERPL-SCNC: 25 MMOL/L (ref 20–29)
CREAT SERPL-MCNC: 0.62 MG/DL (ref 0.76–1.27)
EGFRCR SERPLBLD CKD-EPI 2021: 114 ML/MIN/1.73
EOSINOPHIL # BLD AUTO: 0.1 X10E3/UL (ref 0–0.4)
EOSINOPHIL NFR BLD AUTO: 2 %
ERYTHROCYTE [DISTWIDTH] IN BLOOD BY AUTOMATED COUNT: 12.7 % (ref 11.6–15.4)
GLUCOSE SERPL-MCNC: 119 MG/DL (ref 70–99)
HCT VFR BLD AUTO: 32.7 % (ref 37.5–51)
HGB BLD-MCNC: 11.6 G/DL (ref 13–17.7)
IMM GRANULOCYTES # BLD AUTO: 0 X10E3/UL (ref 0–0.1)
IMM GRANULOCYTES NFR BLD AUTO: 0 %
INR PPP: 1.3 (ref 0.9–1.2)
LYMPHOCYTES # BLD AUTO: 0.4 X10E3/UL (ref 0.7–3.1)
LYMPHOCYTES NFR BLD AUTO: 13 %
MCH RBC QN AUTO: 36.6 PG (ref 26.6–33)
MCHC RBC AUTO-ENTMCNC: 35.5 G/DL (ref 31.5–35.7)
MCV RBC AUTO: 103 FL (ref 79–97)
MONOCYTES # BLD AUTO: 0.4 X10E3/UL (ref 0.1–0.9)
MONOCYTES NFR BLD AUTO: 12 %
NEUTROPHILS # BLD AUTO: 2.3 X10E3/UL (ref 1.4–7)
NEUTROPHILS NFR BLD AUTO: 72 %
PLATELET # BLD AUTO: 115 X10E3/UL (ref 150–450)
POTASSIUM SERPL-SCNC: 4.2 MMOL/L (ref 3.5–5.2)
PROT SERPL-MCNC: 7.9 G/DL (ref 6–8.5)
PROTHROMBIN TIME: 13.8 SEC (ref 9.1–12)
RBC # BLD AUTO: 3.17 X10E6/UL (ref 4.14–5.8)
SODIUM SERPL-SCNC: 132 MMOL/L (ref 134–144)
WBC # BLD AUTO: 3.2 X10E3/UL (ref 3.4–10.8)

## 2023-09-17 DIAGNOSIS — K70.30 ALCOHOLIC CIRRHOSIS OF LIVER WITHOUT ASCITES (HCC): ICD-10-CM

## 2023-09-18 RX ORDER — FOLIC ACID 1 MG/1
1 TABLET ORAL DAILY
Qty: 30 TABLET | Refills: 3 | Status: SHIPPED | OUTPATIENT
Start: 2023-09-18

## 2023-09-18 RX ORDER — FOLIC ACID 1 MG/1
1000 TABLET ORAL DAILY
Qty: 30 TABLET | Refills: 3 | Status: SHIPPED | OUTPATIENT
Start: 2023-09-18

## 2023-10-18 ENCOUNTER — OFFICE VISIT (OUTPATIENT)
Age: 53
End: 2023-10-18
Payer: COMMERCIAL

## 2023-10-18 VITALS
HEIGHT: 67 IN | BODY MASS INDEX: 22.13 KG/M2 | OXYGEN SATURATION: 99 % | TEMPERATURE: 97.8 F | DIASTOLIC BLOOD PRESSURE: 89 MMHG | HEART RATE: 82 BPM | SYSTOLIC BLOOD PRESSURE: 137 MMHG | WEIGHT: 141 LBS

## 2023-10-18 DIAGNOSIS — K70.30 ALCOHOLIC CIRRHOSIS OF LIVER WITHOUT ASCITES (HCC): Primary | ICD-10-CM

## 2023-10-18 PROCEDURE — 99214 OFFICE O/P EST MOD 30 MIN: CPT | Performed by: NURSE PRACTITIONER

## 2023-10-18 RX ORDER — ONDANSETRON 4 MG/1
4 TABLET, FILM COATED ORAL DAILY PRN
Qty: 30 TABLET | Refills: 2 | Status: SHIPPED | OUTPATIENT
Start: 2023-10-18

## 2023-10-18 ASSESSMENT — PATIENT HEALTH QUESTIONNAIRE - PHQ9
SUM OF ALL RESPONSES TO PHQ QUESTIONS 1-9: 0
2. FEELING DOWN, DEPRESSED OR HOPELESS: 0
1. LITTLE INTEREST OR PLEASURE IN DOING THINGS: 0
SUM OF ALL RESPONSES TO PHQ QUESTIONS 1-9: 0
SUM OF ALL RESPONSES TO PHQ9 QUESTIONS 1 & 2: 0

## 2023-10-18 NOTE — PROGRESS NOTES
MD Emanuel, FACP, [place], [place]      COLLIN Teran, [facility]NP-BC   Lula Clarke, [facility]-   Jez Sorensen, KITTY Chaudhry FNCHACE Leiva Cowden, PCNP-BC      [address]   at [facility]   1775 [facility], [address] [facility]   [facility], [address]   [phone]   FAX: [phone]   at [facility], 833 [facility], [address]   [phone]   FAX: [phone]           Patient Care Team:  Vero Ferguson MD as PCP - General (Family Medicine)  Vero Ferguson MD as PCP - [facility]    Patient Active Problem List   Diagnosis    Hepatomegaly    Hyperbilirubinemia    Moderate protein-calorie malnutrition ([address])    Alcoholic cirrhosis of liver without ascites ([address])    Anemia in other chronic diseases classified elsewhere    Alcohol dependence in remission ([address])         MELD 3.0: 24 at 10/18/2023 12:00 AM  MELD-Na: 24 at 10/18/2023 12:00 AM  Calculated from:  Serum Creatinine: 0.63 mg/dL (Using min of 1 mg/dL) at 10/18/2023 12:00 AM  Serum Sodium: 132 mmol/L at 10/18/2023 12:00 AM  Total Bilirubin: 15.6 mg/dL at 10/18/2023 12:00 AM  Serum Albumin: 3.1 g/dL at 10/18/2023 12:00 AM  INR(ratio): 1.5 at 10/18/2023 12:00 AM  Age at listing (hypothetical): 48 years  Sex: Male at 10/18/2023 12:00 AM        Al[facility] for management of 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 The active problem list, all pertinent past medical history, medications, liver histology, endoscopic studies, radiologic findings and laboratory findings related to the liver disorder were reviewed and discussed with the patient. The patient is a 48 y.o.  Black male who was hospitalized in 10/2022 when he

## 2023-10-19 LAB
ALBUMIN SERPL-MCNC: 3.1 G/DL (ref 3.8–4.9)
ALP SERPL-CCNC: 290 IU/L (ref 44–121)
ALT SERPL-CCNC: 30 IU/L (ref 0–44)
AST SERPL-CCNC: 146 IU/L (ref 0–40)
BILIRUB DIRECT SERPL-MCNC: 14.22 MG/DL (ref 0–0.4)
BILIRUB SERPL-MCNC: 15.6 MG/DL (ref 0–1.2)
BUN SERPL-MCNC: 5 MG/DL (ref 6–24)
BUN/CREAT SERPL: 8 (ref 9–20)
CALCIUM SERPL-MCNC: 9.2 MG/DL (ref 8.7–10.2)
CHLORIDE SERPL-SCNC: 97 MMOL/L (ref 96–106)
CO2 SERPL-SCNC: 22 MMOL/L (ref 20–29)
CREAT SERPL-MCNC: 0.63 MG/DL (ref 0.76–1.27)
EGFRCR SERPLBLD CKD-EPI 2021: 114 ML/MIN/1.73
GLUCOSE SERPL-MCNC: 105 MG/DL (ref 70–99)
INR PPP: 1.5 (ref 0.9–1.2)
POTASSIUM SERPL-SCNC: 3.8 MMOL/L (ref 3.5–5.2)
PROT SERPL-MCNC: 7.1 G/DL (ref 6–8.5)
PROTHROMBIN TIME: 16.1 SEC (ref 9.1–12)
SODIUM SERPL-SCNC: 132 MMOL/L (ref 134–144)

## 2023-10-20 ENCOUNTER — TELEPHONE (OUTPATIENT)
Age: 53
End: 2023-10-20

## 2023-10-20 LAB
BASOPHILS # BLD AUTO: 0.1 X10E3/UL (ref 0–0.2)
BASOPHILS NFR BLD AUTO: 1 %
EOSINOPHIL # BLD AUTO: 0 X10E3/UL (ref 0–0.4)
EOSINOPHIL NFR BLD AUTO: 1 %
HCT VFR BLD AUTO: ABNORMAL %
HGB BLD-MCNC: 10 G/DL (ref 13–17.7)
IMM GRANULOCYTES # BLD AUTO: 0 X10E3/UL (ref 0–0.1)
IMM GRANULOCYTES NFR BLD AUTO: 1 %
LYMPHOCYTES # BLD AUTO: 0.6 X10E3/UL (ref 0.7–3.1)
LYMPHOCYTES NFR BLD AUTO: 12 %
MONOCYTES # BLD AUTO: 0.4 X10E3/UL (ref 0.1–0.9)
MONOCYTES NFR BLD AUTO: 9 %
MORPHOLOGY BLD-IMP: ABNORMAL
NEUTROPHILS # BLD AUTO: 3.8 X10E3/UL (ref 1.4–7)
NEUTROPHILS NFR BLD AUTO: 76 %
PLATELET # BLD AUTO: 135 X10E3/UL (ref 150–450)
RBC # BLD AUTO: ABNORMAL 10*6/UL
WBC # BLD AUTO: 4.9 X10E3/UL (ref 3.4–10.8)

## 2023-10-20 NOTE — TELEPHONE ENCOUNTER
Called and spoke with patient's wife, Lizandro Whitehead. Reported T bili of 15. Asked her to call the office on Monday and schedule an appointment in 1 month. Also is patient starts to feel bad to go to the ER. Start taking oral iron daily. It's ok to take stool softener with iron.

## 2023-11-04 ENCOUNTER — HOSPITAL ENCOUNTER (OUTPATIENT)
Facility: HOSPITAL | Age: 53
End: 2023-11-04
Attending: ORTHOPAEDIC SURGERY
Payer: COMMERCIAL

## 2023-11-04 DIAGNOSIS — M19.011 PRIMARY OSTEOARTHRITIS, RIGHT SHOULDER: ICD-10-CM

## 2023-11-04 DIAGNOSIS — G89.29 CHRONIC RIGHT SHOULDER PAIN: ICD-10-CM

## 2023-11-04 DIAGNOSIS — M25.511 CHRONIC RIGHT SHOULDER PAIN: ICD-10-CM

## 2023-11-04 PROCEDURE — 73200 CT UPPER EXTREMITY W/O DYE: CPT

## 2023-11-15 ENCOUNTER — OFFICE VISIT (OUTPATIENT)
Age: 53
End: 2023-11-15
Payer: COMMERCIAL

## 2023-11-15 VITALS
WEIGHT: 143.8 LBS | HEART RATE: 84 BPM | OXYGEN SATURATION: 99 % | DIASTOLIC BLOOD PRESSURE: 92 MMHG | HEIGHT: 67 IN | SYSTOLIC BLOOD PRESSURE: 149 MMHG | TEMPERATURE: 98.3 F | BODY MASS INDEX: 22.57 KG/M2

## 2023-11-15 DIAGNOSIS — K70.30 ALCOHOLIC CIRRHOSIS OF LIVER WITHOUT ASCITES (HCC): Primary | ICD-10-CM

## 2023-11-15 PROCEDURE — 99214 OFFICE O/P EST MOD 30 MIN: CPT | Performed by: NURSE PRACTITIONER

## 2023-11-15 ASSESSMENT — PATIENT HEALTH QUESTIONNAIRE - PHQ9
SUM OF ALL RESPONSES TO PHQ9 QUESTIONS 1 & 2: 0
2. FEELING DOWN, DEPRESSED OR HOPELESS: 0
1. LITTLE INTEREST OR PLEASURE IN DOING THINGS: 0
SUM OF ALL RESPONSES TO PHQ QUESTIONS 1-9: 0

## 2023-11-15 NOTE — PROGRESS NOTES
MD Emanuel, [address] [place], [place]      COLLIN Finnegan, [facility]-BC   Walt Fenton, [facility]-   Javon Carr, FNCHACE Mazariegos, FNP-ALVAREZ Bradley, [facility]-BC      [address]   at [facility]   [address] [facility], [address] [facility], [address]   [phone]   FAX: [phone]   at [facility], [address] [facility], [address]   [phone]   FAX: [phone]           Patient Care Team:  Tiffanie Hess MD as PCP - General (Family Medicine)  Tiffanie Hess MD as PCP - [facility] Provider    Patient Active Problem List   Diagnosis    Hepatomegaly    Hyperbilirubinemia    Moderate protein-calorie malnutrition ([address])    Alcoholic cirrhosis of liver without ascites ([address])    Anemia in other chronic diseases classified elsewhere    Alcohol dependence in remission [facility])           Jackie Peng is being [facility] at [facility] for management of 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 The active problem list, all pertinent past medical history, medications, liver histology, endoscopic studies, radiologic findings and laboratory findings related to the liver disorder were reviewed and discussed with the patient. The patient is a 48 y.o. male who was hospitalized in 10/2022 when he developed abdominal pain/fullness and jaundice. There was no nausea, vomiting, swelling of the abdomen, swelling of the lower extremity, confusion. He had an episode of acute pancreatitis in 2019. He was abstinent for a few months after that but then slowly started drinking alcohol again in increasing amounts. The patient had  previously remained abstinent from all alcohol since 9/24/2022.  Previous consumption consisted of 10 beers and 4

## 2023-11-16 LAB
BUN SERPL-MCNC: 7 MG/DL (ref 6–24)
BUN/CREAT SERPL: 12 (ref 9–20)
CALCIUM SERPL-MCNC: 9.3 MG/DL (ref 8.7–10.2)
CHLORIDE SERPL-SCNC: 93 MMOL/L (ref 96–106)
CO2 SERPL-SCNC: 22 MMOL/L (ref 20–29)
CREAT SERPL-MCNC: 0.58 MG/DL (ref 0.76–1.27)
EGFRCR SERPLBLD CKD-EPI 2021: 117 ML/MIN/1.73
GLUCOSE SERPL-MCNC: 111 MG/DL (ref 70–99)
INR PPP: 1.7 (ref 0.9–1.2)
POTASSIUM SERPL-SCNC: 3.7 MMOL/L (ref 3.5–5.2)
PROTHROMBIN TIME: 17.3 SEC (ref 9.1–12)
SODIUM SERPL-SCNC: 128 MMOL/L (ref 134–144)

## 2023-11-17 ENCOUNTER — TELEPHONE (OUTPATIENT)
Age: 53
End: 2023-11-17

## 2023-11-17 LAB
AFP L3 MFR SERPL: 16 % (ref 0–9.9)
AFP SERPL-MCNC: 5.3 NG/ML (ref 0–8.4)
BASOPHILS # BLD AUTO: 0.1 X10E3/UL (ref 0–0.2)
BASOPHILS NFR BLD AUTO: 2 %
EOSINOPHIL # BLD AUTO: 0 X10E3/UL (ref 0–0.4)
EOSINOPHIL NFR BLD AUTO: 0 %
ERYTHROCYTE [DISTWIDTH] IN BLOOD BY AUTOMATED COUNT: 15.8 % (ref 11.6–15.4)
HCT VFR BLD AUTO: 26.1 % (ref 37.5–51)
HGB BLD-MCNC: 9.9 G/DL (ref 13–17.7)
LYMPHOCYTES # BLD AUTO: 1 X10E3/UL (ref 0.7–3.1)
LYMPHOCYTES NFR BLD AUTO: 23 %
MCH RBC QN AUTO: 38.2 PG (ref 26.6–33)
MCHC RBC AUTO-ENTMCNC: 37.9 G/DL (ref 31.5–35.7)
MCV RBC AUTO: 101 FL (ref 79–97)
MONOCYTES # BLD AUTO: 0.3 X10E3/UL (ref 0.1–0.9)
MONOCYTES NFR BLD AUTO: 8 %
MORPHOLOGY BLD-IMP: ABNORMAL
NEUTROPHILS # BLD AUTO: 2.9 X10E3/UL (ref 1.4–7)
NEUTROPHILS NFR BLD AUTO: 67 %
PLATELET # BLD AUTO: 97 X10E3/UL (ref 150–450)
RBC # BLD AUTO: 2.59 X10E6/UL (ref 4.14–5.8)
WBC # BLD AUTO: 4.3 X10E3/UL (ref 3.4–10.8)

## 2023-11-17 NOTE — TELEPHONE ENCOUNTER
----- Message from Scott Jones RN sent at 11/17/2023  8:12 AM EST -----  Regarding: FW: Ryan Jacques  Contact: [phone]    ----- Message -----  From: Crystal Villatoro: 11/16/2023   5:19 PM EST  To: #  Subject: Ethan Peabody our call dropped can you plz call me back.

## 2023-11-17 NOTE — TELEPHONE ENCOUNTER
Called patient's wife back and let her know that we have called Sobia Lisajesse and that his lab work was released and collected and is pending. He does not need to come back in to have it drawn.

## 2023-11-20 ENCOUNTER — TELEPHONE (OUTPATIENT)
Age: 53
End: 2023-11-20

## 2023-11-20 ENCOUNTER — CLINICAL DOCUMENTATION (OUTPATIENT)
Age: 53
End: 2023-11-20

## 2023-11-20 DIAGNOSIS — K70.30 ALCOHOLIC CIRRHOSIS OF LIVER WITHOUT ASCITES (HCC): Primary | ICD-10-CM

## 2023-11-20 NOTE — TELEPHONE ENCOUNTER
----- Message from Michelle Bonner sent at 11/17/2023  7:44 PM EST -----  Regarding: Michelle Bonner  Contact: [phone]  Good evening Theemmanuel jacob thank you for getting to the bottom of it. Is there a lab price in your building? Aquilino Teague  [phone]

## 2023-11-20 NOTE — PROGRESS NOTES
Labs faxed to [address] [facility] [address] fax confirmation received [phone]

## 2023-11-20 NOTE — PROGRESS NOTES
Lab results received from [address] [place] [address] (encounter date 11.17.23) and placed in provider's box to review.

## 2023-11-20 NOTE — PROGRESS NOTES
Called patient's wife, Jaison Holguin, and let her know that I just got off the phone with Rubén Elizalde.  They told me that they will escalate this case to management, and she didn't know why they have not resulted. Arnulfo Kennedy from our office called [facility] on Friday morning of last week to also inquire about unresulted labs, she was told labs were pending and would be faxed to office once complete. Today in Monday 11/20 and the labs have not resulted. This was relayed to Jaison Holguin and new labs were ordered. Patient to come into office today or tomorrow to have labs redrawn.

## 2023-11-21 LAB
ALBUMIN SERPL-MCNC: 3.1 G/DL (ref 3.8–4.9)
ALP SERPL-CCNC: 308 IU/L (ref 44–121)
ALT SERPL-CCNC: 30 IU/L (ref 0–44)
AST SERPL-CCNC: 137 IU/L (ref 0–40)
BILIRUB DIRECT SERPL-MCNC: ABNORMAL MG/DL
BILIRUB SERPL-MCNC: ABNORMAL MG/DL
PROT SERPL-MCNC: ABNORMAL G/DL

## 2023-11-22 ENCOUNTER — TELEPHONE (OUTPATIENT)
Age: 53
End: 2023-11-22

## 2023-11-22 DIAGNOSIS — K70.30 ALCOHOLIC CIRRHOSIS OF LIVER WITHOUT ASCITES (HCC): Primary | ICD-10-CM

## 2023-11-22 NOTE — TELEPHONE ENCOUNTER
----- Message from Merrell Goldmann sent at 11/22/2023  6:01 AM EST -----  Regarding: Merrell Goldmann - Blood Work Results  Contact: [phone]  Jacqueline Aguirre can you please give me call re: yesterday's results. Two of the draws were cancelled. Do you know / can you find out why? Will the same thing happen if he goes to [address] [place] [address] in your building Friday? Please call to discuss. Thanks so much. Aquilino [email or web address] Spoke w/patient wife. I have called client service concerning hepatic panel that was canceled on 11/15/23. Lab states \" tubes was sent out to [address] [place] [address] and problem occurred with tubing or at the [address] [place] [address] facility. I have placed another order and patient will have lab drawn Friday morning. (KF)

## 2023-11-25 LAB
ALBUMIN SERPL-MCNC: 2.9 G/DL (ref 3.8–4.9)
ALP SERPL-CCNC: 246 IU/L (ref 44–121)
ALT SERPL-CCNC: 33 IU/L (ref 0–44)
AST SERPL-CCNC: 113 IU/L (ref 0–40)
BASOPHILS # BLD AUTO: 0 X10E3/UL (ref 0–0.2)
BASOPHILS NFR BLD AUTO: 1 %
BILIRUB DIRECT SERPL-MCNC: 12.1 MG/DL (ref 0–0.4)
BILIRUB SERPL-MCNC: 17 MG/DL (ref 0–1.2)
BUN SERPL-MCNC: 4 MG/DL (ref 6–24)
BUN/CREAT SERPL: 6 (ref 9–20)
CALCIUM SERPL-MCNC: 8.9 MG/DL (ref 8.7–10.2)
CHLORIDE SERPL-SCNC: 92 MMOL/L (ref 96–106)
CO2 SERPL-SCNC: 21 MMOL/L (ref 20–29)
CREAT SERPL-MCNC: 0.63 MG/DL (ref 0.76–1.27)
EGFRCR SERPLBLD CKD-EPI 2021: 114 ML/MIN/1.73
EOSINOPHIL # BLD AUTO: 0.1 X10E3/UL (ref 0–0.4)
EOSINOPHIL NFR BLD AUTO: 2 %
ERYTHROCYTE [DISTWIDTH] IN BLOOD BY AUTOMATED COUNT: 13.4 % (ref 11.6–15.4)
GLUCOSE SERPL-MCNC: 183 MG/DL (ref 70–99)
HCT VFR BLD AUTO: 26.4 % (ref 37.5–51)
HGB BLD-MCNC: 10 G/DL (ref 13–17.7)
IMM GRANULOCYTES # BLD AUTO: 0 X10E3/UL (ref 0–0.1)
IMM GRANULOCYTES NFR BLD AUTO: 0 %
INR PPP: 1.5 (ref 0.9–1.2)
LYMPHOCYTES # BLD AUTO: 0.5 X10E3/UL (ref 0.7–3.1)
LYMPHOCYTES NFR BLD AUTO: 13 %
MCH RBC QN AUTO: 38.3 PG (ref 26.6–33)
MCHC RBC AUTO-ENTMCNC: 37.9 G/DL (ref 31.5–35.7)
MCV RBC AUTO: 101 FL (ref 79–97)
MONOCYTES # BLD AUTO: 0.5 X10E3/UL (ref 0.1–0.9)
MONOCYTES NFR BLD AUTO: 13 %
MORPHOLOGY BLD-IMP: ABNORMAL
NEUTROPHILS # BLD AUTO: 2.9 X10E3/UL (ref 1.4–7)
NEUTROPHILS NFR BLD AUTO: 71 %
PLATELET # BLD AUTO: 119 X10E3/UL (ref 150–450)
POTASSIUM SERPL-SCNC: 3.8 MMOL/L (ref 3.5–5.2)
PROT SERPL-MCNC: 7.2 G/DL (ref 6–8.5)
PROTHROMBIN TIME: 15.6 SEC (ref 9.1–12)
RBC # BLD AUTO: 2.61 X10E6/UL (ref 4.14–5.8)
SODIUM SERPL-SCNC: 124 MMOL/L (ref 134–144)
WBC # BLD AUTO: 4.1 X10E3/UL (ref 3.4–10.8)

## 2023-11-27 ENCOUNTER — TELEPHONE (OUTPATIENT)
Age: 53
End: 2023-11-27

## 2023-11-27 LAB
AFP L3 MFR SERPL: 19.2 % (ref 0–9.9)
AFP SERPL-MCNC: 6.5 NG/ML (ref 0–8.4)

## 2023-11-27 NOTE — TELEPHONE ENCOUNTER
----- Message from Catalina Moscoso RN sent at 11/22/2023  9:16 AM EST -----  Regarding: FW: Jenny Acevedo - Blood Work Results  Contact: [phone]    ----- Message -----  From: Jenny Acevedo  Sent: 11/22/2023   6:01 AM EST  To: #  Subject: Jenny Acevedo - Blood Work Results               Wayne Campa can you please give me call re: yesterday's results. Two of the draws were cancelled. Do you know / can you find out why? Will the same thing happen if he goes to [address] [place] [address] in your building Friday? Please call to discuss. Thanks so much. Aquilino Teague

## 2023-11-28 DIAGNOSIS — R10.9 RIGHT SIDED ABDOMINAL PAIN: Primary | ICD-10-CM

## 2023-11-29 ENCOUNTER — OFFICE VISIT (OUTPATIENT)
Age: 53
End: 2023-11-29
Payer: COMMERCIAL

## 2023-11-29 VITALS
RESPIRATION RATE: 16 BRPM | OXYGEN SATURATION: 100 % | BODY MASS INDEX: 21.5 KG/M2 | HEIGHT: 67 IN | SYSTOLIC BLOOD PRESSURE: 122 MMHG | HEART RATE: 76 BPM | TEMPERATURE: 97.8 F | WEIGHT: 137 LBS | DIASTOLIC BLOOD PRESSURE: 76 MMHG

## 2023-11-29 DIAGNOSIS — M25.511 CHRONIC RIGHT SHOULDER PAIN: ICD-10-CM

## 2023-11-29 DIAGNOSIS — G89.29 CHRONIC RIGHT SHOULDER PAIN: ICD-10-CM

## 2023-11-29 DIAGNOSIS — K70.30 ALCOHOLIC CIRRHOSIS OF LIVER WITHOUT ASCITES (HCC): Primary | ICD-10-CM

## 2023-11-29 PROCEDURE — 99214 OFFICE O/P EST MOD 30 MIN: CPT | Performed by: NURSE PRACTITIONER

## 2023-11-29 RX ORDER — TRAMADOL HYDROCHLORIDE 50 MG/1
50 TABLET ORAL EVERY 4 HOURS PRN
Qty: 42 TABLET | Refills: 0 | Status: SHIPPED | OUTPATIENT
Start: 2023-11-29 | End: 2023-12-06

## 2023-11-29 NOTE — PROGRESS NOTES
MD Emanuel, [address] [facility], [place], [place]      COLLIN Fang, AGPCNP-BC   Buzz Mistry, [facility]-AG   Sharda Roberto, FNP-C  Afia [facility], FNP-C   Rudygregg España, AGPCNP-BC      [address]   at [facility]   1101 [facility], [address]   [place], [address]   [phone]   FAX: [phone]   at [facility], [address] [facility], [address]   [phone]   FAX: [phone]           Patient Care Team:  Jr Fitzgerald MD as PCP - General (Family Medicine)  Jr Fitzgerald MD as PCP - [facility]    Patient Active Problem List   Diagnosis    Hepatomegaly    Hyperbilirubinemia    Moderate protein-calorie malnutrition ([address])    Alcoholic cirrhosis of liver without ascites ([address])    Anemia in other chronic diseases classified elsewhere    Alcohol dependence in remission [facility])           Margarita Jimenez is being seen at [facility] for management of 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 The active problem list, all pertinent past medical history, medications, liver histology, endoscopic studies, radiologic findings and laboratory findings related to the liver disorder were reviewed and discussed with the patient. The patient is a 48 y.o. male who was hospitalized in 10/2022 when he developed abdominal pain/fullness and jaundice. There was no nausea, vomiting, swelling of the abdomen, swelling of the lower extremity, confusion. He had an episode of acute pancreatitis in 2019. He was abstinent for a few months after that but then slowly started drinking alcohol again in increasing amounts. The patient had  previously remained abstinent from all alcohol since 9/24/2022.  Previous consumption consisted of 10 beers and 4

## 2023-11-30 ENCOUNTER — HOSPITAL ENCOUNTER (OUTPATIENT)
Facility: HOSPITAL | Age: 53
Discharge: HOME | End: 2023-11-30
Payer: COMMERCIAL

## 2023-11-30 DIAGNOSIS — R10.9 RIGHT SIDED ABDOMINAL PAIN: ICD-10-CM

## 2023-11-30 LAB
ALBUMIN SERPL-MCNC: 2.9 G/DL (ref 3.8–4.9)
ALP SERPL-CCNC: 240 IU/L (ref 44–121)
ALT SERPL-CCNC: 34 IU/L (ref 0–44)
AST SERPL-CCNC: 111 IU/L (ref 0–40)
BASOPHILS # BLD AUTO: 0.1 X10E3/UL (ref 0–0.2)
BASOPHILS NFR BLD AUTO: 2 %
BILIRUB DIRECT SERPL-MCNC: 10.52 MG/DL (ref 0–0.4)
BILIRUB SERPL-MCNC: 14.6 MG/DL (ref 0–1.2)
BUN SERPL-MCNC: 4 MG/DL (ref 6–24)
BUN/CREAT SERPL: 6 (ref 9–20)
CALCIUM SERPL-MCNC: 9 MG/DL (ref 8.7–10.2)
CHLORIDE SERPL-SCNC: 95 MMOL/L (ref 96–106)
CO2 SERPL-SCNC: 21 MMOL/L (ref 20–29)
CREAT SERPL-MCNC: 0.67 MG/DL (ref 0.76–1.27)
EGFRCR SERPLBLD CKD-EPI 2021: 112 ML/MIN/1.73
EOSINOPHIL # BLD AUTO: 0.1 X10E3/UL (ref 0–0.4)
EOSINOPHIL NFR BLD AUTO: 2 %
ERYTHROCYTE [DISTWIDTH] IN BLOOD BY AUTOMATED COUNT: 13.2 % (ref 11.6–15.4)
GLUCOSE SERPL-MCNC: 129 MG/DL (ref 70–99)
HCT VFR BLD AUTO: 26.6 % (ref 37.5–51)
HGB BLD-MCNC: 10.1 G/DL (ref 13–17.7)
IMM GRANULOCYTES # BLD AUTO: 0.1 X10E3/UL (ref 0–0.1)
IMM GRANULOCYTES NFR BLD AUTO: 1 %
INR PPP: 1.5 (ref 0.9–1.2)
LYMPHOCYTES # BLD AUTO: 0.7 X10E3/UL (ref 0.7–3.1)
LYMPHOCYTES NFR BLD AUTO: 13 %
MCH RBC QN AUTO: 38.4 PG (ref 26.6–33)
MCHC RBC AUTO-ENTMCNC: 38 G/DL (ref 31.5–35.7)
MCV RBC AUTO: 101 FL (ref 79–97)
MONOCYTES # BLD AUTO: 0.5 X10E3/UL (ref 0.1–0.9)
MONOCYTES NFR BLD AUTO: 10 %
MORPHOLOGY BLD-IMP: ABNORMAL
NEUTROPHILS # BLD AUTO: 3.7 X10E3/UL (ref 1.4–7)
NEUTROPHILS NFR BLD AUTO: 72 %
PLATELET # BLD AUTO: 134 X10E3/UL (ref 150–450)
POTASSIUM SERPL-SCNC: 3.7 MMOL/L (ref 3.5–5.2)
PROT SERPL-MCNC: 7.7 G/DL (ref 6–8.5)
PROTHROMBIN TIME: 15.5 SEC (ref 9.1–12)
RBC # BLD AUTO: 2.63 X10E6/UL (ref 4.14–5.8)
SODIUM SERPL-SCNC: 127 MMOL/L (ref 134–144)
WBC # BLD AUTO: 5.1 X10E3/UL (ref 3.4–10.8)

## 2023-11-30 PROCEDURE — 76700 US EXAM ABDOM COMPLETE: CPT

## 2023-12-04 ENCOUNTER — TELEPHONE (OUTPATIENT)
Age: 53
End: 2023-12-04

## 2023-12-04 NOTE — TELEPHONE ENCOUNTER
----- Message from Ly Kirk RN sent at 12/4/2023  8:22 AM EST -----  Regarding: FW: Charly Teague  Contact: [phone]    ----- Message -----  From: Evelina Rios  Sent: 12/1/2023   8:58 PM EST  To: #  Subject: Charly Teague                                 Good evening Melissa Marquez. Can you please call me Monday to discuss the blood test results. Also he has the ultrasound Thursday evening. Wondering about the results. Have a good weekend. Aquilino Teague  [phone]

## 2023-12-04 NOTE — TELEPHONE ENCOUNTER
Spoke with patient's wife, Danika Ann. Reported US results and lab work. He may have acute cholecystitis due to abdominal pain and US results.   I sent US to his PCP and she will call and get him an appointment for the abdominal pain

## 2023-12-12 ENCOUNTER — TELEPHONE (OUTPATIENT)
Age: 53
End: 2023-12-12

## 2023-12-12 DIAGNOSIS — R10.10 PAIN OF UPPER ABDOMEN: Primary | ICD-10-CM

## 2023-12-12 NOTE — TELEPHONE ENCOUNTER
----- Message from Brooke Jones RN sent at 12/8/2023  9:17 AM EST -----  Regarding: FW: Iveth Anderson  Contact: [phone]    ----- Message -----  From: Maday Barr  Sent: 12/8/2023   8:49 AM EST  To: #  Subject: Matilde Luther morning Myesha Barnes hope you're having a good week. I called [facility] yesterday to get an appt for Robyn Schools re gallstones follow up they dont have anything until March! Do you have any other gastro physicians you can recommend. Thank you     Aquilino Teague  [phone]

## 2023-12-12 NOTE — TELEPHONE ENCOUNTER
Called and spoke to patient's wife, Sofi Sebastian.   Referral has been placed and number given to call and make appointment

## 2023-12-19 PROBLEM — K81.1 CHRONIC CHOLECYSTITIS: Status: ACTIVE | Noted: 2023-12-19

## 2023-12-26 RX ORDER — FERROUS SULFATE 325(65) MG
325 TABLET ORAL EVERY OTHER DAY
Qty: 60 TABLET | Refills: 5 | Status: SHIPPED | OUTPATIENT
Start: 2023-12-26

## 2023-12-26 RX ORDER — LANOLIN ALCOHOL/MO/W.PET/CERES
1 CREAM (GRAM) TOPICAL EVERY OTHER DAY
Qty: 30 TABLET | Refills: 1 | OUTPATIENT
Start: 2023-12-26

## 2024-01-10 ENCOUNTER — TELEPHONE (OUTPATIENT)
Age: 54
End: 2024-01-10

## 2024-01-10 ENCOUNTER — CLINICAL DOCUMENTATION (OUTPATIENT)
Age: 54
End: 2024-01-10

## 2024-01-10 NOTE — PROGRESS NOTES
Returned call to the wife regarding shoulder surgery. Based on the last blood work in November the liver function has declined which could lead to infection and poor outcomes with surgery. His quality of life is poor and he is having significant pain. Discussed the possibility of him having a corticosteroid injection to help improve pain until the liver function improves. She plans to discuss with Jacqueline at their upcoming appointment.

## 2024-01-10 NOTE — TELEPHONE ENCOUNTER
Patient's wife would like a call back from Jacqueline (April), regarding patient needing shoulder surgery. States MLS wanted him to wait until March for it. However patient is struggling to lift his arm, and is in a lot of pain.

## 2024-01-15 ENCOUNTER — OFFICE VISIT (OUTPATIENT)
Age: 54
End: 2024-01-15
Payer: COMMERCIAL

## 2024-01-15 VITALS
BODY MASS INDEX: 22.76 KG/M2 | SYSTOLIC BLOOD PRESSURE: 117 MMHG | DIASTOLIC BLOOD PRESSURE: 68 MMHG | HEIGHT: 67 IN | HEART RATE: 90 BPM | OXYGEN SATURATION: 100 % | TEMPERATURE: 97 F | WEIGHT: 145 LBS

## 2024-01-15 DIAGNOSIS — K70.30 ALCOHOLIC CIRRHOSIS OF LIVER WITHOUT ASCITES (HCC): Primary | ICD-10-CM

## 2024-01-15 PROCEDURE — 99214 OFFICE O/P EST MOD 30 MIN: CPT | Performed by: NURSE PRACTITIONER

## 2024-01-15 RX ORDER — GUAIFENESIN 400 MG/1
400 TABLET ORAL 4 TIMES DAILY PRN
COMMUNITY

## 2024-01-15 NOTE — PROGRESS NOTES
Identified pt with two pt identifiers(name and ). Reviewed record in preparation for visit and have obtained necessary documentation.    Chief Complaint   Patient presents with    Follow-up     /68 (Site: Left Upper Arm, Position: Sitting, Cuff Size: Large Adult)   Pulse 90   Temp 97 °F (36.1 °C) (Temporal)   Ht 1.702 m (5' 7\")   Wt 65.8 kg (145 lb)   SpO2 100%   BMI 22.71 kg/m²       1. \"Have you been to the ER, urgent care clinic since your last visit?  Hospitalized since your last visit?\" No    2. \"Have you seen or consulted any other health care providers outside of the [facility] since your last visit?\" No     Patient is accompanied by self and  wife I have received verbal consent from Michael Teague to discuss any/all medical information while they are present in the room.        
possibly an area of focal fat deposition. Scattered simple cysts. Trace perihepatic ascites.  7/2023.  Dynamic MRI liver.  Changes consistent with cirrhosis.  No suspicious hepatic mass. Hepatic cysts, and left hepatic lobe hemangioma. Finding on recent ultrasound represents heterogeneous fibrosis. No significant change since last year given difference in technique. No biliary dilatation, portal hypertension. No ascites.  11/2023.  Ultrasound of liver.   Echogenic consistent with cirrhosis.  No liver mass lesions.  No dilated bile ducts.  No ascites.  There is a 1.1 cm cyst.     OTHER TESTING:  Not available or performed    FOLLOW-UP:  All of the issues listed above in the Assessment and Plan were discussed with the patient.  All questions were answered.  The patient expressed a clear understanding of the above.    Follow-up [facility] in 2 months for routine monitoring.  Lab work ordered.      Jacqueline Singh, FNP-C  [facility] [address], [address]  [place]  [address] [phone]  [facility]

## 2024-01-16 ENCOUNTER — CLINICAL DOCUMENTATION (OUTPATIENT)
Age: 54
End: 2024-01-16

## 2024-01-16 ENCOUNTER — TELEPHONE (OUTPATIENT)
Age: 54
End: 2024-01-16

## 2024-01-16 DIAGNOSIS — K70.30 ALCOHOLIC CIRRHOSIS OF LIVER WITHOUT ASCITES (HCC): Primary | ICD-10-CM

## 2024-01-16 LAB
ALBUMIN SERPL-MCNC: 2.6 G/DL (ref 3.8–4.9)
ALP SERPL-CCNC: 175 IU/L (ref 44–121)
ALT SERPL-CCNC: 15 IU/L (ref 0–44)
AST SERPL-CCNC: 38 IU/L (ref 0–40)
BASOPHILS # BLD AUTO: 0 X10E3/UL (ref 0–0.2)
BASOPHILS NFR BLD AUTO: 1 %
BILIRUB DIRECT SERPL-MCNC: 3.88 MG/DL (ref 0–0.4)
BILIRUB SERPL-MCNC: 5.9 MG/DL (ref 0–1.2)
BUN SERPL-MCNC: 4 MG/DL (ref 6–24)
BUN/CREAT SERPL: 6 (ref 9–20)
CALCIUM SERPL-MCNC: 9 MG/DL (ref 8.7–10.2)
CHLORIDE SERPL-SCNC: 103 MMOL/L (ref 96–106)
CO2 SERPL-SCNC: 21 MMOL/L (ref 20–29)
CREAT SERPL-MCNC: 0.63 MG/DL (ref 0.76–1.27)
EGFRCR SERPLBLD CKD-EPI 2021: 114 ML/MIN/1.73
EOSINOPHIL # BLD AUTO: 0 X10E3/UL (ref 0–0.4)
EOSINOPHIL NFR BLD AUTO: 1 %
ERYTHROCYTE [DISTWIDTH] IN BLOOD BY AUTOMATED COUNT: 12.2 % (ref 11.6–15.4)
GLUCOSE SERPL-MCNC: 125 MG/DL (ref 70–99)
HCT VFR BLD AUTO: 26 % (ref 37.5–51)
HGB BLD-MCNC: 9.2 G/DL (ref 13–17.7)
IMM GRANULOCYTES # BLD AUTO: 0 X10E3/UL (ref 0–0.1)
IMM GRANULOCYTES NFR BLD AUTO: 0 %
INR PPP: 1.4 (ref 0.9–1.2)
LYMPHOCYTES # BLD AUTO: 0.7 X10E3/UL (ref 0.7–3.1)
LYMPHOCYTES NFR BLD AUTO: 26 %
MCH RBC QN AUTO: 35.8 PG (ref 26.6–33)
MCHC RBC AUTO-ENTMCNC: 35.4 G/DL (ref 31.5–35.7)
MCV RBC AUTO: 101 FL (ref 79–97)
MONOCYTES # BLD AUTO: 0.3 X10E3/UL (ref 0.1–0.9)
MONOCYTES NFR BLD AUTO: 12 %
NEUTROPHILS # BLD AUTO: 1.6 X10E3/UL (ref 1.4–7)
NEUTROPHILS NFR BLD AUTO: 60 %
PLATELET # BLD AUTO: 126 X10E3/UL (ref 150–450)
POTASSIUM SERPL-SCNC: 4 MMOL/L (ref 3.5–5.2)
PROT SERPL-MCNC: 7.1 G/DL (ref 6–8.5)
PROTHROMBIN TIME: 15.1 SEC (ref 9.1–12)
RBC # BLD AUTO: 2.57 X10E6/UL (ref 4.14–5.8)
SODIUM SERPL-SCNC: 134 MMOL/L (ref 134–144)
WBC # BLD AUTO: 2.7 X10E3/UL (ref 3.4–10.8)

## 2024-01-16 NOTE — TELEPHONE ENCOUNTER
Called patient and spoke with spouse, Aquilino, to notify her that CT and not been completed yet and we would need to reschedule the appointment with Dr. Harvey. Gave spouse number to call to get CT scheduled. Aquilino stated that they met with the liver doctor yesterday and numbers are not good so she stated that they are going to hold off on doing surgery until blood work improves.

## 2024-01-18 ENCOUNTER — CLINICAL DOCUMENTATION (OUTPATIENT)
Age: 54
End: 2024-01-18

## 2024-01-18 ENCOUNTER — TELEPHONE (OUTPATIENT)
Age: 54
End: 2024-01-18

## 2024-01-18 DIAGNOSIS — D50.9 IRON DEFICIENCY ANEMIA, UNSPECIFIED IRON DEFICIENCY ANEMIA TYPE: ICD-10-CM

## 2024-01-18 DIAGNOSIS — K70.30 ALCOHOLIC CIRRHOSIS OF LIVER WITHOUT ASCITES (HCC): Primary | ICD-10-CM

## 2024-01-18 LAB
PETH BLD QL SCN: NEGATIVE
PETH BLD-MCNC: NEGATIVE NG/ML

## 2024-01-18 NOTE — TELEPHONE ENCOUNTER
----- Message from Silvia Bowling RN sent at 1/17/2024  3:25 PM EST -----  Regarding: FW: Michael Teague - Brannon Daquan  Contact: [phone]    ----- Message -----  From: Michael Teague  Sent: 1/17/2024   3:10 PM EST  To: #  Subject: Michael Teague - Brannon Butt                         Good afternoon Jacqueline thank you so much for sending us the order, we will go the day before the appointment. And WOW WHAT WONDERFUL NEWS about his blood work!!! Call to discuss whenever you have a moment. So glad to hear this!    Aquilino Teague  [phone]

## 2024-01-18 NOTE — TELEPHONE ENCOUNTER
Spoke with Aquilino, patient's wife.  PETH test was negative, I placed order for Iron panel and ferritin.  This will be sent by mail so labs can be done before next office visit.

## 2024-02-20 DIAGNOSIS — K70.30 ALCOHOLIC CIRRHOSIS OF LIVER WITHOUT ASCITES (HCC): ICD-10-CM

## 2024-02-20 DIAGNOSIS — I10 ESSENTIAL (PRIMARY) HYPERTENSION: ICD-10-CM

## 2024-02-20 RX ORDER — LISINOPRIL 20 MG/1
20 TABLET ORAL DAILY
Qty: 90 TABLET | Refills: 3 | OUTPATIENT
Start: 2024-02-20

## 2024-02-20 RX ORDER — FOLIC ACID 1 MG/1
1000 TABLET ORAL DAILY
Qty: 90 TABLET | Refills: 3 | Status: SHIPPED | OUTPATIENT
Start: 2024-02-20

## 2024-02-20 RX ORDER — LISINOPRIL 20 MG/1
20 TABLET ORAL DAILY
Qty: 90 TABLET | Refills: 3 | Status: SHIPPED | OUTPATIENT
Start: 2024-02-20

## 2024-03-19 ENCOUNTER — OFFICE VISIT (OUTPATIENT)
Age: 54
End: 2024-03-19
Payer: COMMERCIAL

## 2024-03-19 VITALS
HEIGHT: 67 IN | WEIGHT: 161.4 LBS | DIASTOLIC BLOOD PRESSURE: 66 MMHG | BODY MASS INDEX: 25.33 KG/M2 | TEMPERATURE: 97.9 F | OXYGEN SATURATION: 100 % | HEART RATE: 89 BPM | SYSTOLIC BLOOD PRESSURE: 126 MMHG | RESPIRATION RATE: 17 BRPM

## 2024-03-19 DIAGNOSIS — K70.30 ALCOHOLIC CIRRHOSIS OF LIVER WITHOUT ASCITES (HCC): Primary | ICD-10-CM

## 2024-03-19 LAB
ALBUMIN SERPL-MCNC: 3.5 G/DL (ref 3.8–4.9)
ALP SERPL-CCNC: 163 IU/L (ref 44–121)
ALT SERPL-CCNC: 19 IU/L (ref 0–44)
AST SERPL-CCNC: 34 IU/L (ref 0–40)
BASOPHILS # BLD AUTO: 0 X10E3/UL (ref 0–0.2)
BASOPHILS NFR BLD AUTO: 1 %
BILIRUB DIRECT SERPL-MCNC: 1.16 MG/DL (ref 0–0.4)
BILIRUB SERPL-MCNC: 2 MG/DL (ref 0–1.2)
BUN SERPL-MCNC: 8 MG/DL (ref 6–24)
BUN/CREAT SERPL: 11 (ref 9–20)
CALCIUM SERPL-MCNC: 9.8 MG/DL (ref 8.7–10.2)
CHLORIDE SERPL-SCNC: 101 MMOL/L (ref 96–106)
CO2 SERPL-SCNC: 21 MMOL/L (ref 20–29)
CREAT SERPL-MCNC: 0.73 MG/DL (ref 0.76–1.27)
EGFRCR SERPLBLD CKD-EPI 2021: 109 ML/MIN/1.73
EOSINOPHIL # BLD AUTO: 0.1 X10E3/UL (ref 0–0.4)
EOSINOPHIL NFR BLD AUTO: 2 %
ERYTHROCYTE [DISTWIDTH] IN BLOOD BY AUTOMATED COUNT: 11.8 % (ref 11.6–15.4)
FERRITIN SERPL-MCNC: 277 NG/ML (ref 30–400)
GLUCOSE SERPL-MCNC: 99 MG/DL (ref 70–99)
HCT VFR BLD AUTO: 29.6 % (ref 37.5–51)
HGB BLD-MCNC: 10.1 G/DL (ref 13–17.7)
IMM GRANULOCYTES # BLD AUTO: 0 X10E3/UL (ref 0–0.1)
IMM GRANULOCYTES NFR BLD AUTO: 0 %
INR PPP: 1.2 (ref 0.9–1.2)
LYMPHOCYTES # BLD AUTO: 0.7 X10E3/UL (ref 0.7–3.1)
LYMPHOCYTES NFR BLD AUTO: 23 %
MCH RBC QN AUTO: 35.8 PG (ref 26.6–33)
MCHC RBC AUTO-ENTMCNC: 34.1 G/DL (ref 31.5–35.7)
MCV RBC AUTO: 105 FL (ref 79–97)
MONOCYTES # BLD AUTO: 0.4 X10E3/UL (ref 0.1–0.9)
MONOCYTES NFR BLD AUTO: 12 %
NEUTROPHILS # BLD AUTO: 2.1 X10E3/UL (ref 1.4–7)
NEUTROPHILS NFR BLD AUTO: 62 %
PLATELET # BLD AUTO: 126 X10E3/UL (ref 150–450)
POTASSIUM SERPL-SCNC: 4.1 MMOL/L (ref 3.5–5.2)
PROT SERPL-MCNC: 6.9 G/DL (ref 6–8.5)
PROTHROMBIN TIME: 13 SEC (ref 9.1–12)
RBC # BLD AUTO: 2.82 X10E6/UL (ref 4.14–5.8)
SODIUM SERPL-SCNC: 136 MMOL/L (ref 134–144)
WBC # BLD AUTO: 3.2 X10E3/UL (ref 3.4–10.8)

## 2024-03-19 PROCEDURE — 99215 OFFICE O/P EST HI 40 MIN: CPT | Performed by: NURSE PRACTITIONER

## 2024-03-19 RX ORDER — CYPROHEPTADINE HYDROCHLORIDE 4 MG/1
4 TABLET ORAL 3 TIMES DAILY
Qty: 90 TABLET | Refills: 3 | Status: SHIPPED | OUTPATIENT
Start: 2024-03-19

## 2024-03-19 ASSESSMENT — PATIENT HEALTH QUESTIONNAIRE - PHQ9
1. LITTLE INTEREST OR PLEASURE IN DOING THINGS: NOT AT ALL
SUM OF ALL RESPONSES TO PHQ QUESTIONS 1-9: 0
SUM OF ALL RESPONSES TO PHQ9 QUESTIONS 1 & 2: 0
2. FEELING DOWN, DEPRESSED OR HOPELESS: NOT AT ALL
SUM OF ALL RESPONSES TO PHQ QUESTIONS 1-9: 0

## 2024-03-19 NOTE — PROGRESS NOTES
Identified pt with two pt identifiers(name and ). Reviewed record in preparation for visit and have obtained necessary documentation.  Vitals:    24 0808   BP: 126/66   Site: Left Upper Arm   Position: Sitting   Cuff Size: Medium Adult   Pulse: 89   Resp: 17   Temp: 97.9 °F (36.6 °C)   TempSrc: Temporal   SpO2: 100%   Weight: 73.2 kg (161 lb 6.4 oz)   Height: 1.702 m (5' 7\")        Health Maintenance Review: Patient reminded of \"due or due soon\" health maintenance. I have asked the patient to contact his/her primary care provider (PCP) for follow-up on his/her health maintenance.    Coordination of Care Questionnaire:  :   1) Have you been to an emergency room, urgent care, or hospitalized since your last visit?  If yes, where when, and reason for visit? no       2. Have seen or consulted any other health care provider since your last visit?   If yes, where when, and reason for visit?  Yes steroid shot in right shoulder      Patient is accompanied by wife I have received verbal consent from Michael Teague to discuss any/all medical information while they are present in the room.    
ferrous sulfate (IRON 325) 325 (65 Fe) MG tablet Take 1 tablet by mouth every other day 60 tablet 5    ondansetron (ZOFRAN) 4 MG tablet Take 1 tablet by mouth daily as needed for Nausea or Vomiting 30 tablet 2    pantoprazole (PROTONIX) 40 MG tablet TAKE 1 TABLET BY MOUTH EVERY DAY 90 tablet 3    chlorproMAZINE (THORAZINE) 25 MG tablet TAKE 1 TABLET BY MOUTH IN THE MORNING AND IN THE EVENING (Patient taking differently: PRN) 30 tablet 0    thiamine mononitrate 100 MG tablet TAKE 1 TABLET BY MOUTH EVERY DAY 90 tablet 3    [employer] B-12 500 MCG tablet TAKE 1 TABLET BY MOUTH EVERY DAY 90 tablet 3    guaiFENesin 400 MG tablet Take 1 tablet by mouth 4 times daily as needed for Cough (Patient not taking: Reported on 3/19/2024)       No current facility-administered medications for this visit.           SYSTEM REVIEW NOT RELATED TO LIVER DISEASE OR REVIEWED ABOVE:  Constitution systems: Negative for fever, chills, weight gain, positive for weight loss.   Eyes: Negative for visual changes.  ENT: Negative for sore throat, painful swallowing.   Respiratory: Negative for cough, hemoptysis, SOB.   Cardiology: Negative for chest pain, palpitations.  GI:  Negative for constipation or diarrhea.  : Negative for urinary frequency, dysuria, hematuria, nocturia.   Skin: Negative for rash.  Hematology: Negative for easy bruising, blood clots.    Musculo-skelatal: Negative for back pain, muscle pain, weakness.  Neurologic: Negative for headaches,  vertigo, memory problems not related to HE.   Psychology: Negative for anxiety, depression.       FAMILY HISTORY:  The father was murdered.    The mother  of unknown cause.  There is no family history of liver disease.    There is no family history of immune disorders.      SOCIAL HISTORY:  The patient is .    The patient has 3 children,   The patient stopped using tobacco products in 40 years ago.    The patient has previously consumed alcohol in excess.    The patient has been

## 2024-03-21 LAB
IRON SATN MFR SERPL: 39 % (ref 15–55)
IRON SERPL-MCNC: 138 UG/DL (ref 38–169)
TIBC SERPL-MCNC: 356 UG/DL (ref 250–450)
UIBC SERPL-MCNC: 218 UG/DL (ref 111–343)

## 2024-03-22 ENCOUNTER — TELEPHONE (OUTPATIENT)
Age: 54
End: 2024-03-22

## 2024-03-22 NOTE — TELEPHONE ENCOUNTER
Called Aquilino, patient's wife and reported lab work.  All is looking good.  His shoulder surgery is scheduled for 5/2/24. He will get labs drawn 1 week before surgery.

## 2024-03-22 NOTE — TELEPHONE ENCOUNTER
----- Message from Silvia Bowling RN sent at 3/21/2024 11:10 AM EDT -----  Regarding: FW: Michael Louis 6670  Contact: [phone]    ----- Message -----  From: Michael Teague  Sent: 3/21/2024  11:07 AM EDT  To: #  Subject: Michael Teague - [id number]                             Good morning Jacqueline, always good seeing you. When you get a chance today, can you please call me to go over the recent Lab results. Thank you     Aquilino Teague  [phone]

## 2024-04-20 LAB
ALBUMIN SERPL-MCNC: 3.5 G/DL (ref 3.8–4.9)
ALP SERPL-CCNC: 150 IU/L (ref 44–121)
ALT SERPL-CCNC: 15 IU/L (ref 0–44)
AST SERPL-CCNC: 28 IU/L (ref 0–40)
BASOPHILS # BLD AUTO: 0 X10E3/UL (ref 0–0.2)
BASOPHILS NFR BLD AUTO: 1 %
BILIRUB DIRECT SERPL-MCNC: 0.82 MG/DL (ref 0–0.4)
BILIRUB SERPL-MCNC: 1.6 MG/DL (ref 0–1.2)
BUN SERPL-MCNC: 11 MG/DL (ref 6–24)
BUN/CREAT SERPL: 13 (ref 9–20)
CALCIUM SERPL-MCNC: 9.7 MG/DL (ref 8.7–10.2)
CHLORIDE SERPL-SCNC: 99 MMOL/L (ref 96–106)
CO2 SERPL-SCNC: 20 MMOL/L (ref 20–29)
CREAT SERPL-MCNC: 0.85 MG/DL (ref 0.76–1.27)
EGFRCR SERPLBLD CKD-EPI 2021: 104 ML/MIN/1.73
EOSINOPHIL # BLD AUTO: 0.1 X10E3/UL (ref 0–0.4)
EOSINOPHIL NFR BLD AUTO: 3 %
ERYTHROCYTE [DISTWIDTH] IN BLOOD BY AUTOMATED COUNT: 11.6 % (ref 11.6–15.4)
FERRITIN SERPL-MCNC: 240 NG/ML (ref 30–400)
GLUCOSE SERPL-MCNC: 116 MG/DL (ref 70–99)
HCT VFR BLD AUTO: 28.5 % (ref 37.5–51)
HGB BLD-MCNC: 9.8 G/DL (ref 13–17.7)
IMM GRANULOCYTES # BLD AUTO: 0 X10E3/UL (ref 0–0.1)
IMM GRANULOCYTES NFR BLD AUTO: 0 %
INR PPP: 1.2 (ref 0.9–1.2)
IRON SATN MFR SERPL: 35 % (ref 15–55)
IRON SERPL-MCNC: 124 UG/DL (ref 38–169)
LYMPHOCYTES # BLD AUTO: 0.8 X10E3/UL (ref 0.7–3.1)
LYMPHOCYTES NFR BLD AUTO: 31 %
MCH RBC QN AUTO: 34 PG (ref 26.6–33)
MCHC RBC AUTO-ENTMCNC: 34.4 G/DL (ref 31.5–35.7)
MCV RBC AUTO: 99 FL (ref 79–97)
MONOCYTES # BLD AUTO: 0.4 X10E3/UL (ref 0.1–0.9)
MONOCYTES NFR BLD AUTO: 16 %
NEUTROPHILS # BLD AUTO: 1.2 X10E3/UL (ref 1.4–7)
NEUTROPHILS NFR BLD AUTO: 49 %
PLATELET # BLD AUTO: 116 X10E3/UL (ref 150–450)
POTASSIUM SERPL-SCNC: 3.7 MMOL/L (ref 3.5–5.2)
PROT SERPL-MCNC: 6.9 G/DL (ref 6–8.5)
PROTHROMBIN TIME: 13 SEC (ref 9.1–12)
RBC # BLD AUTO: 2.88 X10E6/UL (ref 4.14–5.8)
SODIUM SERPL-SCNC: 131 MMOL/L (ref 134–144)
TIBC SERPL-MCNC: 354 UG/DL (ref 250–450)
UIBC SERPL-MCNC: 230 UG/DL (ref 111–343)
WBC # BLD AUTO: 2.5 X10E3/UL (ref 3.4–10.8)

## 2024-04-23 LAB
AFP L3 MFR SERPL: 13.2 % (ref 0–9.9)
AFP SERPL-MCNC: 4.1 NG/ML (ref 0–8.4)

## 2024-04-30 ENCOUNTER — HOSPITAL ENCOUNTER (OUTPATIENT)
Facility: HOSPITAL | Age: 54
Discharge: HOME | End: 2024-05-03
Payer: COMMERCIAL

## 2024-04-30 DIAGNOSIS — K70.30 ALCOHOLIC CIRRHOSIS OF LIVER WITHOUT ASCITES (HCC): ICD-10-CM

## 2024-04-30 PROCEDURE — A9579 GAD-BASE MR CONTRAST NOS,1ML: HCPCS | Performed by: FAMILY MEDICINE

## 2024-04-30 PROCEDURE — 6360000004 HC RX CONTRAST MEDICATION: Performed by: FAMILY MEDICINE

## 2024-04-30 PROCEDURE — 74183 MRI ABD W/O CNTR FLWD CNTR: CPT

## 2024-04-30 RX ADMIN — GADOTERIDOL 15 ML: 279.3 INJECTION, SOLUTION INTRAVENOUS at 17:56

## 2024-05-03 ENCOUNTER — HOSPITAL ENCOUNTER (OUTPATIENT)
Facility: HOSPITAL | Age: 54
Discharge: HOME | End: 2024-05-03
Payer: COMMERCIAL

## 2024-05-03 VITALS
TEMPERATURE: 97.7 F | OXYGEN SATURATION: 100 % | SYSTOLIC BLOOD PRESSURE: 135 MMHG | RESPIRATION RATE: 16 BRPM | WEIGHT: 169.53 LBS | HEART RATE: 91 BPM | DIASTOLIC BLOOD PRESSURE: 63 MMHG | HEIGHT: 66 IN | BODY MASS INDEX: 27.25 KG/M2

## 2024-05-03 LAB
ABO + RH BLD: NORMAL
ALBUMIN SERPL-MCNC: 3 G/DL (ref 3.5–5)
ALBUMIN/GLOB SERPL: 0.7 (ref 1.1–2.2)
ALP SERPL-CCNC: 151 U/L (ref 45–117)
ALT SERPL-CCNC: 18 U/L (ref 12–78)
ANION GAP SERPL CALC-SCNC: 7 MMOL/L (ref 5–15)
APPEARANCE UR: CLEAR
AST SERPL-CCNC: 32 U/L (ref 15–37)
BACTERIA URNS QL MICRO: NEGATIVE /HPF
BILIRUB SERPL-MCNC: 1.3 MG/DL (ref 0.2–1)
BILIRUB UR QL: NEGATIVE
BLOOD GROUP ANTIBODIES SERPL: NORMAL
BUN SERPL-MCNC: 8 MG/DL (ref 6–20)
BUN/CREAT SERPL: 10 (ref 12–20)
CALCIUM SERPL-MCNC: 9.6 MG/DL (ref 8.5–10.1)
CHLORIDE SERPL-SCNC: 105 MMOL/L (ref 97–108)
CO2 SERPL-SCNC: 23 MMOL/L (ref 21–32)
COLOR UR: ABNORMAL
CREAT SERPL-MCNC: 0.83 MG/DL (ref 0.7–1.3)
EPITH CASTS URNS QL MICRO: ABNORMAL /LPF
ERYTHROCYTE [DISTWIDTH] IN BLOOD BY AUTOMATED COUNT: 12 % (ref 11.5–14.5)
EST. AVERAGE GLUCOSE BLD GHB EST-MCNC: 88 MG/DL
GLOBULIN SER CALC-MCNC: 4.2 G/DL (ref 2–4)
GLUCOSE SERPL-MCNC: 114 MG/DL (ref 65–100)
GLUCOSE UR STRIP.AUTO-MCNC: NEGATIVE MG/DL
HBA1C MFR BLD: 4.7 % (ref 4–5.6)
HCT VFR BLD AUTO: 26.9 % (ref 36.6–50.3)
HGB BLD-MCNC: 9.2 G/DL (ref 12.1–17)
HGB UR QL STRIP: NEGATIVE
HYALINE CASTS URNS QL MICRO: ABNORMAL /LPF (ref 0–2)
INR PPP: 1.3 (ref 0.9–1.1)
KETONES UR QL STRIP.AUTO: NEGATIVE MG/DL
LEUKOCYTE ESTERASE UR QL STRIP.AUTO: NEGATIVE
MCH RBC QN AUTO: 34.5 PG (ref 26–34)
MCHC RBC AUTO-ENTMCNC: 34.2 G/DL (ref 30–36.5)
MCV RBC AUTO: 100.7 FL (ref 80–99)
NITRITE UR QL STRIP.AUTO: NEGATIVE
NRBC # BLD: 0 K/UL (ref 0–0.01)
NRBC BLD-RTO: 0 PER 100 WBC
PH UR STRIP: 7 (ref 5–8)
PLATELET # BLD AUTO: 123 K/UL (ref 150–400)
PMV BLD AUTO: 9.8 FL (ref 8.9–12.9)
POTASSIUM SERPL-SCNC: 3.7 MMOL/L (ref 3.5–5.1)
PROT SERPL-MCNC: 7.2 G/DL (ref 6.4–8.2)
PROT UR STRIP-MCNC: NEGATIVE MG/DL
PROTHROMBIN TIME: 12.9 SEC (ref 9–11.1)
RBC # BLD AUTO: 2.67 M/UL (ref 4.1–5.7)
RBC #/AREA URNS HPF: ABNORMAL /HPF (ref 0–5)
SODIUM SERPL-SCNC: 135 MMOL/L (ref 136–145)
SP GR UR REFRACTOMETRY: 1.01
SPECIMEN EXP DATE BLD: NORMAL
URINE CULTURE IF INDICATED: ABNORMAL
UROBILINOGEN UR QL STRIP.AUTO: 4 EU/DL (ref 0.2–1)
WBC # BLD AUTO: 2.8 K/UL (ref 4.1–11.1)
WBC URNS QL MICRO: ABNORMAL /HPF (ref 0–4)

## 2024-05-03 PROCEDURE — 81001 URINALYSIS AUTO W/SCOPE: CPT

## 2024-05-03 PROCEDURE — 80053 COMPREHEN METABOLIC PANEL: CPT

## 2024-05-03 PROCEDURE — 36415 COLL VENOUS BLD VENIPUNCTURE: CPT

## 2024-05-03 PROCEDURE — 86901 BLOOD TYPING SEROLOGIC RH(D): CPT

## 2024-05-03 PROCEDURE — APPNB30 APP NON BILLABLE TIME 0-30 MINS: Performed by: NURSE PRACTITIONER

## 2024-05-03 PROCEDURE — 86900 BLOOD TYPING SEROLOGIC ABO: CPT

## 2024-05-03 PROCEDURE — 85027 COMPLETE CBC AUTOMATED: CPT

## 2024-05-03 PROCEDURE — 83036 HEMOGLOBIN GLYCOSYLATED A1C: CPT

## 2024-05-03 PROCEDURE — 86850 RBC ANTIBODY SCREEN: CPT

## 2024-05-03 PROCEDURE — 85610 PROTHROMBIN TIME: CPT

## 2024-05-03 PROCEDURE — 97165 OT EVAL LOW COMPLEX 30 MIN: CPT | Performed by: OCCUPATIONAL THERAPIST

## 2024-05-03 PROCEDURE — 97535 SELF CARE MNGMENT TRAINING: CPT | Performed by: OCCUPATIONAL THERAPIST

## 2024-05-03 ASSESSMENT — PAIN DESCRIPTION - LOCATION: LOCATION: SHOULDER

## 2024-05-03 ASSESSMENT — PAIN DESCRIPTION - ORIENTATION: ORIENTATION: RIGHT

## 2024-05-03 ASSESSMENT — PAIN SCALES - GENERAL: PAINLEVEL_OUTOF10: 9

## 2024-05-03 ASSESSMENT — PAIN DESCRIPTION - DESCRIPTORS: DESCRIPTORS: ACHING

## 2024-05-04 LAB
BACTERIA SPEC CULT: NORMAL
BACTERIA SPEC CULT: NORMAL
SERVICE CMNT-IMP: NORMAL

## 2024-05-06 ENCOUNTER — TELEPHONE (OUTPATIENT)
Age: 54
End: 2024-05-06

## 2024-05-06 NOTE — PROGRESS NOTES
[facility]  Ambulatory Surgery Unit  Joint/Spine Preoperative Instructions        Surgery Date 5/14/24          Tentative Arrival Time to be called @ [phone]     1. On the day of your surgery, please report to the Ambulatory Surgery Unit Registration Desk and sign in at your designated time. The Ambulatory Surgery Unit is located in [facility] on the [facility] side of the [facility], across from the [facility].  Pleases have all of your insurance cards, photo ID and copay with you the morning of surgery.    2. You must have someone with you to drive you home. You should not drive a car for 24 hours following surgery. Please make arrangements for a friend or family member to stay with you at least the first 24 hours after your surgery.    3. No food after midnight .  This includes gum, candy, or mints.  Medications morning of surgery should be taken with a sip of water.  Please follow pre-surgery drink instructions that were given at your Pre Admission Testing appointment.      4. We recommend you do not drink any alcoholic beverages for 24 hours before and after your surgery.    5. Contact your surgeon’s office for instructions on the following medications: non-steroidal anti-inflammatory drugs (i.e. Advil, Aleve), vitamins, and supplements. (Some surgeon’s will want you to stop these medications prior to surgery and others may allow you to take them)  **If you are currently taking Plavix, Coumadin, Aspirin and/or other blood-thinning agents, contact your surgeon for instructions.** Your surgeon will partner with the physician prescribing these medications to determine if it is safe to stop or if you need to continue taking.  Please do not stop taking these medications without instructions from your surgeon    6. Wear comfortable clothes.  Wear glasses instead of contacts.  Do not bring any money or jewelry. Please bring picture ID, insurance card, and any prearranged 
        [facility]  Occupational Therapy Pre-surgery evaluation  [address] [place] [address]    OCCUPATIONAL THERAPY PRE TOTAL SHOULDER SURGERY EVALUATION  Date: 5/3/2024  Patient: Michael Teague (53 y.o. 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     Discussed anticipated disposition to home with possible discharge within a 1 to 2 day time frame post-surgery. Patient's  was present for the session.  Patient and  in agreement.     Anticipate patient will not need acute OT orders post surgery.     GOALS: (Goals have been discussed and agreed upon with patient.)  DISCHARGE GOALS: Time Frame: 1 DAY  Patient will demonstrate and/or verbalize full understanding of instructions related compensatory UB ADL techniques, sling management, shoulder/UE positioning for pain control, post operative shoulder exercises and functional mobility in order to minimize functional deficits in preparation for their upcoming surgery. This will be achieved by using education, demonstration and through the use of an informational handout including a home exercise program.    REHABILITATION POTENTIAL FOR STATED GOALS:  Excellent       RECOMMENDATIONS AND PLANNED INTERVENTIONS: (Benefits and precautions of occupational therapy have been discussed with the patient.)    TREATMENT PLAN EFFECTIVE DATES: 5/3/2024 to 5/3/2024          OBJECTIVE DATA SUMMARY:   HISTORY:      Past Medical History:   Diagnosis 
Hibiclens/Chlorhexidine    Preventing Infections Before and After - Your Surgery    IMPORTANT INSTRUCTIONS    Please read and follow these instructions carefully. If you are unable to comply with the below instructions your procedure will be cancelled.       Every Night for Three (3) nights before your surgery:  Shower with an antibacterial soap, such as Dial, or the soap provided at your preassessment appointment. A shower is better than a bath for cleaning your skin.  If needed, ask someone to help you reach all areas of your body. Don’t forget to clean your belly button with every shower.    The night before your surgery:   If you lose your Hibiclens/chlorhexidine please contact surgery center or you can purchase it at a local pharmacy  On the night before your surgery, shower with an antibacterial soap, such as Dial, or the soap provided at your preassessment appointment.   With one packet of Hibiclens/Chlorhexidine in hand, turn water off.  Apply Hibiclens antiseptic skin cleanser with a clean, freshly washed washcloth.  Gently apply to your body from chin to toes (except the genital area) and especially the area(s) where your incision(s) will be.  Leave Hibiclens/Chlorhexidine on your skin for at least 20 seconds.    CAUTION: If needed, Hibiclens/chlorhexidine may be used to clean the folds of skin of the legs (such as in the area of the groin) and on your buttocks and hips. However, do not use Hibiclens/Chlorhexidine above the neck or in the genital area (your bottom) or put inside any area of your body.  Turn the water back on and rinse.  Dry gently with a clean, freshly washed towel.  After your shower, do not use any powder, deodorant, perfumes or lotion.  Use clean, freshly washed towels and washcloths every time you shower.  Wear clean, freshly washed pajamas to bed the night before surgery.  Sleep on clean, freshly washed sheets.  Do not allow pets to sleep in your bed with you.        The Morning of 
Incentive Spirometer        Using the incentive spirometer helps expand the small air sacs of your lungs, helps you breathe deeply, and helps improve your lung function.  Use your incentive spirometer twice a day (10 breaths each time) prior to surgery.      How to Use Your Incentive Spirometer:  Hold the incentive spirometer in an upright position.   Breathe out as usual.   Place the mouthpiece in your mouth and seal your lips tightly around it.   Take a deep breath.  Breathe in slowly and as deeply as possible. Keep the blue flow rate guide between the arrows.   Hold your breath as long as possible. Then exhale slowly and allow the piston to fall to the bottom of the column.   Rest for a few seconds and repeat steps one through five at least 10 times.     PAT Tidal Volume___3000_______________  x___2_____________  Date__5/3/24_____________________    BRING THE INCENTIVE SPIROMETER WITH YOU TO THE HOSPITAL ON THE DAY OF YOUR SURGERY.  Opportunity given to ask and answer questions as well as to observe return demonstration.    Patient signature_____________________________    Witness____________________________  
Orthopedic and Spine Patients:  Instructions on When You Can   Eat or Drink Before Surgery      You have been provided 2 pre-surgery drinks received at your pre-admission testing appointment.    Night before surgery:  You should drink one bottle of the  pre-surgery drink at bedtime. No food after midnight!    Day of Surgery:  Complete 2nd bottle of the pre-surgery drink 1 hour prior to arrival at hospital.  For questions call Pre-Admission Testing at [phone].  They are available from 8:00am-5:00pm, Monday through Friday.  
Patient + for Apparent Staph in nares. Script called into patients pharmacy per Aggie Sullivan, NP for mupirocin nasal ointment- apply pea size amount to both nostrils twice a day for a total of 5 days. Called patient and spoke to wife, verbalizes understanding.   
[facility] \"Don't shoulder this alone! Tips to prepare and safely care for yourself / Shoulder replacement surgery\" patient handbook was provided & reviewed during the patients pre-admission testing (PAT) appointment. An opportunity for questions was provided, patient verbalized understanding.   
muscle mass, extra-renal metabolism of creatinine, excessive creatine ingestion, or following therapy that affects renal tubular secretion.      Calcium 05/03/2024 9.6  8.5 - 10.1 MG/DL Final    Total Bilirubin 05/03/2024 1.3 (H)  0.2 - 1.0 MG/DL Final    ALT 05/03/2024 18  12 - 78 U/L Final    AST 05/03/2024 32  15 - 37 U/L Final    Alk Phosphatase 05/03/2024 151 (H)  45 - 117 U/L Final    Total Protein 05/03/2024 7.2  6.4 - 8.2 g/dL Final    Albumin 05/03/2024 3.0 (L)  3.5 - 5.0 g/dL Final    Globulin 05/03/2024 4.2 (H)  2.0 - 4.0 g/dL Final    Albumin/Globulin Ratio 05/03/2024 0.7 (L)  1.1 - 2.2   Final    Special Requests 05/03/2024 NO SPECIAL REQUESTS    Final    Culture 05/03/2024 MRSA NOT PRESENT. Apparent Staphylococcus aureus (not MRSA noted).    Final    Culture 05/03/2024     Final                    Value:Screening of patient nares for MRSA is for surveillance purposes and, if positive, to facilitate isolation considerations in high risk settings. It is not intended for automatic decolonization interventions per se as regimens are not sufficiently effective to warrant routine use.      INR 05/03/2024 1.3 (H)  0.9 - 1.1   Final    A single therapeutic range for Vit K antagonists may not be optimal for all indications - see June, 2008 issue of Chest, American College of Chest Physicians Evidence-Based Clinical Practice Guidelines, 8th Edition.    Protime 05/03/2024 12.9 (H)  9.0 - 11.1 sec Final    Color, UA 05/03/2024 YELLOW/STRAW    Final    Color Reference Range: Straw, Yellow or Dark Yellow    Appearance 05/03/2024 CLEAR  CLEAR   Final    Specific Gravity, UA 05/03/2024 1.010    Final    pH, Urine 05/03/2024 7.0  5.0 - 8.0   Final    Protein, UA 05/03/2024 Negative  NEG mg/dL Final    Glucose, Ur 05/03/2024 Negative  NEG mg/dL Final    Ketones, Urine 05/03/2024 Negative  NEG mg/dL Final    Bilirubin, Urine 05/03/2024 Negative  NEG   Final    Blood, Urine 05/03/2024 Negative  NEG   Final

## 2024-05-06 NOTE — TELEPHONE ENCOUNTER
Patient's wife called asking if it is ok for patient to take Bactroban ointment ordered on 3/6/24. Please call patient to advise.

## 2024-05-07 ENCOUNTER — ANESTHESIA EVENT (OUTPATIENT)
Facility: HOSPITAL | Age: 54
End: 2024-05-07
Payer: COMMERCIAL

## 2024-05-07 LAB
EKG ATRIAL RATE: 93 BPM
EKG DIAGNOSIS: NORMAL
EKG P AXIS: 38 DEGREES
EKG P-R INTERVAL: 172 MS
EKG Q-T INTERVAL: 370 MS
EKG QRS DURATION: 98 MS
EKG QTC CALCULATION (BAZETT): 460 MS
EKG R AXIS: -16 DEGREES
EKG T AXIS: 36 DEGREES
EKG VENTRICULAR RATE: 93 BPM

## 2024-05-08 ENCOUNTER — TELEPHONE (OUTPATIENT)
Age: 54
End: 2024-05-08

## 2024-05-08 NOTE — TELEPHONE ENCOUNTER
Called wife back and discussed the need to use bactroban presurgery.  He had a positive test for MRSA in his nare during testing.  It would be good to take this prior to surgery that is scheduled for next week.

## 2024-05-14 ENCOUNTER — ANESTHESIA (OUTPATIENT)
Facility: HOSPITAL | Age: 54
End: 2024-05-14
Payer: COMMERCIAL

## 2024-05-14 ENCOUNTER — APPOINTMENT (OUTPATIENT)
Facility: HOSPITAL | Age: 54
End: 2024-05-14
Attending: ORTHOPAEDIC SURGERY
Payer: COMMERCIAL

## 2024-05-14 ENCOUNTER — HOSPITAL ENCOUNTER (OUTPATIENT)
Facility: HOSPITAL | Age: 54
Setting detail: OUTPATIENT SURGERY
Discharge: HOME | End: 2024-05-14
Attending: ORTHOPAEDIC SURGERY | Admitting: ORTHOPAEDIC SURGERY
Payer: COMMERCIAL

## 2024-05-14 VITALS
TEMPERATURE: 97.9 F | RESPIRATION RATE: 14 BRPM | WEIGHT: 161 LBS | HEIGHT: 66 IN | BODY MASS INDEX: 25.88 KG/M2 | SYSTOLIC BLOOD PRESSURE: 116 MMHG | DIASTOLIC BLOOD PRESSURE: 72 MMHG | OXYGEN SATURATION: 99 % | HEART RATE: 99 BPM

## 2024-05-14 DIAGNOSIS — M19.011 ARTHRITIS OF RIGHT SHOULDER REGION: Primary | ICD-10-CM

## 2024-05-14 PROCEDURE — 3700000000 HC ANESTHESIA ATTENDED CARE: Performed by: ORTHOPAEDIC SURGERY

## 2024-05-14 PROCEDURE — 2580000003 HC RX 258: Performed by: ANESTHESIOLOGY

## 2024-05-14 PROCEDURE — 73030 X-RAY EXAM OF SHOULDER: CPT

## 2024-05-14 PROCEDURE — 3700000001 HC ADD 15 MINUTES (ANESTHESIA): Performed by: ORTHOPAEDIC SURGERY

## 2024-05-14 PROCEDURE — C9290 INJ, BUPIVACAINE LIPOSOME: HCPCS | Performed by: ORTHOPAEDIC SURGERY

## 2024-05-14 PROCEDURE — 2580000003 HC RX 258: Performed by: ORTHOPAEDIC SURGERY

## 2024-05-14 PROCEDURE — 6360000002 HC RX W HCPCS: Performed by: ORTHOPAEDIC SURGERY

## 2024-05-14 PROCEDURE — 2500000003 HC RX 250 WO HCPCS: Performed by: NURSE ANESTHETIST, CERTIFIED REGISTERED

## 2024-05-14 PROCEDURE — 7100000011 HC PHASE II RECOVERY - ADDTL 15 MIN: Performed by: ORTHOPAEDIC SURGERY

## 2024-05-14 PROCEDURE — C1713 ANCHOR/SCREW BN/BN,TIS/BN: HCPCS | Performed by: ORTHOPAEDIC SURGERY

## 2024-05-14 PROCEDURE — 2580000003 HC RX 258: Performed by: NURSE ANESTHETIST, CERTIFIED REGISTERED

## 2024-05-14 PROCEDURE — C1776 JOINT DEVICE (IMPLANTABLE): HCPCS | Performed by: ORTHOPAEDIC SURGERY

## 2024-05-14 PROCEDURE — 64415 NJX AA&/STRD BRCH PLXS IMG: CPT | Performed by: ANESTHESIOLOGY

## 2024-05-14 PROCEDURE — 3600000015 HC SURGERY LEVEL 5 ADDTL 15MIN: Performed by: ORTHOPAEDIC SURGERY

## 2024-05-14 PROCEDURE — 7100000010 HC PHASE II RECOVERY - FIRST 15 MIN: Performed by: ORTHOPAEDIC SURGERY

## 2024-05-14 PROCEDURE — 7100000001 HC PACU RECOVERY - ADDTL 15 MIN: Performed by: ORTHOPAEDIC SURGERY

## 2024-05-14 PROCEDURE — 7100000000 HC PACU RECOVERY - FIRST 15 MIN: Performed by: ORTHOPAEDIC SURGERY

## 2024-05-14 PROCEDURE — 2709999900 HC NON-CHARGEABLE SUPPLY: Performed by: ORTHOPAEDIC SURGERY

## 2024-05-14 PROCEDURE — 3600000005 HC SURGERY LEVEL 5 BASE: Performed by: ORTHOPAEDIC SURGERY

## 2024-05-14 PROCEDURE — 6360000002 HC RX W HCPCS: Performed by: NURSE ANESTHETIST, CERTIFIED REGISTERED

## 2024-05-14 PROCEDURE — 2500000003 HC RX 250 WO HCPCS: Performed by: ORTHOPAEDIC SURGERY

## 2024-05-14 PROCEDURE — 6360000002 HC RX W HCPCS: Performed by: ANESTHESIOLOGY

## 2024-05-14 PROCEDURE — C9290 INJ, BUPIVACAINE LIPOSOME: HCPCS | Performed by: ANESTHESIOLOGY

## 2024-05-14 DEVICE — IMPLANTABLE DEVICE
Type: IMPLANTABLE DEVICE | Site: SHOULDER | Status: FUNCTIONAL
Brand: EQUINOXE

## 2024-05-14 DEVICE — COMPONENT TOT SHLDR CAPPED HA S2EXACTECH] EXACTECH INC]: Type: IMPLANTABLE DEVICE | Status: FUNCTIONAL

## 2024-05-14 DEVICE — COBALT G-HV BONE CEMENT 40GM
Type: IMPLANTABLE DEVICE | Site: SHOULDER | Status: FUNCTIONAL
Brand: DJO SURGICAL

## 2024-05-14 RX ORDER — CEFAZOLIN SODIUM 1 G/3ML
INJECTION, POWDER, FOR SOLUTION INTRAMUSCULAR; INTRAVENOUS
Status: DISCONTINUED
Start: 2024-05-14 | End: 2024-05-14 | Stop reason: HOSPADM

## 2024-05-14 RX ORDER — SUCCINYLCHOLINE/SOD CL,ISO/PF 200MG/10ML
SYRINGE (ML) INTRAVENOUS PRN
Status: DISCONTINUED | OUTPATIENT
Start: 2024-05-14 | End: 2024-05-14 | Stop reason: SDUPTHER

## 2024-05-14 RX ORDER — DEXMEDETOMIDINE HYDROCHLORIDE 100 UG/ML
INJECTION, SOLUTION INTRAVENOUS PRN
Status: DISCONTINUED | OUTPATIENT
Start: 2024-05-14 | End: 2024-05-14 | Stop reason: SDUPTHER

## 2024-05-14 RX ORDER — PROPOFOL 10 MG/ML
INJECTION, EMULSION INTRAVENOUS PRN
Status: DISCONTINUED | OUTPATIENT
Start: 2024-05-14 | End: 2024-05-14 | Stop reason: SDUPTHER

## 2024-05-14 RX ORDER — DIPHENHYDRAMINE HYDROCHLORIDE 50 MG/ML
12.5 INJECTION INTRAMUSCULAR; INTRAVENOUS
Status: DISCONTINUED | OUTPATIENT
Start: 2024-05-14 | End: 2024-05-14 | Stop reason: HOSPADM

## 2024-05-14 RX ORDER — EPHEDRINE SULFATE/0.9% NACL/PF 25 MG/5 ML
SYRINGE (ML) INTRAVENOUS PRN
Status: DISCONTINUED | OUTPATIENT
Start: 2024-05-14 | End: 2024-05-14 | Stop reason: SDUPTHER

## 2024-05-14 RX ORDER — FENTANYL CITRATE 50 UG/ML
25 INJECTION, SOLUTION INTRAMUSCULAR; INTRAVENOUS EVERY 5 MIN PRN
Status: DISCONTINUED | OUTPATIENT
Start: 2024-05-14 | End: 2024-05-14 | Stop reason: HOSPADM

## 2024-05-14 RX ORDER — MIDAZOLAM HYDROCHLORIDE 1 MG/ML
INJECTION INTRAMUSCULAR; INTRAVENOUS
Status: COMPLETED
Start: 2024-05-14 | End: 2024-05-14

## 2024-05-14 RX ORDER — FENTANYL CITRATE 50 UG/ML
INJECTION, SOLUTION INTRAMUSCULAR; INTRAVENOUS PRN
Status: DISCONTINUED | OUTPATIENT
Start: 2024-05-14 | End: 2024-05-14 | Stop reason: SDUPTHER

## 2024-05-14 RX ORDER — MEPERIDINE HYDROCHLORIDE 25 MG/ML
12.5 INJECTION INTRAMUSCULAR; INTRAVENOUS; SUBCUTANEOUS EVERY 5 MIN PRN
Status: DISCONTINUED | OUTPATIENT
Start: 2024-05-14 | End: 2024-05-14 | Stop reason: HOSPADM

## 2024-05-14 RX ORDER — OXYCODONE HYDROCHLORIDE 5 MG/1
5 TABLET ORAL EVERY 4 HOURS PRN
Qty: 28 TABLET | Refills: 0 | Status: SHIPPED | OUTPATIENT
Start: 2024-05-14 | End: 2024-05-21

## 2024-05-14 RX ORDER — LIDOCAINE HYDROCHLORIDE 20 MG/ML
INJECTION, SOLUTION EPIDURAL; INFILTRATION; INTRACAUDAL; PERINEURAL PRN
Status: DISCONTINUED | OUTPATIENT
Start: 2024-05-14 | End: 2024-05-14 | Stop reason: SDUPTHER

## 2024-05-14 RX ORDER — BUPIVACAINE HYDROCHLORIDE 5 MG/ML
INJECTION, SOLUTION EPIDURAL; INTRACAUDAL
Status: COMPLETED | OUTPATIENT
Start: 2024-05-14 | End: 2024-05-14

## 2024-05-14 RX ORDER — DEXAMETHASONE SODIUM PHOSPHATE 4 MG/ML
INJECTION, SOLUTION INTRA-ARTICULAR; INTRALESIONAL; INTRAMUSCULAR; INTRAVENOUS; SOFT TISSUE PRN
Status: DISCONTINUED | OUTPATIENT
Start: 2024-05-14 | End: 2024-05-14 | Stop reason: SDUPTHER

## 2024-05-14 RX ORDER — OXYCODONE HYDROCHLORIDE 5 MG/1
5 TABLET ORAL
Status: DISCONTINUED | OUTPATIENT
Start: 2024-05-14 | End: 2024-05-14 | Stop reason: HOSPADM

## 2024-05-14 RX ORDER — ONDANSETRON 2 MG/ML
INJECTION INTRAMUSCULAR; INTRAVENOUS PRN
Status: DISCONTINUED | OUTPATIENT
Start: 2024-05-14 | End: 2024-05-14 | Stop reason: SDUPTHER

## 2024-05-14 RX ORDER — HYDROMORPHONE HYDROCHLORIDE 1 MG/ML
0.5 INJECTION, SOLUTION INTRAMUSCULAR; INTRAVENOUS; SUBCUTANEOUS EVERY 5 MIN PRN
Status: DISCONTINUED | OUTPATIENT
Start: 2024-05-14 | End: 2024-05-14 | Stop reason: HOSPADM

## 2024-05-14 RX ORDER — SODIUM CHLORIDE 0.9 % (FLUSH) 0.9 %
5-40 SYRINGE (ML) INJECTION PRN
Status: DISCONTINUED | OUTPATIENT
Start: 2024-05-14 | End: 2024-05-14 | Stop reason: HOSPADM

## 2024-05-14 RX ORDER — WATER 10 ML/10ML
INJECTION INTRAMUSCULAR; INTRAVENOUS; SUBCUTANEOUS
Status: DISCONTINUED
Start: 2024-05-14 | End: 2024-05-14 | Stop reason: HOSPADM

## 2024-05-14 RX ORDER — SODIUM CHLORIDE 9 MG/ML
INJECTION, SOLUTION INTRAVENOUS PRN
Status: DISCONTINUED | OUTPATIENT
Start: 2024-05-14 | End: 2024-05-14 | Stop reason: HOSPADM

## 2024-05-14 RX ORDER — NALOXONE HYDROCHLORIDE 0.4 MG/ML
INJECTION, SOLUTION INTRAMUSCULAR; INTRAVENOUS; SUBCUTANEOUS PRN
Status: DISCONTINUED | OUTPATIENT
Start: 2024-05-14 | End: 2024-05-14 | Stop reason: HOSPADM

## 2024-05-14 RX ORDER — DROPERIDOL 2.5 MG/ML
0.62 INJECTION, SOLUTION INTRAMUSCULAR; INTRAVENOUS
Status: DISCONTINUED | OUTPATIENT
Start: 2024-05-14 | End: 2024-05-14 | Stop reason: HOSPADM

## 2024-05-14 RX ORDER — LIDOCAINE HYDROCHLORIDE 10 MG/ML
1 INJECTION, SOLUTION EPIDURAL; INFILTRATION; INTRACAUDAL; PERINEURAL
Status: DISCONTINUED | OUTPATIENT
Start: 2024-05-14 | End: 2024-05-14 | Stop reason: HOSPADM

## 2024-05-14 RX ORDER — KETOROLAC TROMETHAMINE 30 MG/ML
30 INJECTION, SOLUTION INTRAMUSCULAR; INTRAVENOUS
Status: DISCONTINUED | OUTPATIENT
Start: 2024-05-14 | End: 2024-05-14 | Stop reason: HOSPADM

## 2024-05-14 RX ORDER — MIDAZOLAM HYDROCHLORIDE 1 MG/ML
INJECTION INTRAMUSCULAR; INTRAVENOUS
Status: COMPLETED | OUTPATIENT
Start: 2024-05-14 | End: 2024-05-14

## 2024-05-14 RX ORDER — BUPIVACAINE HYDROCHLORIDE 5 MG/ML
INJECTION, SOLUTION EPIDURAL; INTRACAUDAL
Status: COMPLETED
Start: 2024-05-14 | End: 2024-05-14

## 2024-05-14 RX ORDER — SODIUM CHLORIDE, SODIUM LACTATE, POTASSIUM CHLORIDE, CALCIUM CHLORIDE 600; 310; 30; 20 MG/100ML; MG/100ML; MG/100ML; MG/100ML
INJECTION, SOLUTION INTRAVENOUS CONTINUOUS
Status: DISCONTINUED | OUTPATIENT
Start: 2024-05-14 | End: 2024-05-14 | Stop reason: HOSPADM

## 2024-05-14 RX ORDER — TRANEXAMIC ACID 100 MG/ML
INJECTION, SOLUTION INTRAVENOUS
Status: DISCONTINUED
Start: 2024-05-14 | End: 2024-05-14 | Stop reason: HOSPADM

## 2024-05-14 RX ORDER — VANCOMYCIN HYDROCHLORIDE 1 G/20ML
INJECTION, POWDER, LYOPHILIZED, FOR SOLUTION INTRAVENOUS PRN
Status: DISCONTINUED | OUTPATIENT
Start: 2024-05-14 | End: 2024-05-14 | Stop reason: ALTCHOICE

## 2024-05-14 RX ADMIN — Medication 120 MG: at 10:44

## 2024-05-14 RX ADMIN — MIDAZOLAM HYDROCHLORIDE 5 MG: 1 INJECTION, SOLUTION INTRAMUSCULAR; INTRAVENOUS at 09:34

## 2024-05-14 RX ADMIN — DEXMEDETOMIDINE HYDROCHLORIDE 4 MCG: 100 INJECTION, SOLUTION, CONCENTRATE INTRAVENOUS at 10:51

## 2024-05-14 RX ADMIN — WATER 2000 MG: 1 INJECTION INTRAMUSCULAR; INTRAVENOUS; SUBCUTANEOUS at 10:49

## 2024-05-14 RX ADMIN — FENTANYL CITRATE 50 MCG: 50 INJECTION, SOLUTION INTRAMUSCULAR; INTRAVENOUS at 10:43

## 2024-05-14 RX ADMIN — DEXMEDETOMIDINE HYDROCHLORIDE 4 MCG: 100 INJECTION, SOLUTION, CONCENTRATE INTRAVENOUS at 10:39

## 2024-05-14 RX ADMIN — SODIUM CHLORIDE, POTASSIUM CHLORIDE, SODIUM LACTATE AND CALCIUM CHLORIDE: 600; 310; 30; 20 INJECTION, SOLUTION INTRAVENOUS at 12:35

## 2024-05-14 RX ADMIN — ONDANSETRON HYDROCHLORIDE 4 MG: 2 INJECTION, SOLUTION INTRAMUSCULAR; INTRAVENOUS at 12:26

## 2024-05-14 RX ADMIN — LIDOCAINE HYDROCHLORIDE 80 MG: 20 INJECTION, SOLUTION EPIDURAL; INFILTRATION; INTRACAUDAL; PERINEURAL at 10:42

## 2024-05-14 RX ADMIN — PHENYLEPHRINE HYDROCHLORIDE 20 MCG/MIN: 10 INJECTION INTRAVENOUS at 10:51

## 2024-05-14 RX ADMIN — LIDOCAINE HYDROCHLORIDE 20 MG: 20 INJECTION, SOLUTION EPIDURAL; INFILTRATION; INTRACAUDAL; PERINEURAL at 10:43

## 2024-05-14 RX ADMIN — PROPOFOL 180 MG: 10 INJECTION, EMULSION INTRAVENOUS at 10:43

## 2024-05-14 RX ADMIN — TRANEXAMIC ACID 1000 MG: 100 INJECTION, SOLUTION INTRAVENOUS at 10:47

## 2024-05-14 RX ADMIN — FENTANYL CITRATE 50 MCG: 50 INJECTION, SOLUTION INTRAMUSCULAR; INTRAVENOUS at 11:08

## 2024-05-14 RX ADMIN — BUPIVACAINE 10 ML: 13.3 INJECTION, SUSPENSION, LIPOSOMAL INFILTRATION at 09:38

## 2024-05-14 RX ADMIN — EPHEDRINE SULFATE 10 MG: 5 INJECTION INTRAVENOUS at 11:13

## 2024-05-14 RX ADMIN — DEXAMETHASONE SODIUM PHOSPHATE 8 MG: 4 INJECTION, SOLUTION INTRAMUSCULAR; INTRAVENOUS at 10:50

## 2024-05-14 RX ADMIN — EPHEDRINE SULFATE 10 MG: 5 INJECTION INTRAVENOUS at 10:58

## 2024-05-14 RX ADMIN — SODIUM CHLORIDE, POTASSIUM CHLORIDE, SODIUM LACTATE AND CALCIUM CHLORIDE: 600; 310; 30; 20 INJECTION, SOLUTION INTRAVENOUS at 09:10

## 2024-05-14 RX ADMIN — BUPIVACAINE HYDROCHLORIDE 15 ML: 5 INJECTION, SOLUTION EPIDURAL; INTRACAUDAL; PERINEURAL at 09:38

## 2024-05-14 ASSESSMENT — PAIN - FUNCTIONAL ASSESSMENT: PAIN_FUNCTIONAL_ASSESSMENT: NONE - DENIES PAIN

## 2024-05-14 ASSESSMENT — PAIN SCALES - GENERAL
PAINLEVEL_OUTOF10: 0

## 2024-05-14 NOTE — OP NOTE
().    [employer] GPS system was used to guide in placement of glenoid component.    OPERATION: Time out was done to confirm the operating procedure, surgeon, patient and site.  Once confirmed by the team, procedure was started.   In the operating room the patient was anesthetized and given IV antibiotics. In a modified beachchair position, the right shoulder was prepped and draped in a sterile fashion. A deltopectoral incision was made through the subcutaneous layer and the deltopectoral interval developed with cephalic vein retracted laterally. The subscapularis was transected near the musculotendinous junction and tagged for later closure.  It was  from the underlying capsule, which was excised anteriorly and inferiorly. The cuff was freed from adhesions with finger dissection. The head was dislocated and the proximal cutting guide was used to transect the head and peripheral osteophytes were removed.  The medullary canal reamed at 13 mm where excellent cortical interferem fit was noted. We then broached to this size.  Protective base plate was placed over the humerus as we turned our attention to the glenoid.  The subscapularis was freed circumferentially. It had excellent mobility.  The glenoid was exposed and a central drill hole made.  I was able to over ream to the appropriate size and depth to match the glenoid.  Guide was used to drill accessory peg holes.  After appropriate preparation of the glenoid face size large posterior augmented glenoid component was placed without difficulty.  Cement was placed in the peripheral peg holes and autogenous bone graft was placed in our central peg hole.  We then turned our attention back to the humerus.  Our trial components were placed and we trialed with a replicator plate and size 50 tall humeral head component.  Trial reductions were carried out.  These sizes gave us excellent restoration of overall fit and stability.  The sizes were chosen for the final

## 2024-05-14 NOTE — ANESTHESIA PROCEDURE NOTES
Peripheral Block    Patient location during procedure: pre-op  Reason for block: post-op pain management and at surgeon's request  Start time: 5/14/2024 9:34 AM  End time: 5/14/2024 9:40 AM  Staffing  Performed: anesthesiologist   Anesthesiologist: Concha Rodriguez MD  Performed by: Concha Rodriguez MD  Authorized by: Concha Rodriguez MD    Preanesthetic Checklist  Completed: patient identified, IV checked, site marked, risks and benefits discussed, surgical/procedural consents, equipment checked, pre-op evaluation, timeout performed, anesthesia consent given, oxygen available, monitors applied/VS acknowledged, fire risk safety assessment completed and verbalized and blood product R/B/A discussed and consented  Peripheral Block   Patient position: sitting  Prep: ChloraPrep  Provider prep: mask and sterile gloves  Patient monitoring: cardiac monitor, continuous pulse ox, frequent blood pressure checks, IV access, oxygen and responsive to questions  Block type: Brachial plexus  Interscalene  Laterality: right  Injection technique: single-shot  Guidance: ultrasound guided    Needle   Needle type: insulated echogenic nerve stimulator needle   Needle gauge: 22 G  Needle localization: ultrasound guidance  Needle length: 5 cm  Assessment   Injection assessment: negative aspiration for heme, no paresthesia on injection, local visualized surrounding nerve on ultrasound and no intravascular symptoms  Paresthesia pain: none  Slow fractionated injection: yes  Hemodynamics: stable  Outcomes: uncomplicated and patient tolerated procedure well    Medications Administered  midazolam (VERSED) injection 2 mg/2mL - IntraVENous   5 mg - 5/14/2024 9:34:00 AM  BUPivacaine (MARCAINE) PF injection 0.5% - Perineural, Shoulder Right   15 mL - 5/14/2024 9:38:00 AM  BUPivacaine liposome (EXPAREL) injection 1.3% - Perineural, Shoulder Right   10 mL - 5/14/2024 9:38:00 AM

## 2024-05-14 NOTE — PERIOP NOTE
Michael Teague  1970  [id number]    Situation:  Verbal report given from: Eleni Khalil CRNA, JASMIN Ramos RN  Procedure: Procedure(s):  RIGHT TOTAL SHOULDER ARTHROPLASTY (GEN W/BLOCK)    Background:    Preoperative diagnosis: Arthritis of right shoulder region [M19.011]    Postoperative diagnosis: * No post-op diagnosis entered *    :  Dr. Souza    Assistant(s): Circulator: Karina Ramos RN  Scrub Person First: Ana Mendez  Scrub Person Second: Paulo Webb    Specimens: * No specimens in log *    Assessment:  Intra-procedure medications         Anesthesia gave intra-procedure sedation and medications, see anesthesia flow sheet     Intravenous fluids: LR@ KVO     Vital signs stable       Recommendation:    Permission to share finding with wife

## 2024-05-14 NOTE — ANESTHESIA PRE PROCEDURE
Department of Anesthesiology  Preprocedure Note       Name:  Michael Teague   Age:  53 y.o.  :  1970                                          MRN:  [id number]         Date:  2024      Surgeon: Surgeon(s):  You Souza DO    Procedure: Procedure(s):  RIGHT TOTAL SHOULDER ARTHROPLASTY VERSUS HEMIARTHROPLASTY (GEN W/BLOCK)    Medications prior to admission:   Prior to Admission medications    Medication Sig Start Date End Date Taking? Authorizing Provider   oxyCODONE (ROXICODONE) 5 MG immediate release tablet Take 1 tablet by mouth every 4 hours as needed for Pain for up to 7 days. Max Daily Amount: 30 mg 24 Yes You Souza DO   mupirocin (BACTROBAN) 2 % ointment Apply pea sized amount inside of both nostrils twice daily for five days 24   Aggie Sullivan APRN - NP   cyproheptadine (PERIACTIN) 4 MG tablet Take 1 tablet by mouth 3 times daily  Patient not taking: Reported on 5/3/2024 3/19/24   Jacqueline Singh APRN - NP   folic acid (FOLVITE) 1 MG tablet TAKE 1 TABLET BY MOUTH EVERY DAY 24   Jacqueline Singh APRN - NP   lisinopril (PRINIVIL;ZESTRIL) 20 MG tablet Take 1 tablet by mouth daily 24   Jacqueline Singh APRN - NP   ferrous sulfate (IRON 325) 325 (65 Fe) MG tablet Take 1 tablet by mouth every other day 23   Domonique Nice APRN - NP   ondansetron (ZOFRAN) 4 MG tablet Take 1 tablet by mouth daily as needed for Nausea or Vomiting  Patient not taking: Reported on 2024 10/18/23   Jacqueline Singh APRN - NP   pantoprazole (PROTONIX) 40 MG tablet TAKE 1 TABLET BY MOUTH EVERY DAY 8/10/23   Jacquelnie Singh APRN - NP   chlorproMAZINE (THORAZINE) 25 MG tablet TAKE 1 TABLET BY MOUTH IN THE MORNING AND IN THE EVENING  Patient not taking: Reported on 2024   Luz Tatum APRN - NP   thiamine mononitrate 100 MG tablet TAKE 1 TABLET BY MOUTH EVERY DAY  Patient not taking: Reported on 2024   Jacqueline Singh APRN - NP

## 2024-05-14 NOTE — PERIOP NOTE
1304-Pt. To pacu, sleepy but arousable.  Vss.  Denies pain.  Dressing to right shoulder intact.    1326-Portable xray complete.    1330-Discharge instructions reviewed w/pts wife.  She verbalized understanding.    1400-Pt. States ready for discharge.  Vss.  Denies pain.  Dressing to right shoulder intact.  Sling placed to right arm.  Pt. Assisted to bathroom to void.  Pt discharged via wheelchair to car, accompanied by RN.  Pt discharged awake and alert, respirations equal and unlabored, skin warm, dry, and intact.  Pt and family members' questions and concerns addressed prior to discharge.

## 2024-05-14 NOTE — H&P
recorded.      Gen: Well-developed,  in no acute distress   HEENT: Pink conjunctivae, hearing intact to voice, moist mucous membranes   Neck: Supple  Resp: No respiratory distress   Card: RRR, palpable distal pulse-equal bilaterally, birsk cap refill all distal digits   Abd: Soft, non-distended  Musc: pain with ROM R shoulder   Skin: No skin breakdown noted. Skin warm, pink, dry  Neuro: Cranial nerves are grossly intact, no focal motor weakness, follows commands appropriately   Psych: Good insight, oriented to person, place and time, alert    Imaging Review:   XR SHOULDER RIGHT (MIN 2 VIEWS) 12/12/2022    Narrative  X-rays of the right shoulder 3 views done today show advanced glenohumeral joint space narrowing and osteophyte formation bone-on-bone.  There is evidence of subchondral lucencies at the humeral head and glenoid.      XR CHEST PORTABLE 10/05/2022    Narrative  EXAM: XR CHEST PORT    DATE: 10/5/2022 11:32 AM    INDICATION: SOB    COMPARISON: None.    TECHNIQUE: A portable AP radiograph of the chest was obtained.    FINDINGS:  Cardiomediastinal silhouette is unremarkable. No pulmonary edema. No focal  consolidations or pleural effusions. Mild atelectasis at the left lung base.  Bones and soft tissues are unremarkable.    Impression  Normal chest.      XR ABDOMEN (KUB) (SINGLE AP VIEW) 09/30/2022    Narrative  PROCEDURE: XR ABD (KUB)    COMPARISON: Ultrasound dated 9/26/2022    REASON FOR STUDY: abdmoinal pain, no flatus    FINDINGS: Single AP portable supine view the abdomen demonstrates an enlarged  liver. No evidence of mechanical bowel obstruction. No pathologic calcifications  identified.    Impression  Hepatomegaly. No acute pathology.    MRI Result (most recent):  MRI ABDOMEN W WO CONTRAST 04/30/2024    Narrative  EXAM: MRI ABDOMEN W WO CONTRAST    INDICATION: Alcoholic cirrhosis of liver without ascites    COMPARISON: Ultrasound 11/30/2023 and MRI 7/17/2023.    CONTRAST: 15 mL ProHance

## 2024-05-14 NOTE — DISCHARGE INSTRUCTIONS
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 performed by Author MD Dimitris at [address] [facility] Right 10/6/2017    MOHS DEFECT REPAIR BCC RIGHT DISTAL NOSE performed by Author Dimitris MD at [facility] SKIN BIOPSY  4/20/15    face       FAMILY HISTORY     Family History   Problem Relation Age of Onset    Emphysema Father     Heart Attack Father     Allergies Mother     Cancer Mother          of colon cancer at 80years old   [facility] Cancer Sister          of breast cancer age 48years old       SOCIAL HISTORY     Social History     Tobacco Use    Smoking status: Never Smoker    Smokeless tobacco: Never Used   Substance Use Topics    Alcohol use: Yes     Comment: occasionally    Drug use: No       ALLERGIES     Allergies   Allergen Reactions    Seasonal        MEDICATIONS     Current Outpatient Medications on File Prior to Encounter   Medication Sig Dispense Refill    famciclovir (FAMVIR) 500 MG tablet Take 500 mg by mouth 3 times daily      furosemide (LASIX) 20 MG tablet Take 20 mg by mouth 2 times daily      allopurinol (ZYLOPRIM) 300 MG tablet Take 300 mg by mouth daily      ibrutinib (IMBRUVICA) 420 MG tablet Take 420 mg by mouth daily      cloNIDine (CATAPRES) 0.1 MG tablet Take 0.1 mg by mouth 2 times daily      Polyethylene Glycol 400 (BLINK TEARS OP) Apply to eye      fluticasone (FLONASE) 50 MCG/ACT nasal spray 1 spray by Each Nostril route daily      carboxymethylcellulose 1 % ophthalmic solution 1 drop 3 times daily      cemiplimab-rwlc (LIBTAYO) 350 MG/7ML SOLN chemo injection Infuse 350 mg intravenously every 21 days      fluticasone (FLOVENT HFA) 220 MCG/ACT inhaler Inhale 1 puff into the lungs 2 times daily 3 Inhaler 3    amLODIPine (NORVASC) 10 MG tablet Take 10 mg by mouth daily       metoprolol tartrate (LOPRESSOR) 50 MG tablet Take 50 mg by mouth 2 times daily       levothyroxine (SYNTHROID) 50 MCG tablet Take 0.5 mcg by mouth Daily.  Multiple Vitamins-Minerals (CENTRUM ULTRA WOMENS PO) Take 1 tablet by mouth daily. No current facility-administered medications on file prior to encounter. REVIEW OF SYSTEMS:     Constitutional: Denies fever, chills, night sweats, fatigue, weight loss/gain, loss of appetite   Head: Denies headache,  dizziness, loss of consciousness  Respiratory: Denies shortness of breath, cough, wheezing, dyspnea with exertion  Cardiovascular:Denies chest pain, palpitations, edema  Gastrointestinal: Denies nausea, vomiting, constipation, diarrhea, abdominal pain   Hematology: Denies easy brusing or bleeding, hx of clotting disorder  Dermatology: + wound left temporal.  Left temporal and pre-auricular mass.   Denies skin rash, eczema, pruritis    PHYSICAL EXAM:     /62   Pulse 53   Resp 16   SpO2 97%   Wt Readings from Last 3 Encounters:   07/02/20 140 lb (63.5 kg)   10/06/17 168 lb (76.2 kg)   07/28/17 169 lb (76.7 kg)     General:  Awake, alert, no apparent distress.    HEENT: conjuctivae are clear without exudate or hemorrhage, anicteric sclera, moist oral mucosa. + Left-sided preauricular lymphadenopathy. Chest:  Respirations regular, non-labored.  Chest rise and fall equal bilaterally.  Lungs clear to auscultation throughout all fields  Cardiovascular:  RRR,S1S2, no murmur or gallop. Neurological: Awake, alert, oriented x4   Psychiatric:  Appropriate mood and affect  Extremities: non-traumatic in appearance. Skin:  Warm and dry, firm mass to border (11:00 position) of left temporal mass, approximately 1 cm diameter, non-mobile, non-tender with palpation.     Wound:                Location: Left temporal              Classification/stage: malignant wound              Tunneling/undermining: Not present              Wound bed: 100% red              Exudate:  moderate, circulation while recovering from surgery. Please do these exercises every hour during waking hours  Take mediation as prescribed by your physician (Lovenox, Coumadin, Aspirin)  Resume your normal activities as soon as your doctor advises you to do so.  Remember, when traveling, to “stretch your legs” frequently.      PATIENTS WHO BELIEVE THEY MAY BE EXPERIENCING SIGNS AND SYMPTOMS OF DVT OR PE SHOULD SEEK MEDICAL HELP IMMEDIATELY           TO PREVENT AN INFECTION      WASH YOUR HANDS    To prevent infection, good handwashing is the most important thing you or your caregiver can do.      Wash your hands with soap and water or use the hand  we gave you before you touch any wounds.    SHOWER    Use the antibacterial soap we gave you when you take a shower.     Shower with this soap until your wounds are healed.      To reach all areas of your body, you may need someone to help you.     Don’t forget to clean your belly button with every shower.     USE CLEAN SHEETS    Use freshly cleaned sheets on your bed after surgery.     To keep the surgery site clean, do not allow pets to sleep with you while your wound is still healing.     STOP SMOKING    Stop smoking, or at least cut back on smoking    Smoking slows your healing.     TAKE NARCOTIC PAIN MEDICATIONS WITH FOOD!    For the night of surgery, while block is still in effect, start with 1 pain pill at bedtime    Narcotics tend to be constipating and we recommend taking a stool softener such as Colace or Miralax (follow package instructions).    If you were given prescriptions, please review the written information on the prescribed medications.    DO NOT DRIVE WHILE TAKING NARCOTIC PAIN MEDICATIONS.    CPAP PATIENTS BE SURE TO WEAR MACHINE WHENEVER NAPPING OR SLEEPING DAY/NIGHT OF SURGERY!    PATIENT INSTRUCTIONS:    After General Anesthesia or Intravenous Sedation, for 24 hours or while taking prescription Narcotics:  Someone should be with you for the next  carcinoma of skin Z85.828    History of Mohs surgery for squamous cell carcinoma of skin Z98.890, Z85.828         PLAN     Patient examined and evaluated  -Wound is measuring larger in size with new onset mass to border of wound. Ongoing chemotherapy per Dr. Dior Jeffries. Discussed with Rock Otero that this wound will be very difficult/slow to heal due to chemotherapy, active cancer, and radiation history. She verbalizes understanding. Drainage has significantly increased from last visit, serosanguinous.      -Dressings changed to as follows:  Left scalp- Apply silver alginate to wound. Cover with silicone bordered foam. Change every 3 days    -Okay to wash hair on dressing change days. Leave foam on while showering. Then change dressing after shower. Antibiotics: No, no signs or symptoms of infection at this time. Signs and symptoms of infectious process reviewed with Fredrick Chávez. Advised her to call clinic or seek emergency care should these occur.      -Follow up 1 month for re-evaluation. Call with any needs or concerns prior to scheduled visit. All questions and concerns addressed prior to discharge from today's visit. Please see attached Discharge Instructions    Written patient dismissal instructions given to patient and signed by patient or POA. Discharge Instructions     Discharge Instructions       Visit Discharge/Physician Orders  - Do not use antibiotic ointment anymore. - Supplies ordered for you through [facility] Call [phone] to reorder  - Okay to wash hair on dressing change days. Leave foam on while showering. Then change dressing after shower.     Wound Location: Left scalp      Dressing orders:      1) Gather wound care supplies and arrange on clean table.      2) Wash your hands with soap and water or use alcohol based hand  for 20 seconds (sing \"Happy Birthday\" twice).    3) Cleanse wounds with normal saline or wound cleanser and gauze.  Pat dry with clean gauze.     4) Left scalp- Apply silver alginate to wound. Cover with silicone bordered foam. Change every 3 days     Keep all dressings clean & dry.     Do not shower, take baths or get wound wet, unless otherwise instructed by your Wound Care doctor. Follow up visit: 4 weeks on Thursday October 8th at 8:00 am     Keep next scheduled appointment. Please give 24 hour notice if unable to keep appointment. [phone]     If you experience any of the following, please call the Wound Care Service during business hours: Monday through Friday 8:00 am - 4:30 pm  ([phone]).               *Increase in pain              *Temperature over 101              *Increase in drainage from your wound or a foul odor              *Uncontrolled swelling              *Need for compression bandage changes due to slippage, breakthrough drainage     If you need medical attention outside of business hours, please contact your Primary Care Doctor or go to the nearest emergency room    Electronically signed by SCOOTER Sibley CNP on 9/10/2020 at 8:29 AM.

## 2024-05-14 NOTE — PERIOP NOTE
Permission received to review discharge instructions and discuss private health information with wife Aquilino.    Patient states family/friend will be with them for 24 hours following procedure.

## 2024-05-14 NOTE — PERIOP NOTE
Dr. Rodriguez performed a right upper extremity nerve block with ultrasound. Patient on continuous cardiac and pulse oximetry monitoring throughout the procedure, nasal cannula 2 liters. Patient received 5mg Versed. Vital signs stable. Patient tolerated procedure well. Patient drowsy but arouses when spoken to. Will continue to monitor.

## 2024-05-14 NOTE — ANESTHESIA POSTPROCEDURE EVALUATION
Department of Anesthesiology  Postprocedure Note    Patient: Michael Teague  MRN: [id number]  YOB: 1970  Date of evaluation: 5/14/2024    Procedure Summary       Date: 05/14/24 Room / Location: [facility] ASU B3 / [facility] AMBULATORY OR    Anesthesia Start: 1039 Anesthesia Stop: 1307    Procedure: RIGHT TOTAL SHOULDER ARTHROPLASTY (GEN W/BLOCK) (Right: Shoulder) Diagnosis:       Arthritis of right shoulder region      (Arthritis of right shoulder region [M19.011])    Surgeons: You Souza DO Responsible Provider: Concha Rodriguez MD    Anesthesia Type: General, Regional ASA Status: 3            Anesthesia Type: General, Regional    Piter Phase I: Piter Score: 9    Piter Phase II: Piter Score: 9    Anesthesia Post Evaluation    Patient location during evaluation: PACU  Patient participation: complete - patient participated  Level of consciousness: awake and alert  Pain score: 0  Airway patency: patent  Nausea & Vomiting: no nausea and no vomiting  Cardiovascular status: hemodynamically stable  Respiratory status: acceptable  Hydration status: euvolemic  Comments: Pt has Interscalene block with Exparel. Doing well.  Multimodal analgesia pain management approach  Pain management: satisfactory to patient    No notable events documented.

## 2024-05-20 ENCOUNTER — TELEPHONE (OUTPATIENT)
Age: 54
End: 2024-05-20

## 2024-05-20 NOTE — TELEPHONE ENCOUNTER
----- Message from Anabell Mistry sent at 5/20/2024  8:33 AM EDT -----  Regarding: FW: Michael Teague   Contact: [phone]    ----- Message -----  From: Michael Teague  Sent: 5/20/2024   8:21 AM EDT  To: #  Subject: Michael Teague                                    Good morning Jacqueline hope you had a good weekend. Can you please call me today about medication and possible allergic reaction and effect on his liver. Thanks so much!    Aquilino Teague   [phone]

## 2024-05-20 NOTE — TELEPHONE ENCOUNTER
Spoke with patient's wife and the itching is worse since the shoulder surgery. She will call the surgeon to see if it is the medication that he was given.

## 2024-05-23 ENCOUNTER — OFFICE VISIT (OUTPATIENT)
Age: 54
End: 2024-05-23
Payer: COMMERCIAL

## 2024-05-23 VITALS
SYSTOLIC BLOOD PRESSURE: 138 MMHG | OXYGEN SATURATION: 98 % | BODY MASS INDEX: 25.91 KG/M2 | HEART RATE: 98 BPM | HEIGHT: 66 IN | DIASTOLIC BLOOD PRESSURE: 82 MMHG | RESPIRATION RATE: 17 BRPM | TEMPERATURE: 98.7 F | WEIGHT: 161.2 LBS

## 2024-05-23 DIAGNOSIS — K70.30 ALCOHOLIC CIRRHOSIS OF LIVER WITHOUT ASCITES (HCC): Primary | ICD-10-CM

## 2024-05-23 PROCEDURE — 99214 OFFICE O/P EST MOD 30 MIN: CPT | Performed by: NURSE PRACTITIONER

## 2024-05-23 RX ORDER — UREA 10 %
500 LOTION (ML) TOPICAL DAILY
Qty: 90 TABLET | Refills: 3 | OUTPATIENT
Start: 2024-05-23

## 2024-05-23 RX ORDER — LANOLIN ALCOHOL/MO/W.PET/CERES
100 CREAM (GRAM) TOPICAL DAILY
COMMUNITY
Start: 2024-05-03

## 2024-05-23 ASSESSMENT — PATIENT HEALTH QUESTIONNAIRE - PHQ9
SUM OF ALL RESPONSES TO PHQ QUESTIONS 1-9: 0
SUM OF ALL RESPONSES TO PHQ9 QUESTIONS 1 & 2: 0
SUM OF ALL RESPONSES TO PHQ QUESTIONS 1-9: 0
SUM OF ALL RESPONSES TO PHQ QUESTIONS 1-9: 0
DEPRESSION UNABLE TO ASSESS: FUNCTIONAL CAPACITY MOTIVATION LIMITS ACCURACY
SUM OF ALL RESPONSES TO PHQ QUESTIONS 1-9: 0
2. FEELING DOWN, DEPRESSED OR HOPELESS: NOT AT ALL
1. LITTLE INTEREST OR PLEASURE IN DOING THINGS: NOT AT ALL

## 2024-05-23 NOTE — PROGRESS NOTES
Identified pt with two pt identifiers(name and ). Reviewed record in preparation for visit and have obtained necessary documentation.  Vitals:    24 0811   BP: 138/82   Site: Left Upper Arm   Position: Sitting   Cuff Size: Medium Adult   Pulse: 98   Resp: 17   Temp: 98.7 °F (37.1 °C)   TempSrc: Temporal   SpO2: 98%   Weight: 73.1 kg (161 lb 3.2 oz)   Height: 1.664 m (5' 5.5\")        Health Maintenance Review: Patient reminded of \"due or due soon\" health maintenance. I have asked the patient to contact his/her primary care provider (PCP) for follow-up on his/her health maintenance.    Coordination of Care Questionnaire:  :   1) Have you been to an emergency room, urgent care, or hospitalized since your last visit?  If yes, where when, and reason for visit? yes       2. Have seen or consulted any other health care provider since your last visit?   If yes, where when, and reason for visit?  No      Patient is accompanied by spouse I have received verbal consent from Michael Teague to discuss any/all medical information while they are present in the room.   
patient and their family members reviewing the complex medical history and multiple medical issues, performing the examination, explaining and then documenting the natural history of the liver disease, cirrhosis, the complications of cirrhosis.        Jacqueline Singh, ENRIKEP-C  [facility] [address], [address]  [place]  [address] [phone]  [facility]

## 2024-05-30 ENCOUNTER — TELEPHONE (OUTPATIENT)
Age: 54
End: 2024-05-30

## 2024-05-30 NOTE — TELEPHONE ENCOUNTER
----- Message from Silvia Bowling RN sent at 5/29/2024  4:04 PM EDT -----  Regarding: FW: Michael Teague - Important   Contact: [phone]    ----- Message -----  From: Michael Teague  Sent: 5/29/2024   3:46 PM EDT  To: #  Subject: Michael Teague - Important                        Good afternoon Jacqueline hope that you are doing well . Please give me a call today, if at all possible. Thank you     Aquilino Teague  [phone]

## 2024-05-30 NOTE — TELEPHONE ENCOUNTER
Spoke with wife, patient is drinking alcohol again for pain from his shoulder surgery. She states he is confused, not remembering to take meds and forgetting conversations.  Start lactulose and get him back on his pain medications from ortho. He starts PT next week. She is asking for an earlier appointment.  Will have the office call with an appt time/date.

## 2024-05-30 NOTE — TELEPHONE ENCOUNTER
Patient's wife called back to speak with Jacqueline. States it's very important that she speaks with Jacqueline.

## 2024-05-30 NOTE — TELEPHONE ENCOUNTER
----- Message from Silvia Bowling RN sent at 5/30/2024 10:46 AM EDT -----  Regarding: FW: Michael Teague - Just missed your call  Contact: [phone]    ----- Message -----  From: Michael Teague  Sent: 5/30/2024  10:45 AM EDT  To: #  Subject: Michael Teague - Jerrod missed your call            Antonio enriquez I missed your call can you please call me back. I have a 1:00 meeting if you call before then that would be great. Thank you     Aquilino Teague  [phone]

## 2024-06-03 ENCOUNTER — TELEPHONE (OUTPATIENT)
Age: 54
End: 2024-06-03

## 2024-06-03 NOTE — TELEPHONE ENCOUNTER
Patient's wife called asking for another appointment date to see Jacqueline. They are not available this date. Please advise.

## 2024-06-24 ENCOUNTER — CLINICAL DOCUMENTATION (OUTPATIENT)
Age: 54
End: 2024-06-24

## 2024-07-03 LAB
ALBUMIN SERPL-MCNC: 3.7 G/DL (ref 3.8–4.9)
ALP SERPL-CCNC: 181 IU/L (ref 44–121)
ALT SERPL-CCNC: 40 IU/L (ref 0–44)
AST SERPL-CCNC: 52 IU/L (ref 0–40)
BASOPHILS # BLD AUTO: 0.1 X10E3/UL (ref 0–0.2)
BASOPHILS NFR BLD AUTO: 2 %
BILIRUB DIRECT SERPL-MCNC: 1.09 MG/DL (ref 0–0.4)
BILIRUB SERPL-MCNC: 1.9 MG/DL (ref 0–1.2)
BUN SERPL-MCNC: 5 MG/DL (ref 6–24)
BUN/CREAT SERPL: 7 (ref 9–20)
CALCIUM SERPL-MCNC: 9.5 MG/DL (ref 8.7–10.2)
CHLORIDE SERPL-SCNC: 97 MMOL/L (ref 96–106)
CO2 SERPL-SCNC: 22 MMOL/L (ref 20–29)
CREAT SERPL-MCNC: 0.69 MG/DL (ref 0.76–1.27)
EGFRCR SERPLBLD CKD-EPI 2021: 110 ML/MIN/1.73
EOSINOPHIL # BLD AUTO: 0.1 X10E3/UL (ref 0–0.4)
EOSINOPHIL NFR BLD AUTO: 5 %
ERYTHROCYTE [DISTWIDTH] IN BLOOD BY AUTOMATED COUNT: 13.9 % (ref 11.6–15.4)
FERRITIN SERPL-MCNC: 299 NG/ML (ref 30–400)
GLUCOSE SERPL-MCNC: 96 MG/DL (ref 70–99)
HCT VFR BLD AUTO: 35.5 % (ref 37.5–51)
HGB BLD-MCNC: 12.2 G/DL (ref 13–17.7)
IMM GRANULOCYTES # BLD AUTO: 0 X10E3/UL (ref 0–0.1)
IMM GRANULOCYTES NFR BLD AUTO: 0 %
INR PPP: 1.2 (ref 0.9–1.2)
IRON SATN MFR SERPL: 33 % (ref 15–55)
IRON SERPL-MCNC: 117 UG/DL (ref 38–169)
LYMPHOCYTES # BLD AUTO: 0.6 X10E3/UL (ref 0.7–3.1)
LYMPHOCYTES NFR BLD AUTO: 24 %
MCH RBC QN AUTO: 35.1 PG (ref 26.6–33)
MCHC RBC AUTO-ENTMCNC: 34.4 G/DL (ref 31.5–35.7)
MCV RBC AUTO: 102 FL (ref 79–97)
MONOCYTES # BLD AUTO: 0.4 X10E3/UL (ref 0.1–0.9)
MONOCYTES NFR BLD AUTO: 15 %
NEUTROPHILS # BLD AUTO: 1.3 X10E3/UL (ref 1.4–7)
NEUTROPHILS NFR BLD AUTO: 54 %
PLATELET # BLD AUTO: 119 X10E3/UL (ref 150–450)
POTASSIUM SERPL-SCNC: 3.4 MMOL/L (ref 3.5–5.2)
PROT SERPL-MCNC: 7.7 G/DL (ref 6–8.5)
PROTHROMBIN TIME: 13.1 SEC (ref 9.1–12)
RBC # BLD AUTO: 3.48 X10E6/UL (ref 4.14–5.8)
SODIUM SERPL-SCNC: 134 MMOL/L (ref 134–144)
TIBC SERPL-MCNC: 357 UG/DL (ref 250–450)
UIBC SERPL-MCNC: 240 UG/DL (ref 111–343)
WBC # BLD AUTO: 2.5 X10E3/UL (ref 3.4–10.8)

## 2024-07-04 LAB
AFP L3 MFR SERPL: 14.8 % (ref 0–9.9)
AFP SERPL-MCNC: 4.9 NG/ML (ref 0–8.4)

## 2024-07-11 RX ORDER — UREA 10 %
500 LOTION (ML) TOPICAL DAILY
Qty: 90 TABLET | Refills: 3 | Status: SHIPPED | OUTPATIENT
Start: 2024-07-11

## 2024-07-12 ENCOUNTER — TELEPHONE (OUTPATIENT)
Age: 54
End: 2024-07-12

## 2024-07-12 NOTE — TELEPHONE ENCOUNTER
Spoke with Aquilino Teague(patient's wife) regarding ongoing pt for patient. Informed her that Dr. Souza said it was ok to do so.

## 2024-07-22 ENCOUNTER — OFFICE VISIT (OUTPATIENT)
Age: 54
End: 2024-07-22
Payer: COMMERCIAL

## 2024-07-22 VITALS
HEIGHT: 66 IN | HEART RATE: 91 BPM | RESPIRATION RATE: 18 BRPM | DIASTOLIC BLOOD PRESSURE: 84 MMHG | WEIGHT: 160.2 LBS | TEMPERATURE: 97.8 F | SYSTOLIC BLOOD PRESSURE: 118 MMHG | BODY MASS INDEX: 25.75 KG/M2 | OXYGEN SATURATION: 99 %

## 2024-07-22 DIAGNOSIS — K70.30 ALCOHOLIC CIRRHOSIS OF LIVER WITHOUT ASCITES (HCC): Primary | ICD-10-CM

## 2024-07-22 PROCEDURE — 99214 OFFICE O/P EST MOD 30 MIN: CPT | Performed by: NURSE PRACTITIONER

## 2024-07-22 NOTE — PROGRESS NOTES
[facility]      David Nur MD, FACP, FACG, FAASLD      COLLIN Sun, [facility]   Luz Ratayad, [facility]   Jacqueline Singh, FNP-C  Tyrone Arias, [facility]   Britney Lagunas, [facility]   at [facility]   [address], [address]   [place]  [address] [phone]   FAX: [phone]  [facility]   [address], [address]   [place]  [address] [phone]   FAX: [phone]           Patient Care Team:  Blair Rand MD as PCP - General (Family Medicine)  Blair Rand MD as PCP - [facility] Empaneled Provider            Michael Teague is being seen at [facility] for management of 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  The active problem list, all pertinent past medical history, medications, liver histology, endoscopic studies, radiologic findings and laboratory findings related to the liver disorder were reviewed and discussed with the patient.      The patient is a 54 y.o. male who was hospitalized in 10/2022 when he developed abdominal pain/fullness and jaundice.  There was no nausea, vomiting, swelling of the abdomen, swelling of the lower extremity, confusion. He had an episode of acute pancreatitis in 2019.  He was abstinent for a few months after that but then slowly started drinking alcohol again in increasing amounts.  The patient had  previously remained abstinent from all alcohol since 9/24/2022. Previous consumption consisted of 10 beers and 4 mixed drinks daily for 4 year. The patient was also taking 3.5-4.5 gm of tylenol daily for treatment of severe abdominal pain.      The patient was enrolled in the [facility] clinical trial. Patient was given prednisone during hospitalization for a DF of 59. A one time infusion was

## 2024-07-22 NOTE — PROGRESS NOTES
Identified pt with two pt identifiers(name and ). Reviewed record in preparation for visit and have obtained necessary documentation.  Vitals:    24 0942   BP: (!) 161/100   Site: Left Upper Arm   Position: Sitting   Cuff Size: Medium Adult   Pulse: 91   Resp: 18   Temp: 97.8 °F (36.6 °C)   TempSrc: Temporal   SpO2: 99%   Weight: 72.7 kg (160 lb 3.2 oz)   Height: 1.676 m (5' 6\")        Health Maintenance Review: Patient reminded of \"due or due soon\" health maintenance. I have asked the patient to contact his/her primary care provider (PCP) for follow-up on his/her health maintenance.    Coordination of Care Questionnaire:  :   1) Have you been to an emergency room, urgent care, or hospitalized since your last visit?  If yes, where when, and reason for visit? no       2. Have seen or consulted any other health care provider since your last visit?   If yes, where when, and reason for visit?  No      Patient is accompanied by self I have received verbal consent from Michael Teague to discuss any/all medical information while they are present in the room.

## 2024-08-07 DIAGNOSIS — K21.9 GASTRO-ESOPHAGEAL REFLUX DISEASE WITHOUT ESOPHAGITIS: ICD-10-CM

## 2024-08-07 DIAGNOSIS — K70.30 ALCOHOLIC CIRRHOSIS OF LIVER WITHOUT ASCITES (HCC): ICD-10-CM

## 2024-08-07 RX ORDER — LANOLIN ALCOHOL/MO/W.PET/CERES
100 CREAM (GRAM) TOPICAL DAILY
Qty: 90 TABLET | Refills: 3 | Status: SHIPPED | OUTPATIENT
Start: 2024-08-07

## 2024-08-07 RX ORDER — PANTOPRAZOLE SODIUM 40 MG/1
TABLET, DELAYED RELEASE ORAL
Qty: 90 TABLET | Refills: 3 | Status: SHIPPED | OUTPATIENT
Start: 2024-08-07

## 2024-08-07 NOTE — TELEPHONE ENCOUNTER
Thiamine 100 MG / Vitamin B-1 100 MG medication pended to provider    Pantoprazole/Protonix medication pended to provider

## 2024-10-22 LAB
ALBUMIN SERPL-MCNC: 3.5 G/DL (ref 3.8–4.9)
ALP SERPL-CCNC: 239 IU/L (ref 44–121)
ALT SERPL-CCNC: 44 IU/L (ref 0–44)
AST SERPL-CCNC: 162 IU/L (ref 0–40)
BASOPHILS # BLD AUTO: 0 X10E3/UL (ref 0–0.2)
BASOPHILS NFR BLD AUTO: 1 %
BILIRUB DIRECT SERPL-MCNC: 5.98 MG/DL (ref 0–0.4)
BILIRUB SERPL-MCNC: 8 MG/DL (ref 0–1.2)
BUN SERPL-MCNC: 5 MG/DL (ref 6–24)
BUN/CREAT SERPL: 7 (ref 9–20)
CALCIUM SERPL-MCNC: 9.2 MG/DL (ref 8.7–10.2)
CHLORIDE SERPL-SCNC: 95 MMOL/L (ref 96–106)
CO2 SERPL-SCNC: 19 MMOL/L (ref 20–29)
CREAT SERPL-MCNC: 0.74 MG/DL (ref 0.76–1.27)
EGFRCR SERPLBLD CKD-EPI 2021: 108 ML/MIN/1.73
EOSINOPHIL # BLD AUTO: 0.1 X10E3/UL (ref 0–0.4)
EOSINOPHIL NFR BLD AUTO: 2 %
ERYTHROCYTE [DISTWIDTH] IN BLOOD BY AUTOMATED COUNT: 13.1 % (ref 11.6–15.4)
GLUCOSE SERPL-MCNC: 144 MG/DL (ref 70–99)
HCT VFR BLD AUTO: 31.4 % (ref 37.5–51)
HGB BLD-MCNC: 10.9 G/DL (ref 13–17.7)
IMM GRANULOCYTES # BLD AUTO: 0 X10E3/UL (ref 0–0.1)
IMM GRANULOCYTES NFR BLD AUTO: 0 %
INR PPP: 1.3 (ref 0.9–1.2)
LYMPHOCYTES # BLD AUTO: 0.6 X10E3/UL (ref 0.7–3.1)
LYMPHOCYTES NFR BLD AUTO: 16 %
MCH RBC QN AUTO: 36.6 PG (ref 26.6–33)
MCHC RBC AUTO-ENTMCNC: 34.7 G/DL (ref 31.5–35.7)
MCV RBC AUTO: 105 FL (ref 79–97)
MONOCYTES # BLD AUTO: 0.4 X10E3/UL (ref 0.1–0.9)
MONOCYTES NFR BLD AUTO: 11 %
NEUTROPHILS # BLD AUTO: 2.6 X10E3/UL (ref 1.4–7)
NEUTROPHILS NFR BLD AUTO: 70 %
PLATELET # BLD AUTO: 140 X10E3/UL (ref 150–450)
POTASSIUM SERPL-SCNC: 4.2 MMOL/L (ref 3.5–5.2)
PROT SERPL-MCNC: 7.4 G/DL (ref 6–8.5)
PROTHROMBIN TIME: 14.3 SEC (ref 9.1–12)
RBC # BLD AUTO: 2.98 X10E6/UL (ref 4.14–5.8)
SODIUM SERPL-SCNC: 130 MMOL/L (ref 134–144)
WBC # BLD AUTO: 3.6 X10E3/UL (ref 3.4–10.8)

## 2024-10-23 LAB
AFP L3 MFR SERPL: 13.6 % (ref 0–9.9)
AFP SERPL-MCNC: 4.6 NG/ML (ref 0–8.4)

## 2024-10-25 ENCOUNTER — TELEPHONE (OUTPATIENT)
Age: 54
End: 2024-10-25

## 2024-11-06 ENCOUNTER — OFFICE VISIT (OUTPATIENT)
Age: 54
End: 2024-11-06
Payer: COMMERCIAL

## 2024-11-06 VITALS
SYSTOLIC BLOOD PRESSURE: 157 MMHG | HEART RATE: 102 BPM | WEIGHT: 155.8 LBS | TEMPERATURE: 99.7 F | DIASTOLIC BLOOD PRESSURE: 95 MMHG | BODY MASS INDEX: 25.04 KG/M2 | OXYGEN SATURATION: 98 % | HEIGHT: 66 IN

## 2024-11-06 DIAGNOSIS — K76.82 HEPATIC ENCEPHALOPATHY (HCC): ICD-10-CM

## 2024-11-06 DIAGNOSIS — R17 SCLERAL ICTERUS: ICD-10-CM

## 2024-11-06 DIAGNOSIS — K70.30 ALCOHOLIC CIRRHOSIS OF LIVER WITHOUT ASCITES (HCC): Primary | ICD-10-CM

## 2024-11-06 PROCEDURE — 99215 OFFICE O/P EST HI 40 MIN: CPT | Performed by: NURSE PRACTITIONER

## 2024-11-06 RX ORDER — LACTULOSE 10 G/15ML
10 SOLUTION ORAL DAILY
Qty: 945 ML | Refills: 3 | Status: SHIPPED | OUTPATIENT
Start: 2024-11-06

## 2024-11-06 ASSESSMENT — PATIENT HEALTH QUESTIONNAIRE - PHQ9
SUM OF ALL RESPONSES TO PHQ QUESTIONS 1-9: 0
SUM OF ALL RESPONSES TO PHQ9 QUESTIONS 1 & 2: 0
SUM OF ALL RESPONSES TO PHQ QUESTIONS 1-9: 0
DEPRESSION UNABLE TO ASSESS: FUNCTIONAL CAPACITY MOTIVATION LIMITS ACCURACY
1. LITTLE INTEREST OR PLEASURE IN DOING THINGS: NOT AT ALL
SUM OF ALL RESPONSES TO PHQ QUESTIONS 1-9: 0
SUM OF ALL RESPONSES TO PHQ QUESTIONS 1-9: 0
2. FEELING DOWN, DEPRESSED OR HOPELESS: NOT AT ALL

## 2024-11-06 ASSESSMENT — ANXIETY QUESTIONNAIRES
IF YOU CHECKED OFF ANY PROBLEMS ON THIS QUESTIONNAIRE, HOW DIFFICULT HAVE THESE PROBLEMS MADE IT FOR YOU TO DO YOUR WORK, TAKE CARE OF THINGS AT HOME, OR GET ALONG WITH OTHER PEOPLE: NOT DIFFICULT AT ALL
1. FEELING NERVOUS, ANXIOUS, OR ON EDGE: NOT AT ALL
6. BECOMING EASILY ANNOYED OR IRRITABLE: NOT AT ALL
7. FEELING AFRAID AS IF SOMETHING AWFUL MIGHT HAPPEN: NOT AT ALL
5. BEING SO RESTLESS THAT IT IS HARD TO SIT STILL: NOT AT ALL
4. TROUBLE RELAXING: NOT AT ALL
3. WORRYING TOO MUCH ABOUT DIFFERENT THINGS: NOT AT ALL
2. NOT BEING ABLE TO STOP OR CONTROL WORRYING: NOT AT ALL
GAD7 TOTAL SCORE: 0

## 2024-11-06 NOTE — PROGRESS NOTES
Chief Complaint   Patient presents with    Follow-up     N/A       Vitals:    11/06/24 0825   BP: (!) 157/95   Site: Left Upper Arm   Position: Sitting   Pulse: (!) 102   Temp: 99.7 °F (37.6 °C)   TempSrc: Temporal   SpO2: 98%   Weight: 70.7 kg (155 lb 12.8 oz)   Height: 1.676 m (5' 6\")     .  \"Have you been to the ER, urgent care clinic since your last visit?  Hospitalized since your last visit?\"    NO    “Have you seen or consulted any other health care providers outside of [facility] since your last visit?”    NO        “Have you had a colorectal cancer screening such as a colonoscopy/FIT/Cologuard?    NO    No colonoscopy on file  No cologuard on file  No FIT/FOBT on file   No flexible sigmoidoscopy on file         Click Here for Release of Records Request    
come down to normal with abstinence.     Anemia   This is due to multifactorial causes including portal hypertension with chronic GI blood loss, bone marrow suppression secondary to alcohol.  The most recent FE studies were from 7/2024.  Ferritin 299  Iron Sat 33%  This was most likely elevated due to alcohol.   The HGB is 10.9.   Patient is on oral iron.    Epigastric Pain  Well controlled on pantoprazole 40 mg daily.   Will continue at the current dose.     Hypertension  Continue lisinopril to 20 mg daily.     Hepatic encephalopathy   Overt HE was fist noted by patient's wife in 10/2024.    Will start lactulose at 15 mls daily    Screening for Esophageal varices   An EGD was performed by Dr. Gutiérrez on 3/2022 which showed no varices.   The PLT count is low with liver stiffness of F4 by Fibroscan.  Will schedule for EGD to assess for varices and need for banding in 1 year.    EGD ordered today    Thrombocytopenia   This is secondary to cirrhosis.  There is no evidence of overt bleeding.    No treatment is required.  The platelet count is adequate for the patient to undergo procedures without the need for platelet transfusion or platelet growth factors.    Screening for Hepatocellular Carcinoma  HCC screening was performed in 4/2024 and does not suggest HCC.    AFP  was  4.1,   AFP-L3%  was    13.2(H). MRI in 4/2024 with no lesion noted.       Will repeat ultrasound in 6 months.  Ultrasound was ordered at last office appointment but has not been completed as of today's office visit.    Positive serology for autoimmune liver disease  The positive TADEO suggests there the may be an underlying autoimmune liver disease.    The patient may require a liver biopsy to determine if the elevated liver enzymes are due to an autoimmune liver disease or alcohol.  The patient had a negative TADEO in 10/2022.    Treatment of other medical problems in patients with chronic liver disease  There are no contraindications for the patient to

## 2024-11-19 ENCOUNTER — HOSPITAL ENCOUNTER (OUTPATIENT)
Facility: HOSPITAL | Age: 54
Discharge: HOME | End: 2024-11-22
Payer: COMMERCIAL

## 2024-11-19 DIAGNOSIS — K70.30 ALCOHOLIC CIRRHOSIS OF LIVER WITHOUT ASCITES (HCC): ICD-10-CM

## 2024-11-19 PROCEDURE — 76700 US EXAM ABDOM COMPLETE: CPT

## 2024-11-20 ENCOUNTER — APPOINTMENT (OUTPATIENT)
Facility: HOSPITAL | Age: 54
End: 2024-11-20
Payer: COMMERCIAL

## 2024-11-20 ENCOUNTER — HOSPITAL ENCOUNTER (EMERGENCY)
Facility: HOSPITAL | Age: 54
Discharge: LEFT BEFORE DISCHARGE | End: 2024-11-21
Attending: STUDENT IN AN ORGANIZED HEALTH CARE EDUCATION/TRAINING PROGRAM
Payer: COMMERCIAL

## 2024-11-20 ENCOUNTER — TELEPHONE (OUTPATIENT)
Age: 54
End: 2024-11-20

## 2024-11-20 DIAGNOSIS — D64.9 ANEMIA, UNSPECIFIED TYPE: Primary | ICD-10-CM

## 2024-11-20 LAB
ALBUMIN SERPL-MCNC: 2 G/DL (ref 3.5–5)
ALBUMIN SERPL-MCNC: 2.9 G/DL (ref 3.8–4.9)
ALBUMIN/GLOB SERPL: 0.5 (ref 1.1–2.2)
ALP SERPL-CCNC: 165 U/L (ref 45–117)
ALP SERPL-CCNC: 170 IU/L (ref 44–121)
ALT SERPL-CCNC: 52 IU/L (ref 0–44)
ALT SERPL-CCNC: 54 U/L (ref 12–78)
ANION GAP SERPL CALC-SCNC: 7 MMOL/L (ref 2–12)
AST SERPL-CCNC: 138 U/L (ref 15–37)
AST SERPL-CCNC: 150 IU/L (ref 0–40)
BASOPHILS # BLD AUTO: 0 X10E3/UL (ref 0–0.2)
BASOPHILS # BLD: 0 K/UL (ref 0–0.1)
BASOPHILS NFR BLD AUTO: 1 %
BASOPHILS NFR BLD: 1 % (ref 0–1)
BILIRUB DIRECT SERPL-MCNC: 7 MG/DL (ref 0–0.4)
BILIRUB SERPL-MCNC: 8.8 MG/DL (ref 0.2–1)
BILIRUB SERPL-MCNC: 9.3 MG/DL (ref 0–1.2)
BUN SERPL-MCNC: 5 MG/DL (ref 6–20)
BUN SERPL-MCNC: 5 MG/DL (ref 6–24)
BUN/CREAT SERPL: 6 (ref 12–20)
BUN/CREAT SERPL: 7 (ref 9–20)
CALCIUM SERPL-MCNC: 8.2 MG/DL (ref 8.7–10.2)
CALCIUM SERPL-MCNC: 8.3 MG/DL (ref 8.5–10.1)
CHLORIDE SERPL-SCNC: 97 MMOL/L (ref 97–108)
CHLORIDE SERPL-SCNC: 98 MMOL/L (ref 96–106)
CO2 SERPL-SCNC: 19 MMOL/L (ref 20–29)
CO2 SERPL-SCNC: 21 MMOL/L (ref 21–32)
COMMENT:: NORMAL
CREAT SERPL-MCNC: 0.71 MG/DL (ref 0.76–1.27)
CREAT SERPL-MCNC: 0.89 MG/DL (ref 0.7–1.3)
DIFFERENTIAL METHOD BLD: ABNORMAL
EGFRCR SERPLBLD CKD-EPI 2021: 109 ML/MIN/1.73
EOSINOPHIL # BLD AUTO: 0 X10E3/UL (ref 0–0.4)
EOSINOPHIL # BLD: 0.1 K/UL (ref 0–0.4)
EOSINOPHIL NFR BLD AUTO: 1 %
EOSINOPHIL NFR BLD: 2 % (ref 0–7)
ERYTHROCYTE [DISTWIDTH] IN BLOOD BY AUTOMATED COUNT: 13.5 % (ref 11.5–14.5)
ERYTHROCYTE [DISTWIDTH] IN BLOOD BY AUTOMATED COUNT: 13.6 % (ref 11.6–15.4)
GLOBULIN SER CALC-MCNC: 4.1 G/DL (ref 2–4)
GLUCOSE SERPL-MCNC: 105 MG/DL (ref 65–100)
GLUCOSE SERPL-MCNC: 77 MG/DL (ref 70–99)
HCT VFR BLD AUTO: 18.7 % (ref 36.6–50.3)
HCT VFR BLD AUTO: 20.2 % (ref 37.5–51)
HGB BLD-MCNC: 6.6 G/DL (ref 12.1–17)
HGB BLD-MCNC: 7 G/DL (ref 13–17.7)
HISTORY CHECK: NORMAL
IMM GRANULOCYTES # BLD AUTO: 0 K/UL (ref 0–0.04)
IMM GRANULOCYTES # BLD AUTO: 0 X10E3/UL (ref 0–0.1)
IMM GRANULOCYTES NFR BLD AUTO: 0 % (ref 0–0.5)
IMM GRANULOCYTES NFR BLD AUTO: 1 %
INR PPP: 1.4 (ref 0.9–1.2)
LYMPHOCYTES # BLD AUTO: 0.6 X10E3/UL (ref 0.7–3.1)
LYMPHOCYTES # BLD: 0.5 K/UL (ref 0.8–3.5)
LYMPHOCYTES NFR BLD AUTO: 18 %
LYMPHOCYTES NFR BLD: 14 % (ref 12–49)
MCH RBC QN AUTO: 37.7 PG (ref 26–34)
MCH RBC QN AUTO: 37.8 PG (ref 26.6–33)
MCHC RBC AUTO-ENTMCNC: 34.7 G/DL (ref 31.5–35.7)
MCHC RBC AUTO-ENTMCNC: 35.3 G/DL (ref 30–36.5)
MCV RBC AUTO: 106.9 FL (ref 80–99)
MCV RBC AUTO: 109 FL (ref 79–97)
MONOCYTES # BLD AUTO: 0.3 X10E3/UL (ref 0.1–0.9)
MONOCYTES # BLD: 0.4 K/UL (ref 0–1)
MONOCYTES NFR BLD AUTO: 10 %
MONOCYTES NFR BLD: 10 % (ref 5–13)
NEUTROPHILS # BLD AUTO: 2.3 X10E3/UL (ref 1.4–7)
NEUTROPHILS NFR BLD AUTO: 69 %
NEUTS SEG # BLD: 2.5 K/UL (ref 1.8–8)
NEUTS SEG NFR BLD: 73 % (ref 32–75)
NRBC # BLD: 0 K/UL (ref 0–0.01)
NRBC BLD-RTO: 0 PER 100 WBC
PLATELET # BLD AUTO: 135 K/UL (ref 150–400)
PLATELET # BLD AUTO: 148 X10E3/UL (ref 150–450)
PMV BLD AUTO: 10.6 FL (ref 8.9–12.9)
POTASSIUM SERPL-SCNC: 4.3 MMOL/L (ref 3.5–5.1)
POTASSIUM SERPL-SCNC: 4.3 MMOL/L (ref 3.5–5.2)
PROT SERPL-MCNC: 6.1 G/DL (ref 6.4–8.2)
PROT SERPL-MCNC: 6.1 G/DL (ref 6–8.5)
PROTHROMBIN TIME: 15.1 SEC (ref 9.1–12)
RBC # BLD AUTO: 1.75 M/UL (ref 4.1–5.7)
RBC # BLD AUTO: 1.85 X10E6/UL (ref 4.14–5.8)
RBC MORPH BLD: ABNORMAL
RBC MORPH BLD: ABNORMAL
SODIUM SERPL-SCNC: 125 MMOL/L (ref 136–145)
SODIUM SERPL-SCNC: 129 MMOL/L (ref 134–144)
SPECIMEN HOLD: NORMAL
WBC # BLD AUTO: 3.3 X10E3/UL (ref 3.4–10.8)
WBC # BLD AUTO: 3.5 K/UL (ref 4.1–11.1)

## 2024-11-20 PROCEDURE — 71045 X-RAY EXAM CHEST 1 VIEW: CPT

## 2024-11-20 PROCEDURE — 36415 COLL VENOUS BLD VENIPUNCTURE: CPT

## 2024-11-20 PROCEDURE — 86900 BLOOD TYPING SEROLOGIC ABO: CPT

## 2024-11-20 PROCEDURE — 85025 COMPLETE CBC W/AUTO DIFF WBC: CPT

## 2024-11-20 PROCEDURE — 96375 TX/PRO/DX INJ NEW DRUG ADDON: CPT

## 2024-11-20 PROCEDURE — 6360000002 HC RX W HCPCS: Performed by: NURSE PRACTITIONER

## 2024-11-20 PROCEDURE — 80053 COMPREHEN METABOLIC PANEL: CPT

## 2024-11-20 PROCEDURE — 96374 THER/PROPH/DIAG INJ IV PUSH: CPT

## 2024-11-20 PROCEDURE — 86901 BLOOD TYPING SEROLOGIC RH(D): CPT

## 2024-11-20 PROCEDURE — 99285 EMERGENCY DEPT VISIT HI MDM: CPT

## 2024-11-20 PROCEDURE — 86923 COMPATIBILITY TEST ELECTRIC: CPT

## 2024-11-20 PROCEDURE — P9016 RBC LEUKOCYTES REDUCED: HCPCS

## 2024-11-20 PROCEDURE — 86850 RBC ANTIBODY SCREEN: CPT

## 2024-11-20 PROCEDURE — 2580000003 HC RX 258: Performed by: NURSE PRACTITIONER

## 2024-11-20 RX ORDER — OCTREOTIDE ACETATE 500 UG/ML
50 INJECTION, SOLUTION INTRAVENOUS; SUBCUTANEOUS ONCE
Status: DISCONTINUED | OUTPATIENT
Start: 2024-11-20 | End: 2024-11-21 | Stop reason: HOSPADM

## 2024-11-20 RX ORDER — SODIUM CHLORIDE 9 MG/ML
INJECTION, SOLUTION INTRAVENOUS PRN
Status: DISCONTINUED | OUTPATIENT
Start: 2024-11-20 | End: 2024-11-21 | Stop reason: HOSPADM

## 2024-11-20 RX ADMIN — CEFTRIAXONE SODIUM 2000 MG: 2 INJECTION, POWDER, FOR SOLUTION INTRAMUSCULAR; INTRAVENOUS at 19:43

## 2024-11-20 RX ADMIN — PANTOPRAZOLE SODIUM 40 MG: 40 INJECTION, POWDER, FOR SOLUTION INTRAVENOUS at 19:46

## 2024-11-20 ASSESSMENT — PAIN DESCRIPTION - LOCATION: LOCATION: ABDOMEN

## 2024-11-20 ASSESSMENT — PAIN DESCRIPTION - DESCRIPTORS: DESCRIPTORS: SHARP

## 2024-11-20 ASSESSMENT — LIFESTYLE VARIABLES
HOW MANY STANDARD DRINKS CONTAINING ALCOHOL DO YOU HAVE ON A TYPICAL DAY: PATIENT DOES NOT DRINK
HOW OFTEN DO YOU HAVE A DRINK CONTAINING ALCOHOL: NEVER

## 2024-11-20 ASSESSMENT — PAIN SCALES - GENERAL: PAINLEVEL_OUTOF10: 5

## 2024-11-20 ASSESSMENT — PAIN - FUNCTIONAL ASSESSMENT: PAIN_FUNCTIONAL_ASSESSMENT: 0-10

## 2024-11-20 NOTE — TELEPHONE ENCOUNTER
Called and spoke with patient and reported his hemoglobin of 7.  Patient states that he has had some bloody emesis in the last 2 weeks but it has stopped.  He states that he will be going to urgent care after work his wife is going to meet him there.  They will let me know tomorrow the results.  Recommended patient go to the ER he is more comfortable going to urgent care.

## 2024-11-21 ENCOUNTER — TELEPHONE (OUTPATIENT)
Age: 54
End: 2024-11-21

## 2024-11-21 VITALS
TEMPERATURE: 98.5 F | BODY MASS INDEX: 25.36 KG/M2 | DIASTOLIC BLOOD PRESSURE: 85 MMHG | HEIGHT: 67 IN | HEART RATE: 83 BPM | SYSTOLIC BLOOD PRESSURE: 123 MMHG | WEIGHT: 161.6 LBS | RESPIRATION RATE: 15 BRPM | OXYGEN SATURATION: 100 %

## 2024-11-21 LAB
HCT VFR BLD AUTO: 20.9 % (ref 36.6–50.3)
HGB BLD-MCNC: 7 G/DL (ref 12.1–17)
HISTORY CHECK: NORMAL

## 2024-11-21 PROCEDURE — 36415 COLL VENOUS BLD VENIPUNCTURE: CPT

## 2024-11-21 PROCEDURE — 85018 HEMOGLOBIN: CPT

## 2024-11-21 PROCEDURE — 85014 HEMATOCRIT: CPT

## 2024-11-21 PROCEDURE — P9016 RBC LEUKOCYTES REDUCED: HCPCS

## 2024-11-21 RX ORDER — SODIUM CHLORIDE 9 MG/ML
INJECTION, SOLUTION INTRAVENOUS PRN
Status: DISCONTINUED | OUTPATIENT
Start: 2024-11-21 | End: 2024-11-21 | Stop reason: HOSPADM

## 2024-11-21 NOTE — ED NOTES
TRANSFER - OUT REPORT:    Verbal report given to ISAIAH Juarez on Michael Teague  being transferred to [facility] ED for routine progression of patient care       Report consisted of patient's Situation, Background, Assessment and   Recommendations(SBAR).     Information from the following report(s) ED SBAR was reviewed with the receiving nurse.    [place] Assessment:    Presents to emergency department  because of falls (Syncope, seizure, or loss of consciousness): No  Age > 70: No  Altered Mental Status, Intoxication with alcohol or substance confusion (Disorientation, impaired judgment, poor safety awaremess, or inability to follow instructions): No  Impaired Mobility: Ambulates or transfers with assistive devices or assistance; Unable to ambulate or transer.: No  Nursing Judgement: No          Lines:       Opportunity for questions and clarification was provided.      Patient transported with:  Monitor

## 2024-11-21 NOTE — ED TRIAGE NOTES
Patient arrives via POV with steady gait to triage. Patient reports vomiting dark red emesis for 3 days last week. Not currently vomiting. Patient was sent by PCP for low Hgb and dehydration.

## 2024-11-21 NOTE — ED PROVIDER NOTES
[facility] EMERGENCY DEP  EMERGENCY DEPARTMENT ENCOUNTER      Pt Name: Michael Teague  MRN: [id number]  Birthdate 1970  Date of evaluation: 11/20/2024  Provider: Radha Watson MD    CHIEF COMPLAINT       Chief Complaint   Patient presents with    Vomiting     Patient arrives via POV with steady gait to triage. Patient reports vomiting dark red emesis for 3 days last week. Not currently vomiting. Patient was sent by PCP for low Hgb 6.9 and dehydration.     GI Bleeding         HISTORY OF PRESENT ILLNESS   (Location/Symptom, Timing/Onset, Context/Setting, Quality, Duration, Modifying Factors, Severity)  Note limiting factors.   Patient is a 20-year-old with history of alcoholic cirrhosis and follows with Dr. Mathis, and oriented has not required blood transfusion in the past, arthritis, cholecystitis, GERD, hypertension, pancreatitis, thrombocytopenia, presents with concern for anemia.  Patient reports that he had 3 days of large-volume hematemesis with bright red blood last week that resolved spontaneously.  He denies prior history of esophageal varices.  Patient says that he had outpatient labs that were routinely scheduled per his hepatology team, and was told to come to the ED for blood.  Patient denies shortness of breath, nausea, vomiting, abdominal pain.  Reports he does not want to be admitted to the hospital but will stay in the ED for blood transfusion.        Nursing Notes were reviewed.    REVIEW OF SYSTEMS    Not Required     Review of Systems    PAST MEDICAL HISTORY     Past Medical History:   Diagnosis Date    Alcohol dependence in remission (HCC)     since November 2023    Anemia     Arthritis     Chronic cholecystitis     Cirrhosis of liver (HCC)     GERD (gastroesophageal reflux disease)     Hypertension     Pancreatitis     Thrombocytopenia (HCC)        SURGICAL HISTORY       Past Surgical History:   Procedure Laterality Date    COLONOSCOPY      SHOULDER SURGERY Right 5/14/2024    RIGHT TOTAL

## 2024-11-21 NOTE — ED TRIAGE NOTES
Patient is a transfer from  with a GI bleed. Complains of bloody vomit and feces last week. Recent pain in ab and SOB on excursion. Nx: HTN, cirrhosis of liver, pancreatitis.

## 2024-11-21 NOTE — ED PROVIDER NOTES
Rapid assessment in triage completed.    Patient presents with several days of dark black emesis and intermittent melanotic stools with some abdominal discomfort and mild distention.  History of alcoholic cirrhosis but states he is no longer drinking alcohol.  He was referred to the ED to be evaluated for abnormal labs.    Pertinent labs reveal hemoglobin of 7.0 with a baseline closer to 11.0 - 12.0.  Platelet count 148.  LFTs elevated 3:1 AST/ALT ratio.  T. bili 9.3.    I have discussed my concern with the patient regarding these findings and recommended urgent transfer to [facility] for blood transfusion and hospital admission.  Patient states he is willing to receive a blood transfusion but does not wanted to be admitted to the hospital (financial concerns).    We will work on transferring to [facility] as soon as possible.  Case discussed with ED attending, Dr. Rendon.    Will give IV PPI, rocephin, and octreotide.  No known hx of varices or PUD.     Ghanshyam Deutsch, APRN - NP  11/20/24 1931

## 2024-11-21 NOTE — TELEPHONE ENCOUNTER
Contacted patient to schedule an EGD in December (per staff message). Wife is concerned and feels as if the procedure should be done sooner due to the bleeding. She's not sure at this time as to what she needs to do and asks for guidance from Jacqueline. Please call to discuss.

## 2024-11-21 NOTE — CONSENT
Informed Consent for Blood Component Transfusion Note    I have discussed with the patient the rationale for blood component transfusion; its benefits in treating or preventing fatigue, organ damage, or death; and its risk which includes mild transfusion reactions, rare risk of blood borne infection, or more serious but rare reactions. I have discussed the alternatives to transfusion, including the risk and consequences of not receiving transfusion. The patient had an opportunity to ask questions and had agreed to proceed with transfusion of blood components.    Electronically signed by Radha Watson MD on 11/20/24 at 9:40 PM EST

## 2024-11-21 NOTE — ED NOTES
Bedside and Verbal shift change report given to SERGEY RN (oncoming nurse) by ISAIAH Huynh (offgoing nurse). Report included the following information Nurse Handoff Report and Index.

## 2024-11-22 ENCOUNTER — TELEPHONE (OUTPATIENT)
Age: 54
End: 2024-11-22

## 2024-11-22 LAB
ABO + RH BLD: NORMAL
BLD PROD TYP BPU: NORMAL
BLD PROD TYP BPU: NORMAL
BLOOD BANK BLOOD PRODUCT EXPIRATION DATE: NORMAL
BLOOD BANK BLOOD PRODUCT EXPIRATION DATE: NORMAL
BLOOD BANK DISPENSE STATUS: NORMAL
BLOOD BANK DISPENSE STATUS: NORMAL
BLOOD BANK ISBT PRODUCT BLOOD TYPE: 6200
BLOOD BANK ISBT PRODUCT BLOOD TYPE: 6200
BLOOD BANK PRODUCT CODE: NORMAL
BLOOD BANK PRODUCT CODE: NORMAL
BLOOD BANK UNIT TYPE AND RH: NORMAL
BLOOD BANK UNIT TYPE AND RH: NORMAL
BLOOD GROUP ANTIBODIES SERPL: NORMAL
BPU ID: NORMAL
BPU ID: NORMAL
CROSSMATCH RESULT: NORMAL
CROSSMATCH RESULT: NORMAL
SPECIMEN EXP DATE BLD: NORMAL
UNIT DIVISION: 0
UNIT DIVISION: 0
UNIT ISSUE DATE/TIME: NORMAL
UNIT ISSUE DATE/TIME: NORMAL

## 2024-11-22 NOTE — TELEPHONE ENCOUNTER
Late entry 11/21/2024 and spoke with patient's wife by phone and recommended that he go to the ER.  Patient's wife states that she called him right away and try to get him to go and let me know.

## 2024-11-22 NOTE — TELEPHONE ENCOUNTER
Late entry 11/21/2024 and spoke with patient's wife by phone and recommended that he go to the ER.  Patient's wife states that she called him right away and try to get him to go and let me know.    Patient received 2 units of blood in the ER and this was recommended admission to the hospital to find source of bleeding.  His hemoglobin was 6.6 at arrival to the ER.  Patient declined and signed AMA papers.

## 2024-11-22 NOTE — TELEPHONE ENCOUNTER
Late entry 11/20/2024  Spoke with patient by phone and recommended that he come to the ER explained to him why that he is bleeding from somewhere and his hemoglobin is low and he most likely needs blood.  Patient stated that he would go to the ER after work and day.    Late entry 11/21/2024 and spoke with patient's wife by phone and recommended that he go to the ER.  Patient's wife states that she called him right away and try to get him to go and let me know.    Patient received 2 units of blood in the ER and this was recommended admission to the hospital to find source of bleeding.  His hemoglobin was 6.6 at arrival to the ER.  Patient declined and signed AMA papers.

## 2024-11-23 LAB
PETH BLD QL SCN: POSITIVE
PETH BLD-MCNC: 87 NG/ML

## 2024-11-27 ENCOUNTER — TELEPHONE (OUTPATIENT)
Age: 54
End: 2024-11-27

## 2024-11-27 NOTE — TELEPHONE ENCOUNTER
Called patient's wife, no answer.  Left voice message with ultrasound results and for her to place questions in MyChart if needed.

## 2024-12-02 ENCOUNTER — CLINICAL DOCUMENTATION (OUTPATIENT)
Age: 54
End: 2024-12-02

## 2024-12-02 DIAGNOSIS — K70.30 ALCOHOLIC CIRRHOSIS OF LIVER WITHOUT ASCITES (HCC): Primary | ICD-10-CM

## 2024-12-02 DIAGNOSIS — D64.9 ANEMIA, UNSPECIFIED TYPE: ICD-10-CM

## 2024-12-04 ENCOUNTER — PREP FOR PROCEDURE (OUTPATIENT)
Age: 54
End: 2024-12-04

## 2024-12-04 LAB
ALBUMIN SERPL-MCNC: 3.2 G/DL (ref 3.8–4.9)
ALP SERPL-CCNC: 191 IU/L (ref 44–121)
ALT SERPL-CCNC: 39 IU/L (ref 0–44)
AST SERPL-CCNC: 121 IU/L (ref 0–40)
BASOPHILS # BLD AUTO: 0.1 X10E3/UL (ref 0–0.2)
BASOPHILS NFR BLD AUTO: 1 %
BILIRUB DIRECT SERPL-MCNC: 5.25 MG/DL (ref 0–0.4)
BILIRUB SERPL-MCNC: 7 MG/DL (ref 0–1.2)
BUN SERPL-MCNC: 6 MG/DL (ref 6–24)
BUN/CREAT SERPL: 10 (ref 9–20)
CALCIUM SERPL-MCNC: 8.8 MG/DL (ref 8.7–10.2)
CHLORIDE SERPL-SCNC: 98 MMOL/L (ref 96–106)
CO2 SERPL-SCNC: 19 MMOL/L (ref 20–29)
CREAT SERPL-MCNC: 0.6 MG/DL (ref 0.76–1.27)
EGFRCR SERPLBLD CKD-EPI 2021: 115 ML/MIN/1.73
EOSINOPHIL # BLD AUTO: 0.1 X10E3/UL (ref 0–0.4)
EOSINOPHIL NFR BLD AUTO: 2 %
ERYTHROCYTE [DISTWIDTH] IN BLOOD BY AUTOMATED COUNT: 15.2 % (ref 11.6–15.4)
FERRITIN SERPL-MCNC: 501 NG/ML (ref 30–400)
GLUCOSE SERPL-MCNC: 85 MG/DL (ref 70–99)
HCT VFR BLD AUTO: 29 % (ref 37.5–51)
HGB BLD-MCNC: 9.8 G/DL (ref 13–17.7)
IMM GRANULOCYTES # BLD AUTO: 0 X10E3/UL (ref 0–0.1)
IMM GRANULOCYTES NFR BLD AUTO: 0 %
INR PPP: 1.3 (ref 0.9–1.2)
IRON SATN MFR SERPL: 24 % (ref 15–55)
IRON SERPL-MCNC: 60 UG/DL (ref 38–169)
LYMPHOCYTES # BLD AUTO: 0.6 X10E3/UL (ref 0.7–3.1)
LYMPHOCYTES NFR BLD AUTO: 17 %
MCH RBC QN AUTO: 33.9 PG (ref 26.6–33)
MCHC RBC AUTO-ENTMCNC: 33.8 G/DL (ref 31.5–35.7)
MCV RBC AUTO: 100 FL (ref 79–97)
MONOCYTES # BLD AUTO: 0.4 X10E3/UL (ref 0.1–0.9)
MONOCYTES NFR BLD AUTO: 11 %
NEUTROPHILS # BLD AUTO: 2.4 X10E3/UL (ref 1.4–7)
NEUTROPHILS NFR BLD AUTO: 69 %
PLATELET # BLD AUTO: 118 X10E3/UL (ref 150–450)
POTASSIUM SERPL-SCNC: 4.2 MMOL/L (ref 3.5–5.2)
PROT SERPL-MCNC: 7.3 G/DL (ref 6–8.5)
PROTHROMBIN TIME: 14.6 SEC (ref 9.1–12)
RBC # BLD AUTO: 2.89 X10E6/UL (ref 4.14–5.8)
SODIUM SERPL-SCNC: 128 MMOL/L (ref 134–144)
TIBC SERPL-MCNC: 246 UG/DL (ref 250–450)
UIBC SERPL-MCNC: 186 UG/DL (ref 111–343)
WBC # BLD AUTO: 3.6 X10E3/UL (ref 3.4–10.8)

## 2024-12-06 ENCOUNTER — OFFICE VISIT (OUTPATIENT)
Age: 54
End: 2024-12-06
Payer: COMMERCIAL

## 2024-12-06 VITALS
OXYGEN SATURATION: 99 % | WEIGHT: 153.8 LBS | BODY MASS INDEX: 24.14 KG/M2 | SYSTOLIC BLOOD PRESSURE: 127 MMHG | DIASTOLIC BLOOD PRESSURE: 82 MMHG | TEMPERATURE: 98 F | HEART RATE: 90 BPM | HEIGHT: 67 IN

## 2024-12-06 DIAGNOSIS — K70.30 ALCOHOLIC CIRRHOSIS OF LIVER WITHOUT ASCITES (HCC): Primary | ICD-10-CM

## 2024-12-06 DIAGNOSIS — D62 ACUTE BLOOD LOSS ANEMIA: ICD-10-CM

## 2024-12-06 PROCEDURE — 99215 OFFICE O/P EST HI 40 MIN: CPT | Performed by: NURSE PRACTITIONER

## 2024-12-06 ASSESSMENT — ANXIETY QUESTIONNAIRES
7. FEELING AFRAID AS IF SOMETHING AWFUL MIGHT HAPPEN: NOT AT ALL
IF YOU CHECKED OFF ANY PROBLEMS ON THIS QUESTIONNAIRE, HOW DIFFICULT HAVE THESE PROBLEMS MADE IT FOR YOU TO DO YOUR WORK, TAKE CARE OF THINGS AT HOME, OR GET ALONG WITH OTHER PEOPLE: NOT DIFFICULT AT ALL
1. FEELING NERVOUS, ANXIOUS, OR ON EDGE: NOT AT ALL
5. BEING SO RESTLESS THAT IT IS HARD TO SIT STILL: NOT AT ALL
2. NOT BEING ABLE TO STOP OR CONTROL WORRYING: NOT AT ALL
GAD7 TOTAL SCORE: 0
4. TROUBLE RELAXING: NOT AT ALL
6. BECOMING EASILY ANNOYED OR IRRITABLE: NOT AT ALL
3. WORRYING TOO MUCH ABOUT DIFFERENT THINGS: NOT AT ALL

## 2024-12-06 ASSESSMENT — PATIENT HEALTH QUESTIONNAIRE - PHQ9
2. FEELING DOWN, DEPRESSED OR HOPELESS: NOT AT ALL
1. LITTLE INTEREST OR PLEASURE IN DOING THINGS: NOT AT ALL
SUM OF ALL RESPONSES TO PHQ QUESTIONS 1-9: 0
DEPRESSION UNABLE TO ASSESS: FUNCTIONAL CAPACITY MOTIVATION LIMITS ACCURACY
SUM OF ALL RESPONSES TO PHQ9 QUESTIONS 1 & 2: 0
SUM OF ALL RESPONSES TO PHQ QUESTIONS 1-9: 0

## 2024-12-06 NOTE — PROGRESS NOTES
[facility]      David Nur MD, FACP, FACG, FAASLD      COLLIN Sun, [facility]   Luz Ratayad, [facility]   Jacqueline Singh, FNP-C  Tyrone Arias, [facility]   Britney Lagunas, [facility]   at [facility]   [address], [address]   [place]  [address] [phone]   FAX: [phone]  [facility]   [address], [address]   [place]  [address] [phone]   FAX: [phone]           Patient Care Team:  Blair Rand MD as PCP - General (Family Medicine)  Blair Rand MD as PCP - [facility] Empaneled Provider            Michael Teague is being seen at [facility] for management of 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  The active problem list, all pertinent past medical history, medications, liver histology, endoscopic studies, radiologic findings and laboratory findings related to the liver disorder were reviewed and discussed with the patient.      The patient is a 54 y.o. male who was hospitalized in 10/2022 when he developed abdominal pain/fullness and jaundice.  There was no nausea, vomiting, swelling of the abdomen, swelling of the lower extremity, confusion. He had an episode of acute pancreatitis in 2019.  He was abstinent for a few months after that but then slowly started drinking alcohol again in increasing amounts.  The patient had  previously remained abstinent from all alcohol since 9/24/2022. Previous consumption consisted of 10 beers and 4 mixed drinks daily for 4 year. The patient was also taking 3.5-4.5 gm of tylenol daily for treatment of severe abdominal pain.      The patient was enrolled in the [facility] clinical trial. Patient was given prednisone during hospitalization for a DF of 59. A one time infusion was

## 2024-12-06 NOTE — PROGRESS NOTES
Chief Complaint   Patient presents with    Follow-up     Vitals:    12/06/24 0744   BP: 127/82   Pulse: 90   Temp: 98 °F (36.7 °C)   SpO2: 99%     \"Have you been to the ER, urgent care clinic since your last visit?  Hospitalized since your last visit?\"    YES - When: approximately 2  weeks ago.  Where and Why: [facility].    “Have you seen or consulted any other health care providers outside our system since your last visit?”    NO      “Have you had a colorectal cancer screening such as a colonoscopy/FIT/Cologuard?    NO    No colonoscopy on file  No cologuard on file  No FIT/FOBT on file   No flexible sigmoidoscopy on file

## 2024-12-13 ENCOUNTER — ANESTHESIA (OUTPATIENT)
Facility: HOSPITAL | Age: 54
End: 2024-12-13
Payer: COMMERCIAL

## 2024-12-13 ENCOUNTER — ANESTHESIA EVENT (OUTPATIENT)
Facility: HOSPITAL | Age: 54
End: 2024-12-13
Payer: COMMERCIAL

## 2024-12-13 ENCOUNTER — HOSPITAL ENCOUNTER (OUTPATIENT)
Facility: HOSPITAL | Age: 54
Setting detail: OUTPATIENT SURGERY
Discharge: HOME | End: 2024-12-13
Attending: STUDENT IN AN ORGANIZED HEALTH CARE EDUCATION/TRAINING PROGRAM | Admitting: STUDENT IN AN ORGANIZED HEALTH CARE EDUCATION/TRAINING PROGRAM
Payer: COMMERCIAL

## 2024-12-13 VITALS
HEART RATE: 83 BPM | HEIGHT: 67 IN | OXYGEN SATURATION: 100 % | SYSTOLIC BLOOD PRESSURE: 133 MMHG | TEMPERATURE: 98 F | RESPIRATION RATE: 20 BRPM | DIASTOLIC BLOOD PRESSURE: 92 MMHG | BODY MASS INDEX: 23.32 KG/M2 | WEIGHT: 148.6 LBS

## 2024-12-13 PROCEDURE — 2580000003 HC RX 258: Performed by: NURSE ANESTHETIST, CERTIFIED REGISTERED

## 2024-12-13 PROCEDURE — 6360000002 HC RX W HCPCS: Performed by: NURSE ANESTHETIST, CERTIFIED REGISTERED

## 2024-12-13 PROCEDURE — 3600007502: Performed by: STUDENT IN AN ORGANIZED HEALTH CARE EDUCATION/TRAINING PROGRAM

## 2024-12-13 PROCEDURE — 43244 EGD VARICES LIGATION: CPT | Performed by: STUDENT IN AN ORGANIZED HEALTH CARE EDUCATION/TRAINING PROGRAM

## 2024-12-13 PROCEDURE — 7100000011 HC PHASE II RECOVERY - ADDTL 15 MIN: Performed by: STUDENT IN AN ORGANIZED HEALTH CARE EDUCATION/TRAINING PROGRAM

## 2024-12-13 PROCEDURE — 2709999900 HC NON-CHARGEABLE SUPPLY: Performed by: STUDENT IN AN ORGANIZED HEALTH CARE EDUCATION/TRAINING PROGRAM

## 2024-12-13 PROCEDURE — 2720000010 HC SURG SUPPLY STERILE: Performed by: STUDENT IN AN ORGANIZED HEALTH CARE EDUCATION/TRAINING PROGRAM

## 2024-12-13 PROCEDURE — 3700000000 HC ANESTHESIA ATTENDED CARE: Performed by: STUDENT IN AN ORGANIZED HEALTH CARE EDUCATION/TRAINING PROGRAM

## 2024-12-13 PROCEDURE — 7100000010 HC PHASE II RECOVERY - FIRST 15 MIN: Performed by: STUDENT IN AN ORGANIZED HEALTH CARE EDUCATION/TRAINING PROGRAM

## 2024-12-13 RX ORDER — SODIUM CHLORIDE 9 MG/ML
INJECTION, SOLUTION INTRAVENOUS
Status: DISCONTINUED | OUTPATIENT
Start: 2024-12-13 | End: 2024-12-13 | Stop reason: SDUPTHER

## 2024-12-13 RX ORDER — SODIUM CHLORIDE 0.9 % (FLUSH) 0.9 %
5-40 SYRINGE (ML) INJECTION EVERY 12 HOURS SCHEDULED
Status: DISCONTINUED | OUTPATIENT
Start: 2024-12-13 | End: 2024-12-13 | Stop reason: HOSPADM

## 2024-12-13 RX ORDER — LIDOCAINE HYDROCHLORIDE 20 MG/ML
INJECTION, SOLUTION EPIDURAL; INFILTRATION; INTRACAUDAL; PERINEURAL
Status: DISCONTINUED | OUTPATIENT
Start: 2024-12-13 | End: 2024-12-13 | Stop reason: SDUPTHER

## 2024-12-13 RX ORDER — SODIUM CHLORIDE 0.9 % (FLUSH) 0.9 %
5-40 SYRINGE (ML) INJECTION PRN
Status: DISCONTINUED | OUTPATIENT
Start: 2024-12-13 | End: 2024-12-13 | Stop reason: HOSPADM

## 2024-12-13 RX ORDER — SODIUM CHLORIDE 9 MG/ML
25 INJECTION, SOLUTION INTRAVENOUS PRN
Status: DISCONTINUED | OUTPATIENT
Start: 2024-12-13 | End: 2024-12-13 | Stop reason: HOSPADM

## 2024-12-13 RX ADMIN — PROPOFOL 25 MG: 10 INJECTION, EMULSION INTRAVENOUS at 09:13

## 2024-12-13 RX ADMIN — SODIUM CHLORIDE: 9 INJECTION, SOLUTION INTRAVENOUS at 09:00

## 2024-12-13 RX ADMIN — PROPOFOL 50 MG: 10 INJECTION, EMULSION INTRAVENOUS at 09:11

## 2024-12-13 RX ADMIN — PROPOFOL 25 MG: 10 INJECTION, EMULSION INTRAVENOUS at 09:17

## 2024-12-13 RX ADMIN — PROPOFOL 100 MG: 10 INJECTION, EMULSION INTRAVENOUS at 09:10

## 2024-12-13 RX ADMIN — LIDOCAINE HYDROCHLORIDE 80 MG: 20 INJECTION, SOLUTION EPIDURAL; INFILTRATION; INTRACAUDAL; PERINEURAL at 09:10

## 2024-12-13 RX ADMIN — PROPOFOL 25 MG: 10 INJECTION, EMULSION INTRAVENOUS at 09:15

## 2024-12-13 NOTE — ANESTHESIA POSTPROCEDURE EVALUATION
Post-Anesthesia Evaluation and Assessment    Patient: Michael Teague MRN: [id number]  SSN: [id number]    YOB: 1970  Age: 54 y.o.  Sex: male      I have evaluated the patient and they are stable and ready for discharge from the PACU.     Cardiovascular Function/Vital Signs  Visit Vitals  BP (!) 133/92   Pulse 83   Temp 98 °F (36.7 °C) (Temporal)   Resp 20   Ht 1.702 m (5' 7\")   Wt 67.4 kg (148 lb 9.6 oz)   SpO2 100%   BMI 23.27 kg/m²       Patient is status post Monitor Anesthesia Care anesthesia for Procedure(s):  ESOPHAGOGASTRODUODENOSCOPY.    Nausea/Vomiting: None    Postoperative hydration reviewed and adequate.    Pain:      Managed    Neurological Status:       At baseline    Mental Status, Level of Consciousness: Alert and  oriented to person, place, and time    Pulmonary Status:       Adequate oxygenation and airway patent    Complications related to anesthesia: None    Post-anesthesia assessment completed. No concerns    Signed By: Kwadwo Young MD     December 13, 2024            Department of Anesthesiology  Postprocedure Note    Patient: Michael Teague  MRN: [id number]  YOB: 1970  Date of evaluation: 12/13/2024    Procedure Summary       Date: 12/13/24 Room / Location: [facility] 04 / [facility] ENDOSCOPY    Anesthesia Start: 0907 Anesthesia Stop: 0919    Procedure: ESOPHAGOGASTRODUODENOSCOPY (Upper GI Region) Diagnosis:       Alcoholic cirrhosis of liver without ascites (HCC)      (Alcoholic cirrhosis of liver without ascites (HCC) [K70.30])    Surgeons: Shyanne Kennedy MD Responsible Provider: Kwadwo Young MD    Anesthesia Type: MAC ASA Status: 2            Anesthesia Type: No value filed.    Piter Phase I: Piter Score: 10    Piter Phase II: Piter Score: 10    Anesthesia Post Evaluation    No notable events documented.

## 2024-12-13 NOTE — DISCHARGE INSTRUCTIONS
[facility]     David Nur MD, FACP, [facility], FAASLD   MD Elza Campoverde PA-C April S Ashworth, [facility]   Luz Tatum, [facility]   Jacqueline Singh, [facility]  Tyrone Arias, [facility]   Britney Lagunas, [facility]   Betsey Glass, [facility]   at [facility]   [address], [address]   [place]  [address] [phone]   FAX: [phone]  [facility]   [address]   [place]  [address] [phone]   FAX: [phone]         ENDOSCOPY WITH BANDING DISCHARGE INSTRUCTIONS    Michael Teague    1970  Date: 12/13/2024    DISCOMFORT:  Use lozenges or warm salt water gargle for sore thoat  Apply warm compress to IV site if red.  If redness or soreness persists call the office.  You may experience gas and bloating.  Walking and belching will help relieve this.  You may experience chest pain or discomfort or feel as though food is \"sticking\" in your food pipe for a few days after the procedure. This is a normal feeling after banding of esophageal varices.    CHEST PRESSURE:  Banding of dilated veins (varices) in the food tube (esophagus) can be painful in some persons.  The pain is caused by squeezing the varices and lining of the esophagus by the bands used to seal the varices.  You can take liquid pain medication either acetaminophen or alternative.  The best treatment for this is to drink a an \"Icee\" or \"Slurpee\" since the ice crystals will freeze the nerve endings in the food tube and relieve the pain.    DIET:  Regular food may dislodge the bands placed on the varices.  For this reason you should only have liquid for the rest of today.  Eat only soft food that does not need to be chewed all day tomorrow.  You may advance to your regular diet in 2 days.    ACTIVITY:  Spend

## 2024-12-13 NOTE — OP NOTE
[facility]     David Nur MD, FACP, MACG, FAASLD   MD Elza Campoverde, COLLIN Niec, [facility]   Luz Tatum, [facility]   Jacqueline Greenwoodosta, [facility]  Tyrone Arias, [facility]   Britney Lagunas, [facility]   Betsey Glass, [facility]   [address]   [place]  [address] [phone]   FAX: [phone]  [facility]   [address]   [place]  [address] [phone]   FAX: [phone]         UPPER ENDOSCOPY PROCEDURE NOTE    NAME: Michael Teague  :  1970  MRN:  [id number]    INDICATION: Cirrhosis.  Screening for esophageal varices with variceal ligation if  indicated.    : Shyanne Kennedy MD    SURGICAL ASSISTANT:  None    PROSTHETIC DEVISES, TISSUE GRAFTS, ORGAN TRANSPLANTS:  Not applicable     ANESTHESIA/SEDATION: MAC Sedation per anesthesiology         PROCEDURE DESCRIPTION:  Inf[employer] consent was obtained from the patient for the procedure.  All risks and benefits of the procedure explained.     The procedure was performed in the endoscopy suite.  The patient was laying on a stretcher and moved to the left lateral decubitus position prior to administration of sedation.    Sedation was administered by anesthesiology.  See their note for details.      The endoscope was inserted into the mouth and advanced under direct vision to the second portion of the duodenum.  Careful inspection of upper gastrointestinal tract was made as the endoscope was inserted and withdrawn.  Retroflexion of the endoscope to view of the cardia of the stomach was performed.  The findings and interventions are described below.      FINDINGS:  Esophagus:    Moderate to large varices with thin wall.   Three bands were successfully deployed. Mild oozing

## 2024-12-13 NOTE — H&P
[facility]     David Nur MD, FACP, MACG, FAASLD   MD Elza Campoverde, COLLIN Nice, [facility]   Luz Tatum, [facility]   Jacqueline Francisco, [facility]  Tyrone Arias, [facility]   Britney Lagunas, [facility]   Betsey Glass, [facility]      [facility]   at [facility]   [address]   [place]  [address] [phone]   FAX: [phone]  [facility]   [address]   [place]  [address] [phone]   FAX: [phone]         PRE-PROCEDURE NOTE - EGD    H and P from last office visit reviewed.  Cirrhosis MELD 21, INR 1.3, kPa 57.7. Anemia.   Allergies reviewed.  Out-patient medicaton list reviewed.    Patient Active Problem List   Diagnosis    Hepatomegaly    Hyperbilirubinemia    Moderate protein-calorie malnutrition (HCC)    Alcoholic cirrhosis of liver without ascites (HCC)    Anemia in other chronic diseases classified elsewhere    Alcohol dependence in remission (HCC)    Chronic cholecystitis    Arthritis of right shoulder region         No Known Allergies    No current facility-administered medications on file prior to encounter.     Current Outpatient Medications on File Prior to Encounter   Medication Sig Dispense Refill    lactulose (CHRONULAC) 10 GM/15ML solution Take 15 mLs by mouth daily 945 mL 3    thiamine 100 MG tablet TAKE 1 TABLET BY MOUTH EVERY DAY 90 tablet 3    pantoprazole (PROTONIX) 40 MG tablet TAKE 1 TABLET BY MOUTH EVERY DAY 90 tablet 3    vitamin B-12 (CYANOCOBALAMIN) 500 MCG tablet Take 1 tablet by mouth daily 90 tablet 3    folic acid (FOLVITE) 1 MG tablet TAKE 1 TABLET BY MOUTH EVERY DAY 90 tablet 3    lisinopril (PRINIVIL;ZESTRIL) 20 MG tablet Take 1 tablet by mouth daily 90 tablet 3    ferrous sulfate (IRON 325) 325 (65 Fe) MG tablet Take 1 tablet

## 2024-12-13 NOTE — ANESTHESIA PRE PROCEDURE
Department of Anesthesiology  Preprocedure Note       Name:  Michael Teague   Age:  54 y.o.  :  1970                                          MRN:  [id number]         Date:  2024      Surgeon: Surgeon(s):  Shyanne Kennedy MD    Procedure: Procedure(s):  ESOPHAGOGASTRODUODENOSCOPY    Medications prior to admission:   Prior to Admission medications    Medication Sig Start Date End Date Taking? Authorizing Provider   lactulose (CHRONULAC) 10 GM/15ML solution Take 15 mLs by mouth daily 24  Yes Jacqueline Singh APRN - NP   thiamine 100 MG tablet TAKE 1 TABLET BY MOUTH EVERY DAY 24  Yes Jacqueline Singh APRN - NP   pantoprazole (PROTONIX) 40 MG tablet TAKE 1 TABLET BY MOUTH EVERY DAY 24  Yes Jacqueline Singh APRN - NP   vitamin B-12 (CYANOCOBALAMIN) 500 MCG tablet Take 1 tablet by mouth daily 24  Yes Jacqueline Singh APRN - NP   folic acid (FOLVITE) 1 MG tablet TAKE 1 TABLET BY MOUTH EVERY DAY 24  Yes Jacqueline Singh APRN - NP   lisinopril (PRINIVIL;ZESTRIL) 20 MG tablet Take 1 tablet by mouth daily 24  Yes Jacqueline Singh APRN - NP   ferrous sulfate (IRON 325) 325 (65 Fe) MG tablet Take 1 tablet by mouth every other day 23  Yes Domonique Nice APRN - NP       Current medications:    Current Facility-Administered Medications   Medication Dose Route Frequency Provider Last Rate Last Admin   • sodium chloride flush 0.9 % injection 5-40 mL  5-40 mL IntraVENous 2 times per day Shyanne Kennedy MD       • sodium chloride flush 0.9 % injection 5-40 mL  5-40 mL IntraVENous PRN Shyanne Kennedy MD       • 0.9 % sodium chloride infusion  25 mL IntraVENous PRN Shyanne Kennedy MD           Allergies:  No Known Allergies    Problem List:    Patient Active Problem List   Diagnosis Code   • Hepatomegaly R16.0   • Hyperbilirubinemia E80.6   • Moderate protein-calorie malnutrition (HCC) E44.0   • Alcoholic cirrhosis of liver without ascites (HCC) K70.30   • Anemia in other

## 2024-12-13 NOTE — PROGRESS NOTES

## 2024-12-16 ENCOUNTER — TELEPHONE (OUTPATIENT)
Age: 54
End: 2024-12-16

## 2024-12-16 NOTE — TELEPHONE ENCOUNTER
Left v/m for patient to call us regarding EGD in 8 weeks.       ----- Message from Dr. Shyanne Kennedy MD sent at 12/13/2024  9:21 AM EST -----  Please set up EGD for variceal surviellance in 8 weeks.    Thank you

## 2024-12-28 LAB
ALBUMIN SERPL-MCNC: 3.1 G/DL (ref 3.8–4.9)
ALP SERPL-CCNC: 182 IU/L (ref 44–121)
ALT SERPL-CCNC: 17 IU/L (ref 0–44)
AST SERPL-CCNC: 43 IU/L (ref 0–40)
BASOPHILS # BLD AUTO: 0 X10E3/UL (ref 0–0.2)
BASOPHILS NFR BLD AUTO: 1 %
BILIRUB DIRECT SERPL-MCNC: 2.65 MG/DL (ref 0–0.4)
BILIRUB SERPL-MCNC: 3.5 MG/DL (ref 0–1.2)
BUN SERPL-MCNC: 6 MG/DL (ref 6–24)
BUN/CREAT SERPL: 8 (ref 9–20)
CALCIUM SERPL-MCNC: 9.2 MG/DL (ref 8.7–10.2)
CHLORIDE SERPL-SCNC: 98 MMOL/L (ref 96–106)
CO2 SERPL-SCNC: 22 MMOL/L (ref 20–29)
CREAT SERPL-MCNC: 0.77 MG/DL (ref 0.76–1.27)
EGFRCR SERPLBLD CKD-EPI 2021: 106 ML/MIN/1.73
EOSINOPHIL # BLD AUTO: 0.1 X10E3/UL (ref 0–0.4)
EOSINOPHIL NFR BLD AUTO: 3 %
ERYTHROCYTE [DISTWIDTH] IN BLOOD BY AUTOMATED COUNT: 14.9 % (ref 11.6–15.4)
FERRITIN SERPL-MCNC: 255 NG/ML (ref 30–400)
GLUCOSE SERPL-MCNC: 117 MG/DL (ref 70–99)
HCT VFR BLD AUTO: 29.4 % (ref 37.5–51)
HGB BLD-MCNC: 9.7 G/DL (ref 13–17.7)
IMM GRANULOCYTES # BLD AUTO: 0 X10E3/UL (ref 0–0.1)
IMM GRANULOCYTES NFR BLD AUTO: 0 %
INR PPP: 1.2 (ref 0.9–1.2)
IRON SATN MFR SERPL: 24 % (ref 15–55)
IRON SERPL-MCNC: 64 UG/DL (ref 38–169)
LYMPHOCYTES # BLD AUTO: 0.9 X10E3/UL (ref 0.7–3.1)
LYMPHOCYTES NFR BLD AUTO: 28 %
MCH RBC QN AUTO: 33.4 PG (ref 26.6–33)
MCHC RBC AUTO-ENTMCNC: 33 G/DL (ref 31.5–35.7)
MCV RBC AUTO: 101 FL (ref 79–97)
MONOCYTES # BLD AUTO: 0.4 X10E3/UL (ref 0.1–0.9)
MONOCYTES NFR BLD AUTO: 12 %
NEUTROPHILS # BLD AUTO: 1.7 X10E3/UL (ref 1.4–7)
NEUTROPHILS NFR BLD AUTO: 56 %
PLATELET # BLD AUTO: 125 X10E3/UL (ref 150–450)
POTASSIUM SERPL-SCNC: 4.2 MMOL/L (ref 3.5–5.2)
PROT SERPL-MCNC: 7.2 G/DL (ref 6–8.5)
PROTHROMBIN TIME: 13.8 SEC (ref 9.1–12)
RBC # BLD AUTO: 2.9 X10E6/UL (ref 4.14–5.8)
SODIUM SERPL-SCNC: 132 MMOL/L (ref 134–144)
TIBC SERPL-MCNC: 269 UG/DL (ref 250–450)
UIBC SERPL-MCNC: 205 UG/DL (ref 111–343)
WBC # BLD AUTO: 3 X10E3/UL (ref 3.4–10.8)

## 2025-01-02 ENCOUNTER — TELEPHONE (OUTPATIENT)
Age: 55
End: 2025-01-02

## 2025-01-02 NOTE — TELEPHONE ENCOUNTER
Called patient to reschedule due to provider out of office on bereavement. Spoke with wife and asked to see if patient could be seen as a virtual appt on 1/3/2025. Wife stated the virtual appt would not work, as she will not be with patient and he is not good with technology.  I gave wife other options of in person at a later date, however wife was not happy that they had to wait that long, and could they see someone else. I let her know of our policy, and that we like to keep patients with their provider. Wife argued that that is ridiculous and what do we do in an emergency? I explained that we do have policies in place for that, however due to Jacqueline not being out, just doing virtuals, we would need patient to stay with Jacqueline. Wife became argumentative and said the she would call back after speaking with patient. I asked if she would like to switch to the virtual for tomorrow, or a later date in the meantime? She asked if we could have both of those appts, and I said that we cannot hold 2 appts, and that she would need to either pick one of the appts for now, or we'd have to cancel the appt, as Jacqueline will not be here. She got very upset, so I asked her to hold. I handed the call to Anabell Mistry to take over.

## 2025-01-13 ENCOUNTER — TELEMEDICINE (OUTPATIENT)
Age: 55
End: 2025-01-13
Payer: COMMERCIAL

## 2025-01-13 DIAGNOSIS — K70.30 ALCOHOLIC CIRRHOSIS OF LIVER WITHOUT ASCITES (HCC): Primary | ICD-10-CM

## 2025-01-13 DIAGNOSIS — D62 ACUTE BLOOD LOSS ANEMIA: ICD-10-CM

## 2025-01-13 DIAGNOSIS — I85.00 ESOPHAGEAL VARICES WITHOUT BLEEDING, UNSPECIFIED ESOPHAGEAL VARICES TYPE (HCC): ICD-10-CM

## 2025-01-13 PROCEDURE — 99215 OFFICE O/P EST HI 40 MIN: CPT | Performed by: NURSE PRACTITIONER

## 2025-01-13 ASSESSMENT — PATIENT HEALTH QUESTIONNAIRE - PHQ9
SUM OF ALL RESPONSES TO PHQ QUESTIONS 1-9: 0
SUM OF ALL RESPONSES TO PHQ QUESTIONS 1-9: 0
SUM OF ALL RESPONSES TO PHQ9 QUESTIONS 1 & 2: 0
SUM OF ALL RESPONSES TO PHQ QUESTIONS 1-9: 0
SUM OF ALL RESPONSES TO PHQ QUESTIONS 1-9: 0
1. LITTLE INTEREST OR PLEASURE IN DOING THINGS: NOT AT ALL
2. FEELING DOWN, DEPRESSED OR HOPELESS: NOT AT ALL
DEPRESSION UNABLE TO ASSESS: FUNCTIONAL CAPACITY MOTIVATION LIMITS ACCURACY

## 2025-01-13 ASSESSMENT — ANXIETY QUESTIONNAIRES
2. NOT BEING ABLE TO STOP OR CONTROL WORRYING: NOT AT ALL
4. TROUBLE RELAXING: NOT AT ALL
6. BECOMING EASILY ANNOYED OR IRRITABLE: NOT AT ALL
IF YOU CHECKED OFF ANY PROBLEMS ON THIS QUESTIONNAIRE, HOW DIFFICULT HAVE THESE PROBLEMS MADE IT FOR YOU TO DO YOUR WORK, TAKE CARE OF THINGS AT HOME, OR GET ALONG WITH OTHER PEOPLE: NOT DIFFICULT AT ALL
GAD7 TOTAL SCORE: 0
5. BEING SO RESTLESS THAT IT IS HARD TO SIT STILL: NOT AT ALL
7. FEELING AFRAID AS IF SOMETHING AWFUL MIGHT HAPPEN: NOT AT ALL
3. WORRYING TOO MUCH ABOUT DIFFERENT THINGS: NOT AT ALL
1. FEELING NERVOUS, ANXIOUS, OR ON EDGE: NOT AT ALL

## 2025-01-13 NOTE — PROGRESS NOTES
[facility]      David Nur MD, FACP, FACG, FAASLD      COLLIN Sun, [facility]   Luz Tatum, [facility]   Jacqueline Singh, FNP-C  Tyrone Arias, FNP-C   Britney Lagunas, [facility]   at [facility]   [address]   [place]  [address] [phone]   FAX: [phone]  [facility]   [address]   [place]  [address] [phone]   FAX: [phone]           Patient Care Team:  Blair Rand MD as PCP - General (Family Medicine)  Blair Rand MD as PCP - [facility] Empaneled Provider      VIRTUAL TELEHEALTH VISIT PERFORMED DUE TO COVID-19 EPIDEMIC    CONSENT:  Michael Teague, who was evaluated through a synchronous (real-time) audio-video encounter, and/or his healthcare decision maker, is aware that it is a billable service, which includes applicable co-pays, with coverage as determined by his insurance carrier. He provided verbal consent to proceed and patient identification was verified. This visit was conducted pursuant to the emergency declaration under the Herrera Act and the National Emergencies Act, 1135 waiver authority and the Coronavirus Preparedness and Response Supplemental Appropriations Act. A caregiver was present when appropriate. Ability to conduct physical exam was limited. The patient was located at home in a state where the provider was licensed to provide care.       Michael Teague is being seen at [facility] for management of 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  The active problem list, all pertinent past medical history, medications, liver histology, endoscopic studies, radiologic findings and laboratory findings related to the liver disorder were reviewed and discussed with

## 2025-01-16 RX ORDER — FERROUS SULFATE 325(65) MG
325 TABLET ORAL
Qty: 90 TABLET | Refills: 3 | Status: SHIPPED | OUTPATIENT
Start: 2025-01-16

## 2025-01-30 ENCOUNTER — PREP FOR PROCEDURE (OUTPATIENT)
Age: 55
End: 2025-01-30

## 2025-02-07 ENCOUNTER — TELEPHONE (OUTPATIENT)
Age: 55
End: 2025-02-07

## 2025-02-07 DIAGNOSIS — K70.30 ALCOHOLIC CIRRHOSIS OF LIVER WITHOUT ASCITES (HCC): ICD-10-CM

## 2025-02-08 LAB
ALBUMIN SERPL-MCNC: 3.4 G/DL (ref 3.8–4.9)
ALP SERPL-CCNC: 165 IU/L (ref 44–121)
ALT SERPL-CCNC: 21 IU/L (ref 0–44)
AST SERPL-CCNC: 40 IU/L (ref 0–40)
BASOPHILS # BLD AUTO: 0 X10E3/UL (ref 0–0.2)
BASOPHILS NFR BLD AUTO: 1 %
BILIRUB DIRECT SERPL-MCNC: 1.49 MG/DL (ref 0–0.4)
BILIRUB SERPL-MCNC: 2.5 MG/DL (ref 0–1.2)
BUN SERPL-MCNC: 6 MG/DL (ref 6–24)
BUN/CREAT SERPL: 8 (ref 9–20)
CALCIUM SERPL-MCNC: 9.5 MG/DL (ref 8.7–10.2)
CHLORIDE SERPL-SCNC: 101 MMOL/L (ref 96–106)
CO2 SERPL-SCNC: 22 MMOL/L (ref 20–29)
CREAT SERPL-MCNC: 0.74 MG/DL (ref 0.76–1.27)
EGFRCR SERPLBLD CKD-EPI 2021: 108 ML/MIN/1.73
EOSINOPHIL # BLD AUTO: 0.1 X10E3/UL (ref 0–0.4)
EOSINOPHIL NFR BLD AUTO: 3 %
ERYTHROCYTE [DISTWIDTH] IN BLOOD BY AUTOMATED COUNT: 12.8 % (ref 11.6–15.4)
GLUCOSE SERPL-MCNC: 118 MG/DL (ref 70–99)
HCT VFR BLD AUTO: 29.8 % (ref 37.5–51)
HGB BLD-MCNC: 10.2 G/DL (ref 13–17.7)
IMM GRANULOCYTES # BLD AUTO: 0 X10E3/UL (ref 0–0.1)
IMM GRANULOCYTES NFR BLD AUTO: 0 %
INR PPP: 1.3 (ref 0.9–1.2)
LYMPHOCYTES # BLD AUTO: 0.8 X10E3/UL (ref 0.7–3.1)
LYMPHOCYTES NFR BLD AUTO: 30 %
MCH RBC QN AUTO: 35.3 PG (ref 26.6–33)
MCHC RBC AUTO-ENTMCNC: 34.2 G/DL (ref 31.5–35.7)
MCV RBC AUTO: 103 FL (ref 79–97)
MONOCYTES # BLD AUTO: 0.4 X10E3/UL (ref 0.1–0.9)
MONOCYTES NFR BLD AUTO: 15 %
NEUTROPHILS # BLD AUTO: 1.3 X10E3/UL (ref 1.4–7)
NEUTROPHILS NFR BLD AUTO: 51 %
PLATELET # BLD AUTO: 113 X10E3/UL (ref 150–450)
POTASSIUM SERPL-SCNC: 4.1 MMOL/L (ref 3.5–5.2)
PROT SERPL-MCNC: 7 G/DL (ref 6–8.5)
PROTHROMBIN TIME: 14.6 SEC (ref 9.1–12)
RBC # BLD AUTO: 2.89 X10E6/UL (ref 4.14–5.8)
SODIUM SERPL-SCNC: 134 MMOL/L (ref 134–144)
WBC # BLD AUTO: 2.6 X10E3/UL (ref 3.4–10.8)

## 2025-02-13 ENCOUNTER — ANESTHESIA EVENT (OUTPATIENT)
Facility: HOSPITAL | Age: 55
End: 2025-02-13
Payer: COMMERCIAL

## 2025-02-13 NOTE — ANESTHESIA PRE PROCEDURE
ARTHROPLASTY (GEN W/BLOCK) performed by You Souza DO at [facility] AMBULATORY OR    UPPER GASTROINTESTINAL ENDOSCOPY N/A 12/13/2024    ESOPHAGOGASTRODUODENOSCOPY performed by Shyanne Kennedy MD at [facility] ENDOSCOPY       Social History:    Social History     Tobacco Use    Smoking status: Never    Smokeless tobacco: Never   Substance Use Topics    Alcohol use: Not Currently     Alcohol/week: 6.0 standard drinks of alcohol     Types: 6 Standard drinks or equivalent per week     Comment: history of alcohol dependence  until 11/2023                                Counseling given: Not Answered      Vital Signs (Current): There were no vitals filed for this visit.                                           BP Readings from Last 3 Encounters:   12/13/24 (!) 133/92   12/06/24 127/82   11/21/24 123/85       NPO Status:                                                                                 BMI:   Wt Readings from Last 3 Encounters:   02/13/25 67.1 kg (148 lb)   12/13/24 67.4 kg (148 lb 9.6 oz)   12/06/24 69.8 kg (153 lb 12.8 oz)     There is no height or weight on file to calculate BMI.    CBC:   Lab Results   Component Value Date/Time    WBC 2.6 02/07/2025 12:00 AM    RBC 2.89 02/07/2025 12:00 AM    HGB 10.2 02/07/2025 12:00 AM    HCT 29.8 02/07/2025 12:00 AM     02/07/2025 12:00 AM    RDW 12.8 02/07/2025 12:00 AM     02/07/2025 12:00 AM       CMP:   Lab Results   Component Value Date/Time     02/07/2025 12:00 AM    K 4.1 02/07/2025 12:00 AM     02/07/2025 12:00 AM    CO2 22 02/07/2025 12:00 AM    BUN 6 02/07/2025 12:00 AM    CREATININE 0.74 02/07/2025 12:00 AM    GFRAA >60 10/03/2022 02:26 AM    AGRATIO 0.4 11/21/2022 08:50 AM    LABGLOM 108 02/07/2025 12:00 AM    LABGLOM 104 04/19/2024 03:47 PM    GLUCOSE 118 02/07/2025 12:00 AM    CALCIUM 9.5 02/07/2025 12:00 AM    BILITOT 2.5 02/07/2025 12:00 AM    ALKPHOS 165 02/07/2025 12:00 AM    AST 40 02/07/2025 12:00 AM    ALT 21 02/07/2025

## 2025-02-14 ENCOUNTER — HOSPITAL ENCOUNTER (OUTPATIENT)
Facility: HOSPITAL | Age: 55
Setting detail: OUTPATIENT SURGERY
Discharge: HOME | End: 2025-02-14
Attending: STUDENT IN AN ORGANIZED HEALTH CARE EDUCATION/TRAINING PROGRAM | Admitting: STUDENT IN AN ORGANIZED HEALTH CARE EDUCATION/TRAINING PROGRAM
Payer: COMMERCIAL

## 2025-02-14 ENCOUNTER — ANESTHESIA (OUTPATIENT)
Facility: HOSPITAL | Age: 55
End: 2025-02-14
Payer: COMMERCIAL

## 2025-02-14 VITALS
HEART RATE: 92 BPM | TEMPERATURE: 98.2 F | BODY MASS INDEX: 25.9 KG/M2 | SYSTOLIC BLOOD PRESSURE: 126 MMHG | DIASTOLIC BLOOD PRESSURE: 87 MMHG | OXYGEN SATURATION: 97 % | HEIGHT: 67 IN | WEIGHT: 165 LBS | RESPIRATION RATE: 16 BRPM

## 2025-02-14 PROCEDURE — 2580000003 HC RX 258: Performed by: STUDENT IN AN ORGANIZED HEALTH CARE EDUCATION/TRAINING PROGRAM

## 2025-02-14 PROCEDURE — 3600007502: Performed by: STUDENT IN AN ORGANIZED HEALTH CARE EDUCATION/TRAINING PROGRAM

## 2025-02-14 PROCEDURE — 7100000010 HC PHASE II RECOVERY - FIRST 15 MIN: Performed by: STUDENT IN AN ORGANIZED HEALTH CARE EDUCATION/TRAINING PROGRAM

## 2025-02-14 PROCEDURE — 43244 EGD VARICES LIGATION: CPT | Performed by: STUDENT IN AN ORGANIZED HEALTH CARE EDUCATION/TRAINING PROGRAM

## 2025-02-14 PROCEDURE — 6360000002 HC RX W HCPCS: Performed by: ANESTHESIOLOGY

## 2025-02-14 PROCEDURE — 2720000010 HC SURG SUPPLY STERILE: Performed by: STUDENT IN AN ORGANIZED HEALTH CARE EDUCATION/TRAINING PROGRAM

## 2025-02-14 PROCEDURE — 3700000000 HC ANESTHESIA ATTENDED CARE: Performed by: STUDENT IN AN ORGANIZED HEALTH CARE EDUCATION/TRAINING PROGRAM

## 2025-02-14 PROCEDURE — 7100000011 HC PHASE II RECOVERY - ADDTL 15 MIN: Performed by: STUDENT IN AN ORGANIZED HEALTH CARE EDUCATION/TRAINING PROGRAM

## 2025-02-14 RX ORDER — SODIUM CHLORIDE 0.9 % (FLUSH) 0.9 %
5-40 SYRINGE (ML) INJECTION PRN
Status: DISCONTINUED | OUTPATIENT
Start: 2025-02-14 | End: 2025-02-14 | Stop reason: HOSPADM

## 2025-02-14 RX ORDER — SODIUM CHLORIDE 0.9 % (FLUSH) 0.9 %
5-40 SYRINGE (ML) INJECTION EVERY 12 HOURS SCHEDULED
Status: DISCONTINUED | OUTPATIENT
Start: 2025-02-14 | End: 2025-02-14 | Stop reason: HOSPADM

## 2025-02-14 RX ORDER — LIDOCAINE HYDROCHLORIDE 20 MG/ML
INJECTION, SOLUTION EPIDURAL; INFILTRATION; INTRACAUDAL; PERINEURAL
Status: DISCONTINUED | OUTPATIENT
Start: 2025-02-14 | End: 2025-02-14 | Stop reason: SDUPTHER

## 2025-02-14 RX ORDER — SODIUM CHLORIDE 9 MG/ML
25 INJECTION, SOLUTION INTRAVENOUS PRN
Status: DISCONTINUED | OUTPATIENT
Start: 2025-02-14 | End: 2025-02-14 | Stop reason: HOSPADM

## 2025-02-14 RX ADMIN — PROPOFOL 50 MG: 10 INJECTION, EMULSION INTRAVENOUS at 10:33

## 2025-02-14 RX ADMIN — PROPOFOL 100 MG: 10 INJECTION, EMULSION INTRAVENOUS at 10:30

## 2025-02-14 RX ADMIN — PROPOFOL 50 MG: 10 INJECTION, EMULSION INTRAVENOUS at 10:32

## 2025-02-14 RX ADMIN — PROPOFOL 50 MG: 10 INJECTION, EMULSION INTRAVENOUS at 10:35

## 2025-02-14 RX ADMIN — PROPOFOL 150 MG: 10 INJECTION, EMULSION INTRAVENOUS at 10:28

## 2025-02-14 RX ADMIN — LIDOCAINE HYDROCHLORIDE 100 MG: 20 INJECTION, SOLUTION EPIDURAL; INFILTRATION; INTRACAUDAL; PERINEURAL at 10:28

## 2025-02-14 RX ADMIN — SODIUM CHLORIDE: 9 INJECTION, SOLUTION INTRAVENOUS at 10:23

## 2025-02-14 NOTE — DISCHARGE INSTRUCTIONS
[facility]     David Nur MD, FACP, MACG, FAASLD   MD Elza Campoverde PA-C April S Ashworth, [facility]   Luz Tatum, [facility]   Jacqueline Singh, [facility]  Tyrone Arias, [facility]   Britney Lagunas, [facility]   at [facility]   [address], [address]   [place]  [address] [phone]   FAX: [phone]  [facility]   [address], [address]   [place]  [address] [phone]   FAX: [phone]         ENDOSCOPY WITH BANDING DISCHARGE INSTRUCTIONS    Michael Teague    1970  Date: 2/14/2025    DISCOMFORT:  Use lozenges or warm salt water gargle for sore thoat  Apply warm compress to IV site if red.  If redness or soreness persists call the office.  You may experience gas and bloating.  Walking and belching will help relieve this.  You may experience chest pain or discomfort or feel as though food is \"sticking\" in your food pipe for a few days after the procedure. This is a normal feeling after banding of esophageal varices.    CHEST PRESSURE:  Banding of dilated veins (varices) in the food tube (esophagus) can be painful in some persons.  The pain is caused by squeezing the varices and lining of the esophagus by the bands used to seal the varices.  You can take liquid pain medication either acetaminophen or alternative.  The best treatment for this is to drink a an \"Icee\" or \"Slurpee\" since the ice crystals will freeze the nerve endings in the food tube and relieve the pain.    DIET:  Regular food may dislodge the bands placed on the varices.  For this reason you should only have liquid for the rest of today.  Eat only soft food that does not need to be chewed all day tomorrow.  You may advance to your regular diet in 2 days.    ACTIVITY:  Spend the remainder of the day

## 2025-02-14 NOTE — OP NOTE
[facility]     David Nur MD, FACP, MACG, FAASLD   MD Elza Campoverde, COLLIN Nice, [facility]   Luz Tatum, [facility]   Jacqueline Francisco, FNP-C  Tyrone Arias, [facility]   Britney Lagunas, [facility]   at [facility]   [address], [address]   [place]  [address] [phone]   FAX: [phone]  [facility]   [address], [address]   [place]  [address] [phone]   FAX: [phone]          UPPER ENDOSCOPY WITH BANDING OF ESOPHAGEAL VARICES PROCEDURE NOTE    NAME: Michael Teague  :  1970  MRN:  [id number]      INDICATION: Cirrhosis.  Screening for esophageal varices with variceal ligation if indicated.    : Shyanne Kennedy MD    SURGICAL ASSISTANT:  None    PROSTHETIC DEVISES, TISSUE GRAFTS, ORGAN TRANSPLANTS:  Not applicable     ANESTHESIA/SEDATION: Propofol was administered by anesthesia      PROCEDURE DESCRIPTION:  Informed consent was obtained from the patient for the procedure.  All risks and benefits of the procedure explained.     The procedure was performed in the endoscopy suite.  The patient was laying on a stretcher and moved to the left lateral decubitus position prior to administration of sedation.    Sedation was administered by anesthesiology.  See their note for details.      The endoscope was inserted into the mouth and advanced under direct vision to the second portion of the duodenum.  Careful inspection of upper gastrointestinal tract was made as the endoscope was inserted and withdrawn.  Retroflexion of the endoscope to view of the cardia of the stomach was performed.  After withdrawing the endoscope the banding devise was placed on the tip of the endoscope.  The scope was then reinserted under direct inspection and advanced to  the esophagus.  Banding of esophageal varices was performed as described below.  The scope was then removed.    FINDINGS:  Esophagus:    Three columns of moderate size varices. Three bands successfully placed with obliteration of varices.     Stomach:   Normal  No gastric varices on retroflexion    Duodenum:   Normal bulb and second portion    SPECIMENS COLLECTED:   None      INTERVENTIONS:   3 bands placed were placed on esophageal varices.    COMPLICATIONS: None.  The patient tolerated the procedure well.    EBL: Negligible.           RECOMMENDATIONS:  Observe until discharge parameters are achieved.  Liquid diet today.  Soft food tomorrow.  Resume general diet thereafter.  Repeat endoscopy to reassess varices and need for additional banding in 2 months    Shyanne Kennedy MD  [address], [address]  [place]  [address] [phone]  [facility]

## 2025-02-14 NOTE — PROGRESS NOTES
Initial RN admission and assessment performed and documented in Endoscopy navigator.     Patient evaluated by anesthesia in pre-procedure holding.     All procedural vital signs, airway assessment, and level of consciousness information monitored and recorded by anesthesia staff on the anesthesia record.     Report received from CRNA post procedure.  Patient transported to recovery area by RN.    Endoscopy post procedure time out was performed and specimens were verified with physician.    Endoscope was pre-cleaned at bedside immediately following procedure by OMAR GEE.

## 2025-02-14 NOTE — H&P
[facility]     David Nur MD, FACP, MACG, FAASLD   MD Elza Campoverde PA-C April S Ashworth, [facility]   Luz Tatum, [facility]   Jacqueline Singh, FNP-C  Tyrone Arias, FNP-C   Britney Lagunas, [facility]   at [facility]   [address], [address]   [place]  [address] [phone]   FAX: [phone]  [facility]   [address]   [place]  [address] [phone]   FAX: [phone]         PRE-PROCEDURE NOTE - EGD    H and P from last office visit reviewed.    Allergies reviewed.  Out-patient medicaton list reviewed.    Patient Active Problem List   Diagnosis    Hepatomegaly    Hyperbilirubinemia    Moderate protein-calorie malnutrition (HCC)    Alcoholic cirrhosis of liver without ascites (HCC)    Anemia in other chronic diseases classified elsewhere    Alcohol dependence in remission (HCC)    Chronic cholecystitis    Arthritis of right shoulder region         No Known Allergies    No current facility-administered medications on file prior to encounter.     Current Outpatient Medications on File Prior to Encounter   Medication Sig Dispense Refill    ferrous sulfate (IRON 325) 325 (65 Fe) MG tablet Take 1 tablet by mouth daily (with breakfast) 90 tablet 3    lactulose (CHRONULAC) 10 GM/15ML solution Take 15 mLs by mouth daily 945 mL 3    thiamine 100 MG tablet TAKE 1 TABLET BY MOUTH EVERY DAY 90 tablet 3    pantoprazole (PROTONIX) 40 MG tablet TAKE 1 TABLET BY MOUTH EVERY DAY 90 tablet 3    vitamin B-12 (CYANOCOBALAMIN) 500 MCG tablet Take 1 tablet by mouth daily 90 tablet 3    folic acid (FOLVITE) 1 MG tablet TAKE 1 TABLET BY MOUTH EVERY DAY 90 tablet 3    lisinopril (PRINIVIL;ZESTRIL) 20 MG tablet Take 1 tablet by mouth daily 90 tablet 3       For EGD to assess

## 2025-02-14 NOTE — ANESTHESIA POSTPROCEDURE EVALUATION
Department of Anesthesiology  Postprocedure Note    Patient: Michael Teague  MRN: [id number]  YOB: 1970  Date of evaluation: 2/14/2025    Procedure Summary       Date: 02/14/25 Room / Location: [facility] ENDO 03 / [facility] ENDOSCOPY    Anesthesia Start: 1022 Anesthesia Stop: 1036    Procedure: EGD DIAGNOSTIC ONLY Diagnosis:       Alcoholic cirrhosis of liver without ascites (HCC)      (Alcoholic cirrhosis of liver without ascites (HCC) [K70.30])    Surgeons: Shyanne Kennedy MD Responsible Provider: Nacho Jaimes MD    Anesthesia Type: MAC ASA Status: 3            Anesthesia Type: MAC    Piter Phase I: Piter Score: 8    Piter Phase II: Piter Score: 9    Anesthesia Post Evaluation    Patient location during evaluation: PACU  Patient participation: complete - patient participated  Level of consciousness: awake  Airway patency: patent  Nausea & Vomiting: no nausea  Cardiovascular status: hemodynamically stable  Respiratory status: acceptable  Hydration status: stable  Pain management: adequate    No notable events documented.

## 2025-02-21 ENCOUNTER — TELEMEDICINE (OUTPATIENT)
Age: 55
End: 2025-02-21
Payer: COMMERCIAL

## 2025-02-21 DIAGNOSIS — K70.30 ALCOHOLIC CIRRHOSIS OF LIVER WITHOUT ASCITES (HCC): Primary | ICD-10-CM

## 2025-02-21 PROCEDURE — 99214 OFFICE O/P EST MOD 30 MIN: CPT | Performed by: NURSE PRACTITIONER

## 2025-02-21 ASSESSMENT — ANXIETY QUESTIONNAIRES
2. NOT BEING ABLE TO STOP OR CONTROL WORRYING: NOT AT ALL
IF YOU CHECKED OFF ANY PROBLEMS ON THIS QUESTIONNAIRE, HOW DIFFICULT HAVE THESE PROBLEMS MADE IT FOR YOU TO DO YOUR WORK, TAKE CARE OF THINGS AT HOME, OR GET ALONG WITH OTHER PEOPLE: NOT DIFFICULT AT ALL
3. WORRYING TOO MUCH ABOUT DIFFERENT THINGS: NOT AT ALL
1. FEELING NERVOUS, ANXIOUS, OR ON EDGE: NOT AT ALL
5. BEING SO RESTLESS THAT IT IS HARD TO SIT STILL: NOT AT ALL
GAD7 TOTAL SCORE: 0
6. BECOMING EASILY ANNOYED OR IRRITABLE: NOT AT ALL
4. TROUBLE RELAXING: NOT AT ALL
7. FEELING AFRAID AS IF SOMETHING AWFUL MIGHT HAPPEN: NOT AT ALL

## 2025-02-21 ASSESSMENT — PATIENT HEALTH QUESTIONNAIRE - PHQ9
2. FEELING DOWN, DEPRESSED OR HOPELESS: NOT AT ALL
SUM OF ALL RESPONSES TO PHQ QUESTIONS 1-9: 0
SUM OF ALL RESPONSES TO PHQ9 QUESTIONS 1 & 2: 0
DEPRESSION UNABLE TO ASSESS: FUNCTIONAL CAPACITY MOTIVATION LIMITS ACCURACY
SUM OF ALL RESPONSES TO PHQ QUESTIONS 1-9: 0
1. LITTLE INTEREST OR PLEASURE IN DOING THINGS: NOT AT ALL

## 2025-02-21 NOTE — PROGRESS NOTES
Chief Complaint   Patient presents with    Follow-up     N/A     There were no vitals filed for this visit.  .  \"Have you been to the ER, urgent care clinic since your last visit?  Hospitalized since your last visit?\"    NO    “Have you seen or consulted any other health care providers outside of [facility] since your last visit?”    NO        “Have you had a colorectal cancer screening such as a colonoscopy/FIT/Cologuard?    NO    No colonoscopy on file  No cologuard on file  No FIT/FOBT on file   No flexible sigmoidoscopy on file         Click Here for Release of Records Request

## 2025-02-21 NOTE — PROGRESS NOTES
[facility]      David Nur MD, FACP, FACG, FAASLD      COLLIN Sun, [facility]   Luz Tatum, [facility]   Jacqueline Singh, FNP-C  Tyrone Arias, FNP-C   Britney Lagunas, [facility]   at [facility]   [address]   [place]  [address] [phone]   FAX: [phone]  [facility]   [address], [address]   [place]  [address] [phone]   FAX: [phone]           Patient Care Team:  Blair Rand MD as PCP - General (Family Medicine)  Blair Rand MD as PCP - [facility] Empaneled Provider      VIRTUAL TELEHEALTH VISIT     CONSENT:  Michael Teague, who was evaluated through a synchronous (real-time) audio-video encounter, and/or his healthcare decision maker, is aware that it is a billable service, which includes applicable co-pays, with coverage as determined by his insurance carrier. He provided verbal consent to proceed and patient identification was verified. This visit was conducted pursuant to the emergency declaration under the [place]rera Act and the National Emergencies Act, 1135 waiver authority and the Coronavirus Preparedness and Response Supplemental Appropriations Act. A caregiver was present when appropriate. Ability to conduct physical exam was limited. The patient was located at home in a state where the provider was licensed to provide care.       Michael Teague is being seen at [facility] for management of 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  The active problem list, all pertinent past medical history, medications, liver histology, endoscopic studies, radiologic findings and laboratory findings related to the liver disorder were reviewed and discussed with the patient.      The patient is a

## 2025-02-23 ENCOUNTER — APPOINTMENT (OUTPATIENT)
Facility: HOSPITAL | Age: 55
DRG: 394 | End: 2025-02-23
Payer: COMMERCIAL

## 2025-02-23 ENCOUNTER — HOSPITAL ENCOUNTER (INPATIENT)
Facility: HOSPITAL | Age: 55
LOS: 3 days | Discharge: HOME | DRG: 394 | End: 2025-02-26
Attending: EMERGENCY MEDICINE | Admitting: FAMILY MEDICINE
Payer: COMMERCIAL

## 2025-02-23 DIAGNOSIS — M19.011 ARTHRITIS OF RIGHT SHOULDER REGION: ICD-10-CM

## 2025-02-23 DIAGNOSIS — I85.11 SECONDARY ESOPHAGEAL VARICES WITH BLEEDING (HCC): ICD-10-CM

## 2025-02-23 DIAGNOSIS — K21.9 GASTRO-ESOPHAGEAL REFLUX DISEASE WITHOUT ESOPHAGITIS: ICD-10-CM

## 2025-02-23 DIAGNOSIS — K92.0 HEMATEMESIS WITH NAUSEA: Primary | ICD-10-CM

## 2025-02-23 DIAGNOSIS — K70.30 ALCOHOLIC CIRRHOSIS OF LIVER WITHOUT ASCITES (HCC): ICD-10-CM

## 2025-02-23 LAB
ALBUMIN SERPL-MCNC: 2.7 G/DL (ref 3.5–5)
ALBUMIN/GLOB SERPL: 0.6 (ref 1.1–2.2)
ALP SERPL-CCNC: 163 U/L (ref 45–117)
ALT SERPL-CCNC: 30 U/L (ref 12–78)
ANION GAP SERPL CALC-SCNC: 8 MMOL/L (ref 2–12)
AST SERPL-CCNC: 61 U/L (ref 15–37)
BASOPHILS # BLD: 0.02 K/UL (ref 0–0.1)
BASOPHILS NFR BLD: 0.4 % (ref 0–1)
BILIRUB SERPL-MCNC: 5.5 MG/DL (ref 0.2–1)
BUN SERPL-MCNC: 15 MG/DL (ref 6–20)
BUN/CREAT SERPL: 15 (ref 12–20)
CALCIUM SERPL-MCNC: 8.9 MG/DL (ref 8.5–10.1)
CHLORIDE SERPL-SCNC: 98 MMOL/L (ref 97–108)
CO2 SERPL-SCNC: 30 MMOL/L (ref 21–32)
CREAT SERPL-MCNC: 0.98 MG/DL (ref 0.7–1.3)
DIFFERENTIAL METHOD BLD: ABNORMAL
EOSINOPHIL # BLD: 0.01 K/UL (ref 0–0.4)
EOSINOPHIL NFR BLD: 0.2 % (ref 0–7)
ERYTHROCYTE [DISTWIDTH] IN BLOOD BY AUTOMATED COUNT: 12.6 % (ref 11.5–14.5)
FIBRINOGEN PPP-MCNC: 206 MG/DL (ref 200–475)
GLOBULIN SER CALC-MCNC: 4.6 G/DL (ref 2–4)
GLUCOSE SERPL-MCNC: 155 MG/DL (ref 65–100)
HCT VFR BLD AUTO: 25.8 % (ref 36.6–50.3)
HGB BLD-MCNC: 8.8 G/DL (ref 12.1–17)
IMM GRANULOCYTES # BLD AUTO: 0.01 K/UL (ref 0–0.04)
IMM GRANULOCYTES NFR BLD AUTO: 0.2 % (ref 0–0.5)
INR PPP: 1.7 (ref 0.9–1.1)
LACTATE BLD-SCNC: 2.78 MMOL/L (ref 0.4–2)
LYMPHOCYTES # BLD: 0.74 K/UL (ref 0.8–3.5)
LYMPHOCYTES NFR BLD: 14.3 % (ref 12–49)
MCH RBC QN AUTO: 35.1 PG (ref 26–34)
MCHC RBC AUTO-ENTMCNC: 34.1 G/DL (ref 30–36.5)
MCV RBC AUTO: 102.8 FL (ref 80–99)
MONOCYTES # BLD: 0.44 K/UL (ref 0–1)
MONOCYTES NFR BLD: 8.5 % (ref 5–13)
NEUTS SEG # BLD: 3.98 K/UL (ref 1.8–8)
NEUTS SEG NFR BLD: 76.4 % (ref 32–75)
NRBC # BLD: 0 K/UL (ref 0–0.01)
NRBC BLD-RTO: 0 PER 100 WBC
PLATELET # BLD AUTO: 82 K/UL (ref 150–400)
PMV BLD AUTO: 10.8 FL (ref 8.9–12.9)
POTASSIUM SERPL-SCNC: 3.4 MMOL/L (ref 3.5–5.1)
PROCALCITONIN SERPL-MCNC: 0.23 NG/ML
PROT SERPL-MCNC: 7.3 G/DL (ref 6.4–8.2)
PROTHROMBIN TIME: 17.7 SEC (ref 9.2–11.2)
RBC # BLD AUTO: 2.51 M/UL (ref 4.1–5.7)
RBC MORPH BLD: ABNORMAL
SODIUM SERPL-SCNC: 136 MMOL/L (ref 136–145)
TROPONIN I SERPL HS-MCNC: 8 NG/L (ref 0–76)
WBC # BLD AUTO: 5.2 K/UL (ref 4.1–11.1)

## 2025-02-23 PROCEDURE — 83605 ASSAY OF LACTIC ACID: CPT

## 2025-02-23 PROCEDURE — 86900 BLOOD TYPING SEROLOGIC ABO: CPT

## 2025-02-23 PROCEDURE — 87040 BLOOD CULTURE FOR BACTERIA: CPT

## 2025-02-23 PROCEDURE — 36415 COLL VENOUS BLD VENIPUNCTURE: CPT

## 2025-02-23 PROCEDURE — 93005 ELECTROCARDIOGRAM TRACING: CPT | Performed by: PHYSICIAN ASSISTANT

## 2025-02-23 PROCEDURE — 96374 THER/PROPH/DIAG INJ IV PUSH: CPT

## 2025-02-23 PROCEDURE — 82077 ASSAY SPEC XCP UR&BREATH IA: CPT

## 2025-02-23 PROCEDURE — 85384 FIBRINOGEN ACTIVITY: CPT

## 2025-02-23 PROCEDURE — 83690 ASSAY OF LIPASE: CPT

## 2025-02-23 PROCEDURE — 85610 PROTHROMBIN TIME: CPT

## 2025-02-23 PROCEDURE — 84145 PROCALCITONIN (PCT): CPT

## 2025-02-23 PROCEDURE — 6360000004 HC RX CONTRAST MEDICATION: Performed by: RADIOLOGY

## 2025-02-23 PROCEDURE — 96375 TX/PRO/DX INJ NEW DRUG ADDON: CPT

## 2025-02-23 PROCEDURE — 84484 ASSAY OF TROPONIN QUANT: CPT

## 2025-02-23 PROCEDURE — 1100000000 HC RM PRIVATE

## 2025-02-23 PROCEDURE — 86901 BLOOD TYPING SEROLOGIC RH(D): CPT

## 2025-02-23 PROCEDURE — 2500000003 HC RX 250 WO HCPCS: Performed by: EMERGENCY MEDICINE

## 2025-02-23 PROCEDURE — 99285 EMERGENCY DEPT VISIT HI MDM: CPT

## 2025-02-23 PROCEDURE — 71045 X-RAY EXAM CHEST 1 VIEW: CPT

## 2025-02-23 PROCEDURE — 80053 COMPREHEN METABOLIC PANEL: CPT

## 2025-02-23 PROCEDURE — 2060000000 HC ICU INTERMEDIATE R&B

## 2025-02-23 PROCEDURE — 85025 COMPLETE CBC W/AUTO DIFF WBC: CPT

## 2025-02-23 PROCEDURE — 74174 CTA ABD&PLVS W/CONTRAST: CPT

## 2025-02-23 PROCEDURE — 2580000003 HC RX 258: Performed by: EMERGENCY MEDICINE

## 2025-02-23 PROCEDURE — 6360000002 HC RX W HCPCS: Performed by: EMERGENCY MEDICINE

## 2025-02-23 PROCEDURE — 86923 COMPATIBILITY TEST ELECTRIC: CPT

## 2025-02-23 PROCEDURE — 86850 RBC ANTIBODY SCREEN: CPT

## 2025-02-23 RX ORDER — ONDANSETRON 2 MG/ML
4 INJECTION INTRAMUSCULAR; INTRAVENOUS EVERY 6 HOURS PRN
Status: DISCONTINUED | OUTPATIENT
Start: 2025-02-23 | End: 2025-02-26 | Stop reason: HOSPADM

## 2025-02-23 RX ORDER — SODIUM CHLORIDE 0.9 % (FLUSH) 0.9 %
5-40 SYRINGE (ML) INJECTION EVERY 12 HOURS SCHEDULED
Status: DISCONTINUED | OUTPATIENT
Start: 2025-02-23 | End: 2025-02-26 | Stop reason: HOSPADM

## 2025-02-23 RX ORDER — 0.9 % SODIUM CHLORIDE 0.9 %
1000 INTRAVENOUS SOLUTION INTRAVENOUS ONCE
Status: COMPLETED | OUTPATIENT
Start: 2025-02-23 | End: 2025-02-23

## 2025-02-23 RX ORDER — SODIUM CHLORIDE 9 MG/ML
INJECTION, SOLUTION INTRAVENOUS PRN
Status: DISCONTINUED | OUTPATIENT
Start: 2025-02-23 | End: 2025-02-26 | Stop reason: HOSPADM

## 2025-02-23 RX ORDER — SODIUM CHLORIDE 9 MG/ML
INJECTION, SOLUTION INTRAVENOUS CONTINUOUS
Status: DISCONTINUED | OUTPATIENT
Start: 2025-02-23 | End: 2025-02-24

## 2025-02-23 RX ORDER — ONDANSETRON 4 MG/1
4 TABLET, ORALLY DISINTEGRATING ORAL EVERY 8 HOURS PRN
Status: DISCONTINUED | OUTPATIENT
Start: 2025-02-23 | End: 2025-02-26 | Stop reason: HOSPADM

## 2025-02-23 RX ORDER — SODIUM CHLORIDE 0.9 % (FLUSH) 0.9 %
5-40 SYRINGE (ML) INJECTION PRN
Status: DISCONTINUED | OUTPATIENT
Start: 2025-02-23 | End: 2025-02-26 | Stop reason: HOSPADM

## 2025-02-23 RX ORDER — MORPHINE SULFATE 4 MG/ML
4 INJECTION, SOLUTION INTRAMUSCULAR; INTRAVENOUS
Status: DISPENSED | OUTPATIENT
Start: 2025-02-23 | End: 2025-02-24

## 2025-02-23 RX ORDER — DROPERIDOL 2.5 MG/ML
1.25 INJECTION, SOLUTION INTRAMUSCULAR; INTRAVENOUS ONCE
Status: COMPLETED | OUTPATIENT
Start: 2025-02-23 | End: 2025-02-23

## 2025-02-23 RX ORDER — IOPAMIDOL 755 MG/ML
100 INJECTION, SOLUTION INTRAVASCULAR
Status: COMPLETED | OUTPATIENT
Start: 2025-02-23 | End: 2025-02-23

## 2025-02-23 RX ADMIN — WATER 2000 MG: 1 INJECTION INTRAMUSCULAR; INTRAVENOUS; SUBCUTANEOUS at 22:15

## 2025-02-23 RX ADMIN — OCTREOTIDE ACETATE 25 MCG/HR: 500 INJECTION, SOLUTION INTRAVENOUS; SUBCUTANEOUS at 22:21

## 2025-02-23 RX ADMIN — PANTOPRAZOLE SODIUM 80 MG: 40 INJECTION, POWDER, LYOPHILIZED, FOR SOLUTION INTRAVENOUS at 21:49

## 2025-02-23 RX ADMIN — DROPERIDOL 1.25 MG: 2.5 INJECTION, SOLUTION INTRAMUSCULAR; INTRAVENOUS at 22:05

## 2025-02-23 RX ADMIN — IOPAMIDOL 100 ML: 755 INJECTION, SOLUTION INTRAVENOUS at 21:28

## 2025-02-23 RX ADMIN — SODIUM CHLORIDE 1000 ML: 9 INJECTION, SOLUTION INTRAVENOUS at 22:16

## 2025-02-23 ASSESSMENT — PAIN DESCRIPTION - ORIENTATION: ORIENTATION: MID;UPPER

## 2025-02-23 ASSESSMENT — PAIN SCALES - GENERAL
PAINLEVEL_OUTOF10: 8
PAINLEVEL_OUTOF10: 0

## 2025-02-23 ASSESSMENT — PAIN DESCRIPTION - DESCRIPTORS: DESCRIPTORS: ACHING

## 2025-02-23 ASSESSMENT — PAIN - FUNCTIONAL ASSESSMENT
PAIN_FUNCTIONAL_ASSESSMENT: 0-10
PAIN_FUNCTIONAL_ASSESSMENT: 0-10
PAIN_FUNCTIONAL_ASSESSMENT: PREVENTS OR INTERFERES SOME ACTIVE ACTIVITIES AND ADLS

## 2025-02-23 ASSESSMENT — PAIN DESCRIPTION - LOCATION: LOCATION: ABDOMEN

## 2025-02-23 ASSESSMENT — PAIN DESCRIPTION - ONSET: ONSET: PROGRESSIVE

## 2025-02-23 ASSESSMENT — PAIN DESCRIPTION - FREQUENCY: FREQUENCY: CONTINUOUS

## 2025-02-23 ASSESSMENT — PAIN DESCRIPTION - PAIN TYPE: TYPE: ACUTE PAIN

## 2025-02-24 ENCOUNTER — ANESTHESIA EVENT (OUTPATIENT)
Facility: HOSPITAL | Age: 55
DRG: 394 | End: 2025-02-24
Payer: COMMERCIAL

## 2025-02-24 LAB
ALBUMIN SERPL-MCNC: 2.4 G/DL (ref 3.5–5)
ALBUMIN/GLOB SERPL: 0.6 (ref 1.1–2.2)
ALP SERPL-CCNC: 135 U/L (ref 45–117)
ALT SERPL-CCNC: 26 U/L (ref 12–78)
AMMONIA PLAS-SCNC: 136 UMOL/L
ANION GAP SERPL CALC-SCNC: 6 MMOL/L (ref 2–12)
APPEARANCE UR: CLEAR
APTT PPP: 25.5 SEC (ref 22.1–31)
AST SERPL-CCNC: 61 U/L (ref 15–37)
BACTERIA URNS QL MICRO: NEGATIVE /HPF
BASOPHILS # BLD: 0.01 K/UL (ref 0–0.1)
BASOPHILS NFR BLD: 0.2 % (ref 0–1)
BILIRUB DIRECT SERPL-MCNC: 2.4 MG/DL (ref 0–0.2)
BILIRUB SERPL-MCNC: 4.2 MG/DL (ref 0.2–1)
BILIRUB UR QL CFM: NEGATIVE
BUN SERPL-MCNC: 19 MG/DL (ref 6–20)
BUN/CREAT SERPL: 19 (ref 12–20)
CALCIUM SERPL-MCNC: 8.4 MG/DL (ref 8.5–10.1)
CHLORIDE SERPL-SCNC: 101 MMOL/L (ref 97–108)
CO2 SERPL-SCNC: 29 MMOL/L (ref 21–32)
COLOR UR: ABNORMAL
COMMENT:: NORMAL
CREAT SERPL-MCNC: 0.98 MG/DL (ref 0.7–1.3)
DIFFERENTIAL METHOD BLD: ABNORMAL
EKG ATRIAL RATE: 99 BPM
EKG DIAGNOSIS: NORMAL
EKG P AXIS: 67 DEGREES
EKG P-R INTERVAL: 148 MS
EKG Q-T INTERVAL: 370 MS
EKG QRS DURATION: 86 MS
EKG QTC CALCULATION (BAZETT): 474 MS
EKG R AXIS: 2 DEGREES
EKG T AXIS: 51 DEGREES
EKG VENTRICULAR RATE: 99 BPM
EOSINOPHIL # BLD: 0.01 K/UL (ref 0–0.4)
EOSINOPHIL NFR BLD: 0.2 % (ref 0–7)
EPITH CASTS URNS QL MICRO: ABNORMAL /LPF
ERYTHROCYTE [DISTWIDTH] IN BLOOD BY AUTOMATED COUNT: 13 % (ref 11.5–14.5)
ETHANOL SERPL-MCNC: <10 MG/DL (ref 0–0.08)
FOLATE SERPL-MCNC: 16.2 NG/ML (ref 5–21)
GLOBULIN SER CALC-MCNC: 3.9 G/DL (ref 2–4)
GLUCOSE SERPL-MCNC: 140 MG/DL (ref 65–100)
GLUCOSE UR STRIP.AUTO-MCNC: NEGATIVE MG/DL
HCT VFR BLD AUTO: 21.9 % (ref 36.6–50.3)
HCT VFR BLD AUTO: 23.4 % (ref 36.6–50.3)
HGB BLD-MCNC: 7.5 G/DL (ref 12.1–17)
HGB BLD-MCNC: 7.9 G/DL (ref 12.1–17)
HGB UR QL STRIP: NEGATIVE
HISTORY CHECK: NORMAL
HYALINE CASTS URNS QL MICRO: ABNORMAL /LPF (ref 0–5)
IMM GRANULOCYTES # BLD AUTO: 0.01 K/UL (ref 0–0.04)
IMM GRANULOCYTES NFR BLD AUTO: 0.2 % (ref 0–0.5)
INR PPP: 1.7 (ref 0.9–1.1)
IRON SATN MFR SERPL: 33 % (ref 20–50)
IRON SERPL-MCNC: 90 UG/DL (ref 35–150)
KETONES UR QL STRIP.AUTO: ABNORMAL MG/DL
LACTATE BLD-SCNC: 1.47 MMOL/L (ref 0.4–2)
LEUKOCYTE ESTERASE UR QL STRIP.AUTO: ABNORMAL
LIPASE SERPL-CCNC: 13 U/L (ref 13–75)
LYMPHOCYTES # BLD: 0.67 K/UL (ref 0.8–3.5)
LYMPHOCYTES NFR BLD: 14.9 % (ref 12–49)
MCH RBC QN AUTO: 35.5 PG (ref 26–34)
MCHC RBC AUTO-ENTMCNC: 34.2 G/DL (ref 30–36.5)
MCV RBC AUTO: 103.8 FL (ref 80–99)
MONOCYTES # BLD: 0.33 K/UL (ref 0–1)
MONOCYTES NFR BLD: 7.3 % (ref 5–13)
NEUTS SEG # BLD: 3.47 K/UL (ref 1.8–8)
NEUTS SEG NFR BLD: 77.2 % (ref 32–75)
NITRITE UR QL STRIP.AUTO: POSITIVE
NRBC # BLD: 0 K/UL (ref 0–0.01)
NRBC BLD-RTO: 0 PER 100 WBC
PH UR STRIP: 7 (ref 5–8)
PHOSPHATE SERPL-MCNC: 4 MG/DL (ref 2.6–4.7)
PLATELET # BLD AUTO: 62 K/UL (ref 150–400)
PMV BLD AUTO: 12.6 FL (ref 8.9–12.9)
POTASSIUM SERPL-SCNC: 3.9 MMOL/L (ref 3.5–5.1)
PROT SERPL-MCNC: 6.3 G/DL (ref 6.4–8.2)
PROT UR STRIP-MCNC: ABNORMAL MG/DL
PROTHROMBIN TIME: 17.2 SEC (ref 9.2–11.2)
RBC # BLD AUTO: 2.11 M/UL (ref 4.1–5.7)
RBC #/AREA URNS HPF: ABNORMAL /HPF (ref 0–5)
RBC MORPH BLD: ABNORMAL
SODIUM SERPL-SCNC: 136 MMOL/L (ref 136–145)
SP GR UR REFRACTOMETRY: <1.005 (ref 1–1.03)
SPECIMEN HOLD: NORMAL
SPECIMEN HOLD: NORMAL
THERAPEUTIC RANGE: NORMAL SECS (ref 58–77)
TIBC SERPL-MCNC: 272 UG/DL (ref 250–450)
UROBILINOGEN UR QL STRIP.AUTO: 2 EU/DL (ref 0.2–1)
VIT B12 SERPL-MCNC: 1838 PG/ML (ref 193–986)
WBC # BLD AUTO: 4.5 K/UL (ref 4.1–11.1)
WBC URNS QL MICRO: ABNORMAL /HPF (ref 0–4)

## 2025-02-24 PROCEDURE — 6360000002 HC RX W HCPCS: Performed by: EMERGENCY MEDICINE

## 2025-02-24 PROCEDURE — 82607 VITAMIN B-12: CPT

## 2025-02-24 PROCEDURE — 2580000003 HC RX 258: Performed by: EMERGENCY MEDICINE

## 2025-02-24 PROCEDURE — 6360000002 HC RX W HCPCS: Performed by: FAMILY MEDICINE

## 2025-02-24 PROCEDURE — 83540 ASSAY OF IRON: CPT

## 2025-02-24 PROCEDURE — P9016 RBC LEUKOCYTES REDUCED: HCPCS

## 2025-02-24 PROCEDURE — 2580000003 HC RX 258: Performed by: FAMILY MEDICINE

## 2025-02-24 PROCEDURE — 82140 ASSAY OF AMMONIA: CPT

## 2025-02-24 PROCEDURE — 36430 TRANSFUSION BLD/BLD COMPNT: CPT

## 2025-02-24 PROCEDURE — 85025 COMPLETE CBC W/AUTO DIFF WBC: CPT

## 2025-02-24 PROCEDURE — 36415 COLL VENOUS BLD VENIPUNCTURE: CPT

## 2025-02-24 PROCEDURE — 85014 HEMATOCRIT: CPT

## 2025-02-24 PROCEDURE — 82746 ASSAY OF FOLIC ACID SERUM: CPT

## 2025-02-24 PROCEDURE — 2500000003 HC RX 250 WO HCPCS: Performed by: FAMILY MEDICINE

## 2025-02-24 PROCEDURE — 85610 PROTHROMBIN TIME: CPT

## 2025-02-24 PROCEDURE — 30233N1 TRANSFUSION OF NONAUTOLOGOUS RED BLOOD CELLS INTO PERIPHERAL VEIN, PERCUTANEOUS APPROACH: ICD-10-PCS | Performed by: FAMILY MEDICINE

## 2025-02-24 PROCEDURE — 85730 THROMBOPLASTIN TIME PARTIAL: CPT

## 2025-02-24 PROCEDURE — 81001 URINALYSIS AUTO W/SCOPE: CPT

## 2025-02-24 PROCEDURE — 83605 ASSAY OF LACTIC ACID: CPT

## 2025-02-24 PROCEDURE — 84100 ASSAY OF PHOSPHORUS: CPT

## 2025-02-24 PROCEDURE — 80053 COMPREHEN METABOLIC PANEL: CPT

## 2025-02-24 PROCEDURE — 83550 IRON BINDING TEST: CPT

## 2025-02-24 PROCEDURE — 82248 BILIRUBIN DIRECT: CPT

## 2025-02-24 PROCEDURE — P9040 RBC LEUKOREDUCED IRRADIATED: HCPCS

## 2025-02-24 PROCEDURE — 99223 1ST HOSP IP/OBS HIGH 75: CPT | Performed by: STUDENT IN AN ORGANIZED HEALTH CARE EDUCATION/TRAINING PROGRAM

## 2025-02-24 PROCEDURE — 2060000000 HC ICU INTERMEDIATE R&B

## 2025-02-24 PROCEDURE — 85018 HEMOGLOBIN: CPT

## 2025-02-24 RX ORDER — DIAZEPAM 10 MG/2ML
5 INJECTION, SOLUTION INTRAMUSCULAR; INTRAVENOUS EVERY 4 HOURS PRN
Status: DISCONTINUED | OUTPATIENT
Start: 2025-02-24 | End: 2025-02-26 | Stop reason: HOSPADM

## 2025-02-24 RX ORDER — LORAZEPAM 1 MG/1
4 TABLET ORAL
Status: DISCONTINUED | OUTPATIENT
Start: 2025-02-24 | End: 2025-02-26 | Stop reason: HOSPADM

## 2025-02-24 RX ORDER — LORAZEPAM 1 MG/1
2 TABLET ORAL
Status: DISCONTINUED | OUTPATIENT
Start: 2025-02-24 | End: 2025-02-26 | Stop reason: HOSPADM

## 2025-02-24 RX ORDER — MORPHINE SULFATE 2 MG/ML
1 INJECTION, SOLUTION INTRAMUSCULAR; INTRAVENOUS EVERY 4 HOURS PRN
Status: DISCONTINUED | OUTPATIENT
Start: 2025-02-24 | End: 2025-02-26 | Stop reason: HOSPADM

## 2025-02-24 RX ORDER — SODIUM CHLORIDE 0.9 % (FLUSH) 0.9 %
5-40 SYRINGE (ML) INJECTION EVERY 12 HOURS SCHEDULED
Status: CANCELLED | OUTPATIENT
Start: 2025-02-24

## 2025-02-24 RX ORDER — SODIUM CHLORIDE 9 MG/ML
25 INJECTION, SOLUTION INTRAVENOUS PRN
Status: CANCELLED | OUTPATIENT
Start: 2025-02-24

## 2025-02-24 RX ORDER — LORAZEPAM 1 MG/1
3 TABLET ORAL
Status: DISCONTINUED | OUTPATIENT
Start: 2025-02-24 | End: 2025-02-26 | Stop reason: HOSPADM

## 2025-02-24 RX ORDER — SODIUM CHLORIDE 0.9 % (FLUSH) 0.9 %
5-40 SYRINGE (ML) INJECTION PRN
Status: CANCELLED | OUTPATIENT
Start: 2025-02-24

## 2025-02-24 RX ORDER — SODIUM CHLORIDE 9 MG/ML
INJECTION, SOLUTION INTRAVENOUS PRN
Status: DISCONTINUED | OUTPATIENT
Start: 2025-02-24 | End: 2025-02-26 | Stop reason: HOSPADM

## 2025-02-24 RX ORDER — SODIUM CHLORIDE 0.9 % (FLUSH) 0.9 %
5-40 SYRINGE (ML) INJECTION EVERY 12 HOURS SCHEDULED
Status: DISCONTINUED | OUTPATIENT
Start: 2025-02-24 | End: 2025-02-26 | Stop reason: HOSPADM

## 2025-02-24 RX ORDER — LORAZEPAM 1 MG/1
1 TABLET ORAL
Status: DISCONTINUED | OUTPATIENT
Start: 2025-02-24 | End: 2025-02-26 | Stop reason: HOSPADM

## 2025-02-24 RX ORDER — SODIUM CHLORIDE 0.9 % (FLUSH) 0.9 %
5-40 SYRINGE (ML) INJECTION PRN
Status: DISCONTINUED | OUTPATIENT
Start: 2025-02-24 | End: 2025-02-26 | Stop reason: HOSPADM

## 2025-02-24 RX ORDER — SODIUM CHLORIDE 9 MG/ML
INJECTION, SOLUTION INTRAVENOUS CONTINUOUS
Status: DISCONTINUED | OUTPATIENT
Start: 2025-02-24 | End: 2025-02-25

## 2025-02-24 RX ADMIN — Medication 5 ML: at 00:00

## 2025-02-24 RX ADMIN — PANTOPRAZOLE SODIUM 40 MG: 40 INJECTION, POWDER, FOR SOLUTION INTRAVENOUS at 08:56

## 2025-02-24 RX ADMIN — MORPHINE SULFATE 1 MG: 2 INJECTION, SOLUTION INTRAMUSCULAR; INTRAVENOUS at 11:36

## 2025-02-24 RX ADMIN — PANTOPRAZOLE SODIUM 40 MG: 40 INJECTION, POWDER, FOR SOLUTION INTRAVENOUS at 22:18

## 2025-02-24 RX ADMIN — Medication 10 ML: at 22:27

## 2025-02-24 RX ADMIN — MORPHINE SULFATE 1 MG: 2 INJECTION, SOLUTION INTRAMUSCULAR; INTRAVENOUS at 18:13

## 2025-02-24 RX ADMIN — OCTREOTIDE ACETATE 25 MCG/HR: 500 INJECTION, SOLUTION INTRAVENOUS; SUBCUTANEOUS at 19:53

## 2025-02-24 RX ADMIN — ONDANSETRON 4 MG: 2 INJECTION, SOLUTION INTRAMUSCULAR; INTRAVENOUS at 22:25

## 2025-02-24 RX ADMIN — Medication 10 ML: at 08:56

## 2025-02-24 RX ADMIN — MORPHINE SULFATE 1 MG: 2 INJECTION, SOLUTION INTRAMUSCULAR; INTRAVENOUS at 22:17

## 2025-02-24 RX ADMIN — THIAMINE HYDROCHLORIDE: 100 INJECTION, SOLUTION INTRAMUSCULAR; INTRAVENOUS at 03:16

## 2025-02-24 RX ADMIN — SODIUM CHLORIDE, PRESERVATIVE FREE 10 ML: 5 INJECTION INTRAVENOUS at 08:57

## 2025-02-24 RX ADMIN — WATER 2000 MG: 1 INJECTION INTRAMUSCULAR; INTRAVENOUS; SUBCUTANEOUS at 22:18

## 2025-02-24 RX ADMIN — SODIUM CHLORIDE, PRESERVATIVE FREE 10 ML: 5 INJECTION INTRAVENOUS at 22:26

## 2025-02-24 ASSESSMENT — PAIN DESCRIPTION - LOCATION
LOCATION: ABDOMEN

## 2025-02-24 ASSESSMENT — PAIN SCALES - GENERAL
PAINLEVEL_OUTOF10: 7
PAINLEVEL_OUTOF10: 5
PAINLEVEL_OUTOF10: 0
PAINLEVEL_OUTOF10: 7
PAINLEVEL_OUTOF10: 5
PAINLEVEL_OUTOF10: 0
PAINLEVEL_OUTOF10: 0
PAINLEVEL_OUTOF10: 5
PAINLEVEL_OUTOF10: 7
PAINLEVEL_OUTOF10: 7

## 2025-02-24 ASSESSMENT — PAIN - FUNCTIONAL ASSESSMENT
PAIN_FUNCTIONAL_ASSESSMENT: ACTIVITIES ARE NOT PREVENTED
PAIN_FUNCTIONAL_ASSESSMENT: 0-10
PAIN_FUNCTIONAL_ASSESSMENT: 0-10
PAIN_FUNCTIONAL_ASSESSMENT: ACTIVITIES ARE NOT PREVENTED
PAIN_FUNCTIONAL_ASSESSMENT: NONE - DENIES PAIN
PAIN_FUNCTIONAL_ASSESSMENT: 0-10
PAIN_FUNCTIONAL_ASSESSMENT: ACTIVITIES ARE NOT PREVENTED
PAIN_FUNCTIONAL_ASSESSMENT: NONE - DENIES PAIN
PAIN_FUNCTIONAL_ASSESSMENT: ACTIVITIES ARE NOT PREVENTED

## 2025-02-24 ASSESSMENT — LIFESTYLE VARIABLES
HOW OFTEN DO YOU HAVE A DRINK CONTAINING ALCOHOL: 2-4 TIMES A MONTH
HOW MANY STANDARD DRINKS CONTAINING ALCOHOL DO YOU HAVE ON A TYPICAL DAY: 5 OR 6

## 2025-02-24 ASSESSMENT — PAIN DESCRIPTION - FREQUENCY
FREQUENCY: CONTINUOUS

## 2025-02-24 ASSESSMENT — PAIN DESCRIPTION - DESCRIPTORS
DESCRIPTORS: ACHING;DISCOMFORT
DESCRIPTORS: ACHING

## 2025-02-24 ASSESSMENT — PAIN DESCRIPTION - PAIN TYPE
TYPE: ACUTE PAIN

## 2025-02-24 ASSESSMENT — PAIN DESCRIPTION - ORIENTATION
ORIENTATION: MID
ORIENTATION: UPPER;LEFT
ORIENTATION: MID

## 2025-02-24 ASSESSMENT — PAIN DESCRIPTION - ONSET
ONSET: ON-GOING

## 2025-02-24 NOTE — ED NOTES
9:03 PM  I have evaluated the patient as the Provider in Rapid Medical Evaluation (RME). I have reviewed his vital signs and the triage nurse assessment. I have talked with the patient and any available family and advised that I am the provider in triage and have ordered the appropriate study to initiate their work up based on the clinical presentation during my assessment. I have advised that the patient will be accommodated in the Main ED as soon as possible. I have also requested to contact the triage nurse or myself immediately if the patient experiences any changes in their condition during this brief waiting period.  Paulo Gilmore PA-C    54-year-old M with history of alcoholic induced cirrhosis, esophageal varices, presenting to emergency department for hematemesis x 2 days.  Endorses severe epigastric abdominal pain.  Recently had banding of esophageal varices 2 weeks ago.       Paulo Gilmore PA-C  02/23/25 2110

## 2025-02-24 NOTE — ED NOTES
Received call from Arturo RAMIREZ to hold NSS this time and may start sodium chloride 0.9 % 1,000 mL with folic acid 5 MG/ML 1 mg, adult multi-vitamin with vitamin k 10 mL, thiamine 100 mg. After this, may go back to NSS.  Ciwa monitoring included.

## 2025-02-24 NOTE — ED NOTES
TRANSFER - OUT REPORT:    Verbal report given to ISAIAH Khalil on Michael Teague  being transferred to [address] [place] [address] for routine progression of patient care       Report consisted of patient's Situation, Background, Assessment and   Recommendations(SBAR).     Information from the following report(s) Nurse Handoff Report, Index, ED Encounter Summary, ED SBAR, Adult Overview, MAR, and Recent Results was reviewed with the receiving nurse.    [place] Fall Assessment:    Presents to emergency department  because of falls (Syncope, seizure, or loss of consciousness): No  Age > 70: No  Altered Mental Status, Intoxication with alcohol or substance confusion (Disorientation, impaired judgment, poor safety awaremess, or inability to follow instructions): No  Impaired Mobility: Ambulates or transfers with assistive devices or assistance; Unable to ambulate or transer.: No  Nursing Judgement: No          Lines:   Peripheral IV 02/23/25 Left Antecubital (Active)   Site Assessment Clean, dry & intact 02/23/25 2114   Line Status Specimen collected;Normal saline locked 02/23/25 2114   Line Care Connections checked and tightened 02/23/25 2114   Phlebitis Assessment No symptoms 02/23/25 2114   Infiltration Assessment 0 02/23/25 2114   Alcohol Cap Used No 02/23/25 2114   Dressing Status Clean, dry & intact 02/23/25 2114   Dressing Type Transparent 02/23/25 2114   Dressing Intervention New 02/23/25 2114       Peripheral IV 02/24/25 Right;Anterior Hand (Active)   Site Assessment Clean, dry & intact 02/24/25 0330   Line Status Blood return noted 02/24/25 0330   Line Care Cap changed 02/24/25 0330   Phlebitis Assessment No symptoms 02/24/25 0330   Infiltration Assessment 0 02/24/25 0330       Peripheral IV 02/24/25 Left;Anterior Hand (Active)   Site Assessment Clean, dry & intact 02/24/25 0609   Line Status Blood return noted 02/24/25 0609   Line Care Cap changed 02/24/25 0609   Phlebitis Assessment No symptoms 02/24/25 0609   Infiltration  Assessment 0 02/24/25 0609        Opportunity for questions and clarification was provided.      Patient transported with:  Monitor and Registered Nurse

## 2025-02-24 NOTE — CONSENT
Informed Consent for Blood Component Transfusion Note    I have discussed with the patient the rationale for blood component transfusion; its benefits in treating or preventing fatigue, organ damage, or death; and its risk which includes mild transfusion reactions, rare risk of blood borne infection, or more serious but rare reactions. I have discussed the alternatives to transfusion, including the risk and consequences of not receiving transfusion. The patient had an opportunity to ask questions and had agreed to proceed with transfusion of blood components.    Electronically signed by Artur Morris MD on 2/24/25 at 5:53 AM EST

## 2025-02-24 NOTE — ED NOTES
This RN able to explain and secured blood transfusion consent. Patient was encouraged to report blood transfusion reactions such as chest pain, shortness of breath, itchiness, back/headache or any untoward signs and symptoms . This RN able to start the transfusion with another RN Luis with independent double check

## 2025-02-24 NOTE — ED NOTES
Verbal order for clear liquid diet per Dr. Shyanne Kennedy placed. Pt with planned endoscopy tomorrow, NPO at midnight.

## 2025-02-24 NOTE — ED NOTES
Bedside shift change report given to Ena RN (oncoming nurse) by Luis Antonio RN (offgoing nurse). Report included the following information Nurse Handoff Report, Index, ED Encounter Summary, ED SBAR, Adult Overview, Intake/Output, and MAR.     (4) no impairment

## 2025-02-24 NOTE — ED TRIAGE NOTES
He has been vomiting blood for 2 days.He has a hx of liver disease. He has had esophageal varices. He had bands done Fe 14th. He is complaining of dizziness.

## 2025-02-24 NOTE — H&P
History and Physical    Date of Service:  2/23/2025  Primary Care Provider: Blair Rand MD  Source of information:   Patient, patient's wife, and review of ED and electronic medical records    Chief Complaint: Vomiting blood      History of Presenting Illness:   Michael Teague is a 54 y.o. male with past medical history of liver cirrhosis, esophageal varices, status post variceal banding, prior alcohol dependence, pancreatitis, GERD, anemia, thrombocytopenia, arthritis presented to the emergency department with chief complaint of vomiting blood.  Patient reportedly had been vomiting blood over the past 2 days which is since resolved.  Symptoms have been intermittent, severe, without specific alleviating factors.  Patient has some nausea but no abdominal pain at current.  Per chart review patient is followed by hepatologist at [facility]  On 2/14/2025, patient underwent EGD showing esophageal varices for which patient underwent successful banding of 3 varices.  Tonight, on arrival in the ED, initial recorded vital signs were temperature 97.9 °F, /74, heart rate 106, respiratory rate 16, O2 saturation 98% room air.  12-lead EKG showed normal sinus rhythm at 99 bpm.  Abnormal labs included hemoglobin 8.8, .8, platelets 82, neutrophils 76.4%, K3.4, blood glucose 155, albumin 2.7, alk phos is 163, AST 61, total bilirubin 5.5, PT 17.7, INR 1.7, POC lactic acid 2.78.  Chest x-ray portable showed no acute cardiopulmonary process.  CTA abdomen and pelvis with and without IV contrast showed cirrhosis, esophageal varices, recanalized umbilical vein shunt but no active bleeding and no ascites.  ED ordered octreotide continuous IV drip at 25 mcg/hr, Protonix 80 mg IV x 1, droperidol 1.25 mg IV x 1, morphine 4 mg IV x 1.  Patient is now seen for admission to the hospitalist service.       REVIEW OF SYSTEMS:  Pertinent items are noted in the History of Present Illness.     Past Medical  ENCOUNTER: YES. I had a face to face encounter with the patient, reviewed and interpreted patient data including clinical events, labs, images, vital signs, I/O's, and examined patient.  I have discussed the case and the plan and management of the patient's care with the consulting services, the bedside nurses and necessary ancillary providers.  This patient has a high probability of imminent, clinically significant deterioration, which requires the highest level of preparedness to intervene urgently. I participated in the decision-making and personally managed or directed the management of the following life and organ supporting interventions that required my frequent assessment to treat or prevent imminent deterioration.  I personally spent 60 minutes of critical care time.  This is time spent at this critically ill patient's bedside actively involved in patient care as well as the coordination of care and discussions with the patient's family.  This does not include any procedural time which has been billed separately.      Signed By: Artur Morris MD     February 23, 2025         Please note that this dictation may have been completed with Dragon, the computer voice recognition software.  Quite often unanticipated grammatical, syntax, homophones, and other interpretive errors are inadvertently transcribed by the computer software.  Please disregard these errors.  Please excuse any errors that have escaped final proofreading.

## 2025-02-24 NOTE — PROGRESS NOTES
[facility] Adult  Hospitalist Group                                                                                          Hospitalist Progress Note  Michael Connor MD  Office Phone: [phone]        Date of Service:  2025  NAME:  Michael Teague  :  1970  MRN:  [id number]       Admission Summary:   Michael Teague is a 54 y.o. male with past medical history of liver cirrhosis, esophageal varices, status post variceal banding, prior alcohol dependence, pancreatitis, GERD, anemia, thrombocytopenia, arthritis presented to the emergency department with chief complaint of vomiting blood.  Patient reportedly had been vomiting blood over the past 2 days which is since resolved.  Symptoms have been intermittent, severe, without specific alleviating factors.  Patient has some nausea but no abdominal pain at current.  Per chart review patient is followed by hepatologist at [facility].  On 2025, patient underwent EGD showing esophageal varices for which patient underwent successful banding of 3 varices.  Tonight, on arrival in the ED, initial recorded vital signs were temperature 97.9 °F, /74, heart rate 106, respiratory rate 16, O2 saturation 98% room air.  12-lead EKG showed normal sinus rhythm at 99 bpm.  Abnormal labs included hemoglobin 8.8, .8, platelets 82, neutrophils 76.4%, K3.4, blood glucose 155, albumin 2.7, alk phos is 163, AST 61, total bilirubin 5.5, PT 17.7, INR 1.7, POC lactic acid 2.78.  Chest x-ray portable showed no acute cardiopulmonary process.  CTA abdomen and pelvis with and without IV contrast showed cirrhosis, esophageal varices, recanalized umbilical vein shunt but no active bleeding and no ascites.  ED ordered octreotide continuous IV drip at 25 mcg/hr, Protonix 80 mg IV x 1, droperidol 1.25 mg IV x 1, morphine 4 mg IV x 1.  Patient is now seen for admission to the hospitalist service.        Interval history / Subjective:     : alert x 3, awake, no acute distress,   HEENT: PEERL, EOMI, moist mucus membrane, TM clear  Neck: supple, no JVD, no meningeal signs  Chest: Clear to auscultation bilaterally   CVS: S1 S2 heard, Capillary refill less than 2 seconds  Abd: soft/ non tender, non distended, BS physiological,   Ext: no clubbing, no cyanosis, no edema, brisk 2+ DP pulses  Neuro/Psych: pleasant mood and affect, CN 2-12 grossly intact, sensory grossly within normal limit, Strength 5/5 in all extremities, DTR 1+ x 4  Skin: warm     Data Review:    Review and/or order of clinical lab test      I have personally and independently reviewed all pertinent labs, diagnostic studies, imaging, and have provided independent interpretation of the same.     Labs:     Recent Labs     02/23/25 2113 02/24/25  0336   WBC 5.2 4.5   HGB 8.8* 7.5*   HCT 25.8* 21.9*   PLT 82* 62*     Recent Labs     02/23/25 2113 02/24/25  0336    136   K 3.4* 3.9   CL 98 101   CO2 30 29   BUN 15 19   PHOS  --  4.0     Recent Labs     02/23/25 2113 02/24/25  0336   ALT 30 26   GLOB 4.6* 3.9     Recent Labs     02/23/25 2113 02/24/25  0336   INR 1.7* 1.7*   APTT  --  25.5      Recent Labs     02/24/25  0336   TIBC 272      No results found for: \"RBCF\"   No results for input(s): \"PH\", \"PCO2\", \"PO2\" in the last 72 hours.  No results for input(s): \"CPK\" in the last 72 hours.    Invalid input(s): \"CPKMB\", \"CKNDX\", \"TROIQ\"  No results found for: \"CHOL\", \"CHLST\", \"CHOLV\", \"HDL\", \"HDLC\", \"LDL\", \"LDLC\"  No results found for: \"GLUCPOC\"  [unfilled]    Notes reviewed from all clinical/nonclinical/nursing services involved in patient's clinical care. Care coordination discussions were held with appropriate clinical/nonclinical/ nursing providers based on care coordination needs.         Patients current active Medications were reviewed, considered, added and adjusted based on the clinical condition today.      Home Medications were reconciled to the best of my ability given all

## 2025-02-24 NOTE — ED NOTES
Introduced self to patient and relative. Hooked the patient to monitor. Informed regarding waiting time of results.

## 2025-02-24 NOTE — ED NOTES
MD messaged regarding pain medication. Patient in pain.     10:32: MD states they will put in pain medication shortly.

## 2025-02-24 NOTE — CONSULTS
clinical trial. Patient was given prednisone during hospitalization for a DF of 59. A one time infusion was given as part of the DURECT protocol 10/06/2022. He has completed this clinical trial and is now seen for regular follow up.     Imaging of the liver with CT scan demonstrated hepatomegaly, fatty liver, no liver mass, mild ascites.     Serologic evaluation for markers of chronic liver disease was positive for TADEO and elevated ferritin.    He underwent EGD two weeks ago and three bands were placed. He developed epigastric pain post procedure and reports hematemesis and dark stools over the past few days. His wife has noticed some mild confusion at home.       In the ED Laboratory studies were significant for:    Sna 136, Scr 0.98 mg, Sammonia 136, AST 61, ALT 26, , TBILI 4.2 mg, ALB 2.4 gm, WBC 4.5, HB 8.8 gms, PLT 82, INR 1.7    Hepatology Plan:    I suspect this is post band ulcer bleeding. There is limited endoscopic therapy for this however other causes of hematemesis will be assessed. EGD tomorrow at 8 am.    NPO at midnight    Continue octreotide, IV PPI and Rocephin    If patient develops overt GI bleeding or hemodynamic instability, please page me.    Daily MELD labs    ASSESSMENT AND PLAN:  Alcoholic hepatitis  MDF 23.9  Defer steroids at this time      Cirrhosis  The diagnosis of cirrhosis is based upon imaging and laboratory studies.  Cirrhosis is presumed secondary to alcohol.        The CTP score is 8. Child Class B. The MELD score is 20    MELD 3.0: 20 at 2/24/2025  3:36 AM  MELD-Na: 19 at 2/24/2025  3:36 AM  Calculated from:  Serum Creatinine: 0.98 MG/DL (Using min of 1 MG/DL) at 2/24/2025  3:36 AM  Serum Sodium: 136 mmol/L at 2/24/2025  3:36 AM  Total Bilirubin: 4.2 MG/DL at 2/24/2025  3:36 AM  Serum Albumin: 2.4 g/dL at 2/24/2025  3:36 AM  INR(ratio): 1.7   at 2/24/2025  3:36 AM  Age at listing (hypothetical): 54 years  Sex: Male at 2/24/2025  3:36 AM       Alcohol liver disease  The  Dr. Gutiérrez on 3/2022 which showed no varices.   The PLT count is low with liver stiffness of F4 by Fibroscan.  Will schedule for EGD to assess for varices and need for banding in 1 year.    The patient had hemoglobin drop from 11 to 7 in one month  EGD in 2025 demonstrated medium esophageal varices and banding was performed.  3 bands were placed.  Follow-up EGD is scheduled for 2025     Thrombocytopenia   This is secondary to cirrhosis.  There is no evidence of overt bleeding.    No treatment is required.  The platelet count is adequate for the patient to undergo procedures without the need for platelet transfusion or platelet growth factors.     Screening for Hepatocellular Carcinoma  HCC screening was performed in 2024 and does not suggest HCC.    AFP  was  4.1,   AFP-L3%  was    13.2(H). MRI in 2024 with no lesion noted.       Will repeat ultrasound in 6 months.       SYSTEM REVIEW:  Constitution systems: Negative for fever, chills, weight gain, weight loss.   Eyes: Negative for visual changes.  ENT: Negative for sore throat, painful swallowing.   Respiratory: Negative for cough, hemoptysis, SOB.   Cardiology: Negative for chest pain, palpitations.  GI:  Negative for constipation or diarrhea.  : Negative for urinary frequency, dysuria, hematuria, nocturia.   Skin: Negative for rash.  Hematology: Negative for easy bruising, blood clots.    Musculo-skelatal: Negative for back pain, muscle pain, weakness.  Neurologic: Negative for headaches, dizziness, vertigo, memory problems not related to HE.  Psychology: Negative for anxiety, depression.       FAMILY HISTORY:  The father was murdered.    The mother  of unknown cause.  There is no family history of liver disease.    There is no family history of immune disorders.        SOCIAL HISTORY:  The patient is .    The patient has 3 children,   The patient stopped using tobacco products in 40 years ago.    The patient has previously consumed alcohol

## 2025-02-25 ENCOUNTER — ANESTHESIA (OUTPATIENT)
Facility: HOSPITAL | Age: 55
DRG: 394 | End: 2025-02-25
Payer: COMMERCIAL

## 2025-02-25 LAB
ABO + RH BLD: NORMAL
ALBUMIN SERPL-MCNC: 2.4 G/DL (ref 3.5–5)
ALBUMIN/GLOB SERPL: 0.7 (ref 1.1–2.2)
ALP SERPL-CCNC: 124 U/L (ref 45–117)
ALT SERPL-CCNC: 24 U/L (ref 12–78)
ANION GAP SERPL CALC-SCNC: 6 MMOL/L (ref 2–12)
AST SERPL-CCNC: 46 U/L (ref 15–37)
BASOPHILS # BLD: 0.02 K/UL (ref 0–0.1)
BASOPHILS NFR BLD: 0.5 % (ref 0–1)
BILIRUB SERPL-MCNC: 3.6 MG/DL (ref 0.2–1)
BLD PROD TYP BPU: NORMAL
BLOOD BANK BLOOD PRODUCT EXPIRATION DATE: NORMAL
BLOOD BANK DISPENSE STATUS: NORMAL
BLOOD BANK ISBT PRODUCT BLOOD TYPE: 6200
BLOOD BANK PRODUCT CODE: NORMAL
BLOOD BANK UNIT TYPE AND RH: NORMAL
BLOOD GROUP ANTIBODIES SERPL: NORMAL
BPU ID: NORMAL
BUN SERPL-MCNC: 14 MG/DL (ref 6–20)
BUN/CREAT SERPL: 19 (ref 12–20)
CALCIUM SERPL-MCNC: 8.4 MG/DL (ref 8.5–10.1)
CHLORIDE SERPL-SCNC: 103 MMOL/L (ref 97–108)
CO2 SERPL-SCNC: 27 MMOL/L (ref 21–32)
CREAT SERPL-MCNC: 0.74 MG/DL (ref 0.7–1.3)
CROSSMATCH RESULT: NORMAL
DIFFERENTIAL METHOD BLD: ABNORMAL
EOSINOPHIL # BLD: 0.09 K/UL (ref 0–0.4)
EOSINOPHIL NFR BLD: 2 % (ref 0–7)
ERYTHROCYTE [DISTWIDTH] IN BLOOD BY AUTOMATED COUNT: 16.4 % (ref 11.5–14.5)
GLOBULIN SER CALC-MCNC: 3.4 G/DL (ref 2–4)
GLUCOSE SERPL-MCNC: 89 MG/DL (ref 65–100)
HCT VFR BLD AUTO: 22.8 % (ref 36.6–50.3)
HGB BLD-MCNC: 7.8 G/DL (ref 12.1–17)
IMM GRANULOCYTES # BLD AUTO: 0.05 K/UL (ref 0–0.04)
IMM GRANULOCYTES NFR BLD AUTO: 1.1 % (ref 0–0.5)
LYMPHOCYTES # BLD: 0.93 K/UL (ref 0.8–3.5)
LYMPHOCYTES NFR BLD: 21 % (ref 12–49)
MAGNESIUM SERPL-MCNC: 1.5 MG/DL (ref 1.6–2.4)
MCH RBC QN AUTO: 34.1 PG (ref 26–34)
MCHC RBC AUTO-ENTMCNC: 34.2 G/DL (ref 30–36.5)
MCV RBC AUTO: 99.6 FL (ref 80–99)
MONOCYTES # BLD: 0.47 K/UL (ref 0–1)
MONOCYTES NFR BLD: 10.6 % (ref 5–13)
NEUTS SEG # BLD: 2.86 K/UL (ref 1.8–8)
NEUTS SEG NFR BLD: 64.8 % (ref 32–75)
NRBC # BLD: 0 K/UL (ref 0–0.01)
NRBC BLD-RTO: 0 PER 100 WBC
PLATELET # BLD AUTO: 61 K/UL (ref 150–400)
PMV BLD AUTO: 11.4 FL (ref 8.9–12.9)
POTASSIUM SERPL-SCNC: 3.6 MMOL/L (ref 3.5–5.1)
PROT SERPL-MCNC: 5.8 G/DL (ref 6.4–8.2)
RBC # BLD AUTO: 2.29 M/UL (ref 4.1–5.7)
SODIUM SERPL-SCNC: 136 MMOL/L (ref 136–145)
SPECIMEN EXP DATE BLD: NORMAL
UNIT DIVISION: 0
UNIT ISSUE DATE/TIME: NORMAL
WBC # BLD AUTO: 4.4 K/UL (ref 4.1–11.1)

## 2025-02-25 PROCEDURE — 43235 EGD DIAGNOSTIC BRUSH WASH: CPT | Performed by: STUDENT IN AN ORGANIZED HEALTH CARE EDUCATION/TRAINING PROGRAM

## 2025-02-25 PROCEDURE — 7100000010 HC PHASE II RECOVERY - FIRST 15 MIN: Performed by: STUDENT IN AN ORGANIZED HEALTH CARE EDUCATION/TRAINING PROGRAM

## 2025-02-25 PROCEDURE — 2060000000 HC ICU INTERMEDIATE R&B

## 2025-02-25 PROCEDURE — 3600007502: Performed by: STUDENT IN AN ORGANIZED HEALTH CARE EDUCATION/TRAINING PROGRAM

## 2025-02-25 PROCEDURE — 2500000003 HC RX 250 WO HCPCS: Performed by: STUDENT IN AN ORGANIZED HEALTH CARE EDUCATION/TRAINING PROGRAM

## 2025-02-25 PROCEDURE — 6360000002 HC RX W HCPCS: Performed by: FAMILY MEDICINE

## 2025-02-25 PROCEDURE — 6360000002 HC RX W HCPCS

## 2025-02-25 PROCEDURE — 36415 COLL VENOUS BLD VENIPUNCTURE: CPT

## 2025-02-25 PROCEDURE — 80053 COMPREHEN METABOLIC PANEL: CPT

## 2025-02-25 PROCEDURE — 3700000000 HC ANESTHESIA ATTENDED CARE: Performed by: STUDENT IN AN ORGANIZED HEALTH CARE EDUCATION/TRAINING PROGRAM

## 2025-02-25 PROCEDURE — 2720000010 HC SURG SUPPLY STERILE: Performed by: STUDENT IN AN ORGANIZED HEALTH CARE EDUCATION/TRAINING PROGRAM

## 2025-02-25 PROCEDURE — 2500000003 HC RX 250 WO HCPCS: Performed by: FAMILY MEDICINE

## 2025-02-25 PROCEDURE — 6370000000 HC RX 637 (ALT 250 FOR IP): Performed by: STUDENT IN AN ORGANIZED HEALTH CARE EDUCATION/TRAINING PROGRAM

## 2025-02-25 PROCEDURE — 7100000011 HC PHASE II RECOVERY - ADDTL 15 MIN: Performed by: STUDENT IN AN ORGANIZED HEALTH CARE EDUCATION/TRAINING PROGRAM

## 2025-02-25 PROCEDURE — 2580000003 HC RX 258

## 2025-02-25 PROCEDURE — 6370000000 HC RX 637 (ALT 250 FOR IP): Performed by: FAMILY MEDICINE

## 2025-02-25 PROCEDURE — 2580000003 HC RX 258: Performed by: FAMILY MEDICINE

## 2025-02-25 PROCEDURE — 85025 COMPLETE CBC W/AUTO DIFF WBC: CPT

## 2025-02-25 PROCEDURE — 0DJ08ZZ INSPECTION OF UPPER INTESTINAL TRACT, VIA NATURAL OR ARTIFICIAL OPENING ENDOSCOPIC: ICD-10-PCS | Performed by: STUDENT IN AN ORGANIZED HEALTH CARE EDUCATION/TRAINING PROGRAM

## 2025-02-25 PROCEDURE — 83735 ASSAY OF MAGNESIUM: CPT

## 2025-02-25 RX ORDER — SODIUM CHLORIDE 0.9 % (FLUSH) 0.9 %
5-40 SYRINGE (ML) INJECTION EVERY 12 HOURS SCHEDULED
Status: DISCONTINUED | OUTPATIENT
Start: 2025-02-25 | End: 2025-02-25 | Stop reason: HOSPADM

## 2025-02-25 RX ORDER — MAGNESIUM SULFATE 1 G/100ML
1000 INJECTION INTRAVENOUS ONCE
Status: COMPLETED | OUTPATIENT
Start: 2025-02-25 | End: 2025-02-25

## 2025-02-25 RX ORDER — LIDOCAINE HYDROCHLORIDE 20 MG/ML
INJECTION, SOLUTION EPIDURAL; INFILTRATION; INTRACAUDAL; PERINEURAL
Status: DISCONTINUED | OUTPATIENT
Start: 2025-02-25 | End: 2025-02-25 | Stop reason: SDUPTHER

## 2025-02-25 RX ORDER — SUCRALFATE 1 G/1
1 TABLET ORAL EVERY 6 HOURS SCHEDULED
Status: DISCONTINUED | OUTPATIENT
Start: 2025-02-25 | End: 2025-02-26 | Stop reason: HOSPADM

## 2025-02-25 RX ORDER — SODIUM CHLORIDE 0.9 % (FLUSH) 0.9 %
5-40 SYRINGE (ML) INJECTION PRN
Status: DISCONTINUED | OUTPATIENT
Start: 2025-02-25 | End: 2025-02-25 | Stop reason: HOSPADM

## 2025-02-25 RX ORDER — SODIUM CHLORIDE 9 MG/ML
25 INJECTION, SOLUTION INTRAVENOUS PRN
Status: DISCONTINUED | OUTPATIENT
Start: 2025-02-25 | End: 2025-02-25 | Stop reason: HOSPADM

## 2025-02-25 RX ORDER — SODIUM CHLORIDE 9 MG/ML
INJECTION, SOLUTION INTRAVENOUS
Status: DISCONTINUED | OUTPATIENT
Start: 2025-02-25 | End: 2025-02-25 | Stop reason: SDUPTHER

## 2025-02-25 RX ORDER — LACTULOSE 10 G/15ML
20 SOLUTION ORAL 3 TIMES DAILY
Status: DISCONTINUED | OUTPATIENT
Start: 2025-02-25 | End: 2025-02-26 | Stop reason: HOSPADM

## 2025-02-25 RX ADMIN — SODIUM CHLORIDE, PRESERVATIVE FREE 10 ML: 5 INJECTION INTRAVENOUS at 21:24

## 2025-02-25 RX ADMIN — SODIUM CHLORIDE, PRESERVATIVE FREE 10 ML: 5 INJECTION INTRAVENOUS at 09:19

## 2025-02-25 RX ADMIN — SUCRALFATE 1 G: 1 TABLET ORAL at 09:24

## 2025-02-25 RX ADMIN — Medication 10 ML: at 09:19

## 2025-02-25 RX ADMIN — PROPOFOL 150 MG: 10 INJECTION, EMULSION INTRAVENOUS at 07:54

## 2025-02-25 RX ADMIN — SUCRALFATE 1 G: 1 TABLET ORAL at 21:24

## 2025-02-25 RX ADMIN — MAGNESIUM SULFATE 1000 MG: 1 INJECTION INTRAVENOUS at 14:50

## 2025-02-25 RX ADMIN — LACTULOSE 20 G: 10 SOLUTION ORAL at 21:23

## 2025-02-25 RX ADMIN — SUCRALFATE 1 G: 1 TABLET ORAL at 17:48

## 2025-02-25 RX ADMIN — PANTOPRAZOLE SODIUM 40 MG: 40 INJECTION, POWDER, FOR SOLUTION INTRAVENOUS at 09:17

## 2025-02-25 RX ADMIN — LACTULOSE 20 G: 10 SOLUTION ORAL at 09:17

## 2025-02-25 RX ADMIN — WATER 2000 MG: 1 INJECTION INTRAMUSCULAR; INTRAVENOUS; SUBCUTANEOUS at 21:24

## 2025-02-25 RX ADMIN — SODIUM CHLORIDE: 9 INJECTION, SOLUTION INTRAVENOUS at 07:52

## 2025-02-25 RX ADMIN — PANTOPRAZOLE SODIUM 40 MG: 40 INJECTION, POWDER, FOR SOLUTION INTRAVENOUS at 21:23

## 2025-02-25 RX ADMIN — LIDOCAINE HYDROCHLORIDE 80 MG: 20 INJECTION, SOLUTION EPIDURAL; INFILTRATION; INTRACAUDAL; PERINEURAL at 07:54

## 2025-02-25 RX ADMIN — MORPHINE SULFATE 1 MG: 2 INJECTION, SOLUTION INTRAMUSCULAR; INTRAVENOUS at 21:23

## 2025-02-25 RX ADMIN — THIAMINE HYDROCHLORIDE: 100 INJECTION, SOLUTION INTRAMUSCULAR; INTRAVENOUS at 11:12

## 2025-02-25 RX ADMIN — LACTULOSE 20 G: 10 SOLUTION ORAL at 14:44

## 2025-02-25 RX ADMIN — Medication 10 ML: at 21:24

## 2025-02-25 ASSESSMENT — PAIN SCALES - GENERAL: PAINLEVEL_OUTOF10: 7

## 2025-02-25 ASSESSMENT — PAIN - FUNCTIONAL ASSESSMENT
PAIN_FUNCTIONAL_ASSESSMENT: NONE - DENIES PAIN

## 2025-02-25 ASSESSMENT — PAIN DESCRIPTION - LOCATION: LOCATION: ABDOMEN

## 2025-02-25 NOTE — PROGRESS NOTES
[facility] Adult  Hospitalist Group                                                                                          Hospitalist Progress Note  Michael Connor MD  Office Phone: [phone]        Date of Service:  2025  NAME:  Michael Teague  :  1970  MRN:  [id number]       Admission Summary:   Michael Teague is a 54 y.o. male with past medical history of liver cirrhosis, esophageal varices, status post variceal banding, prior alcohol dependence, pancreatitis, GERD, anemia, thrombocytopenia, arthritis presented to the emergency department with chief complaint of vomiting blood.  Patient reportedly had been vomiting blood over the past 2 days which is since resolved.  Symptoms have been intermittent, severe, without specific alleviating factors.  Patient has some nausea but no abdominal pain at current.  Per chart review patient is followed by hepatologist at [facility].  On 2025, patient underwent EGD showing esophageal varices for which patient underwent successful banding of 3 varices.  Tonight, on arrival in the ED, initial recorded vital signs were temperature 97.9 °F, /74, heart rate 106, respiratory rate 16, O2 saturation 98% room air.  12-lead EKG showed normal sinus rhythm at 99 bpm.  Abnormal labs included hemoglobin 8.8, .8, platelets 82, neutrophils 76.4%, K3.4, blood glucose 155, albumin 2.7, alk phos is 163, AST 61, total bilirubin 5.5, PT 17.7, INR 1.7, POC lactic acid 2.78.  Chest x-ray portable showed no acute cardiopulmonary process.  CTA abdomen and pelvis with and without IV contrast showed cirrhosis, esophageal varices, recanalized umbilical vein shunt but no active bleeding and no ascites.  ED ordered octreotide continuous IV drip at 25 mcg/hr, Protonix 80 mg IV x 1, droperidol 1.25 mg IV x 1, morphine 4 mg IV x 1.  Patient is now seen for admission to the hospitalist service.        Interval history / Subjective:

## 2025-02-25 NOTE — ANESTHESIA POSTPROCEDURE EVALUATION
Department of Anesthesiology  Postprocedure Note    Patient: Michael Teague  MRN: [id number]  YOB: 1970  Date of evaluation: 2/25/2025    Procedure Summary       Date: 02/25/25 Room / Location: [facility] ENDO 01 / [facility] ENDOSCOPY    Anesthesia Start: 0752 Anesthesia Stop: 0800    Procedure: ESOPHAGOGASTRODUODENOSCOPY Diagnosis:       Hematemesis, unspecified whether nausea present      (Hematemesis, unspecified whether nausea present [K92.0])    Surgeons: Shyanne Kennedy MD Responsible Provider: Edinson Finch MD    Anesthesia Type: MAC ASA Status: 2            Anesthesia Type: MAC    Piter Phase I:      Piter Phase II: Piter Score: 10    Anesthesia Post Evaluation    Patient location during evaluation: bedside  Nausea & Vomiting: no nausea  Cardiovascular status: blood pressure returned to baseline  Respiratory status: acceptable  Hydration status: euvolemic    No notable events documented.

## 2025-02-25 NOTE — H&P
[facility]     David Nur MD, FACP, MACG, FAASLD   MD Elza Campoverde PA-C April S Ashworth, [facility]   Luz Tatum, [facility]   Jacqueline Singh, FNP-C  Tyrone Arias, FNP-C   Britney Lagunas, [facility]   at [facility]   [address], [address]   [place]  [address] [phone]   FAX: [phone]  [facility]   [address], [address]   [place]  [address] [phone]   FAX: [phone]         PRE-PROCEDURE NOTE - EGD    H and P from admission reviewed.    Allergies reviewed.  Out-patient medicaton list reviewed.    Patient Active Problem List   Diagnosis    Hepatomegaly    Hyperbilirubinemia    Moderate protein-calorie malnutrition    Alcoholic cirrhosis of liver without ascites (HCC)    Anemia in other chronic diseases classified elsewhere    Alcohol dependence in remission (HCC)    Chronic cholecystitis    Arthritis of right shoulder region    Hematemesis with nausea         No Known Allergies    No current facility-administered medications on file prior to encounter.     Current Outpatient Medications on File Prior to Encounter   Medication Sig Dispense Refill    ferrous sulfate (IRON 325) 325 (65 Fe) MG tablet Take 1 tablet by mouth daily (with breakfast) 90 tablet 3    thiamine 100 MG tablet TAKE 1 TABLET BY MOUTH EVERY DAY 90 tablet 3    pantoprazole (PROTONIX) 40 MG tablet TAKE 1 TABLET BY MOUTH EVERY DAY 90 tablet 3    vitamin B-12 (CYANOCOBALAMIN) 500 MCG tablet Take 1 tablet by mouth daily 90 tablet 3    folic acid (FOLVITE) 1 MG tablet TAKE 1 TABLET BY MOUTH EVERY DAY 90 tablet 3    lisinopril (PRINIVIL;ZESTRIL) 20 MG tablet Take 1 tablet by mouth daily 90 tablet 3    methylPREDNISolone (MEDROL DOSEPACK) 4 MG tablet Take as directed  on package (Patient

## 2025-02-25 NOTE — ANESTHESIA PRE PROCEDURE
Department of Anesthesiology  Preprocedure Note       Name:  Michael Teague   Age:  54 y.o.  :  1970                                          MRN:  [id number]         Date:  2025      Surgeon: Surgeon(s):  Shyanne Kennedy MD    Procedure: Procedure(s):  ESOPHAGOGASTRODUODENOSCOPY    Medications prior to admission:   Prior to Admission medications    Medication Sig Start Date End Date Taking? Authorizing Provider   ferrous sulfate (IRON 325) 325 (65 Fe) MG tablet Take 1 tablet by mouth daily (with breakfast) 25  Yes Jacqueline Singh APRN - NP   thiamine 100 MG tablet TAKE 1 TABLET BY MOUTH EVERY DAY 24  Yes Jacqueline Singh APRN - NP   pantoprazole (PROTONIX) 40 MG tablet TAKE 1 TABLET BY MOUTH EVERY DAY 24  Yes Jacqueline Singh APRN - NP   vitamin B-12 (CYANOCOBALAMIN) 500 MCG tablet Take 1 tablet by mouth daily 24  Yes Jacqueline Singh APRN - NP   folic acid (FOLVITE) 1 MG tablet TAKE 1 TABLET BY MOUTH EVERY DAY 24  Yes Jacqueline Singh APRN - NP   lisinopril (PRINIVIL;ZESTRIL) 20 MG tablet Take 1 tablet by mouth daily 24  Yes Jacqueline Singh APRN - NP   methylPREDNISolone (MEDROL DOSEPACK) 4 MG tablet Take as directed  on package  Patient not taking: Reported on 2025   You Souza DO   diclofenac (VOLTAREN) 75 MG EC tablet Take 1 tablet by mouth 2 times daily  Patient not taking: Reported on 2025   You Souza DO   cyclobenzaprine (FLEXERIL) 10 MG tablet Take 1 tablet by mouth 3 times daily as needed for Muscle spasms  Patient not taking: Reported on 2025   You Souza DO   lactulose (CHRONULAC) 10 GM/15ML solution Take 15 mLs by mouth daily  Patient not taking: Reported on 2025   Singh, Jacqueline M, APRN - NP       Current medications:    Current Facility-Administered Medications   Medication Dose Route Frequency Provider Last Rate Last Admin   • lactulose (CHRONULAC) 10 GM/15ML solution 20 g

## 2025-02-25 NOTE — PLAN OF CARE
Problem: Discharge Planning  Goal: Discharge to home or other facility with appropriate resources  Outcome: Progressing  Flowsheets (Taken 2/24/2025 0685)  Discharge to home or other facility with appropriate resources: Identify barriers to discharge with patient and caregiver     Problem: Pain  Goal: Verbalizes/displays adequate comfort level or baseline comfort level  Outcome: Progressing

## 2025-02-25 NOTE — PROGRESS NOTES
4 Eyes Skin Assessment     NAME:  Michael Teague  YOB: 1970  MEDICAL RECORD NUMBER:  [id number]    The patient is being assessed for  Admission    I agree that at least one RN has performed a thorough Head to Toe Skin Assessment on the patient. ALL assessment sites listed below have been assessed.      Areas assessed by both nurses:    Head, Face, Ears, Shoulders, Back, Chest, Arms, Elbows, Hands, Sacrum. Buttock, Coccyx, Ischium, Legs. Feet and Heels, and Under Medical Devices         Does the Patient have a Wound? No noted wound(s)       Troy Prevention initiated by RN: No  Wound Care Orders initiated by RN: No    Pressure Injury (Stage 3,4, Unstageable, DTI, NWPT, and Complex wounds) if present, place Wound referral order by RN under : No    New Ostomies, if present place, Ostomy referral order under : No     Nurse 1 eSignature: Electronically signed by Remi Santo RN on 2/24/25 at 7:26 PM EST    **SHARE this note so that the co-signing nurse can place an eSignature**    Nurse 2 eSignature: Electronically signed by Ena Blood RN on 2/24/25 at 7:47 PM EST

## 2025-02-25 NOTE — PERIOP NOTE
(Active)   Site Assessment Clean, dry & intact 02/25/25 0322   Line Status Blood return noted;Capped;Flushed;Normal saline locked 02/25/25 0322   Line Care Cap changed;Connections checked and tightened;Line pulled back;Ports disinfected 02/25/25 0322   Phlebitis Assessment No symptoms 02/25/25 0322   Infiltration Assessment 0 02/25/25 0322   Alcohol Cap Used Yes 02/25/25 0322   Dressing Status Clean, dry & intact 02/25/25 0322   Dressing Type Transparent 02/25/25 0322       Peripheral IV 02/24/25 Left;Anterior Hand (Active)   Site Assessment Clean, dry & intact 02/25/25 0322   Line Status Capped;Flushed;No blood return;Normal saline locked 02/25/25 0322   Line Care Cap changed;Connections checked and tightened;Line pulled back;Ports disinfected 02/25/25 0322   Phlebitis Assessment No symptoms 02/25/25 0322   Infiltration Assessment 0 02/25/25 0322   Alcohol Cap Used Yes 02/25/25 0322   Dressing Status Clean, dry & intact 02/25/25 0322   Dressing Type Transparent 02/25/25 0322        Opportunity for questions and clarification was provided.      Patient transported with:  Monitor and Registered Nurse

## 2025-02-26 VITALS
OXYGEN SATURATION: 97 % | HEIGHT: 67 IN | DIASTOLIC BLOOD PRESSURE: 89 MMHG | SYSTOLIC BLOOD PRESSURE: 155 MMHG | BODY MASS INDEX: 26.81 KG/M2 | WEIGHT: 170.8 LBS | TEMPERATURE: 98.5 F | RESPIRATION RATE: 20 BRPM | HEART RATE: 80 BPM

## 2025-02-26 LAB
ALBUMIN SERPL-MCNC: 2.4 G/DL (ref 3.5–5)
ALBUMIN/GLOB SERPL: 0.7 (ref 1.1–2.2)
ALP SERPL-CCNC: 120 U/L (ref 45–117)
ALT SERPL-CCNC: 23 U/L (ref 12–78)
ANION GAP SERPL CALC-SCNC: 8 MMOL/L (ref 2–12)
AST SERPL-CCNC: 53 U/L (ref 15–37)
BASOPHILS # BLD: 0.02 K/UL (ref 0–0.1)
BASOPHILS NFR BLD: 0.6 % (ref 0–1)
BILIRUB SERPL-MCNC: 3.4 MG/DL (ref 0.2–1)
BUN SERPL-MCNC: 6 MG/DL (ref 6–20)
BUN/CREAT SERPL: 8 (ref 12–20)
CALCIUM SERPL-MCNC: 8.4 MG/DL (ref 8.5–10.1)
CHLORIDE SERPL-SCNC: 105 MMOL/L (ref 97–108)
CO2 SERPL-SCNC: 21 MMOL/L (ref 21–32)
CREAT SERPL-MCNC: 0.76 MG/DL (ref 0.7–1.3)
DIFFERENTIAL METHOD BLD: ABNORMAL
EOSINOPHIL # BLD: 0.08 K/UL (ref 0–0.4)
EOSINOPHIL NFR BLD: 2.5 % (ref 0–7)
ERYTHROCYTE [DISTWIDTH] IN BLOOD BY AUTOMATED COUNT: 15.8 % (ref 11.5–14.5)
GLOBULIN SER CALC-MCNC: 3.4 G/DL (ref 2–4)
GLUCOSE SERPL-MCNC: 111 MG/DL (ref 65–100)
HCT VFR BLD AUTO: 22 % (ref 36.6–50.3)
HGB BLD-MCNC: 7.7 G/DL (ref 12.1–17)
IMM GRANULOCYTES # BLD AUTO: 0.01 K/UL (ref 0–0.04)
IMM GRANULOCYTES NFR BLD AUTO: 0.3 % (ref 0–0.5)
LYMPHOCYTES # BLD: 0.76 K/UL (ref 0.8–3.5)
LYMPHOCYTES NFR BLD: 23.7 % (ref 12–49)
MAGNESIUM SERPL-MCNC: 1.6 MG/DL (ref 1.6–2.4)
MCH RBC QN AUTO: 35.3 PG (ref 26–34)
MCHC RBC AUTO-ENTMCNC: 35 G/DL (ref 30–36.5)
MCV RBC AUTO: 100.9 FL (ref 80–99)
MONOCYTES # BLD: 0.36 K/UL (ref 0–1)
MONOCYTES NFR BLD: 11.4 % (ref 5–13)
NEUTS SEG # BLD: 1.97 K/UL (ref 1.8–8)
NEUTS SEG NFR BLD: 61.5 % (ref 32–75)
NRBC # BLD: 0 K/UL (ref 0–0.01)
NRBC BLD-RTO: 0 PER 100 WBC
PLATELET # BLD AUTO: 63 K/UL (ref 150–400)
PMV BLD AUTO: 11.3 FL (ref 8.9–12.9)
POTASSIUM SERPL-SCNC: 3.7 MMOL/L (ref 3.5–5.1)
PROT SERPL-MCNC: 5.8 G/DL (ref 6.4–8.2)
RBC # BLD AUTO: 2.18 M/UL (ref 4.1–5.7)
RBC MORPH BLD: ABNORMAL
SODIUM SERPL-SCNC: 134 MMOL/L (ref 136–145)
WBC # BLD AUTO: 3.2 K/UL (ref 4.1–11.1)

## 2025-02-26 PROCEDURE — 85025 COMPLETE CBC W/AUTO DIFF WBC: CPT

## 2025-02-26 PROCEDURE — 83735 ASSAY OF MAGNESIUM: CPT

## 2025-02-26 PROCEDURE — 6360000002 HC RX W HCPCS: Performed by: EMERGENCY MEDICINE

## 2025-02-26 PROCEDURE — 2500000003 HC RX 250 WO HCPCS: Performed by: FAMILY MEDICINE

## 2025-02-26 PROCEDURE — 2580000003 HC RX 258: Performed by: EMERGENCY MEDICINE

## 2025-02-26 PROCEDURE — 6370000000 HC RX 637 (ALT 250 FOR IP): Performed by: STUDENT IN AN ORGANIZED HEALTH CARE EDUCATION/TRAINING PROGRAM

## 2025-02-26 PROCEDURE — 2580000003 HC RX 258: Performed by: FAMILY MEDICINE

## 2025-02-26 PROCEDURE — 80053 COMPREHEN METABOLIC PANEL: CPT

## 2025-02-26 PROCEDURE — 6360000002 HC RX W HCPCS: Performed by: FAMILY MEDICINE

## 2025-02-26 PROCEDURE — 6370000000 HC RX 637 (ALT 250 FOR IP): Performed by: FAMILY MEDICINE

## 2025-02-26 RX ORDER — TRAMADOL HYDROCHLORIDE 50 MG/1
50 TABLET ORAL EVERY 8 HOURS PRN
Qty: 9 TABLET | Refills: 0 | Status: SHIPPED | OUTPATIENT
Start: 2025-02-26 | End: 2025-03-01

## 2025-02-26 RX ORDER — LANOLIN ALCOHOL/MO/W.PET/CERES
100 CREAM (GRAM) TOPICAL DAILY
Qty: 90 TABLET | Refills: 1 | Status: SHIPPED | OUTPATIENT
Start: 2025-02-26

## 2025-02-26 RX ORDER — PANTOPRAZOLE SODIUM 40 MG/1
40 TABLET, DELAYED RELEASE ORAL 2 TIMES DAILY
Qty: 90 TABLET | Refills: 1 | Status: SHIPPED | OUTPATIENT
Start: 2025-02-26

## 2025-02-26 RX ORDER — SUCRALFATE 1 G/1
1 TABLET ORAL 4 TIMES DAILY
Qty: 120 TABLET | Refills: 1 | Status: SHIPPED | OUTPATIENT
Start: 2025-02-26

## 2025-02-26 RX ADMIN — LACTULOSE 20 G: 10 SOLUTION ORAL at 15:39

## 2025-02-26 RX ADMIN — SUCRALFATE 1 G: 1 TABLET ORAL at 09:15

## 2025-02-26 RX ADMIN — PANTOPRAZOLE SODIUM 40 MG: 40 INJECTION, POWDER, FOR SOLUTION INTRAVENOUS at 09:15

## 2025-02-26 RX ADMIN — THIAMINE HYDROCHLORIDE: 100 INJECTION, SOLUTION INTRAMUSCULAR; INTRAVENOUS at 09:22

## 2025-02-26 RX ADMIN — SODIUM CHLORIDE, PRESERVATIVE FREE 10 ML: 5 INJECTION INTRAVENOUS at 09:19

## 2025-02-26 RX ADMIN — SUCRALFATE 1 G: 1 TABLET ORAL at 07:23

## 2025-02-26 RX ADMIN — OCTREOTIDE ACETATE 25 MCG/HR: 500 INJECTION, SOLUTION INTRAVENOUS; SUBCUTANEOUS at 02:20

## 2025-02-26 RX ADMIN — Medication 10 ML: at 09:19

## 2025-02-26 RX ADMIN — LACTULOSE 20 G: 10 SOLUTION ORAL at 09:15

## 2025-02-26 NOTE — PROGRESS NOTES
2/26/2025        RE: Michael Teague         [address] [address]          To Whom It May Concern,      Due to medical reasons, Michael Teague may  may return to work on 3.3.25        Sincerely,          Michael Connor MD

## 2025-02-26 NOTE — PROGRESS NOTES
[facility] Adult  Hospitalist Group                                                                                          Hospitalist Progress Note  Michael Connor MD  Office Phone: [phone]        Date of Service:  2025  NAME:  Michael Teague  :  1970  MRN:  [id number]       Admission Summary:   Michael Teague is a 54 y.o. male with past medical history of liver cirrhosis, esophageal varices, status post variceal banding, prior alcohol dependence, pancreatitis, GERD, anemia, thrombocytopenia, arthritis presented to the emergency department with chief complaint of vomiting blood.  Patient reportedly had been vomiting blood over the past 2 days which is since resolved.  Symptoms have been intermittent, severe, without specific alleviating factors.  Patient has some nausea but no abdominal pain at current.  Per chart review patient is followed by hepatologist at [facility].  On 2025, patient underwent EGD showing esophageal varices for which patient underwent successful banding of 3 varices.  Tonight, on arrival in the ED, initial recorded vital signs were temperature 97.9 °F, /74, heart rate 106, respiratory rate 16, O2 saturation 98% room air.  12-lead EKG showed normal sinus rhythm at 99 bpm.  Abnormal labs included hemoglobin 8.8, .8, platelets 82, neutrophils 76.4%, K3.4, blood glucose 155, albumin 2.7, alk phos is 163, AST 61, total bilirubin 5.5, PT 17.7, INR 1.7, POC lactic acid 2.78.  Chest x-ray portable showed no acute cardiopulmonary process.  CTA abdomen and pelvis with and without IV contrast showed cirrhosis, esophageal varices, recanalized umbilical vein shunt but no active bleeding and no ascites.  ED ordered octreotide continuous IV drip at 25 mcg/hr, Protonix 80 mg IV x 1, droperidol 1.25 mg IV x 1, morphine 4 mg IV x 1.  Patient is now seen for admission to the hospitalist service.        Interval history / Subjective:     injection 5-40 mL  5-40 mL IntraVENous PRN    0.9 % sodium chloride infusion   IntraVENous PRN    ondansetron (ZOFRAN-ODT) disintegrating tablet 4 mg  4 mg Oral Q8H PRN    Or    ondansetron (ZOFRAN) injection 4 mg  4 mg IntraVENous Q6H PRN     ______________________________________________________________________  EXPECTED LENGTH OF STAY: 4  ACTUAL LENGTH OF STAY:          3                 Michael Connor MD

## 2025-02-26 NOTE — DISCHARGE SUMMARY
Pt discharged. IV removed.  Site clean, dry, intact. Bandage placed. Pt discharge teaching completed and included: Medication changes, where to  medications, follow up appointments, s/s of stroke and heart attack, and my chart instructions. Patient has no questions.     Patient has their belongings and confirmed that they have everything with them. Patient wheeled down to be discharged.

## 2025-02-26 NOTE — PLAN OF CARE
Problem: Discharge Planning  Goal: Discharge to home or other facility with appropriate resources  2/26/2025 1006 by Maggie Fisher, ISAIAH  Outcome: Progressing     Problem: Pain  Goal: Verbalizes/displays adequate comfort level or baseline comfort level  2/26/2025 1006 by Maggie Fisher, ISAIAH  Outcome: Progressing     Problem: Safety - Adult  Goal: Free from fall injury  2/26/2025 1006 by Maggie Fisher, ISAIAH  Outcome: Progressing

## 2025-02-26 NOTE — PROGRESS NOTES
[facility]     David Nur MD, FACP, MACG, FAASLD   MD Elza Campoverde PA-C April S Ashworth, [facility]   Luz Tatum, [facility]   Jacquelinesue Singh, FNP-C  Tyrone Arias, [facility]   Britney Lagunas, [facility]   at [facility]   [address], [address]   [place]  [address] [phone]   FAX: [phone]  [facility]   [address], [address]   [place]  [address] [phone]   FAX: [phone]         HEPATOLOGY CONSULT NOTE  I was asked to see this patient in consultation for evaluation and management of cirrhosis and hematemasis.  I have reviewed the   Emergency room note,   Hospital admission note,  Notes by all other physicians who have seen the patient during this hospitalization to date.    I have reviewed the problem list, all past medical history and the reason for this hospitalization.    I have reviewed the allergies and the medications the patient was taking at home prior to this hospitalization.    HISTORY:  The patient is well known to and regularly cared for at [facility].     The patient is a 54 y.o. male who was hospitalized in 10/2022 when he developed abdominal pain/fullness and jaundice.  There was no nausea, vomiting, swelling of the abdomen, swelling of the lower extremity, confusion. He had an episode of acute pancreatitis in 2019.  He was abstinent for a few months after that but then slowly started drinking alcohol again in increasing amounts.  The patient had  previously remained abstinent from all alcohol since 9/24/2022. Previous consumption consisted of 10 beers and 4 mixed drinks daily for 4 year. The patient was also taking 3.5-4.5 gm of tylenol daily for treatment of severe abdominal pain.       The patient was enrolled in the [facility]

## 2025-02-26 NOTE — DISCHARGE SUMMARY
Discharge Summary       PATIENT ID: Michael Teague  MRN: [id number]   YOB: 1970    DATE OF ADMISSION: 2/23/2025  9:26 PM    DATE OF DISCHARGE: 12.26.25   PRIMARY CARE PROVIDER: Blair Rand MD     ATTENDING PHYSICIAN: jarad  DISCHARGING PROVIDER: Michael Connor MD    To contact this individual call [phone] and ask the  to page.  If unavailable ask to be transferred the Adult Hospitalist Department.    CONSULTATIONS: IP CONSULT TO SOCIAL WORK  IP CONSULT TO HEPATOLOGY    PROCEDURES/SURGERIES: Procedure(s):  ESOPHAGOGASTRODUODENOSCOPY    ADMITTING DIAGNOSES & HOSPITAL COURSE:   Michael Teague is a 54 y.o. male with past medical history of liver cirrhosis, esophageal varices, status post variceal banding, prior alcohol dependence, pancreatitis, GERD, anemia, thrombocytopenia, arthritis presented to the emergency department with chief complaint of vomiting blood.  Patient reportedly had been vomiting blood over the past 2 days which is since resolved.  Symptoms have been intermittent, severe, without specific alleviating factors.  Patient has some nausea but no abdominal pain at current.  Per chart review patient is followed by hepatologist at [facility].  On 2/14/2025, patient underwent EGD showing esophageal varices for which patient underwent successful banding of 3 varices.  Tonight, on arrival in the ED, initial recorded vital signs were temperature 97.9 °F, /74, heart rate 106, respiratory rate 16, O2 saturation 98% room air.  12-lead EKG showed normal sinus rhythm at 99 bpm.  Abnormal labs included hemoglobin 8.8, .8, platelets 82, neutrophils 76.4%, K3.4, blood glucose 155, albumin 2.7, alk phos is 163, AST 61, total bilirubin 5.5, PT 17.7, INR 1.7, POC lactic acid 2.78.  Chest x-ray portable showed no acute cardiopulmonary  or pain not relieved by medications.  Or, any other signs or symptoms that you may have questions about.    DISPOSITION:   x Home With:   OT  PT  HH  RN       Long term SNF/Inpatient Rehab    Independent/assisted living    Hospice    Other:       PATIENT CONDITION AT DISCHARGE:     Functional status    Poor     Deconditioned    x Independent      Cognition    x Lucid     Forgetful     Dementia      Catheters/lines (plus indication)    Ruiz     PICC     PEG    x None      Code status    x Full code     DNR      PHYSICAL EXAMINATION AT DISCHARGE:    General : alert x 3, awake, no acute distress,   HEENT: PEERL, EOMI, moist mucus membrane, TM clear  Neck: supple, no JVD, no meningeal signs  Chest: Clear to auscultation bilaterally   CVS: S1 S2 heard, Capillary refill less than 2 seconds  Abd: soft/ Non tender, non distended, BS physiological,   Ext: no clubbing, no cyanosis, no edema, brisk 2+ DP pulses  Neuro/Psych: pleasant mood and affect, CN 2-12 grossly intact, sensory grossly within normal limit, Strength 5/5 in all extremities, DTR 1+ x 4  Skin: warm     CHRONIC MEDICAL DIAGNOSES:      Greater than 31 minutes were spent with the patient on counseling and coordination of care    Signed:   Michael Connor MD  2/26/2025  6:36 PM

## 2025-02-28 LAB
BACTERIA SPEC CULT: NORMAL
BACTERIA SPEC CULT: NORMAL
SERVICE CMNT-IMP: NORMAL
SERVICE CMNT-IMP: NORMAL

## 2025-03-17 ENCOUNTER — TELEPHONE (OUTPATIENT)
Age: 55
End: 2025-03-17

## 2025-03-17 NOTE — TELEPHONE ENCOUNTER
Patient's wife called wanting to know if patient needed to get any lab work done before their next appointment.

## 2025-03-24 RX ORDER — FOLIC ACID 1 MG/1
1000 TABLET ORAL DAILY
Qty: 90 TABLET | Refills: 3 | Status: SHIPPED | OUTPATIENT
Start: 2025-03-24

## 2025-03-25 DIAGNOSIS — K70.10 ALCOHOLIC HEPATITIS WITHOUT ASCITES (HCC): Primary | ICD-10-CM

## 2025-04-04 ENCOUNTER — CLINICAL DOCUMENTATION (OUTPATIENT)
Age: 55
End: 2025-04-04

## 2025-04-04 NOTE — PROGRESS NOTES
Wife called requesting labs ordered 3/25/25 be faxed to [facility] at [phone]. Confirmation received.

## 2025-04-14 ENCOUNTER — PREP FOR PROCEDURE (OUTPATIENT)
Age: 55
End: 2025-04-14

## 2025-04-15 ENCOUNTER — RESULTS FOLLOW-UP (OUTPATIENT)
Age: 55
End: 2025-04-15

## 2025-04-15 DIAGNOSIS — D64.9 ANEMIA, UNSPECIFIED TYPE: Primary | ICD-10-CM

## 2025-04-15 LAB
AFP-TM SERPL-MCNC: 4.2 NG/ML (ref 0–8.4)
ALBUMIN SERPL-MCNC: 3.3 G/DL (ref 3.8–4.9)
ALP SERPL-CCNC: 152 IU/L (ref 44–121)
ALT SERPL-CCNC: 20 IU/L (ref 0–44)
AST SERPL-CCNC: 35 IU/L (ref 0–40)
BILIRUB DIRECT SERPL-MCNC: 0.93 MG/DL (ref 0–0.4)
BILIRUB SERPL-MCNC: 1.5 MG/DL (ref 0–1.2)
ERYTHROCYTE [DISTWIDTH] IN BLOOD BY AUTOMATED COUNT: 13.8 % (ref 11.6–15.4)
HCT VFR BLD AUTO: 21.9 % (ref 37.5–51)
HGB BLD-MCNC: 7 G/DL (ref 13–17.7)
INR PPP: 1.2 (ref 0.9–1.2)
MCH RBC QN AUTO: 32.4 PG (ref 26.6–33)
MCHC RBC AUTO-ENTMCNC: 32 G/DL (ref 31.5–35.7)
MCV RBC AUTO: 101 FL (ref 79–97)
PLATELET # BLD AUTO: 114 X10E3/UL (ref 150–450)
PROT SERPL-MCNC: 6.4 G/DL (ref 6–8.5)
PROTHROMBIN TIME: 13.3 SEC (ref 9.1–12)
RBC # BLD AUTO: 2.16 X10E6/UL (ref 4.14–5.8)
WBC # BLD AUTO: 2 X10E3/UL (ref 3.4–10.8)

## 2025-04-15 NOTE — TELEPHONE ENCOUNTER
----- Message from Dr. Shyanne Kennedy MD sent at 4/15/2025  2:21 PM EDT -----  Please add iron panel to yesterdays labs.     I have instructed him to go to ED if SOB or weak.     Plan for repeat CBC and other labs in one week. If hb <7, he need to go to ED for transfusion.    Thanks!      4/15/25@6170 Called [facility] to add on Iron panel. Iron panel will be add if [facility] have enough blood if prior autho needed then form will be faxed to our office. (KF)

## 2025-04-16 LAB
IRON SATN MFR SERPL: 21 % (ref 15–55)
IRON SERPL-MCNC: 76 UG/DL (ref 38–169)
SPECIMEN STATUS REPORT: NORMAL
TIBC SERPL-MCNC: 364 UG/DL (ref 250–450)
UIBC SERPL-MCNC: 288 UG/DL (ref 111–343)

## 2025-04-18 ENCOUNTER — TELEPHONE (OUTPATIENT)
Age: 55
End: 2025-04-18

## 2025-04-18 NOTE — TELEPHONE ENCOUNTER
4/18/25 0930: Spoke with pt wife. Date, time, location and prep given for EGD scheduled on 4/25/25. Pt wife verbalized understanding. SQ

## 2025-04-19 LAB
PETH BLD QL SCN: POSITIVE
PETH BLD-MCNC: 225 NG/ML

## 2025-04-22 LAB
ALBUMIN SERPL-MCNC: 3.5 G/DL (ref 3.8–4.9)
ALP SERPL-CCNC: 148 IU/L (ref 44–121)
ALT SERPL-CCNC: 16 IU/L (ref 0–44)
AST SERPL-CCNC: 38 IU/L (ref 0–40)
BILIRUB DIRECT SERPL-MCNC: 1.01 MG/DL (ref 0–0.4)
BILIRUB SERPL-MCNC: 1.6 MG/DL (ref 0–1.2)
ERYTHROCYTE [DISTWIDTH] IN BLOOD BY AUTOMATED COUNT: 13.8 % (ref 11.6–15.4)
FERRITIN SERPL-MCNC: 79 NG/ML (ref 30–400)
HCT VFR BLD AUTO: 22.9 % (ref 37.5–51)
HGB BLD-MCNC: 7.2 G/DL (ref 13–17.7)
IRON SATN MFR SERPL: 7 % (ref 15–55)
IRON SERPL-MCNC: 28 UG/DL (ref 38–169)
MCH RBC QN AUTO: 31 PG (ref 26.6–33)
MCHC RBC AUTO-ENTMCNC: 31.4 G/DL (ref 31.5–35.7)
MCV RBC AUTO: 99 FL (ref 79–97)
MORPHOLOGY BLD-IMP: NORMAL
PLATELET # BLD AUTO: 93 X10E3/UL (ref 150–450)
PROT SERPL-MCNC: 6.9 G/DL (ref 6–8.5)
RBC # BLD AUTO: 2.32 X10E6/UL (ref 4.14–5.8)
TIBC SERPL-MCNC: 399 UG/DL (ref 250–450)
UIBC SERPL-MCNC: 371 UG/DL (ref 111–343)
WBC # BLD AUTO: 2.3 X10E3/UL (ref 3.4–10.8)

## 2025-04-23 ENCOUNTER — TELEPHONE (OUTPATIENT)
Age: 55
End: 2025-04-23

## 2025-04-23 ENCOUNTER — PATIENT MESSAGE (OUTPATIENT)
Age: 55
End: 2025-04-23

## 2025-04-23 NOTE — TELEPHONE ENCOUNTER
Patient's wife called wanting to know if patient's iron infusions can be reduced to 1 time a week, instead of 2 times a week, due to patient's work schedule.  Patient's wife also wanted to know if patient's hemoglobin looked, and if it was doing better.

## 2025-04-24 ENCOUNTER — TELEPHONE (OUTPATIENT)
Age: 55
End: 2025-04-24

## 2025-04-25 ENCOUNTER — ANESTHESIA (OUTPATIENT)
Facility: HOSPITAL | Age: 55
End: 2025-04-25
Payer: COMMERCIAL

## 2025-04-25 ENCOUNTER — HOSPITAL ENCOUNTER (OUTPATIENT)
Facility: HOSPITAL | Age: 55
Setting detail: OUTPATIENT SURGERY
Discharge: HOME | End: 2025-04-25
Attending: STUDENT IN AN ORGANIZED HEALTH CARE EDUCATION/TRAINING PROGRAM | Admitting: STUDENT IN AN ORGANIZED HEALTH CARE EDUCATION/TRAINING PROGRAM
Payer: COMMERCIAL

## 2025-04-25 ENCOUNTER — ANESTHESIA EVENT (OUTPATIENT)
Facility: HOSPITAL | Age: 55
End: 2025-04-25
Payer: COMMERCIAL

## 2025-04-25 VITALS
SYSTOLIC BLOOD PRESSURE: 117 MMHG | WEIGHT: 163.1 LBS | BODY MASS INDEX: 25.6 KG/M2 | DIASTOLIC BLOOD PRESSURE: 78 MMHG | RESPIRATION RATE: 20 BRPM | TEMPERATURE: 97.2 F | HEIGHT: 67 IN | OXYGEN SATURATION: 100 % | HEART RATE: 88 BPM

## 2025-04-25 PROBLEM — D50.9 IRON DEFICIENCY ANEMIA: Status: ACTIVE | Noted: 2025-04-25

## 2025-04-25 LAB
PETH BLD QL SCN: POSITIVE
PETH BLD-MCNC: 319 NG/ML

## 2025-04-25 PROCEDURE — 2580000003 HC RX 258: Performed by: NURSE ANESTHETIST, CERTIFIED REGISTERED

## 2025-04-25 PROCEDURE — 43244 EGD VARICES LIGATION: CPT | Performed by: STUDENT IN AN ORGANIZED HEALTH CARE EDUCATION/TRAINING PROGRAM

## 2025-04-25 PROCEDURE — 3600007502: Performed by: STUDENT IN AN ORGANIZED HEALTH CARE EDUCATION/TRAINING PROGRAM

## 2025-04-25 PROCEDURE — 3700000000 HC ANESTHESIA ATTENDED CARE: Performed by: STUDENT IN AN ORGANIZED HEALTH CARE EDUCATION/TRAINING PROGRAM

## 2025-04-25 PROCEDURE — 2720000010 HC SURG SUPPLY STERILE: Performed by: STUDENT IN AN ORGANIZED HEALTH CARE EDUCATION/TRAINING PROGRAM

## 2025-04-25 PROCEDURE — 7100000010 HC PHASE II RECOVERY - FIRST 15 MIN: Performed by: STUDENT IN AN ORGANIZED HEALTH CARE EDUCATION/TRAINING PROGRAM

## 2025-04-25 PROCEDURE — 6360000002 HC RX W HCPCS: Performed by: NURSE ANESTHETIST, CERTIFIED REGISTERED

## 2025-04-25 RX ORDER — SODIUM CHLORIDE 9 MG/ML
5-250 INJECTION, SOLUTION INTRAVENOUS PRN
OUTPATIENT
Start: 2025-05-01

## 2025-04-25 RX ORDER — SODIUM CHLORIDE 0.9 % (FLUSH) 0.9 %
5-40 SYRINGE (ML) INJECTION PRN
Status: DISCONTINUED | OUTPATIENT
Start: 2025-04-25 | End: 2025-04-25 | Stop reason: HOSPADM

## 2025-04-25 RX ORDER — SODIUM CHLORIDE 0.9 % (FLUSH) 0.9 %
5-40 SYRINGE (ML) INJECTION EVERY 12 HOURS SCHEDULED
Status: DISCONTINUED | OUTPATIENT
Start: 2025-04-25 | End: 2025-04-25 | Stop reason: HOSPADM

## 2025-04-25 RX ORDER — SODIUM CHLORIDE 9 MG/ML
INJECTION, SOLUTION INTRAVENOUS
Status: DISCONTINUED | OUTPATIENT
Start: 2025-04-25 | End: 2025-04-25 | Stop reason: SDUPTHER

## 2025-04-25 RX ORDER — HEPARIN 100 UNIT/ML
500 SYRINGE INTRAVENOUS PRN
OUTPATIENT
Start: 2025-05-01

## 2025-04-25 RX ORDER — SODIUM CHLORIDE 9 MG/ML
INJECTION, SOLUTION INTRAVENOUS PRN
Status: DISCONTINUED | OUTPATIENT
Start: 2025-04-25 | End: 2025-04-25 | Stop reason: HOSPADM

## 2025-04-25 RX ORDER — SODIUM CHLORIDE 0.9 % (FLUSH) 0.9 %
5-40 SYRINGE (ML) INJECTION PRN
OUTPATIENT
Start: 2025-05-01

## 2025-04-25 RX ADMIN — PROPOFOL 50 MG: 10 INJECTION, EMULSION INTRAVENOUS at 11:06

## 2025-04-25 RX ADMIN — PROPOFOL 50 MG: 10 INJECTION, EMULSION INTRAVENOUS at 11:10

## 2025-04-25 RX ADMIN — PROPOFOL 50 MG: 10 INJECTION, EMULSION INTRAVENOUS at 11:12

## 2025-04-25 RX ADMIN — SODIUM CHLORIDE: 9 INJECTION, SOLUTION INTRAVENOUS at 10:59

## 2025-04-25 RX ADMIN — PROPOFOL 150 MG: 10 INJECTION, EMULSION INTRAVENOUS at 11:04

## 2025-04-25 RX ADMIN — PROPOFOL 50 MG: 10 INJECTION, EMULSION INTRAVENOUS at 11:08

## 2025-04-25 ASSESSMENT — PAIN - FUNCTIONAL ASSESSMENT: PAIN_FUNCTIONAL_ASSESSMENT: NONE - DENIES PAIN

## 2025-04-25 NOTE — PROGRESS NOTES
Initial RN admission and assessment performed and documented in Endoscopy navigator.     Patient evaluated by anesthesia in pre-procedure holding.     All procedural vital signs, airway assessment, and level of consciousness information monitored and recorded by anesthesia staff on the anesthesia record.     Report received from CRNA post procedure.  Patient transported to recovery area by RN.    Endoscopy post procedure time out was performed and specimens were verified with physician.    Endoscope was pre-cleaned at bedside immediately following procedure by ethan richards..

## 2025-04-25 NOTE — H&P
[facility]     David Nur MD, FACP, MACG, FAASLD   MD Elza Campoverde, COLLIN Nice, [facility]   Luz Alvesayad, [facility]  Tyrone Arias, FNP-C   Britney Lagunas, [facility]         [facility]   [address], [address]   [place]  [address] [phone]   FAX: [phone]  [facility]   [address], [address]   [place]  [address] [phone]   FAX: [phone]         PRE-PROCEDURE NOTE - EGD    H and P from last office visit reviewed.    Allergies reviewed.  Out-patient medicaton list reviewed.    Patient Active Problem List   Diagnosis    Hepatomegaly    Hyperbilirubinemia    Moderate protein-calorie malnutrition    Alcoholic cirrhosis of liver without ascites (HCC)    Anemia in other chronic diseases classified elsewhere    Alcohol dependence in remission (HCC)    Chronic cholecystitis    Arthritis of right shoulder region    Hematemesis with nausea         No Known Allergies    No current facility-administered medications on file prior to encounter.     Current Outpatient Medications on File Prior to Encounter   Medication Sig Dispense Refill    folic acid (FOLVITE) 1 MG tablet TAKE 1 TABLET BY MOUTH EVERY DAY 90 tablet 3    sucralfate (CARAFATE) 1 GM tablet Take 1 tablet by mouth 4 times daily 120 tablet 1    thiamine 100 MG tablet Take 1 tablet by mouth daily 90 tablet 1    pantoprazole (PROTONIX) 40 MG tablet Take 1 tablet by mouth in the morning and at bedtime 90 tablet 1    cyclobenzaprine (FLEXERIL) 10 MG tablet Take 1 tablet by mouth 3 times daily as needed for Muscle spasms (Patient not taking: Reported on 2/24/2025) 30 tablet 0    ferrous sulfate (IRON 325) 325 (65 Fe) MG tablet Take 1 tablet by mouth daily (with breakfast) 90 tablet 3    lactulose  [id number] [phone]  [facility]

## 2025-04-25 NOTE — OP NOTE
[facility]     David Nur MD, FACP, MACG, FAASLD   MD Elza Campoverde, COLLIN Nice, [facility]   Luzclare Alvesayad, [facility]  Tyrone Hugo, FNP-C   Britney Bebo, [facility]   [address], [address]   [place]  [address] [phone]   FAX: [phone]  [facility]   [address], [address]   [place]  [address] [phone]   FAX: [phone]          UPPER ENDOSCOPY WITH BANDING OF ESOPHAGEAL VARICES PROCEDURE NOTE    NAME: Michael Teague  :  1970  MRN:  [id number]      INDICATION: Cirrhosis.  Screening for esophageal varices with variceal ligation if indicated.    : Shyanne Kennedy MD    SURGICAL ASSISTANT:  None    PROSTHETIC DEVISES, TISSUE GRAFTS, ORGAN TRANSPLANTS:  Not applicable     ANESTHESIA/SEDATION: Propofol was administered by anesthesia    PROCEDURE DESCRIPTION:  Informed consent was obtained from the patient for the procedure.  All risks and benefits of the procedure explained.     The procedure was performed in the endoscopy suite.  The patient was laying on a stretcher and moved to the left lateral decubitus position prior to administration of sedation.    Sedation was administered by anesthesiology.  See their note for details.        The endoscope was inserted into the mouth and advanced under direct vision to the second portion of the duodenum.  Careful inspection of upper gastrointestinal tract was made as the endoscope was inserted and withdrawn.  Retroflexion of the endoscope to view of the cardia of the stomach was performed.  After withdrawing the endoscope the banding devise was placed on the tip of the endoscope.  The scope was then reinserted under direct inspection and advanced to the esophagus.   Banding of esophageal varices was performed as described below.  The scope was then removed.    FINDINGS:  Esophagus:    Few moderate size varices. Scars from prior banding  Three bands placed. Mild ooze that slowed by end of procedure    Stomach:   Mild portal hypertensive gastropathy  No gastric varices on retroflexion    Duodenum:   Normal bulb and second portion    SPECIMENS COLLECTED:   None    INTERVENTIONS:     Three bands placed were placed on esophageal varices.    COMPLICATIONS: None.  The patient tolerated the procedure well.    EBL: Negligible.           RECOMMENDATIONS:  Observe until discharge parameters are achieved.  Liquid diet today.  Soft food tomorrow.  Resume general diet thereafter.  Repeat endoscopy to reassess varices and need for additional banding in 3 months. Our office will schedule this    Recommend complete alcohol avoidance.  Iron infusions will be arranged by [facility].    Shyanne Kennedy MD  [address], [address]  [place]  [address] [phone]  [facility]

## 2025-04-25 NOTE — ANESTHESIA POSTPROCEDURE EVALUATION
Department of Anesthesiology  Postprocedure Note    Patient: Michael Teague  MRN: [id number]  YOB: 1970  Date of evaluation: 4/25/2025    Procedure Summary       Date: 04/25/25 Room / Location: [facility] ENDO 04 / [facility] ENDOSCOPY    Anesthesia Start: 1059 Anesthesia Stop: 1114    Procedure: ESOPHAGOGASTRODUODENOSCOPY (Upper GI Region) Diagnosis:       Alcoholic cirrhosis of liver without ascites (HCC)      (Alcoholic cirrhosis of liver without ascites (HCC) [K70.30])    Surgeons: Shyanne Kennedy MD Responsible Provider: Edinson Finch MD    Anesthesia Type: MAC ASA Status: 3            Anesthesia Type: MAC    Piter Phase I: Piter Score: 10    Piter Phase II: Piter Score: 9    Anesthesia Post Evaluation    Patient location during evaluation: bedside  Nausea & Vomiting: no nausea  Cardiovascular status: blood pressure returned to baseline  Respiratory status: acceptable  Hydration status: euvolemic    No notable events documented.

## 2025-04-25 NOTE — ANESTHESIA PRE PROCEDURE
Department of Anesthesiology  Preprocedure Note       Name:  Michael Teague   Age:  54 y.o.  :  1970                                          MRN:  [id number]         Date:  2025      Surgeon: Surgeon(s):  Shyanne Kennedy MD    Procedure: Procedure(s):  ESOPHAGOGASTRODUODENOSCOPY    Medications prior to admission:   Prior to Admission medications    Medication Sig Start Date End Date Taking? Authorizing Provider   folic acid (FOLVITE) 1 MG tablet TAKE 1 TABLET BY MOUTH EVERY DAY 3/24/25  Yes Jacqueline Singh APRN - NP   sucralfate (CARAFATE) 1 GM tablet Take 1 tablet by mouth 4 times daily 25  Yes Michael Connor MD   thiamine 100 MG tablet Take 1 tablet by mouth daily 25  Yes Michael Connor MD   pantoprazole (PROTONIX) 40 MG tablet Take 1 tablet by mouth in the morning and at bedtime 25  Yes Michael Connor MD   ferrous sulfate (IRON 325) 325 (65 Fe) MG tablet Take 1 tablet by mouth daily (with breakfast) 25  Yes Jacqueline Singh APRN - NP   vitamin B-12 (CYANOCOBALAMIN) 500 MCG tablet Take 1 tablet by mouth daily 24  Yes Jacqueline Singh APRN - NP   lisinopril (PRINIVIL;ZESTRIL) 20 MG tablet Take 1 tablet by mouth daily 24  Yes Jacqueline Singh APRN - NP   lactulose (CHRONULAC) 10 GM/15ML solution Take 15 mLs by mouth daily  Patient not taking: Reported on 2025   Jacqueline Singh APRN - NP       Current medications:    Current Facility-Administered Medications   Medication Dose Route Frequency Provider Last Rate Last Admin   • sodium chloride flush 0.9 % injection 5-40 mL  5-40 mL IntraVENous 2 times per day Shyanne Kennedy MD       • sodium chloride flush 0.9 % injection 5-40 mL  5-40 mL IntraVENous PRN Shyanne Kennedy MD       • 0.9 % sodium chloride infusion   IntraVENous PRN Shaynne Kennedy MD         Current Outpatient Medications   Medication Sig Dispense Refill   • folic acid (FOLVITE) 1 MG tablet TAKE 1 TABLET BY MOUTH EVERY DAY 90 tablet 3

## 2025-04-25 NOTE — DISCHARGE INSTRUCTIONS
[facility]     David Nur MD, FACP, MACG, FAASLD   MD Elza Campoverde PA-C April S Ashworth, [facility]   Luz Alveshaydeeford, [facility]  Tyrone Arias, P-C   Britneydevonte Lagunas, [facility]   at [facility]   [address], [address]   [place]  [address] [phone]   FAX: [phone]  [facility]   [address], [address]   [place]  [address] [phone]   FAX: [phone]         ENDOSCOPY WITH BANDING DISCHARGE INSTRUCTIONS    Michael Teague    1970  Date: 4/25/2025    DISCOMFORT:  Use lozenges or warm salt water gargle for sore thoat  Apply warm compress to IV site if red.  If redness or soreness persists call the office.  You may experience gas and bloating.  Walking and belching will help relieve this.  You may experience chest pain or discomfort or feel as though food is \"sticking\" in your food pipe for a few days after the procedure. This is a normal feeling after banding of esophageal varices.    CHEST PRESSURE:  Banding of dilated veins (varices) in the food tube (esophagus) can be painful in some persons.  The pain is caused by squeezing the varices and lining of the esophagus by the bands used to seal the varices.  You can take liquid pain medication either acetaminophen or alternative.  The best treatment for this is to drink a an \"Icee\" or \"Slurpee\" since the ice crystals will freeze the nerve endings in the food tube and relieve the pain.    DIET:  Regular food may dislodge the bands placed on the varices.  For this reason you should only have liquid for the rest of today.  Eat only soft food that does not need to be chewed all day tomorrow.  You may advance to your regular diet in 2 days.    ACTIVITY:  Spend the remainder of the day resting.  Avoid any  strenuous activity.  You may not operate a vehicle for 12 hours.  You may not engage in an occupation involving machinery or appliances for rest of today.   Avoid making any critical decisions for at least 24 hour.    Call the Liver [facility] Office if you have any of the following:  Increasing chest or abdominal pain, nausea, vomiting, vomiting blood, abdominal distension or swelling, fever or chills, bloody discharge from nose or mouth or shortness of breath.    Follow-up Instructions:  Call Dr. Kennedy for any questions or problems at the phone number listed above.  If a biopsy was performed, you will be contacted by the office staff or Dr Kennedy within 1 week.   If you have not heard from us by then you may call the office at the phone number listed above to inquire about the results.    ENDOSCOPY FINDINGS:  Few varices seen in esophagus.   Three bands were placed to seal the varices and reduce the risk of bleeding.  Will repeat EGD to evaluate for additional varices and need for more banding in 3 months.  The office will call to schedule this.    DISCHARGE SUMMARY from the Nurse:  The following personal items collected during your admission are returned to you:

## 2025-04-28 ENCOUNTER — TELEPHONE (OUTPATIENT)
Age: 55
End: 2025-04-28

## 2025-04-28 NOTE — TELEPHONE ENCOUNTER
Called patients wife.    Discussed lab results.    Patient continues to drink to help with pain. I recommended they discuss with PCP alternative pain management strategies.

## 2025-04-28 NOTE — TELEPHONE ENCOUNTER
----- Message from Dr. Shyanne Kennedy MD sent at 4/25/2025 11:25 AM EDT -----  EGD in 3 months for banding please

## 2025-05-01 ENCOUNTER — HOSPITAL ENCOUNTER (OUTPATIENT)
Facility: HOSPITAL | Age: 55
Setting detail: INFUSION SERIES
End: 2025-05-01

## 2025-05-01 DIAGNOSIS — D50.8 OTHER IRON DEFICIENCY ANEMIA: Primary | ICD-10-CM

## 2025-05-05 RX ORDER — LISINOPRIL 20 MG/1
20 TABLET ORAL DAILY
Qty: 90 TABLET | Refills: 3 | OUTPATIENT
Start: 2025-05-05

## 2025-05-08 ENCOUNTER — TELEPHONE (OUTPATIENT)
Age: 55
End: 2025-05-08

## 2025-05-08 NOTE — TELEPHONE ENCOUNTER
Patient's wife called stating that they hadn't gotten authorization for iron infusions yet. Wanted to know if there was anything we can do to move this along, as they have had to reschedule their appointment multiple times.

## 2025-05-08 NOTE — TELEPHONE ENCOUNTER
Patient's wife called stating that they hadn't gotten authorization for iron infusions yet. Wanted to know if there was anything we can do to move this along, as they have had to reschedule their appointment multiple times.        5/8/25@7775 At this time patient insurance is pending and infusion will need to be cancelled tomorrow. Spoke w/patient wife. Advise her to call infusion center and ask for authorization personal. I have looked and insurance still pending. If patient having symptom seek medical attention in the ER. (KF)

## 2025-05-09 ENCOUNTER — TELEPHONE (OUTPATIENT)
Age: 55
End: 2025-05-09

## 2025-05-09 ENCOUNTER — HOSPITAL ENCOUNTER (OUTPATIENT)
Facility: HOSPITAL | Age: 55
Setting detail: INFUSION SERIES
End: 2025-05-09

## 2025-05-09 DIAGNOSIS — R13.10 ODYNOPHAGIA: Primary | ICD-10-CM

## 2025-05-09 NOTE — TELEPHONE ENCOUNTER
Patient;s wife is calling to request the following medication to be ordered sarah Alonso....    sucralfate (CARAFATE) 1 GM tablet

## 2025-05-12 RX ORDER — SUCRALFATE 1 G/1
1 TABLET ORAL 4 TIMES DAILY PRN
Qty: 28 TABLET | Refills: 0 | Status: SHIPPED | OUTPATIENT
Start: 2025-05-12 | End: 2025-05-19

## 2025-05-14 DIAGNOSIS — D50.9 IRON DEFICIENCY ANEMIA, UNSPECIFIED IRON DEFICIENCY ANEMIA TYPE: Primary | ICD-10-CM

## 2025-05-14 NOTE — PROGRESS NOTES
Patient has been unable to get iron infusions due to an insurance issue.    I would like him to get a hemoglobin check tomorrow.  Labs placed.

## 2025-05-15 RX ORDER — SUCRALFATE 1 G/1
1 TABLET ORAL 4 TIMES DAILY
Qty: 120 TABLET | Refills: 1 | OUTPATIENT
Start: 2025-05-15

## 2025-05-18 LAB
BASOPHILS # BLD AUTO: 0 X10E3/UL (ref 0–0.2)
BASOPHILS NFR BLD AUTO: 1 %
EOSINOPHIL # BLD AUTO: 0 X10E3/UL (ref 0–0.4)
EOSINOPHIL NFR BLD AUTO: 2 %
ERYTHROCYTE [DISTWIDTH] IN BLOOD BY AUTOMATED COUNT: 13.1 % (ref 11.6–15.4)
HCT VFR BLD AUTO: 28.3 % (ref 37.5–51)
HGB BLD-MCNC: 9 G/DL (ref 13–17.7)
IMM GRANULOCYTES # BLD AUTO: 0 X10E3/UL (ref 0–0.1)
IMM GRANULOCYTES NFR BLD AUTO: 1 %
LYMPHOCYTES # BLD AUTO: 0.5 X10E3/UL (ref 0.7–3.1)
LYMPHOCYTES NFR BLD AUTO: 25 %
MCH RBC QN AUTO: 32.4 PG (ref 26.6–33)
MCHC RBC AUTO-ENTMCNC: 31.8 G/DL (ref 31.5–35.7)
MCV RBC AUTO: 102 FL (ref 79–97)
MONOCYTES # BLD AUTO: 0.2 X10E3/UL (ref 0.1–0.9)
MONOCYTES NFR BLD AUTO: 11 %
MORPHOLOGY BLD-IMP: ABNORMAL
NEUTROPHILS # BLD AUTO: 1.2 X10E3/UL (ref 1.4–7)
NEUTROPHILS NFR BLD AUTO: 60 %
PLATELET # BLD AUTO: ABNORMAL X10E3/UL
RBC # BLD AUTO: 2.78 X10E6/UL (ref 4.14–5.8)
WBC # BLD AUTO: 2 X10E3/UL (ref 3.4–10.8)

## 2025-05-19 ENCOUNTER — TELEPHONE (OUTPATIENT)
Age: 55
End: 2025-05-19

## 2025-05-19 ENCOUNTER — HOSPITAL ENCOUNTER (OUTPATIENT)
Facility: HOSPITAL | Age: 55
Setting detail: INFUSION SERIES
Discharge: HOME | End: 2025-05-19
Payer: COMMERCIAL

## 2025-05-19 VITALS
RESPIRATION RATE: 17 BRPM | HEART RATE: 84 BPM | TEMPERATURE: 99 F | SYSTOLIC BLOOD PRESSURE: 107 MMHG | OXYGEN SATURATION: 97 % | DIASTOLIC BLOOD PRESSURE: 61 MMHG

## 2025-05-19 DIAGNOSIS — D50.8 OTHER IRON DEFICIENCY ANEMIA: Primary | ICD-10-CM

## 2025-05-19 PROCEDURE — 2580000003 HC RX 258: Performed by: STUDENT IN AN ORGANIZED HEALTH CARE EDUCATION/TRAINING PROGRAM

## 2025-05-19 PROCEDURE — 96374 THER/PROPH/DIAG INJ IV PUSH: CPT

## 2025-05-19 PROCEDURE — 6360000002 HC RX W HCPCS: Performed by: STUDENT IN AN ORGANIZED HEALTH CARE EDUCATION/TRAINING PROGRAM

## 2025-05-19 RX ORDER — HEPARIN 100 UNIT/ML
500 SYRINGE INTRAVENOUS PRN
Status: CANCELLED | OUTPATIENT
Start: 2025-06-06

## 2025-05-19 RX ORDER — SODIUM CHLORIDE 0.9 % (FLUSH) 0.9 %
5-40 SYRINGE (ML) INJECTION PRN
Status: CANCELLED | OUTPATIENT
Start: 2025-06-06

## 2025-05-19 RX ORDER — SODIUM CHLORIDE 9 MG/ML
5-250 INJECTION, SOLUTION INTRAVENOUS PRN
Status: CANCELLED | OUTPATIENT
Start: 2025-06-06

## 2025-05-19 RX ORDER — SODIUM CHLORIDE 9 MG/ML
5-250 INJECTION, SOLUTION INTRAVENOUS PRN
Status: DISCONTINUED | OUTPATIENT
Start: 2025-05-19 | End: 2025-05-20 | Stop reason: HOSPADM

## 2025-05-19 RX ADMIN — IRON SUCROSE 200 MG: 20 INJECTION, SOLUTION INTRAVENOUS at 15:31

## 2025-05-19 ASSESSMENT — PAIN SCALES - GENERAL: PAINLEVEL_OUTOF10: 0

## 2025-05-19 NOTE — PROGRESS NOTES
[facility] 1/5 in stable condition.  PIV established to right ac with positive blood return.     Vitals:    05/19/25 1452   BP: 127/73   Pulse: 92   Resp: 17   Temp: 99 °F (37.2 °C)   SpO2: 97%     Medications Administered         iron sucrose (VENOFER) 200 mg in sodium chloride 0.9 % 100 mL IVPB Admin Date  05/19/2025 Action  New Bag Dose  200 mg Rate  480 mL/hr Route  IntraVENous Documented By  Shanon Sarkar, RN        Pt observed 15 min post infusion w/o incident      PIV flushed and removed per protocol. Pt tolerated treatment well. D/Cd from [facility] in no distress.  Future Appointments   Date Time Provider Department Center   5/28/2025 11:30 AM Shyanne Kennedy MD LIVR [facility] AMB   5/28/2025  3:00 PM JEM SO CHAIR 17 [facility]   6/4/2025  3:00 PM JEM SO CHAIR 7 [facility]   6/11/2025  2:45 PM JEM SO CHAIR 11 [facility]   6/18/2025  3:00 PM JEM SO CHAIR 16 [facility]   7/25/2025 10:30 AM Shyanne Kennedy MD L[facility] AMB

## 2025-05-19 NOTE — TELEPHONE ENCOUNTER
5/19/25@9758 Dawna called from BS and now insurance has approved iron infusions. I called infusion solution to cancel referral. Left voice message. (KF)

## 2025-05-24 LAB
PETH BLD QL SCN: POSITIVE
PETH BLD-MCNC: 444 NG/ML

## 2025-05-28 ENCOUNTER — HOSPITAL ENCOUNTER (OUTPATIENT)
Facility: HOSPITAL | Age: 55
Setting detail: INFUSION SERIES
Discharge: HOME | End: 2025-05-28
Payer: COMMERCIAL

## 2025-05-28 ENCOUNTER — RESULTS FOLLOW-UP (OUTPATIENT)
Age: 55
End: 2025-05-28

## 2025-05-28 ENCOUNTER — OFFICE VISIT (OUTPATIENT)
Age: 55
End: 2025-05-28
Payer: COMMERCIAL

## 2025-05-28 ENCOUNTER — APPOINTMENT (OUTPATIENT)
Facility: HOSPITAL | Age: 55
End: 2025-05-28
Payer: COMMERCIAL

## 2025-05-28 VITALS
BODY MASS INDEX: 25.11 KG/M2 | DIASTOLIC BLOOD PRESSURE: 86 MMHG | SYSTOLIC BLOOD PRESSURE: 143 MMHG | OXYGEN SATURATION: 99 % | WEIGHT: 160 LBS | HEART RATE: 94 BPM | TEMPERATURE: 99.8 F | HEIGHT: 67 IN

## 2025-05-28 VITALS
TEMPERATURE: 98.9 F | RESPIRATION RATE: 18 BRPM | DIASTOLIC BLOOD PRESSURE: 72 MMHG | HEART RATE: 78 BPM | SYSTOLIC BLOOD PRESSURE: 134 MMHG

## 2025-05-28 DIAGNOSIS — D50.8 OTHER IRON DEFICIENCY ANEMIA: Primary | ICD-10-CM

## 2025-05-28 DIAGNOSIS — K70.30 ALCOHOLIC CIRRHOSIS OF LIVER WITHOUT ASCITES (HCC): Primary | ICD-10-CM

## 2025-05-28 DIAGNOSIS — K70.30 ALCOHOLIC CIRRHOSIS OF LIVER WITHOUT ASCITES (HCC): ICD-10-CM

## 2025-05-28 DIAGNOSIS — K08.89 TOOTHACHE: ICD-10-CM

## 2025-05-28 LAB
ALBUMIN SERPL-MCNC: 3.2 G/DL (ref 3.5–5)
ALBUMIN/GLOB SERPL: 0.7 (ref 1.1–2.2)
ALP SERPL-CCNC: 196 U/L (ref 45–117)
ALT SERPL-CCNC: 25 U/L (ref 12–78)
ANION GAP SERPL CALC-SCNC: 8 MMOL/L (ref 2–12)
AST SERPL-CCNC: 57 U/L (ref 15–37)
BASOPHILS # BLD: 0.01 K/UL (ref 0–0.1)
BASOPHILS NFR BLD: 0.2 % (ref 0–1)
BILIRUB DIRECT SERPL-MCNC: 4 MG/DL (ref 0–0.2)
BILIRUB SERPL-MCNC: 6.2 MG/DL (ref 0.2–1)
BUN SERPL-MCNC: 7 MG/DL (ref 6–20)
BUN/CREAT SERPL: 8 (ref 12–20)
CALCIUM SERPL-MCNC: 8.9 MG/DL (ref 8.5–10.1)
CHLORIDE SERPL-SCNC: 99 MMOL/L (ref 97–108)
CO2 SERPL-SCNC: 23 MMOL/L (ref 21–32)
CREAT SERPL-MCNC: 0.88 MG/DL (ref 0.7–1.3)
DIFFERENTIAL METHOD BLD: ABNORMAL
EOSINOPHIL # BLD: 0.01 K/UL (ref 0–0.4)
EOSINOPHIL NFR BLD: 0.2 % (ref 0–7)
ERYTHROCYTE [DISTWIDTH] IN BLOOD BY AUTOMATED COUNT: 13.9 % (ref 11.5–14.5)
GLOBULIN SER CALC-MCNC: 4.7 G/DL (ref 2–4)
GLUCOSE SERPL-MCNC: 111 MG/DL (ref 65–100)
HCT VFR BLD AUTO: 32.3 % (ref 36.6–50.3)
HGB BLD-MCNC: 10.7 G/DL (ref 12.1–17)
IMM GRANULOCYTES # BLD AUTO: 0.03 K/UL (ref 0–0.04)
IMM GRANULOCYTES NFR BLD AUTO: 0.5 % (ref 0–0.5)
INR PPP: 1.6 (ref 0.9–1.1)
LYMPHOCYTES # BLD: 0.41 K/UL (ref 0.8–3.5)
LYMPHOCYTES NFR BLD: 6.2 % (ref 12–49)
MCH RBC QN AUTO: 32.3 PG (ref 26–34)
MCHC RBC AUTO-ENTMCNC: 33.1 G/DL (ref 30–36.5)
MCV RBC AUTO: 97.6 FL (ref 80–99)
MONOCYTES # BLD: 0.57 K/UL (ref 0–1)
MONOCYTES NFR BLD: 8.6 % (ref 5–13)
NEUTS SEG # BLD: 5.56 K/UL (ref 1.8–8)
NEUTS SEG NFR BLD: 84.3 % (ref 32–75)
NRBC # BLD: 0 K/UL (ref 0–0.01)
NRBC BLD-RTO: 0 PER 100 WBC
PLATELET # BLD AUTO: 91 K/UL (ref 150–400)
PMV BLD AUTO: 11.6 FL (ref 8.9–12.9)
POTASSIUM SERPL-SCNC: 3.4 MMOL/L (ref 3.5–5.1)
PROT SERPL-MCNC: 7.9 G/DL (ref 6.4–8.2)
PROTHROMBIN TIME: 16.1 SEC (ref 9.2–11.2)
RBC # BLD AUTO: 3.31 M/UL (ref 4.1–5.7)
RBC MORPH BLD: ABNORMAL
RBC MORPH BLD: ABNORMAL
SODIUM SERPL-SCNC: 130 MMOL/L (ref 136–145)
WBC # BLD AUTO: 6.6 K/UL (ref 4.1–11.1)

## 2025-05-28 PROCEDURE — 96374 THER/PROPH/DIAG INJ IV PUSH: CPT

## 2025-05-28 PROCEDURE — 82107 ALPHA-FETOPROTEIN L3: CPT

## 2025-05-28 PROCEDURE — 80076 HEPATIC FUNCTION PANEL: CPT

## 2025-05-28 PROCEDURE — 85025 COMPLETE CBC W/AUTO DIFF WBC: CPT

## 2025-05-28 PROCEDURE — 6360000002 HC RX W HCPCS: Performed by: STUDENT IN AN ORGANIZED HEALTH CARE EDUCATION/TRAINING PROGRAM

## 2025-05-28 PROCEDURE — 2580000003 HC RX 258: Performed by: STUDENT IN AN ORGANIZED HEALTH CARE EDUCATION/TRAINING PROGRAM

## 2025-05-28 PROCEDURE — 85610 PROTHROMBIN TIME: CPT

## 2025-05-28 PROCEDURE — 80048 BASIC METABOLIC PNL TOTAL CA: CPT

## 2025-05-28 PROCEDURE — 99215 OFFICE O/P EST HI 40 MIN: CPT | Performed by: STUDENT IN AN ORGANIZED HEALTH CARE EDUCATION/TRAINING PROGRAM

## 2025-05-28 PROCEDURE — G0480 DRUG TEST DEF 1-7 CLASSES: HCPCS

## 2025-05-28 RX ORDER — SODIUM CHLORIDE 9 MG/ML
5-250 INJECTION, SOLUTION INTRAVENOUS PRN
OUTPATIENT
Start: 2025-06-02

## 2025-05-28 RX ORDER — SODIUM CHLORIDE 9 MG/ML
5-250 INJECTION, SOLUTION INTRAVENOUS PRN
Status: DISCONTINUED | OUTPATIENT
Start: 2025-05-28 | End: 2025-05-29 | Stop reason: HOSPADM

## 2025-05-28 RX ORDER — SODIUM CHLORIDE 0.9 % (FLUSH) 0.9 %
5-40 SYRINGE (ML) INJECTION PRN
OUTPATIENT
Start: 2025-06-02

## 2025-05-28 RX ORDER — HEPARIN 100 UNIT/ML
500 SYRINGE INTRAVENOUS PRN
OUTPATIENT
Start: 2025-06-02

## 2025-05-28 RX ORDER — AMOXICILLIN 500 MG/1
500 CAPSULE ORAL EVERY 8 HOURS
Qty: 21 CAPSULE | Refills: 0 | Status: SHIPPED | OUTPATIENT
Start: 2025-05-28 | End: 2025-06-04

## 2025-05-28 RX ADMIN — IRON SUCROSE 200 MG: 20 INJECTION, SOLUTION INTRAVENOUS at 13:43

## 2025-05-28 ASSESSMENT — PATIENT HEALTH QUESTIONNAIRE - PHQ9
DEPRESSION UNABLE TO ASSESS: FUNCTIONAL CAPACITY MOTIVATION LIMITS ACCURACY
SUM OF ALL RESPONSES TO PHQ QUESTIONS 1-9: 0
SUM OF ALL RESPONSES TO PHQ QUESTIONS 1-9: 0
1. LITTLE INTEREST OR PLEASURE IN DOING THINGS: NOT AT ALL
SUM OF ALL RESPONSES TO PHQ QUESTIONS 1-9: 0
SUM OF ALL RESPONSES TO PHQ QUESTIONS 1-9: 0
2. FEELING DOWN, DEPRESSED OR HOPELESS: NOT AT ALL

## 2025-05-28 ASSESSMENT — PAIN DESCRIPTION - ORIENTATION: ORIENTATION: LEFT

## 2025-05-28 ASSESSMENT — PAIN DESCRIPTION - LOCATION: LOCATION: SHOULDER

## 2025-05-28 NOTE — PROGRESS NOTES
[facility] Progress Note    Date: May 28, 2025        1300: Pt arrived ambulatory to [facility] for Venofer in stable condition.  Assessment completed. PIV placed to right AC with positive blood return. Labs drawn and sent for processing.      Patient Vitals for the past 12 hrs:   Temp Pulse Resp BP   05/28/25 1245 98.9 °F (37.2 °C) 82 18 (!) 140/82         Medications Administered         iron sucrose (VENOFER) 200 mg in sodium chloride 0.9 % 100 mL IVPB Admin Date  05/28/2025 Action  New Bag Dose  200 mg Rate  480 mL/hr Route  IntraVENous Documented By  Dawna Snyder, RN               Mr. Teague tolerated the infusion, and had no complaints.    PIV flushed and removed. 2x2 and coban placed    [facility] Te[facility]. Patient is aware of next scheduled [facility] appointment.         Future Appointments   Date Time Provider Department Center   5/28/2025  1:45 PM JEM SO CHAIR 11 [facility]   6/4/2025  3:00 PM JEM SO CHAIR 7 [facility]   6/11/2025  2:45 PM JEM SO CHAIR 11 [facility]   6/18/2025  3:00 PM JEM SO CHAIR 16 [facility]   7/25/2025 10:30 AM Shyanne Kennedy MD L[facility] AMB   12/11/2025  8:30 AM Shyanne Kennedy MD L[facility] AMB         Dawna Snyder, RN, RN  May 28, 2025

## 2025-05-28 NOTE — PROGRESS NOTES
Chief Complaint   Patient presents with    Follow-up     N/A  Pain in tooth and      Vitals:    05/28/25 1145   BP: (!) 143/86   BP Site: Right Upper Arm   Patient Position: Sitting   Pulse: 94   Temp: 99.8 °F (37.7 °C)   TempSrc: Temporal   SpO2: 99%   Weight: 72.6 kg (160 lb)   Height: 1.702 m (5' 7\")     .  \"Have you been to the ER, urgent care clinic since your last visit?  Hospitalized since your last visit?\"    NO    “Have you seen or consulted any other health care providers outside of [facility] since your last visit?”    NO        “Have you had a colorectal cancer screening such as a colonoscopy/FIT/Cologuard?    YES - Type: Colonoscopy - Where: with in 5 years  Nurse/CMA to request most recent records if not in the chart     No colonoscopy on file  No cologuard on file  No FIT/FOBT on file   No flexible sigmoidoscopy on file         Click Here for Release of Records Request    
      Serologies Latest Ref Rng & Units 9/28/2022   Hep B Surface Ag Index <0.10   Hep B Surface Ag Interp NEG   Negative   Hep C Ab NR   NONREACTIVE     Serologies Latest Ref Rng & Units 2/27/2023   Hep B Core Ab, Total Negative Negative     Serologies Latest Ref Rng & Units 2/27/2023   Hep A Ab, Total Negative Positive (A)         10/2022: TADEO negative  Serologies Latest Ref Rng & Units 2/27/2023   M2 Ab 0.0 - 20.0 Units <20.0   Ceruloplasmin 16.0 - 31.0 mg/dL 29.9   Alpha-1 antitrypsin level 101 - 187 mg/dL 164     Serologies Latest Ref Rng & Units 6/22/2023   Ferritin 30 - 400 ng/mL 547 (H)   Iron % Saturation 15 - 55 %    TADEO Negative      TADEO BY IFA IGG Negative Positive (A)   TADEO Pattern <1:40 1:160 (H)       LIVER HISTOLOGY:  4/2023.  FibroScan performed at [facility]. EkPa was 57.7.  IQR/med 25%.  . The results suggested a fibrosis level of F4.  The CAP score suggests there is no significant hepatic steatosis.    ENDOSCOPIC PROCEDURES:  3/2022. EGD by Dr. Gutiérrez. No varices.   12/2024. EGD by Dr. Kennedy. Moderate to large varices with thin wall. Three bands were successfully deployed. Mild oozing after first and second band. Oozing stopped by end of procedure. Repeat endoscopy to reassess varices and need for banding in 2-3 months   2/2025.  EGD by Dr. Kennedy. Three columns of moderate size varices. Three bands placed. Repeat endoscopy to reassess varices and need for additional banding in 2 months   4/2025 EGD by dr kennedy. Few moderate varices, 3 bands placed.     RADIOLOGY:  9/2022. CT of abdomen with contrast. Hepatomegaly with cirrhotic change and ascites. Several too small to fully characterize rounded hypodensities redemonstrated without significant change.    11/2022. MRI of liver. Changes consistent with cirrhosis. Hepatomegaly. Small cysts, hemangioma. No concerning liver mass or lesion.   4/2023.  Ultrasound of liver.   Echogenic consistent with cirrhosis.  There is a 2.1

## 2025-06-01 LAB
PETH BLD QL SCN: POSITIVE
PETH BLD-MCNC: 436 NG/ML

## 2025-06-02 NOTE — RESULT ENCOUNTER NOTE
Mild alcoholic hepatitis (direct bilirubin 4.0). MDF 20.5     Patient counseled extensively on importance of complete alcohol avoidance. He reports using alcohol to cope with pain. Recommend alternative pain management strategies.

## 2025-06-04 ENCOUNTER — HOSPITAL ENCOUNTER (OUTPATIENT)
Facility: HOSPITAL | Age: 55
Setting detail: INFUSION SERIES
Discharge: HOME | End: 2025-06-04
Payer: COMMERCIAL

## 2025-06-04 ENCOUNTER — APPOINTMENT (OUTPATIENT)
Facility: HOSPITAL | Age: 55
End: 2025-06-04
Payer: COMMERCIAL

## 2025-06-04 VITALS
HEART RATE: 95 BPM | TEMPERATURE: 98 F | OXYGEN SATURATION: 97 % | RESPIRATION RATE: 18 BRPM | SYSTOLIC BLOOD PRESSURE: 126 MMHG | DIASTOLIC BLOOD PRESSURE: 69 MMHG

## 2025-06-04 DIAGNOSIS — D50.8 OTHER IRON DEFICIENCY ANEMIA: Primary | ICD-10-CM

## 2025-06-04 PROCEDURE — 96374 THER/PROPH/DIAG INJ IV PUSH: CPT

## 2025-06-04 PROCEDURE — 2580000003 HC RX 258: Performed by: STUDENT IN AN ORGANIZED HEALTH CARE EDUCATION/TRAINING PROGRAM

## 2025-06-04 PROCEDURE — 6360000002 HC RX W HCPCS: Performed by: STUDENT IN AN ORGANIZED HEALTH CARE EDUCATION/TRAINING PROGRAM

## 2025-06-04 RX ORDER — SODIUM CHLORIDE 0.9 % (FLUSH) 0.9 %
5-40 SYRINGE (ML) INJECTION PRN
Status: CANCELLED | OUTPATIENT
Start: 2025-06-11

## 2025-06-04 RX ORDER — SODIUM CHLORIDE 9 MG/ML
5-250 INJECTION, SOLUTION INTRAVENOUS PRN
Status: CANCELLED | OUTPATIENT
Start: 2025-06-11

## 2025-06-04 RX ORDER — HEPARIN 100 UNIT/ML
500 SYRINGE INTRAVENOUS PRN
Status: CANCELLED | OUTPATIENT
Start: 2025-06-11

## 2025-06-04 RX ADMIN — IRON SUCROSE 200 MG: 20 INJECTION, SOLUTION INTRAVENOUS at 16:01

## 2025-06-04 ASSESSMENT — PAIN SCALES - GENERAL: PAINLEVEL_OUTOF10: 0

## 2025-06-04 NOTE — PROGRESS NOTES
[facility] Short Note                   Date: 2025    Name: Michael Teague    MRN: [id number]         : 1970      Pt admit to [facility] for Venofer ambulatory in stable condition. Assessment completed and documented in flowsheets. No acute concerns at this time.    Mr. Teague's vitals were reviewed   Patient Vitals for the past 12 hrs:   Temp Pulse Resp BP SpO2   25 1510 98 °F (36.7 °C) 95 18 126/69 97 %       Medications given: via PIV in R forearm  Medications Administered         iron sucrose (VENOFER) 200 mg in sodium chloride 0.9 % 100 mL IVPB Admin Date  2025 Action  New Bag Dose  200 mg Rate  480 mL/hr Route  IntraVENous Documented By  Rosemary Abdi RN          PIV placed with positive blood return. PIV was flushed and removed per protocol prior to discharge.    Mr. Teague tolerated the infusion and had no complaints.    [facility] Te[facility] and is aware of future appointments.     Future Appointments   Date Time Provider Department Center   2025  2:45 PM JEM SO CHAIR 11 [facility]   2025  3:00 PM JME SO CHAIR 16 [facility]   2025 10:30 AM Shyanne Kennedy MD L[facility] AMB   2025  8:30 AM Shyanne Kennedy MD LIVR BS FÉLIX Abdi RN  2025  4:04 PM

## 2025-06-05 LAB
AFP L3 MFR SERPL: NORMAL % (ref 0–9.9)
AFP SERPL-MCNC: 2.8 NG/ML (ref 0–8.4)

## 2025-06-11 ENCOUNTER — APPOINTMENT (OUTPATIENT)
Facility: HOSPITAL | Age: 55
End: 2025-06-11
Payer: COMMERCIAL

## 2025-06-11 ENCOUNTER — HOSPITAL ENCOUNTER (OUTPATIENT)
Facility: HOSPITAL | Age: 55
Setting detail: INFUSION SERIES
Discharge: HOME | End: 2025-06-11
Payer: COMMERCIAL

## 2025-06-11 ENCOUNTER — PATIENT MESSAGE (OUTPATIENT)
Age: 55
End: 2025-06-11

## 2025-06-11 VITALS
SYSTOLIC BLOOD PRESSURE: 131 MMHG | RESPIRATION RATE: 18 BRPM | TEMPERATURE: 98 F | DIASTOLIC BLOOD PRESSURE: 74 MMHG | HEART RATE: 87 BPM | OXYGEN SATURATION: 97 %

## 2025-06-11 DIAGNOSIS — D50.8 OTHER IRON DEFICIENCY ANEMIA: Primary | ICD-10-CM

## 2025-06-11 PROCEDURE — 6360000002 HC RX W HCPCS: Performed by: STUDENT IN AN ORGANIZED HEALTH CARE EDUCATION/TRAINING PROGRAM

## 2025-06-11 PROCEDURE — 96374 THER/PROPH/DIAG INJ IV PUSH: CPT

## 2025-06-11 PROCEDURE — 2580000003 HC RX 258: Performed by: STUDENT IN AN ORGANIZED HEALTH CARE EDUCATION/TRAINING PROGRAM

## 2025-06-11 RX ORDER — SODIUM CHLORIDE 9 MG/ML
5-250 INJECTION, SOLUTION INTRAVENOUS PRN
Status: CANCELLED | OUTPATIENT
Start: 2025-06-18

## 2025-06-11 RX ORDER — SODIUM CHLORIDE 0.9 % (FLUSH) 0.9 %
5-40 SYRINGE (ML) INJECTION PRN
Status: CANCELLED | OUTPATIENT
Start: 2025-06-18

## 2025-06-11 RX ORDER — HEPARIN 100 UNIT/ML
500 SYRINGE INTRAVENOUS PRN
Status: CANCELLED | OUTPATIENT
Start: 2025-06-18

## 2025-06-11 RX ADMIN — IRON SUCROSE 200 MG: 20 INJECTION, SOLUTION INTRAVENOUS at 15:20

## 2025-06-11 ASSESSMENT — PAIN SCALES - GENERAL: PAINLEVEL_OUTOF10: 0

## 2025-06-11 NOTE — PROGRESS NOTES
[facility] Short Note                   Date: 2025    Name: Michael Teague    MRN: [id number]         : 1970      Pt admit to [facility] for Venofer ambulatory in stable condition. Assessment completed and documented in flowsheets. No acute concerns at this time.    Mr. Teague's vitals were reviewed  Patient Vitals for the past 12 hrs:   Temp Pulse Resp BP SpO2   25 1451 98 °F (36.7 °C) 86 18 130/66 97 %       Medications given: via PIV in R forearm per patient preference  Medications Administered         iron sucrose (VENOFER) 200 mg in sodium chloride 0.9 % 100 mL IVPB Admin Date  2025 Action  New Bag Dose  200 mg Rate  480 mL/hr Route  IntraVENous Documented By  Rosemary Abdi RN          PIV placed with positive blood return. PIV was flushed and removed per protocol prior to discharge.    Mr. Teague tolerated the infusion and had no complaints.    Mr. Teague [facility] condition and is aware of future appointments.     Future Appointments   Date Time Provider Department Center   2025  3:00 PM JEM [place] 16 [facility]   2025 10:30 AM Shyanne Kennedy MD L[facility] AMB   2025  8:30 AM Shyanne Kennedy MD [facility] AMB       Rosemary Abdi RN  2025  3:21 PM

## 2025-06-17 ENCOUNTER — TELEPHONE (OUTPATIENT)
Age: 55
End: 2025-06-17

## 2025-06-17 NOTE — TELEPHONE ENCOUNTER
Called Mr Teague. Explained that his liver function appears worse and I suspect it is related to alcohol use.     He reports he stopped using alcohol but is using tylenol to manage his pain. I recommend he avoid this for now, given his history of over use and abnormal liver function.    I will see him in July for a check in.

## 2025-06-17 NOTE — TELEPHONE ENCOUNTER
Per CFW patient needed to see Dr. Kennedy to discuss labs and more plan of care info. Left v/m on wife's phone that Dr. Kennedy has availability on 8/1/25 W@ 4pm or WedNESDAYS for VV

## 2025-06-18 ENCOUNTER — APPOINTMENT (OUTPATIENT)
Facility: HOSPITAL | Age: 55
End: 2025-06-18
Payer: COMMERCIAL

## 2025-06-18 ENCOUNTER — HOSPITAL ENCOUNTER (OUTPATIENT)
Facility: HOSPITAL | Age: 55
Setting detail: INFUSION SERIES
Discharge: HOME | End: 2025-06-18
Payer: COMMERCIAL

## 2025-06-18 VITALS
SYSTOLIC BLOOD PRESSURE: 110 MMHG | DIASTOLIC BLOOD PRESSURE: 59 MMHG | OXYGEN SATURATION: 98 % | HEART RATE: 85 BPM | RESPIRATION RATE: 18 BRPM | TEMPERATURE: 98.7 F

## 2025-06-18 DIAGNOSIS — D50.8 OTHER IRON DEFICIENCY ANEMIA: Primary | ICD-10-CM

## 2025-06-18 PROCEDURE — 2580000003 HC RX 258: Performed by: STUDENT IN AN ORGANIZED HEALTH CARE EDUCATION/TRAINING PROGRAM

## 2025-06-18 PROCEDURE — 6360000002 HC RX W HCPCS: Performed by: STUDENT IN AN ORGANIZED HEALTH CARE EDUCATION/TRAINING PROGRAM

## 2025-06-18 PROCEDURE — 96374 THER/PROPH/DIAG INJ IV PUSH: CPT

## 2025-06-18 RX ORDER — SODIUM CHLORIDE 9 MG/ML
5-250 INJECTION, SOLUTION INTRAVENOUS PRN
OUTPATIENT
Start: 2025-06-25

## 2025-06-18 RX ORDER — SODIUM CHLORIDE 9 MG/ML
5-250 INJECTION, SOLUTION INTRAVENOUS PRN
Status: DISCONTINUED | OUTPATIENT
Start: 2025-06-18 | End: 2025-06-19 | Stop reason: HOSPADM

## 2025-06-18 RX ORDER — SODIUM CHLORIDE 0.9 % (FLUSH) 0.9 %
5-40 SYRINGE (ML) INJECTION PRN
OUTPATIENT
Start: 2025-06-25

## 2025-06-18 RX ORDER — HEPARIN 100 UNIT/ML
500 SYRINGE INTRAVENOUS PRN
OUTPATIENT
Start: 2025-06-25

## 2025-06-18 RX ADMIN — IRON SUCROSE 200 MG: 20 INJECTION, SOLUTION INTRAVENOUS at 15:28

## 2025-06-18 ASSESSMENT — PAIN SCALES - GENERAL: PAINLEVEL_OUTOF10: 0

## 2025-06-18 NOTE — PROGRESS NOTES
[facility] Short Note                   Date: 2025    Name: Michael Teague    MRN: [id number]         : 1970      Pt admit to [facility] for Venofer ambulatory in stable condition. Assessment completed and documented in flowsheets. No acute concerns at this time.    Mr. Teague's vitals were reviewed  Patient Vitals for the past 12 hrs:   Temp Pulse Resp BP SpO2   25 1455 98.7 °F (37.1 °C) 95 18 (!) 109/59 98 %     Medications given: via PIV in R forearm  Medications Administered         iron sucrose (VENOFER) 200 mg in sodium chloride 0.9 % 100 mL IVPB Admin Date  2025 Action  New Bag Dose  200 mg Rate  480 mL/hr Route  IntraVENous Documented By  Rosemary Abdi RN          PIV placed with positive blood return. PIV was flushed and removed per protocol prior to discharge.    Mr. Teague tolerated the infusion and had no complaints.    Mr. Teague [facility] condition and is aware of future appointments.     Future Appointments   Date Time Provider Department Center   2025  7:45 PM [facility] MRI 1 [facility]MRI [facility]   2025 10:30 AM Shyanne Kennedy MD L[facility] AMB   2025  8:30 AM Shyanne Kennedy MD L[facility] AMB       Rosemary Abdi RN  2025  3:35 PM

## 2025-06-27 DIAGNOSIS — I85.00 ESOPHAGEAL VARICES WITHOUT BLEEDING, UNSPECIFIED ESOPHAGEAL VARICES TYPE (HCC): ICD-10-CM

## 2025-06-27 DIAGNOSIS — K70.30 ALCOHOLIC CIRRHOSIS OF LIVER WITHOUT ASCITES (HCC): Primary | ICD-10-CM

## 2025-06-27 RX ORDER — LISINOPRIL 20 MG/1
20 TABLET ORAL DAILY
Qty: 90 TABLET | Refills: 3 | Status: CANCELLED | OUTPATIENT
Start: 2025-06-27

## 2025-06-27 NOTE — TELEPHONE ENCOUNTER
6/27/25@0948 Spoke w/patient wife. Patient will need to contact PCP for blood pressure medication requested (lisinopril). Patient wife verbalize understanding. (KF)

## 2025-07-02 ENCOUNTER — HOSPITAL ENCOUNTER (OUTPATIENT)
Facility: HOSPITAL | Age: 55
Discharge: HOME | End: 2025-07-05
Attending: STUDENT IN AN ORGANIZED HEALTH CARE EDUCATION/TRAINING PROGRAM
Payer: COMMERCIAL

## 2025-07-02 DIAGNOSIS — K70.30 ALCOHOLIC CIRRHOSIS OF LIVER WITHOUT ASCITES (HCC): ICD-10-CM

## 2025-07-02 PROCEDURE — 74181 MRI ABDOMEN W/O CONTRAST: CPT

## 2025-07-07 NOTE — PROGRESS NOTES
Called Ms Teague, she has sent two messages requesting hiccup remedy for Mr Teague. He has had hiccups for 4 days. She would like Chlorpromazine because it was previously prescribed. I am not comfortable prescribing this antipsychotic and would like to defer to PCP. She expressed understanding and will call PCP.

## 2025-07-08 DIAGNOSIS — R06.6 HICCUPS: Primary | ICD-10-CM

## 2025-07-08 RX ORDER — BACLOFEN 10 MG/1
10 TABLET ORAL 2 TIMES DAILY
Qty: 60 TABLET | Refills: 0 | Status: SHIPPED | OUTPATIENT
Start: 2025-07-08

## 2025-07-15 ENCOUNTER — TELEPHONE (OUTPATIENT)
Age: 55
End: 2025-07-15

## 2025-07-15 DIAGNOSIS — K70.30 ALCOHOLIC CIRRHOSIS OF LIVER WITHOUT ASCITES (HCC): Primary | ICD-10-CM

## 2025-07-15 NOTE — TELEPHONE ENCOUNTER
7/15/25@6227 Patient wife called asking for labs prior to appt. Labs placed  and patient will have them done today. (KF)

## 2025-07-16 ENCOUNTER — RESULTS FOLLOW-UP (OUTPATIENT)
Age: 55
End: 2025-07-16

## 2025-07-16 LAB
ALBUMIN SERPL-MCNC: 3.1 G/DL (ref 3.8–4.9)
ALP SERPL-CCNC: 193 IU/L (ref 44–121)
ALT SERPL-CCNC: 35 IU/L (ref 0–44)
AST SERPL-CCNC: 73 IU/L (ref 0–40)
BASOPHILS # BLD AUTO: 0 X10E3/UL (ref 0–0.2)
BASOPHILS NFR BLD AUTO: 1 %
BILIRUB DIRECT SERPL-MCNC: 3.57 MG/DL (ref 0–0.4)
BILIRUB SERPL-MCNC: 4.6 MG/DL (ref 0–1.2)
BUN SERPL-MCNC: 7 MG/DL (ref 6–24)
BUN/CREAT SERPL: 7 (ref 9–20)
CALCIUM SERPL-MCNC: 8.9 MG/DL (ref 8.7–10.2)
CHLORIDE SERPL-SCNC: 97 MMOL/L (ref 96–106)
CO2 SERPL-SCNC: 21 MMOL/L (ref 20–29)
CREAT SERPL-MCNC: 0.96 MG/DL (ref 0.76–1.27)
EGFRCR SERPLBLD CKD-EPI 2021: 93 ML/MIN/1.73
EOSINOPHIL # BLD AUTO: 0 X10E3/UL (ref 0–0.4)
EOSINOPHIL NFR BLD AUTO: 1 %
ERYTHROCYTE [DISTWIDTH] IN BLOOD BY AUTOMATED COUNT: 12.5 % (ref 11.6–15.4)
GLUCOSE SERPL-MCNC: 175 MG/DL (ref 70–99)
HCT VFR BLD AUTO: 25 % (ref 37.5–51)
HGB BLD-MCNC: 8.6 G/DL (ref 13–17.7)
IMM GRANULOCYTES # BLD AUTO: 0 X10E3/UL (ref 0–0.1)
IMM GRANULOCYTES NFR BLD AUTO: 0 %
INR PPP: 1.4 (ref 0.9–1.2)
LYMPHOCYTES # BLD AUTO: 0.5 X10E3/UL (ref 0.7–3.1)
LYMPHOCYTES NFR BLD AUTO: 15 %
MCH RBC QN AUTO: 37.4 PG (ref 26.6–33)
MCHC RBC AUTO-ENTMCNC: 34.4 G/DL (ref 31.5–35.7)
MCV RBC AUTO: 109 FL (ref 79–97)
MONOCYTES # BLD AUTO: 0.4 X10E3/UL (ref 0.1–0.9)
MONOCYTES NFR BLD AUTO: 12 %
NEUTROPHILS # BLD AUTO: 2.5 X10E3/UL (ref 1.4–7)
NEUTROPHILS NFR BLD AUTO: 71 %
PLATELET # BLD AUTO: 199 X10E3/UL (ref 150–450)
POTASSIUM SERPL-SCNC: 4.1 MMOL/L (ref 3.5–5.2)
PROT SERPL-MCNC: 7.2 G/DL (ref 6–8.5)
PROTHROMBIN TIME: 14.5 SEC (ref 9.1–12)
RBC # BLD AUTO: 2.3 X10E6/UL (ref 4.14–5.8)
SODIUM SERPL-SCNC: 130 MMOL/L (ref 134–144)
WBC # BLD AUTO: 3.5 X10E3/UL (ref 3.4–10.8)

## 2025-07-16 NOTE — RESULT ENCOUNTER NOTE
Labs abnormal but stable. Nothing life threatening. I have counseled extensively on need for complete alcohol cessation. Will seen in clinic on 7/25    MELD 3.0: 20 at 7/15/2025  4:13 PM  MELD-Na: 22 at 7/15/2025  4:13 PM  Calculated from:  Serum Creatinine: 0.96 mg/dL (Using min of 1 mg/dL) at 7/15/2025  4:13 PM  Serum Sodium: 130 mmol/L at 7/15/2025  4:13 PM  Total Bilirubin: 4.6 mg/dL at 7/15/2025  4:13 PM  Serum Albumin: 3.1 g/dL at 7/15/2025  4:13 PM  INR(ratio): 1.4 at 7/15/2025  4:13 PM  Age at listing (hypothetical): 55 years  Sex: Male at 7/15/2025  4:13 PM

## 2025-07-19 LAB
PETH BLD QL SCN: POSITIVE
PETH BLD-MCNC: 74 NG/ML

## 2025-07-24 ENCOUNTER — ANESTHESIA EVENT (OUTPATIENT)
Facility: HOSPITAL | Age: 55
End: 2025-07-24
Payer: COMMERCIAL

## 2025-07-24 NOTE — ANESTHESIA PRE PROCEDURE
Department of Anesthesiology  Preprocedure Note       Name:  Michael Teague   Age:  55 y.o.  :  1970                                          MRN:  [id number]         Date:  2025      Surgeon: Surgeon(s):  Shyanne Kennedy MD    Procedure: Procedure(s):  ESOPHAGOGASTRODUODENOSCOPY    Medications prior to admission:   Prior to Admission medications    Medication Sig Start Date End Date Taking? Authorizing Provider   baclofen (LIORESAL) 10 MG tablet Take 1 tablet by mouth 2 times daily Medication may cause drowsiness and patient should not drive or operate machinery after taking it. 25   Shyanne Kennedy MD   sucralfate (CARAFATE) 1 GM tablet Take 1 tablet by mouth 4 times daily as needed (throat pain) 25  Shyanne Kennedy MD   folic acid (FOLVITE) 1 MG tablet TAKE 1 TABLET BY MOUTH EVERY DAY 3/24/25   Jacqueline Singh APRN - NP   thiamine 100 MG tablet Take 1 tablet by mouth daily 25   Michael Connor MD   pantoprazole (PROTONIX) 40 MG tablet Take 1 tablet by mouth in the morning and at bedtime 25   Michael Connor MD   ferrous sulfate (IRON 325) 325 (65 Fe) MG tablet Take 1 tablet by mouth daily (with breakfast) 25   Jacqueline Singh APRN - NP   lactulose (CHRONULAC) 10 GM/15ML solution Take 15 mLs by mouth daily  Patient taking differently: Take 15 mLs by mouth daily PRN 24   Jacqueline Singh APRN - NP   vitamin B-12 (CYANOCOBALAMIN) 500 MCG tablet Take 1 tablet by mouth daily 24   Jacqueline Singh APRN - NP   lisinopril (PRINIVIL;ZESTRIL) 20 MG tablet Take 1 tablet by mouth daily 24   Jacqueline Singh APRN - NP       Current medications:    No current facility-administered medications for this encounter.     Current Outpatient Medications   Medication Sig Dispense Refill    baclofen (LIORESAL) 10 MG tablet Take 1 tablet by mouth 2 times daily Medication may cause drowsiness and patient should not drive or operate machinery after taking it. 60 tablet

## 2025-07-25 ENCOUNTER — ANESTHESIA (OUTPATIENT)
Facility: HOSPITAL | Age: 55
End: 2025-07-25
Payer: COMMERCIAL

## 2025-07-25 ENCOUNTER — HOSPITAL ENCOUNTER (OUTPATIENT)
Facility: HOSPITAL | Age: 55
Setting detail: OUTPATIENT SURGERY
Discharge: HOME | End: 2025-07-25
Attending: STUDENT IN AN ORGANIZED HEALTH CARE EDUCATION/TRAINING PROGRAM | Admitting: STUDENT IN AN ORGANIZED HEALTH CARE EDUCATION/TRAINING PROGRAM
Payer: COMMERCIAL

## 2025-07-25 VITALS
OXYGEN SATURATION: 100 % | SYSTOLIC BLOOD PRESSURE: 142 MMHG | HEART RATE: 81 BPM | DIASTOLIC BLOOD PRESSURE: 88 MMHG | BODY MASS INDEX: 23.07 KG/M2 | HEIGHT: 67 IN | WEIGHT: 147 LBS | RESPIRATION RATE: 13 BRPM

## 2025-07-25 PROBLEM — K70.30 ESOPHAGEAL VARICES IN ALCOHOLIC CIRRHOSIS (HCC): Status: ACTIVE | Noted: 2025-07-25

## 2025-07-25 PROBLEM — I85.10 ESOPHAGEAL VARICES IN ALCOHOLIC CIRRHOSIS (HCC): Status: ACTIVE | Noted: 2025-07-25

## 2025-07-25 PROCEDURE — 43235 EGD DIAGNOSTIC BRUSH WASH: CPT | Performed by: STUDENT IN AN ORGANIZED HEALTH CARE EDUCATION/TRAINING PROGRAM

## 2025-07-25 PROCEDURE — 2580000003 HC RX 258: Performed by: STUDENT IN AN ORGANIZED HEALTH CARE EDUCATION/TRAINING PROGRAM

## 2025-07-25 PROCEDURE — 2720000010 HC SURG SUPPLY STERILE: Performed by: STUDENT IN AN ORGANIZED HEALTH CARE EDUCATION/TRAINING PROGRAM

## 2025-07-25 PROCEDURE — 6360000002 HC RX W HCPCS: Performed by: NURSE ANESTHETIST, CERTIFIED REGISTERED

## 2025-07-25 PROCEDURE — 7100000010 HC PHASE II RECOVERY - FIRST 15 MIN: Performed by: STUDENT IN AN ORGANIZED HEALTH CARE EDUCATION/TRAINING PROGRAM

## 2025-07-25 PROCEDURE — 3600007502: Performed by: STUDENT IN AN ORGANIZED HEALTH CARE EDUCATION/TRAINING PROGRAM

## 2025-07-25 PROCEDURE — 7100000011 HC PHASE II RECOVERY - ADDTL 15 MIN: Performed by: STUDENT IN AN ORGANIZED HEALTH CARE EDUCATION/TRAINING PROGRAM

## 2025-07-25 PROCEDURE — 3700000000 HC ANESTHESIA ATTENDED CARE: Performed by: STUDENT IN AN ORGANIZED HEALTH CARE EDUCATION/TRAINING PROGRAM

## 2025-07-25 RX ORDER — SODIUM CHLORIDE 9 MG/ML
INJECTION, SOLUTION INTRAVENOUS PRN
Status: DISCONTINUED | OUTPATIENT
Start: 2025-07-25 | End: 2025-07-25 | Stop reason: HOSPADM

## 2025-07-25 RX ORDER — SODIUM CHLORIDE 0.9 % (FLUSH) 0.9 %
5-40 SYRINGE (ML) INJECTION EVERY 12 HOURS SCHEDULED
Status: DISCONTINUED | OUTPATIENT
Start: 2025-07-25 | End: 2025-07-25 | Stop reason: HOSPADM

## 2025-07-25 RX ORDER — SODIUM CHLORIDE 0.9 % (FLUSH) 0.9 %
5-40 SYRINGE (ML) INJECTION PRN
Status: DISCONTINUED | OUTPATIENT
Start: 2025-07-25 | End: 2025-07-25 | Stop reason: HOSPADM

## 2025-07-25 RX ORDER — LIDOCAINE HYDROCHLORIDE 20 MG/ML
INJECTION, SOLUTION EPIDURAL; INFILTRATION; INTRACAUDAL; PERINEURAL
Status: DISCONTINUED | OUTPATIENT
Start: 2025-07-25 | End: 2025-07-25 | Stop reason: SDUPTHER

## 2025-07-25 RX ADMIN — LIDOCAINE HYDROCHLORIDE 100 MG: 20 INJECTION, SOLUTION EPIDURAL; INFILTRATION; INTRACAUDAL; PERINEURAL at 10:42

## 2025-07-25 RX ADMIN — PROPOFOL 50 MG: 10 INJECTION, EMULSION INTRAVENOUS at 10:43

## 2025-07-25 RX ADMIN — PROPOFOL 30 MG: 10 INJECTION, EMULSION INTRAVENOUS at 10:47

## 2025-07-25 RX ADMIN — SODIUM CHLORIDE: 9 INJECTION, SOLUTION INTRAVENOUS at 10:41

## 2025-07-25 RX ADMIN — PROPOFOL 30 MG: 10 INJECTION, EMULSION INTRAVENOUS at 10:45

## 2025-07-25 RX ADMIN — PROPOFOL 50 MG: 10 INJECTION, EMULSION INTRAVENOUS at 10:42

## 2025-07-25 ASSESSMENT — PAIN - FUNCTIONAL ASSESSMENT
PAIN_FUNCTIONAL_ASSESSMENT: NONE - DENIES PAIN

## 2025-07-25 NOTE — PROGRESS NOTES
Initial RN admission and assessment performed and documented in Endoscopy navigator.     Patient evaluated by anesthesia in pre-procedure holding.     All procedural vital signs, airway assessment, and level of consciousness information monitored and recorded by anesthesia staff on the anesthesia record.     Report received from CRNA post procedure.  Patient transported to recovery area by RN.    Endoscopy post procedure time out was performed and specimens were verified by physician.-No specimens    Endoscope was pre-cleaned at bedside immediately following procedure by Britney JUAN

## 2025-07-25 NOTE — H&P
[facility]  [address], [address]  [place]  [address] [phone]  FAX: [phone]     David Nur MD, FACP, MACG, FAASLD   MD Elza Campoverde, COLLIN Nice, AGNP-BC   Luzclare Alvesayad, ACNPC-A    Liver Transplant and Liver Cancer Coordination:   Marianne Erazo, ISAIAH Frey, ISAIAH Read RN    Clinical Research Coordination:   Elza Beckham, ISAIAH Enciso, ISAIAH De, ISAIAH Chapa RN       PRE-PROCEDURE NOTE - EGD    H and P from last office visit reviewed.    Allergies reviewed.  Out-patient medicaton list reviewed.    Patient Active Problem List   Diagnosis    Hepatomegaly    Hyperbilirubinemia    Moderate protein-calorie malnutrition    Alcoholic cirrhosis of liver without ascites (HCC)    Anemia in other chronic diseases classified elsewhere    Alcohol dependence in remission (HCC)    Chronic cholecystitis    Arthritis of right shoulder region    Hematemesis with nausea    Iron deficiency anemia         No Known Allergies    No current facility-administered medications on file prior to encounter.     Current Outpatient Medications on File Prior to Encounter   Medication Sig Dispense Refill    baclofen (LIORESAL) 10 MG tablet Take 1 tablet by mouth 2 times daily Medication may cause drowsiness and patient should not drive or operate machinery after taking it. 60 tablet 0    sucralfate (CARAFATE) 1 GM tablet Take 1 tablet by mouth 4 times daily as needed (throat pain) 28 tablet 0    folic acid (FOLVITE) 1 MG tablet TAKE 1 TABLET BY MOUTH EVERY DAY 90 tablet 3    thiamine 100 MG tablet Take 1 tablet by mouth daily 90 tablet 1    pantoprazole (PROTONIX) 40 MG tablet Take 1 tablet by mouth in the morning and at bedtime 90 tablet 1    ferrous sulfate (IRON 325) 325 (65 Fe) MG tablet Take 1 tablet by mouth daily (with breakfast) 90 tablet 3    lactulose (CHRONULAC) 10 GM/15ML

## 2025-07-25 NOTE — OP NOTE
[facility]  [address], [address]  [place], [place]  [id number] [phone]  FAX: [phone]     David Nur MD, FACP, [facility], [facility]   MD Elza Campoverde, COLLIN Nice, AGNP-BC   Luz Alvesayad, ACNPC-A    Liver Transplant and Liver Cancer Coordination:   Marianne Erazo, ISAIAH Frey, ISAIAH Read, ISAIAH    Clinical Research Coordination:   Elza Beckham, ISAIAH Enciso, ISAIAH De, ISAIAH Chapa RN       UPPER ENDOSCOPY PROCEDURE NOTE    NAME: Michael Teague  :  1970  MRN:  [id number]    INDICATION: Cirrhosis.  Screening for esophageal varices with variceal ligation if  indicated.    : Shyanne Kennedy MD    SURGICAL ASSISTANT:  None    PROSTHETIC DEVISES, TISSUE GRAFTS, ORGAN TRANSPLANTS:  Not applicable     ANESTHESIA/SEDATION: MAC Sedation per anesthesiology    PROCEDURE DESCRIPTION:  [employer] consent was obtained from the patient for the procedure.  All risks and benefits of the procedure explained.     The procedure was performed in the endoscopy suite.  The patient was laying on a stretcher and moved to the left lateral decubitus position prior to administration of sedation.    Sedation was administered by anesthesiology.  See their note for details.      The endoscope was inserted into the mouth and advanced under direct vision to the second portion of the duodenum.  Careful inspection of upper gastrointestinal tract was made as the endoscope was inserted and withdrawn.  Retroflexion of the endoscope to view of the cardia of the stomach was performed.  The findings and interventions are described below.      FINDINGS:  Esophagus:    Small varices that flattened with multiple scars from prior banding. No high risk features. Given degree of scar, no banding was performed.     Stomach:   Mild portal hypertensive gastropathy.  No obvious gastric varices on

## 2025-07-25 NOTE — ANESTHESIA POSTPROCEDURE EVALUATION
Department of Anesthesiology  Postprocedure Note    Patient: Michael Teague  MRN: [id number]  YOB: 1970  Date of evaluation: 7/25/2025    Procedure Summary       Date: 07/25/25 Room / Location: [facility] ENDO 04 / [facility] ENDOSCOPY    Anesthesia Start: 1041 Anesthesia Stop: 1052    Procedure: ESOPHAGOGASTRODUODENOSCOPY (Upper GI Region) Diagnosis:       Alcoholic cirrhosis of liver without ascites (HCC)      (Alcoholic cirrhosis of liver without ascites (HCC) [K70.30])    Surgeons: Shyanne Kennedy MD Responsible Provider: Nacho Jaimes MD    Anesthesia Type: MAC ASA Status: 3            Anesthesia Type: MAC    Piter Phase I: Piter Score: 10    Piter Phase II:      Anesthesia Post Evaluation    Patient location during evaluation: PACU  Patient participation: complete - patient participated  Level of consciousness: awake  Airway patency: patent  Nausea & Vomiting: no nausea  Cardiovascular status: hemodynamically stable  Respiratory status: acceptable  Hydration status: stable  Pain management: adequate    No notable events documented.

## 2025-07-29 ENCOUNTER — TELEPHONE (OUTPATIENT)
Age: 55
End: 2025-07-29

## 2025-07-29 NOTE — TELEPHONE ENCOUNTER
7/29/25@6252 Patient wife called to report Amoxicillin/Hydrocodone given for a tooth abscess. Wife asking if it's okay to take these two medications. Discuss w/--okay to take both medication short term. Wife verbalize understanding. (KF)

## 2025-08-15 ENCOUNTER — HOSPITAL ENCOUNTER (OUTPATIENT)
Facility: HOSPITAL | Age: 55
Discharge: HOME | End: 2025-08-18
Attending: ORTHOPAEDIC SURGERY
Payer: COMMERCIAL

## 2025-08-15 DIAGNOSIS — M19.012 PRIMARY OSTEOARTHRITIS, LEFT SHOULDER: ICD-10-CM

## 2025-08-15 PROCEDURE — 73200 CT UPPER EXTREMITY W/O DYE: CPT

## 2025-08-28 ENCOUNTER — OFFICE VISIT (OUTPATIENT)
Age: 55
End: 2025-08-28
Payer: COMMERCIAL

## 2025-08-28 VITALS
HEART RATE: 89 BPM | OXYGEN SATURATION: 95 % | DIASTOLIC BLOOD PRESSURE: 76 MMHG | WEIGHT: 153.6 LBS | TEMPERATURE: 98.4 F | HEIGHT: 67 IN | BODY MASS INDEX: 24.11 KG/M2 | SYSTOLIC BLOOD PRESSURE: 126 MMHG

## 2025-08-28 DIAGNOSIS — D50.9 IRON DEFICIENCY ANEMIA, UNSPECIFIED IRON DEFICIENCY ANEMIA TYPE: ICD-10-CM

## 2025-08-28 DIAGNOSIS — K70.30 ALCOHOLIC CIRRHOSIS OF LIVER WITHOUT ASCITES (HCC): Primary | ICD-10-CM

## 2025-08-28 PROCEDURE — 99215 OFFICE O/P EST HI 40 MIN: CPT | Performed by: STUDENT IN AN ORGANIZED HEALTH CARE EDUCATION/TRAINING PROGRAM

## 2025-08-28 ASSESSMENT — PATIENT HEALTH QUESTIONNAIRE - PHQ9
SUM OF ALL RESPONSES TO PHQ QUESTIONS 1-9: 0
1. LITTLE INTEREST OR PLEASURE IN DOING THINGS: NOT AT ALL
2. FEELING DOWN, DEPRESSED OR HOPELESS: NOT AT ALL
SUM OF ALL RESPONSES TO PHQ QUESTIONS 1-9: 0
DEPRESSION UNABLE TO ASSESS: FUNCTIONAL CAPACITY MOTIVATION LIMITS ACCURACY

## 2025-08-29 ENCOUNTER — RESULTS FOLLOW-UP (OUTPATIENT)
Age: 55
End: 2025-08-29

## 2025-08-29 LAB
ALBUMIN SERPL-MCNC: 3.6 G/DL (ref 3.8–4.9)
ALP SERPL-CCNC: 219 IU/L (ref 44–121)
ALT SERPL-CCNC: 27 IU/L (ref 0–44)
AST SERPL-CCNC: 90 IU/L (ref 0–40)
BASOPHILS # BLD AUTO: 0 X10E3/UL (ref 0–0.2)
BASOPHILS NFR BLD AUTO: 1 %
BILIRUB SERPL-MCNC: 4.5 MG/DL (ref 0–1.2)
BUN SERPL-MCNC: 5 MG/DL (ref 6–24)
BUN/CREAT SERPL: 7 (ref 9–20)
CALCIUM SERPL-MCNC: 9.1 MG/DL (ref 8.7–10.2)
CHLORIDE SERPL-SCNC: 102 MMOL/L (ref 96–106)
CO2 SERPL-SCNC: 22 MMOL/L (ref 20–29)
CREAT SERPL-MCNC: 0.71 MG/DL (ref 0.76–1.27)
EGFRCR SERPLBLD CKD-EPI 2021: 108 ML/MIN/1.73
EOSINOPHIL # BLD AUTO: 0 X10E3/UL (ref 0–0.4)
EOSINOPHIL NFR BLD AUTO: 1 %
ERYTHROCYTE [DISTWIDTH] IN BLOOD BY AUTOMATED COUNT: 12.3 % (ref 11.6–15.4)
FERRITIN SERPL-MCNC: 1061 NG/ML (ref 30–400)
GLOBULIN SER CALC-MCNC: 4.1 G/DL (ref 1.5–4.5)
GLUCOSE SERPL-MCNC: 91 MG/DL (ref 70–99)
HCT VFR BLD AUTO: 34.6 % (ref 37.5–51)
HGB BLD-MCNC: 11.9 G/DL (ref 13–17.7)
IMM GRANULOCYTES # BLD AUTO: 0 X10E3/UL (ref 0–0.1)
IMM GRANULOCYTES NFR BLD AUTO: 0 %
INR PPP: 1.4 (ref 0.9–1.2)
IRON SATN MFR SERPL: 75 % (ref 15–55)
IRON SERPL-MCNC: 215 UG/DL (ref 38–169)
LYMPHOCYTES # BLD AUTO: 0.6 X10E3/UL (ref 0.7–3.1)
LYMPHOCYTES NFR BLD AUTO: 27 %
MCH RBC QN AUTO: 38.4 PG (ref 26.6–33)
MCHC RBC AUTO-ENTMCNC: 34.4 G/DL (ref 31.5–35.7)
MCV RBC AUTO: 112 FL (ref 79–97)
MONOCYTES # BLD AUTO: 0.2 X10E3/UL (ref 0.1–0.9)
MONOCYTES NFR BLD AUTO: 11 %
MORPHOLOGY BLD-IMP: ABNORMAL
NEUTROPHILS # BLD AUTO: 1.3 X10E3/UL (ref 1.4–7)
NEUTROPHILS NFR BLD AUTO: 60 %
PLATELET # BLD AUTO: 55 X10E3/UL (ref 150–450)
POTASSIUM SERPL-SCNC: 3.6 MMOL/L (ref 3.5–5.2)
PROT SERPL-MCNC: 7.7 G/DL (ref 6–8.5)
PROTHROMBIN TIME: 14.4 SEC (ref 9.1–12)
RBC # BLD AUTO: 3.1 X10E6/UL (ref 4.14–5.8)
SODIUM SERPL-SCNC: 139 MMOL/L (ref 134–144)
TIBC SERPL-MCNC: 286 UG/DL (ref 250–450)
UIBC SERPL-MCNC: 71 UG/DL (ref 111–343)
WBC # BLD AUTO: 2.1 X10E3/UL (ref 3.4–10.8)

## 2025-08-31 DIAGNOSIS — K21.9 GASTRO-ESOPHAGEAL REFLUX DISEASE WITHOUT ESOPHAGITIS: ICD-10-CM

## 2025-09-02 RX ORDER — PANTOPRAZOLE SODIUM 40 MG/1
40 TABLET, DELAYED RELEASE ORAL DAILY
Qty: 90 TABLET | Refills: 3 | Status: SHIPPED | OUTPATIENT
Start: 2025-09-02

## (undated) DEVICE — DRAPE,REIN 53X77,STERILE: Brand: MEDLINE

## (undated) DEVICE — COVER LT HNDL PLAS RIG 1 PER PK

## (undated) DEVICE — BANDAGE COBAN 4 IN COMPR W4INXL5YD FOAM COHESIVE QUIK STK SELF ADH SFT

## (undated) DEVICE — SUTURE VICRYL SZ 2-0 L36IN ABSRB UD L36MM CT-1 1/2 CIR J945H

## (undated) DEVICE — SOLUTION IRRIG 1000ML STRL H2O USP PLAS POUR BTL

## (undated) DEVICE — PENCIL SMK EVAC ALL IN 1 DSGN ENH VISIBILITY IMPROVED AIR

## (undated) DEVICE — SOLUTION SCRB 2OZ 10% POVIDONE IOD ANTIMIC BTL

## (undated) DEVICE — PACK,SHOULDER II,DRAPE: Brand: MEDLINE

## (undated) DEVICE — SUTURE ETHIBOND EXCEL SZ 5 L30IN NONABSORBABLE GRN L40MM V-37 MB66G

## (undated) DEVICE — HANDPIECE SET WITH COAXIAL HIGH FLOW TIP AND SUCTION TUBE: Brand: INTERPULSE

## (undated) DEVICE — ORISE PROKNIFE 3.0 MM ELECTRODE: Brand: ORISE™ PROKNIFE

## (undated) DEVICE — SYRINGE, LUER LOCK, 30ML: Brand: MEDLINE

## (undated) DEVICE — ORISE PROKNIFE 1.5 MM ELECTRODE: Brand: ORISE™ PROKNIFE

## (undated) DEVICE — GLOVE ORANGE PI 8   MSG9080

## (undated) DEVICE — 450 ML BOTTLE OF 0.05% CHLORHEXIDINE GLUCONATE IN 99.95% STERILE WATER FOR IRRIGATION, USP AND APPLICATOR.: Brand: IRRISEPT ANTIMICROBIAL WOUND LAVAGE

## (undated) DEVICE — DRAPE,U/ SHT,SPLIT,PLAS,STERIL: Brand: MEDLINE

## (undated) DEVICE — SPONGE LAP W18XL18IN WHT COT 4 PLY FLD STRUNG RADPQ DISP ST 2 PER PACK

## (undated) DEVICE — TOWEL,OR,DSP,ST,BLUE,STD,4/PK,20PK/CS: Brand: MEDLINE

## (undated) DEVICE — SOLUTION IRRIG 3000ML 0.9% SOD CHL USP UROMATIC PLAS CONT

## (undated) DEVICE — SHOULDR GPS HEX PINS KIT
Type: IMPLANTABLE DEVICE | Site: SHOULDER | Status: NON-FUNCTIONAL
Brand: GPS
Removed: 2024-05-14

## (undated) DEVICE — GLOVE ORTHO 8   MSG9480

## (undated) DEVICE — 2108 SERIES SAGITTAL BLADE (24.9 X 0.64 X 73.8MM)

## (undated) DEVICE — STOCKINETTE,IMPERVIOUS,12X48,STERILE: Brand: MEDLINE

## (undated) DEVICE — 3M™ IOBAN™ 2 ANTIMICROBIAL INCISE DRAPE 6640EZ: Brand: IOBAN™ 2

## (undated) DEVICE — SUTURE MONOCRYL SZ 4-0 L27IN ABSRB UD L19MM PS-2 1/2 CIR PRIM Y426H

## (undated) DEVICE — GLOVE ORANGE PI 7 1/2   MSG9075

## (undated) DEVICE — COVER,TABLE,HEAVY DUTY,77"X90",STRL: Brand: MEDLINE

## (undated) DEVICE — ELECTRODE PT RET AD L9FT HI MOIST COND ADH HYDRGEL CORDED

## (undated) DEVICE — SUTURE VICRYL SZ 0 L36IN ABSRB UD L36MM CT-1 1/2 CIR J946H

## (undated) DEVICE — DECANTER BAG 9": Brand: MEDLINE INDUSTRIES, INC.

## (undated) DEVICE — LIGATOR ENDOSCP DIA8.6-11.5MM MULT DISP SPDBND LIGATOR SUP

## (undated) DEVICE — GOWN,SIRUS,NONRNF,XLN/XL,20/CS: Brand: MEDLINE

## (undated) DEVICE — COVER,MAYO STAND,STERILE: Brand: MEDLINE

## (undated) DEVICE — TUBING IRRIG COMPATIBLE W ERBE MEDIVATOR PMP HYDR

## (undated) DEVICE — LIQUIBAND RAPID ADHESIVE 36/CS 0.8ML: Brand: MEDLINE

## (undated) DEVICE — LIGATOR ENDOSCP L2.8MM DIA8.6-11.5MM MULT BND SPEEDBAND

## (undated) DEVICE — HYPODERMIC SAFETY NEEDLE: Brand: MAGELLAN

## (undated) DEVICE — BASIN ST MAJOR-NO CAUTERY: Brand: MEDLINE INDUSTRIES, INC.

## (undated) DEVICE — TRAP FLUID BUFFALO FLTR